# Patient Record
Sex: FEMALE | Race: WHITE | NOT HISPANIC OR LATINO | Employment: OTHER | URBAN - METROPOLITAN AREA
[De-identification: names, ages, dates, MRNs, and addresses within clinical notes are randomized per-mention and may not be internally consistent; named-entity substitution may affect disease eponyms.]

---

## 2017-02-21 ENCOUNTER — RECORDS - HEALTHEAST (OUTPATIENT)
Dept: LAB | Facility: CLINIC | Age: 77
End: 2017-02-21

## 2017-02-21 LAB
CHOLEST SERPL-MCNC: 195 MG/DL
FASTING STATUS PATIENT QL REPORTED: ABNORMAL
HDLC SERPL-MCNC: 37 MG/DL
LDLC SERPL CALC-MCNC: 91 MG/DL
TRIGL SERPL-MCNC: 335 MG/DL

## 2018-05-11 ENCOUNTER — RECORDS - HEALTHEAST (OUTPATIENT)
Dept: LAB | Facility: CLINIC | Age: 78
End: 2018-05-11

## 2018-05-11 LAB
ALBUMIN SERPL-MCNC: 3.6 G/DL (ref 3.5–5)
ALP SERPL-CCNC: 111 U/L (ref 45–120)
ALT SERPL W P-5'-P-CCNC: 22 U/L (ref 0–45)
ANION GAP SERPL CALCULATED.3IONS-SCNC: 15 MMOL/L (ref 5–18)
AST SERPL W P-5'-P-CCNC: 18 U/L (ref 0–40)
BILIRUB SERPL-MCNC: 0.3 MG/DL (ref 0–1)
BUN SERPL-MCNC: 34 MG/DL (ref 8–28)
CALCIUM SERPL-MCNC: 9.4 MG/DL (ref 8.5–10.5)
CHLORIDE BLD-SCNC: 103 MMOL/L (ref 98–107)
CHOLEST SERPL-MCNC: 222 MG/DL
CO2 SERPL-SCNC: 18 MMOL/L (ref 22–31)
CREAT SERPL-MCNC: 1.69 MG/DL (ref 0.6–1.1)
FASTING STATUS PATIENT QL REPORTED: ABNORMAL
GFR SERPL CREATININE-BSD FRML MDRD: 30 ML/MIN/1.73M2
GLUCOSE BLD-MCNC: 332 MG/DL (ref 70–125)
HDLC SERPL-MCNC: 35 MG/DL
LDLC SERPL CALC-MCNC: 114 MG/DL
POTASSIUM BLD-SCNC: 4.7 MMOL/L (ref 3.5–5)
PROT SERPL-MCNC: 7.2 G/DL (ref 6–8)
SODIUM SERPL-SCNC: 136 MMOL/L (ref 136–145)
TRIGL SERPL-MCNC: 366 MG/DL
URATE SERPL-MCNC: 10.9 MG/DL (ref 2–7.5)
VIT B12 SERPL-MCNC: 296 PG/ML (ref 213–816)

## 2018-05-14 LAB — 25(OH)D3 SERPL-MCNC: 9.7 NG/ML (ref 30–80)

## 2018-08-17 ENCOUNTER — RECORDS - HEALTHEAST (OUTPATIENT)
Dept: ADMINISTRATIVE | Facility: OTHER | Age: 78
End: 2018-08-17

## 2018-08-28 ENCOUNTER — RECORDS - HEALTHEAST (OUTPATIENT)
Dept: ADMINISTRATIVE | Facility: OTHER | Age: 78
End: 2018-08-28

## 2018-09-04 ENCOUNTER — RECORDS - HEALTHEAST (OUTPATIENT)
Dept: ADMINISTRATIVE | Facility: OTHER | Age: 78
End: 2018-09-04

## 2018-09-07 ENCOUNTER — RECORDS - HEALTHEAST (OUTPATIENT)
Dept: ADMINISTRATIVE | Facility: OTHER | Age: 78
End: 2018-09-07

## 2018-09-07 ENCOUNTER — AMBULATORY - HEALTHEAST (OUTPATIENT)
Dept: SURGERY | Facility: CLINIC | Age: 78
End: 2018-09-07

## 2018-09-07 DIAGNOSIS — C50.912 DUCTAL CARCINOMA OF LEFT BREAST (H): ICD-10-CM

## 2018-09-10 ENCOUNTER — RECORDS - HEALTHEAST (OUTPATIENT)
Dept: RADIOLOGY | Facility: CLINIC | Age: 78
End: 2018-09-10

## 2018-09-11 ENCOUNTER — HOSPITAL ENCOUNTER (OUTPATIENT)
Dept: SURGERY | Facility: CLINIC | Age: 78
Discharge: HOME | End: 2018-09-11
Attending: SPECIALIST

## 2018-09-11 DIAGNOSIS — Z17.0 MALIGNANT NEOPLASM OF CENTRAL PORTION OF LEFT BREAST IN FEMALE, ESTROGEN RECEPTOR POSITIVE (H): ICD-10-CM

## 2018-09-11 DIAGNOSIS — C50.112 MALIGNANT NEOPLASM OF CENTRAL PORTION OF LEFT BREAST IN FEMALE, ESTROGEN RECEPTOR POSITIVE (H): ICD-10-CM

## 2018-09-14 ENCOUNTER — RECORDS - HEALTHEAST (OUTPATIENT)
Dept: LAB | Facility: CLINIC | Age: 78
End: 2018-09-14

## 2018-09-14 LAB
ALBUMIN SERPL-MCNC: 3.6 G/DL (ref 3.5–5)
ALP SERPL-CCNC: 104 U/L (ref 45–120)
ALT SERPL W P-5'-P-CCNC: 12 U/L (ref 0–45)
ANION GAP SERPL CALCULATED.3IONS-SCNC: 13 MMOL/L (ref 5–18)
AST SERPL W P-5'-P-CCNC: 21 U/L (ref 0–40)
BILIRUB SERPL-MCNC: 0.3 MG/DL (ref 0–1)
BUN SERPL-MCNC: 23 MG/DL (ref 8–28)
CALCIUM SERPL-MCNC: 10.1 MG/DL (ref 8.5–10.5)
CHLORIDE BLD-SCNC: 103 MMOL/L (ref 98–107)
CO2 SERPL-SCNC: 20 MMOL/L (ref 22–31)
CREAT SERPL-MCNC: 1.59 MG/DL (ref 0.6–1.1)
GFR SERPL CREATININE-BSD FRML MDRD: 32 ML/MIN/1.73M2
GLUCOSE BLD-MCNC: 433 MG/DL (ref 70–125)
POTASSIUM BLD-SCNC: 4.5 MMOL/L (ref 3.5–5)
PROT SERPL-MCNC: 6.9 G/DL (ref 6–8)
SODIUM SERPL-SCNC: 136 MMOL/L (ref 136–145)

## 2018-09-18 ENCOUNTER — ANESTHESIA - HEALTHEAST (OUTPATIENT)
Dept: SURGERY | Facility: AMBULATORY SURGERY CENTER | Age: 78
End: 2018-09-18

## 2018-09-19 ENCOUNTER — SURGERY - HEALTHEAST (OUTPATIENT)
Dept: SURGERY | Facility: AMBULATORY SURGERY CENTER | Age: 78
End: 2018-09-19

## 2018-09-19 ENCOUNTER — HOSPITAL ENCOUNTER (OUTPATIENT)
Dept: NUCLEAR MEDICINE | Facility: HOSPITAL | Age: 78
Discharge: HOME | End: 2018-09-19
Attending: SPECIALIST | Admitting: RADIOLOGY

## 2018-09-19 ENCOUNTER — HOSPITAL ENCOUNTER (OUTPATIENT)
Dept: MAMMOGRAPHY | Facility: CLINIC | Age: 78
Discharge: HOME | End: 2018-09-19
Attending: SPECIALIST

## 2018-09-19 ENCOUNTER — HOSPITAL ENCOUNTER (OUTPATIENT)
Dept: SURGERY | Facility: AMBULATORY SURGERY CENTER | Age: 78
Discharge: HOME | End: 2018-09-19
Attending: SPECIALIST | Admitting: SPECIALIST
Payer: MEDICARE

## 2018-09-19 DIAGNOSIS — C50.112 MALIGNANT NEOPLASM OF CENTRAL PORTION OF LEFT BREAST IN FEMALE, ESTROGEN RECEPTOR POSITIVE (H): ICD-10-CM

## 2018-09-19 DIAGNOSIS — Z17.0 MALIGNANT NEOPLASM OF CENTRAL PORTION OF LEFT BREAST IN FEMALE, ESTROGEN RECEPTOR POSITIVE (H): ICD-10-CM

## 2018-09-19 LAB
GLUCOSE BLDC GLUCOMTR-MCNC: 195 MG/DL (ref 70–125)
GLUCOSE BLDC GLUCOMTR-MCNC: 236 MG/DL (ref 70–125)

## 2018-09-19 ASSESSMENT — MIFFLIN-ST. JEOR
SCORE: 1258.79
SCORE: 1258.79

## 2018-10-02 ENCOUNTER — HOSPITAL ENCOUNTER (OUTPATIENT)
Dept: SURGERY | Facility: CLINIC | Age: 78
Discharge: HOME | End: 2018-10-02
Attending: SPECIALIST

## 2018-10-02 DIAGNOSIS — Z17.0 MALIGNANT NEOPLASM OF CENTRAL PORTION OF LEFT BREAST IN FEMALE, ESTROGEN RECEPTOR POSITIVE (H): ICD-10-CM

## 2018-10-02 DIAGNOSIS — C50.112 MALIGNANT NEOPLASM OF CENTRAL PORTION OF LEFT BREAST IN FEMALE, ESTROGEN RECEPTOR POSITIVE (H): ICD-10-CM

## 2018-10-03 ENCOUNTER — COMMUNICATION - HEALTHEAST (OUTPATIENT)
Dept: ONCOLOGY | Facility: HOSPITAL | Age: 78
End: 2018-10-03

## 2018-10-11 ENCOUNTER — COMMUNICATION - HEALTHEAST (OUTPATIENT)
Dept: ADMINISTRATIVE | Facility: HOSPITAL | Age: 78
End: 2018-10-11

## 2018-10-12 ENCOUNTER — COMMUNICATION - HEALTHEAST (OUTPATIENT)
Dept: ONCOLOGY | Facility: HOSPITAL | Age: 78
End: 2018-10-12

## 2018-10-15 ENCOUNTER — COMMUNICATION - HEALTHEAST (OUTPATIENT)
Dept: ONCOLOGY | Facility: HOSPITAL | Age: 78
End: 2018-10-15

## 2018-10-26 ENCOUNTER — OFFICE VISIT - HEALTHEAST (OUTPATIENT)
Dept: ONCOLOGY | Facility: HOSPITAL | Age: 78
End: 2018-10-26

## 2018-10-26 ENCOUNTER — OFFICE VISIT - HEALTHEAST (OUTPATIENT)
Dept: RADIATION ONCOLOGY | Facility: HOSPITAL | Age: 78
End: 2018-10-26

## 2018-10-26 DIAGNOSIS — C50.112 MALIGNANT NEOPLASM OF CENTRAL PORTION OF LEFT BREAST IN FEMALE, ESTROGEN RECEPTOR POSITIVE (H): ICD-10-CM

## 2018-10-26 DIAGNOSIS — M81.0 AGE-RELATED OSTEOPOROSIS WITHOUT CURRENT PATHOLOGICAL FRACTURE: ICD-10-CM

## 2018-10-26 DIAGNOSIS — Z17.0 MALIGNANT NEOPLASM OF CENTRAL PORTION OF LEFT BREAST IN FEMALE, ESTROGEN RECEPTOR POSITIVE (H): ICD-10-CM

## 2018-10-26 RX ORDER — ALLOPURINOL 300 MG/1
1 TABLET ORAL DAILY
Status: SHIPPED | COMMUNITY
Start: 2016-09-07

## 2018-10-26 RX ORDER — NYSTATIN 100000 [USP'U]/G
1 POWDER TOPICAL DAILY PRN
Refills: 5 | Status: SHIPPED | COMMUNITY
Start: 2018-10-10

## 2018-10-26 RX ORDER — ASPIRIN 325 MG
1 TABLET ORAL DAILY
Status: ON HOLD | COMMUNITY
Start: 2007-09-24 | End: 2021-01-01

## 2018-11-15 ENCOUNTER — AMBULATORY - HEALTHEAST (OUTPATIENT)
Dept: RADIATION ONCOLOGY | Facility: HOSPITAL | Age: 78
End: 2018-11-15

## 2018-11-15 DIAGNOSIS — Z17.0 MALIGNANT NEOPLASM OF CENTRAL PORTION OF LEFT BREAST IN FEMALE, ESTROGEN RECEPTOR POSITIVE (H): ICD-10-CM

## 2018-11-15 DIAGNOSIS — C50.112 MALIGNANT NEOPLASM OF CENTRAL PORTION OF LEFT BREAST IN FEMALE, ESTROGEN RECEPTOR POSITIVE (H): ICD-10-CM

## 2018-11-26 ENCOUNTER — AMBULATORY - HEALTHEAST (OUTPATIENT)
Dept: RADIATION ONCOLOGY | Facility: HOSPITAL | Age: 78
End: 2018-11-26

## 2018-11-28 ENCOUNTER — AMBULATORY - HEALTHEAST (OUTPATIENT)
Dept: RADIATION ONCOLOGY | Facility: HOSPITAL | Age: 78
End: 2018-11-28

## 2018-11-28 ENCOUNTER — OFFICE VISIT - HEALTHEAST (OUTPATIENT)
Dept: RADIATION ONCOLOGY | Facility: HOSPITAL | Age: 78
End: 2018-11-28

## 2018-11-28 DIAGNOSIS — Z17.0 MALIGNANT NEOPLASM OF CENTRAL PORTION OF LEFT BREAST IN FEMALE, ESTROGEN RECEPTOR POSITIVE (H): ICD-10-CM

## 2018-11-28 DIAGNOSIS — C50.112 MALIGNANT NEOPLASM OF CENTRAL PORTION OF LEFT BREAST IN FEMALE, ESTROGEN RECEPTOR POSITIVE (H): ICD-10-CM

## 2018-11-29 ENCOUNTER — HOSPITAL ENCOUNTER (OUTPATIENT)
Dept: CT IMAGING | Facility: HOSPITAL | Age: 78
Setting detail: RADIATION/ONCOLOGY SERIES
Discharge: STILL ADMITTED OR ADMINISTRATIVE | End: 2018-11-29
Attending: RADIOLOGY

## 2018-11-29 ENCOUNTER — AMBULATORY - HEALTHEAST (OUTPATIENT)
Dept: RADIATION ONCOLOGY | Facility: HOSPITAL | Age: 78
End: 2018-11-29

## 2018-11-29 DIAGNOSIS — R91.8 MASS OF UPPER LOBE OF RIGHT LUNG: ICD-10-CM

## 2018-11-30 ENCOUNTER — AMBULATORY - HEALTHEAST (OUTPATIENT)
Dept: RADIATION ONCOLOGY | Facility: HOSPITAL | Age: 78
End: 2018-11-30

## 2018-11-30 DIAGNOSIS — R91.8 MASS OF UPPER LOBE OF RIGHT LUNG: ICD-10-CM

## 2018-12-03 ENCOUNTER — OFFICE VISIT - HEALTHEAST (OUTPATIENT)
Dept: RADIATION ONCOLOGY | Facility: HOSPITAL | Age: 78
End: 2018-12-03

## 2018-12-03 DIAGNOSIS — R91.8 MASS OF UPPER LOBE OF RIGHT LUNG: ICD-10-CM

## 2018-12-03 RX ORDER — CITALOPRAM HYDROBROMIDE 40 MG/1
40 TABLET ORAL DAILY
Refills: 3 | Status: SHIPPED | COMMUNITY
Start: 2018-10-10 | End: 2021-01-01

## 2018-12-11 ENCOUNTER — OFFICE VISIT - HEALTHEAST (OUTPATIENT)
Dept: ONCOLOGY | Facility: HOSPITAL | Age: 78
End: 2018-12-11

## 2018-12-11 DIAGNOSIS — Z17.0 MALIGNANT NEOPLASM OF CENTRAL PORTION OF LEFT BREAST IN FEMALE, ESTROGEN RECEPTOR POSITIVE (H): ICD-10-CM

## 2018-12-11 DIAGNOSIS — C50.112 MALIGNANT NEOPLASM OF CENTRAL PORTION OF LEFT BREAST IN FEMALE, ESTROGEN RECEPTOR POSITIVE (H): ICD-10-CM

## 2018-12-12 ENCOUNTER — HOSPITAL ENCOUNTER (OUTPATIENT)
Dept: PET IMAGING | Facility: HOSPITAL | Age: 78
Setting detail: RADIATION/ONCOLOGY SERIES
Discharge: STILL ADMITTED OR ADMINISTRATIVE | End: 2018-12-12
Attending: RADIOLOGY

## 2018-12-12 DIAGNOSIS — R91.8 MASS OF UPPER LOBE OF RIGHT LUNG: ICD-10-CM

## 2018-12-12 LAB — GLUCOSE BLDC GLUCOMTR-MCNC: 263 MG/DL

## 2018-12-13 ENCOUNTER — HOSPITAL ENCOUNTER (OUTPATIENT)
Dept: CT IMAGING | Facility: HOSPITAL | Age: 78
Setting detail: RADIATION/ONCOLOGY SERIES
Discharge: STILL ADMITTED OR ADMINISTRATIVE | End: 2018-12-13
Attending: RADIOLOGY

## 2018-12-13 ENCOUNTER — HOSPITAL ENCOUNTER (OUTPATIENT)
Dept: RADIOLOGY | Facility: HOSPITAL | Age: 78
Discharge: HOME | End: 2018-12-13
Attending: RADIOLOGY

## 2018-12-13 ENCOUNTER — HOSPITAL ENCOUNTER (OUTPATIENT)
Dept: PET IMAGING | Facility: HOSPITAL | Age: 78
Setting detail: RADIATION/ONCOLOGY SERIES
Discharge: STILL ADMITTED OR ADMINISTRATIVE | End: 2018-12-13
Attending: RADIOLOGY

## 2018-12-13 DIAGNOSIS — R91.8 MASS OF UPPER LOBE OF RIGHT LUNG: ICD-10-CM

## 2018-12-13 LAB
GLUCOSE BLDC GLUCOMTR-MCNC: 287 MG/DL
GLUCOSE BLDC GLUCOMTR-MCNC: 294 MG/DL
HGB BLD-MCNC: 7.6 G/DL (ref 12–16)
INR PPP: 0.93 (ref 0.9–1.1)
PLATELET # BLD AUTO: 271 THOU/UL (ref 140–440)

## 2018-12-13 ASSESSMENT — MIFFLIN-ST. JEOR: SCORE: 1272.4

## 2018-12-14 ENCOUNTER — COMMUNICATION - HEALTHEAST (OUTPATIENT)
Dept: RADIATION ONCOLOGY | Facility: HOSPITAL | Age: 78
End: 2018-12-14

## 2018-12-19 ENCOUNTER — HOSPITAL ENCOUNTER (OUTPATIENT)
Dept: PET IMAGING | Facility: HOSPITAL | Age: 78
Setting detail: RADIATION/ONCOLOGY SERIES
Discharge: STILL ADMITTED OR ADMINISTRATIVE | End: 2018-12-19
Attending: RADIOLOGY

## 2018-12-19 LAB
GLUCOSE BLDC GLUCOMTR-MCNC: 157 MG/DL
LAB AP CHARGES (HE HISTORICAL CONVERSION): ABNORMAL
LAB AP INITIAL CYTO EVAL (HE HISTORICAL CONVERSION): ABNORMAL
LAB MED GENERAL PATH INTERP (HE HISTORICAL CONVERSION): ABNORMAL
PATH REPORT.ADDENDUM SPEC: ABNORMAL
PATH REPORT.COMMENTS IMP SPEC: ABNORMAL
PATH REPORT.COMMENTS IMP SPEC: ABNORMAL
PATH REPORT.FINAL DX SPEC: ABNORMAL
PATH REPORT.MICROSCOPIC SPEC OTHER STN: ABNORMAL
PATH REPORT.RELEVANT HX SPEC: ABNORMAL
SPECIMEN DESCRIPTION: ABNORMAL

## 2018-12-21 ENCOUNTER — OFFICE VISIT - HEALTHEAST (OUTPATIENT)
Dept: RADIATION ONCOLOGY | Facility: HOSPITAL | Age: 78
End: 2018-12-21

## 2018-12-21 DIAGNOSIS — C50.112 MALIGNANT NEOPLASM OF CENTRAL PORTION OF LEFT BREAST IN FEMALE, ESTROGEN RECEPTOR POSITIVE (H): ICD-10-CM

## 2018-12-21 DIAGNOSIS — Z17.0 MALIGNANT NEOPLASM OF CENTRAL PORTION OF LEFT BREAST IN FEMALE, ESTROGEN RECEPTOR POSITIVE (H): ICD-10-CM

## 2018-12-21 DIAGNOSIS — C34.11 MALIGNANT NEOPLASM OF UPPER LOBE OF RIGHT LUNG (H): ICD-10-CM

## 2018-12-27 ENCOUNTER — AMBULATORY - HEALTHEAST (OUTPATIENT)
Dept: RADIATION ONCOLOGY | Facility: HOSPITAL | Age: 78
End: 2018-12-27

## 2018-12-27 ENCOUNTER — HOSPITAL ENCOUNTER (OUTPATIENT)
Dept: RESPIRATORY THERAPY | Facility: HOSPITAL | Age: 78
Discharge: HOME | End: 2018-12-27

## 2018-12-27 DIAGNOSIS — Z17.0 MALIGNANT NEOPLASM OF CENTRAL PORTION OF LEFT BREAST IN FEMALE, ESTROGEN RECEPTOR POSITIVE (H): ICD-10-CM

## 2018-12-27 DIAGNOSIS — C34.11 MALIGNANT NEOPLASM OF UPPER LOBE OF RIGHT LUNG (H): ICD-10-CM

## 2018-12-27 DIAGNOSIS — C50.112 MALIGNANT NEOPLASM OF CENTRAL PORTION OF LEFT BREAST IN FEMALE, ESTROGEN RECEPTOR POSITIVE (H): ICD-10-CM

## 2018-12-31 ENCOUNTER — OFFICE VISIT - HEALTHEAST (OUTPATIENT)
Dept: RADIATION ONCOLOGY | Facility: HOSPITAL | Age: 78
End: 2018-12-31

## 2018-12-31 ENCOUNTER — AMBULATORY - HEALTHEAST (OUTPATIENT)
Dept: RADIATION ONCOLOGY | Facility: HOSPITAL | Age: 78
End: 2018-12-31

## 2018-12-31 DIAGNOSIS — Z17.0 MALIGNANT NEOPLASM OF CENTRAL PORTION OF LEFT BREAST IN FEMALE, ESTROGEN RECEPTOR POSITIVE (H): ICD-10-CM

## 2018-12-31 DIAGNOSIS — C50.112 MALIGNANT NEOPLASM OF CENTRAL PORTION OF LEFT BREAST IN FEMALE, ESTROGEN RECEPTOR POSITIVE (H): ICD-10-CM

## 2019-01-02 ENCOUNTER — AMBULATORY - HEALTHEAST (OUTPATIENT)
Dept: RADIATION ONCOLOGY | Facility: HOSPITAL | Age: 79
End: 2019-01-02

## 2019-01-03 ENCOUNTER — OFFICE VISIT - HEALTHEAST (OUTPATIENT)
Dept: PULMONOLOGY | Facility: OTHER | Age: 79
End: 2019-01-03

## 2019-01-03 ENCOUNTER — AMBULATORY - HEALTHEAST (OUTPATIENT)
Dept: RADIATION ONCOLOGY | Facility: HOSPITAL | Age: 79
End: 2019-01-03

## 2019-01-03 DIAGNOSIS — C34.11 MALIGNANT NEOPLASM OF UPPER LOBE OF RIGHT LUNG (H): ICD-10-CM

## 2019-01-03 ASSESSMENT — MIFFLIN-ST. JEOR: SCORE: 1217.97

## 2019-01-04 ENCOUNTER — COMMUNICATION - HEALTHEAST (OUTPATIENT)
Dept: PULMONOLOGY | Facility: OTHER | Age: 79
End: 2019-01-04

## 2019-01-04 ENCOUNTER — AMBULATORY - HEALTHEAST (OUTPATIENT)
Dept: RADIATION ONCOLOGY | Facility: HOSPITAL | Age: 79
End: 2019-01-04

## 2019-01-07 ENCOUNTER — OFFICE VISIT - HEALTHEAST (OUTPATIENT)
Dept: RADIATION ONCOLOGY | Facility: HOSPITAL | Age: 79
End: 2019-01-07

## 2019-01-07 ENCOUNTER — AMBULATORY - HEALTHEAST (OUTPATIENT)
Dept: RADIATION ONCOLOGY | Facility: HOSPITAL | Age: 79
End: 2019-01-07

## 2019-01-07 DIAGNOSIS — Z17.0 MALIGNANT NEOPLASM OF CENTRAL PORTION OF LEFT BREAST IN FEMALE, ESTROGEN RECEPTOR POSITIVE (H): ICD-10-CM

## 2019-01-07 DIAGNOSIS — C50.112 MALIGNANT NEOPLASM OF CENTRAL PORTION OF LEFT BREAST IN FEMALE, ESTROGEN RECEPTOR POSITIVE (H): ICD-10-CM

## 2019-01-08 ENCOUNTER — AMBULATORY - HEALTHEAST (OUTPATIENT)
Dept: RADIATION ONCOLOGY | Facility: HOSPITAL | Age: 79
End: 2019-01-08

## 2019-01-09 ENCOUNTER — COMMUNICATION - HEALTHEAST (OUTPATIENT)
Dept: PULMONOLOGY | Facility: OTHER | Age: 79
End: 2019-01-09

## 2019-01-09 ENCOUNTER — AMBULATORY - HEALTHEAST (OUTPATIENT)
Dept: ONCOLOGY | Facility: CLINIC | Age: 79
End: 2019-01-09

## 2019-01-09 ENCOUNTER — AMBULATORY - HEALTHEAST (OUTPATIENT)
Dept: RADIATION ONCOLOGY | Facility: HOSPITAL | Age: 79
End: 2019-01-09

## 2019-01-10 ENCOUNTER — AMBULATORY - HEALTHEAST (OUTPATIENT)
Dept: RADIATION ONCOLOGY | Facility: HOSPITAL | Age: 79
End: 2019-01-10

## 2019-01-10 ENCOUNTER — COMMUNICATION - HEALTHEAST (OUTPATIENT)
Dept: ONCOLOGY | Facility: HOSPITAL | Age: 79
End: 2019-01-10

## 2019-01-10 ENCOUNTER — COMMUNICATION - HEALTHEAST (OUTPATIENT)
Dept: PULMONOLOGY | Facility: OTHER | Age: 79
End: 2019-01-10

## 2019-01-10 ENCOUNTER — OFFICE VISIT - HEALTHEAST (OUTPATIENT)
Dept: ONCOLOGY | Facility: HOSPITAL | Age: 79
End: 2019-01-10

## 2019-01-10 DIAGNOSIS — Z17.0 MALIGNANT NEOPLASM OF CENTRAL PORTION OF LEFT BREAST IN FEMALE, ESTROGEN RECEPTOR POSITIVE (H): ICD-10-CM

## 2019-01-10 DIAGNOSIS — C50.112 MALIGNANT NEOPLASM OF CENTRAL PORTION OF LEFT BREAST IN FEMALE, ESTROGEN RECEPTOR POSITIVE (H): ICD-10-CM

## 2019-01-10 DIAGNOSIS — R91.8 MASS OF UPPER LOBE OF RIGHT LUNG: ICD-10-CM

## 2019-01-14 ENCOUNTER — RECORDS - HEALTHEAST (OUTPATIENT)
Dept: ADMINISTRATIVE | Facility: OTHER | Age: 79
End: 2019-01-14

## 2019-01-14 ENCOUNTER — AMBULATORY - HEALTHEAST (OUTPATIENT)
Dept: RADIATION ONCOLOGY | Facility: HOSPITAL | Age: 79
End: 2019-01-14

## 2019-01-14 ENCOUNTER — OFFICE VISIT - HEALTHEAST (OUTPATIENT)
Dept: RADIATION ONCOLOGY | Facility: HOSPITAL | Age: 79
End: 2019-01-14

## 2019-01-14 DIAGNOSIS — Z17.0 MALIGNANT NEOPLASM OF CENTRAL PORTION OF LEFT BREAST IN FEMALE, ESTROGEN RECEPTOR POSITIVE (H): ICD-10-CM

## 2019-01-14 DIAGNOSIS — C50.112 MALIGNANT NEOPLASM OF CENTRAL PORTION OF LEFT BREAST IN FEMALE, ESTROGEN RECEPTOR POSITIVE (H): ICD-10-CM

## 2019-01-15 ENCOUNTER — AMBULATORY - HEALTHEAST (OUTPATIENT)
Dept: RADIATION ONCOLOGY | Facility: HOSPITAL | Age: 79
End: 2019-01-15

## 2019-01-16 ENCOUNTER — ANESTHESIA - HEALTHEAST (OUTPATIENT)
Dept: SURGERY | Facility: CLINIC | Age: 79
End: 2019-01-16

## 2019-01-16 ENCOUNTER — COMMUNICATION - HEALTHEAST (OUTPATIENT)
Dept: ONCOLOGY | Facility: HOSPITAL | Age: 79
End: 2019-01-16

## 2019-01-16 ENCOUNTER — RECORDS - HEALTHEAST (OUTPATIENT)
Dept: ADMINISTRATIVE | Facility: OTHER | Age: 79
End: 2019-01-16

## 2019-01-16 ENCOUNTER — AMBULATORY - HEALTHEAST (OUTPATIENT)
Dept: RADIATION ONCOLOGY | Facility: HOSPITAL | Age: 79
End: 2019-01-16

## 2019-01-17 ENCOUNTER — AMBULATORY - HEALTHEAST (OUTPATIENT)
Dept: RADIATION ONCOLOGY | Facility: HOSPITAL | Age: 79
End: 2019-01-17

## 2019-01-17 ENCOUNTER — AMBULATORY - HEALTHEAST (OUTPATIENT)
Dept: ONCOLOGY | Facility: HOSPITAL | Age: 79
End: 2019-01-17

## 2019-01-17 DIAGNOSIS — Z17.0 MALIGNANT NEOPLASM OF CENTRAL PORTION OF LEFT BREAST IN FEMALE, ESTROGEN RECEPTOR POSITIVE (H): ICD-10-CM

## 2019-01-17 DIAGNOSIS — C50.112 MALIGNANT NEOPLASM OF CENTRAL PORTION OF LEFT BREAST IN FEMALE, ESTROGEN RECEPTOR POSITIVE (H): ICD-10-CM

## 2019-01-17 DIAGNOSIS — D64.9 ANEMIA, UNSPECIFIED TYPE: ICD-10-CM

## 2019-01-18 ENCOUNTER — COMMUNICATION - HEALTHEAST (OUTPATIENT)
Dept: ONCOLOGY | Facility: HOSPITAL | Age: 79
End: 2019-01-18

## 2019-01-18 ENCOUNTER — AMBULATORY - HEALTHEAST (OUTPATIENT)
Dept: RADIATION ONCOLOGY | Facility: HOSPITAL | Age: 79
End: 2019-01-18

## 2019-01-18 ENCOUNTER — AMBULATORY - HEALTHEAST (OUTPATIENT)
Dept: INFUSION THERAPY | Facility: HOSPITAL | Age: 79
End: 2019-01-18

## 2019-01-18 DIAGNOSIS — D64.9 ANEMIA, UNSPECIFIED TYPE: ICD-10-CM

## 2019-01-18 LAB
ALBUMIN SERPL-MCNC: 3.5 G/DL (ref 3.5–5)
ALP SERPL-CCNC: 82 U/L (ref 45–120)
ALT SERPL W P-5'-P-CCNC: <9 U/L (ref 0–45)
ANION GAP SERPL CALCULATED.3IONS-SCNC: 8 MMOL/L (ref 5–18)
AST SERPL W P-5'-P-CCNC: 9 U/L (ref 0–40)
BASOPHILS # BLD AUTO: 0 THOU/UL (ref 0–0.2)
BASOPHILS NFR BLD AUTO: 0 % (ref 0–2)
BILIRUB SERPL-MCNC: 0.2 MG/DL (ref 0–1)
BUN SERPL-MCNC: 24 MG/DL (ref 8–28)
CALCIUM SERPL-MCNC: 8.8 MG/DL (ref 8.5–10.5)
CHLORIDE BLD-SCNC: 107 MMOL/L (ref 98–107)
CO2 SERPL-SCNC: 21 MMOL/L (ref 22–31)
CREAT SERPL-MCNC: 1.46 MG/DL (ref 0.6–1.1)
EOSINOPHIL COUNT (ABSOLUTE): 0.2 THOU/UL (ref 0–0.4)
EOSINOPHIL NFR BLD AUTO: 3 % (ref 0–6)
ERYTHROCYTE [DISTWIDTH] IN BLOOD BY AUTOMATED COUNT: 15.7 % (ref 11–14.5)
ERYTHROCYTE [DISTWIDTH] IN BLOOD BY AUTOMATED COUNT: 15.7 % (ref 11–14.5)
FERRITIN SERPL-MCNC: 7 NG/ML (ref 10–130)
FOLATE SERPL-MCNC: 11.5 NG/ML
GFR SERPL CREATININE-BSD FRML MDRD: 35 ML/MIN/1.73M2
GLUCOSE BLD-MCNC: 339 MG/DL (ref 70–125)
HAPTOGLOB SERPL-MCNC: 147 MG/DL (ref 33–171)
HCT VFR BLD AUTO: 21.9 % (ref 35–47)
HCT VFR BLD AUTO: 21.9 % (ref 35–47)
HGB BLD-MCNC: 6.2 G/DL (ref 12–16)
HGB BLD-MCNC: 6.2 G/DL (ref 12–16)
IRON SATN MFR SERPL: 3 % (ref 20–50)
IRON SERPL-MCNC: 13 UG/DL (ref 42–175)
LAB AP CHARGES (HE HISTORICAL CONVERSION): NORMAL
LDH SERPL L TO P-CCNC: 167 U/L (ref 125–220)
LYMPHOCYTES # BLD AUTO: 0.9 THOU/UL (ref 0.8–4.4)
LYMPHOCYTES NFR BLD AUTO: 13 % (ref 20–40)
MCH RBC QN AUTO: 24.2 PG (ref 27–34)
MCH RBC QN AUTO: 24.2 PG (ref 27–34)
MCHC RBC AUTO-ENTMCNC: 28.3 G/DL (ref 32–36)
MCHC RBC AUTO-ENTMCNC: 28.3 G/DL (ref 32–36)
MCV RBC AUTO: 86 FL (ref 80–100)
MCV RBC AUTO: 86 FL (ref 80–100)
MONOCYTES # BLD AUTO: 0.3 THOU/UL (ref 0–0.9)
MONOCYTES NFR BLD AUTO: 5 % (ref 2–10)
OVALOCYTES: ABNORMAL
PATH REPORT.COMMENTS IMP SPEC: NORMAL
PATH REPORT.COMMENTS IMP SPEC: NORMAL
PATH REPORT.FINAL DX SPEC: NORMAL
PATH REPORT.MICROSCOPIC SPEC OTHER STN: ABNORMAL
PATH REPORT.RELEVANT HX SPEC: NORMAL
PLAT MORPH BLD: NORMAL
PLATELET # BLD AUTO: 240 THOU/UL (ref 140–440)
PLATELET # BLD AUTO: 240 THOU/UL (ref 140–440)
PMV BLD AUTO: 10.5 FL (ref 8.5–12.5)
PMV BLD AUTO: 10.5 FL (ref 8.5–12.5)
POLYCHROMASIA BLD QL SMEAR: ABNORMAL
POTASSIUM BLD-SCNC: 5.1 MMOL/L (ref 3.5–5)
PROT SERPL-MCNC: 6.4 G/DL (ref 6–8)
RBC # BLD AUTO: 2.56 MILL/UL (ref 3.8–5.4)
RBC # BLD AUTO: 2.56 MILL/UL (ref 3.8–5.4)
RETICS # AUTO: 0.05 MILL/UL (ref 0.01–0.11)
SCHISTOCYTES: ABNORMAL
SODIUM SERPL-SCNC: 136 MMOL/L (ref 136–145)
TEAR DROP: ABNORMAL
TIBC SERPL-MCNC: 378 UG/DL (ref 313–563)
TOTAL NEUTROPHILS-ABS(DIFF): 5.4 THOU/UL (ref 2–7.7)
TOTAL NEUTROPHILS-REL(DIFF): 79 % (ref 50–70)
TRANSFERRIN SERPL-MCNC: 302 MG/DL (ref 212–360)
VIT B12 SERPL-MCNC: 206 PG/ML (ref 213–816)
WBC: 6.8 THOU/UL (ref 4–11)
WBC: 6.8 THOU/UL (ref 4–11)

## 2019-01-21 ENCOUNTER — AMBULATORY - HEALTHEAST (OUTPATIENT)
Dept: RADIATION ONCOLOGY | Facility: HOSPITAL | Age: 79
End: 2019-01-21

## 2019-01-21 ENCOUNTER — OFFICE VISIT - HEALTHEAST (OUTPATIENT)
Dept: RADIATION ONCOLOGY | Facility: HOSPITAL | Age: 79
End: 2019-01-21

## 2019-01-21 DIAGNOSIS — C50.112 MALIGNANT NEOPLASM OF CENTRAL PORTION OF LEFT BREAST IN FEMALE, ESTROGEN RECEPTOR POSITIVE (H): ICD-10-CM

## 2019-01-21 DIAGNOSIS — Z17.0 MALIGNANT NEOPLASM OF CENTRAL PORTION OF LEFT BREAST IN FEMALE, ESTROGEN RECEPTOR POSITIVE (H): ICD-10-CM

## 2019-01-22 ENCOUNTER — AMBULATORY - HEALTHEAST (OUTPATIENT)
Dept: RADIATION ONCOLOGY | Facility: HOSPITAL | Age: 79
End: 2019-01-22

## 2019-01-22 ENCOUNTER — OFFICE VISIT - HEALTHEAST (OUTPATIENT)
Dept: ONCOLOGY | Facility: HOSPITAL | Age: 79
End: 2019-01-22

## 2019-01-22 ENCOUNTER — AMBULATORY - HEALTHEAST (OUTPATIENT)
Dept: INFUSION THERAPY | Facility: HOSPITAL | Age: 79
End: 2019-01-22

## 2019-01-22 ENCOUNTER — INFUSION - HEALTHEAST (OUTPATIENT)
Dept: INFUSION THERAPY | Facility: HOSPITAL | Age: 79
End: 2019-01-22

## 2019-01-22 DIAGNOSIS — E53.8 VITAMIN B12 DEFICIENCY (NON ANEMIC): ICD-10-CM

## 2019-01-22 DIAGNOSIS — R91.8 MASS OF UPPER LOBE OF RIGHT LUNG: ICD-10-CM

## 2019-01-22 DIAGNOSIS — C50.112 MALIGNANT NEOPLASM OF CENTRAL PORTION OF LEFT BREAST IN FEMALE, ESTROGEN RECEPTOR POSITIVE (H): ICD-10-CM

## 2019-01-22 DIAGNOSIS — D50.9 IRON DEFICIENCY ANEMIA, UNSPECIFIED IRON DEFICIENCY ANEMIA TYPE: ICD-10-CM

## 2019-01-22 DIAGNOSIS — Z17.0 MALIGNANT NEOPLASM OF CENTRAL PORTION OF LEFT BREAST IN FEMALE, ESTROGEN RECEPTOR POSITIVE (H): ICD-10-CM

## 2019-01-22 LAB
ABO/RH(D): NORMAL
ANTIBODY SCREEN: NEGATIVE

## 2019-01-23 ENCOUNTER — AMBULATORY - HEALTHEAST (OUTPATIENT)
Dept: RADIATION ONCOLOGY | Facility: HOSPITAL | Age: 79
End: 2019-01-23

## 2019-01-24 ENCOUNTER — INFUSION - HEALTHEAST (OUTPATIENT)
Dept: INFUSION THERAPY | Facility: HOSPITAL | Age: 79
End: 2019-01-24

## 2019-01-24 ENCOUNTER — AMBULATORY - HEALTHEAST (OUTPATIENT)
Dept: RADIATION ONCOLOGY | Facility: HOSPITAL | Age: 79
End: 2019-01-24

## 2019-01-24 DIAGNOSIS — D50.9 IRON DEFICIENCY ANEMIA, UNSPECIFIED IRON DEFICIENCY ANEMIA TYPE: ICD-10-CM

## 2019-01-25 ENCOUNTER — AMBULATORY - HEALTHEAST (OUTPATIENT)
Dept: RADIATION ONCOLOGY | Facility: HOSPITAL | Age: 79
End: 2019-01-25

## 2019-01-25 ENCOUNTER — AMBULATORY - HEALTHEAST (OUTPATIENT)
Dept: PULMONOLOGY | Facility: OTHER | Age: 79
End: 2019-01-25

## 2019-01-25 LAB
BLD PROD TYP BPU: NORMAL
BLD PROD TYP BPU: NORMAL
BLOOD EXPIRATION DATE: NORMAL
BLOOD EXPIRATION DATE: NORMAL
BLOOD TYPE: 1700
BLOOD TYPE: 7300
CODING SYSTEM: NORMAL
CODING SYSTEM: NORMAL
COMPONENT (HISTORICAL CONVERSION): NORMAL
COMPONENT (HISTORICAL CONVERSION): NORMAL
CROSSMATCH: NORMAL
CROSSMATCH: NORMAL
ISSUE DATE AND TIME: NORMAL
ISSUE DATE AND TIME: NORMAL
STATUS (HISTORICAL CONVERSION): NORMAL
STATUS (HISTORICAL CONVERSION): NORMAL
UNIT ABO/RH (HISTORICAL CONVERSION): NORMAL
UNIT ABO/RH (HISTORICAL CONVERSION): NORMAL
UNIT NUMBER: NORMAL
UNIT NUMBER: NORMAL

## 2019-01-29 ENCOUNTER — INFUSION - HEALTHEAST (OUTPATIENT)
Dept: INFUSION THERAPY | Facility: HOSPITAL | Age: 79
End: 2019-01-29

## 2019-01-29 ENCOUNTER — AMBULATORY - HEALTHEAST (OUTPATIENT)
Dept: RADIATION ONCOLOGY | Facility: HOSPITAL | Age: 79
End: 2019-01-29

## 2019-01-29 DIAGNOSIS — E53.8 VITAMIN B12 DEFICIENCY (NON ANEMIC): ICD-10-CM

## 2019-01-31 ENCOUNTER — AMBULATORY - HEALTHEAST (OUTPATIENT)
Dept: PULMONOLOGY | Facility: OTHER | Age: 79
End: 2019-01-31

## 2019-01-31 ENCOUNTER — AMBULATORY - HEALTHEAST (OUTPATIENT)
Dept: ONCOLOGY | Facility: CLINIC | Age: 79
End: 2019-01-31

## 2019-02-01 ENCOUNTER — AMBULATORY - HEALTHEAST (OUTPATIENT)
Dept: RADIATION ONCOLOGY | Facility: HOSPITAL | Age: 79
End: 2019-02-01

## 2019-02-04 ENCOUNTER — AMBULATORY - HEALTHEAST (OUTPATIENT)
Dept: RADIATION ONCOLOGY | Facility: HOSPITAL | Age: 79
End: 2019-02-04

## 2019-02-04 ENCOUNTER — OFFICE VISIT - HEALTHEAST (OUTPATIENT)
Dept: RADIATION ONCOLOGY | Facility: HOSPITAL | Age: 79
End: 2019-02-04

## 2019-02-04 DIAGNOSIS — Z17.0 MALIGNANT NEOPLASM OF CENTRAL PORTION OF LEFT BREAST IN FEMALE, ESTROGEN RECEPTOR POSITIVE (H): ICD-10-CM

## 2019-02-04 DIAGNOSIS — C34.11 MALIGNANT NEOPLASM OF UPPER LOBE OF RIGHT LUNG (H): ICD-10-CM

## 2019-02-04 DIAGNOSIS — C50.112 MALIGNANT NEOPLASM OF CENTRAL PORTION OF LEFT BREAST IN FEMALE, ESTROGEN RECEPTOR POSITIVE (H): ICD-10-CM

## 2019-02-05 ENCOUNTER — COMMUNICATION - HEALTHEAST (OUTPATIENT)
Dept: ADMINISTRATIVE | Facility: HOSPITAL | Age: 79
End: 2019-02-05

## 2019-02-07 ENCOUNTER — OFFICE VISIT - HEALTHEAST (OUTPATIENT)
Dept: ONCOLOGY | Facility: HOSPITAL | Age: 79
End: 2019-02-07

## 2019-02-07 ENCOUNTER — AMBULATORY - HEALTHEAST (OUTPATIENT)
Dept: INFUSION THERAPY | Facility: HOSPITAL | Age: 79
End: 2019-02-07

## 2019-02-07 ENCOUNTER — INFUSION - HEALTHEAST (OUTPATIENT)
Dept: INFUSION THERAPY | Facility: HOSPITAL | Age: 79
End: 2019-02-07

## 2019-02-07 ENCOUNTER — AMBULATORY - HEALTHEAST (OUTPATIENT)
Dept: RADIATION ONCOLOGY | Facility: HOSPITAL | Age: 79
End: 2019-02-07

## 2019-02-07 DIAGNOSIS — E53.8 VITAMIN B12 DEFICIENCY (NON ANEMIC): ICD-10-CM

## 2019-02-07 DIAGNOSIS — C34.11 MALIGNANT NEOPLASM OF UPPER LOBE OF RIGHT LUNG (H): ICD-10-CM

## 2019-02-07 DIAGNOSIS — D50.8 OTHER IRON DEFICIENCY ANEMIA: ICD-10-CM

## 2019-02-07 DIAGNOSIS — C50.112 MALIGNANT NEOPLASM OF CENTRAL PORTION OF LEFT BREAST IN FEMALE, ESTROGEN RECEPTOR POSITIVE (H): ICD-10-CM

## 2019-02-07 DIAGNOSIS — Z17.0 MALIGNANT NEOPLASM OF CENTRAL PORTION OF LEFT BREAST IN FEMALE, ESTROGEN RECEPTOR POSITIVE (H): ICD-10-CM

## 2019-02-07 DIAGNOSIS — R91.8 MASS OF UPPER LOBE OF RIGHT LUNG: ICD-10-CM

## 2019-02-07 DIAGNOSIS — L03.313 CELLULITIS OF CHEST WALL: ICD-10-CM

## 2019-02-07 LAB
BASOPHILS # BLD AUTO: 0 THOU/UL (ref 0–0.2)
BASOPHILS NFR BLD AUTO: 0 % (ref 0–2)
EOSINOPHIL # BLD AUTO: 0.1 THOU/UL (ref 0–0.4)
EOSINOPHIL NFR BLD AUTO: 1 % (ref 0–6)
ERYTHROCYTE [DISTWIDTH] IN BLOOD BY AUTOMATED COUNT: 18.5 % (ref 11–14.5)
HCT VFR BLD AUTO: 32.4 % (ref 35–47)
HGB BLD-MCNC: 9.7 G/DL (ref 12–16)
LYMPHOCYTES # BLD AUTO: 0.9 THOU/UL (ref 0.8–4.4)
LYMPHOCYTES NFR BLD AUTO: 10 % (ref 20–40)
MCH RBC QN AUTO: 26.1 PG (ref 27–34)
MCHC RBC AUTO-ENTMCNC: 29.9 G/DL (ref 32–36)
MCV RBC AUTO: 87 FL (ref 80–100)
MONOCYTES # BLD AUTO: 0.5 THOU/UL (ref 0–0.9)
MONOCYTES NFR BLD AUTO: 6 % (ref 2–10)
NEUTROPHILS # BLD AUTO: 7 THOU/UL (ref 2–7.7)
NEUTROPHILS NFR BLD AUTO: 82 % (ref 50–70)
PLATELET # BLD AUTO: 205 THOU/UL (ref 140–440)
PMV BLD AUTO: 10.5 FL (ref 8.5–12.5)
RBC # BLD AUTO: 3.71 MILL/UL (ref 3.8–5.4)
WBC: 8.6 THOU/UL (ref 4–11)

## 2019-02-08 ENCOUNTER — AMBULATORY - HEALTHEAST (OUTPATIENT)
Dept: RADIATION ONCOLOGY | Facility: HOSPITAL | Age: 79
End: 2019-02-08

## 2019-02-10 ENCOUNTER — AMBULATORY - HEALTHEAST (OUTPATIENT)
Dept: ONCOLOGY | Facility: HOSPITAL | Age: 79
End: 2019-02-10

## 2019-02-11 ENCOUNTER — COMMUNICATION - HEALTHEAST (OUTPATIENT)
Dept: RADIATION ONCOLOGY | Facility: HOSPITAL | Age: 79
End: 2019-02-11

## 2019-02-12 ENCOUNTER — AMBULATORY - HEALTHEAST (OUTPATIENT)
Dept: RADIATION ONCOLOGY | Facility: HOSPITAL | Age: 79
End: 2019-02-12

## 2019-02-13 ENCOUNTER — COMMUNICATION - HEALTHEAST (OUTPATIENT)
Dept: RADIATION ONCOLOGY | Facility: CLINIC | Age: 79
End: 2019-02-13

## 2019-02-14 ENCOUNTER — AMBULATORY - HEALTHEAST (OUTPATIENT)
Dept: RADIATION ONCOLOGY | Facility: HOSPITAL | Age: 79
End: 2019-02-14

## 2019-02-14 ENCOUNTER — INFUSION - HEALTHEAST (OUTPATIENT)
Dept: INFUSION THERAPY | Facility: HOSPITAL | Age: 79
End: 2019-02-14

## 2019-02-14 DIAGNOSIS — C50.112 MALIGNANT NEOPLASM OF CENTRAL PORTION OF LEFT BREAST IN FEMALE, ESTROGEN RECEPTOR POSITIVE (H): ICD-10-CM

## 2019-02-14 DIAGNOSIS — L08.9 LOCAL INFECTION OF SKIN AND SUBCUTANEOUS TISSUE: ICD-10-CM

## 2019-02-14 DIAGNOSIS — Z17.0 MALIGNANT NEOPLASM OF CENTRAL PORTION OF LEFT BREAST IN FEMALE, ESTROGEN RECEPTOR POSITIVE (H): ICD-10-CM

## 2019-02-14 DIAGNOSIS — E53.8 VITAMIN B12 DEFICIENCY (NON ANEMIC): ICD-10-CM

## 2019-02-15 ENCOUNTER — HOSPITAL ENCOUNTER (OUTPATIENT)
Dept: SURGERY | Facility: CLINIC | Age: 79
Discharge: HOME | End: 2019-02-15
Attending: SPECIALIST | Admitting: SPECIALIST

## 2019-02-15 DIAGNOSIS — L72.3 INFECTED SEBACEOUS CYST: ICD-10-CM

## 2019-02-15 DIAGNOSIS — L08.9 INFECTED SEBACEOUS CYST: ICD-10-CM

## 2019-02-15 ASSESSMENT — MIFFLIN-ST. JEOR: SCORE: 1254.26

## 2019-02-17 ENCOUNTER — HOME CARE/HOSPICE - HEALTHEAST (OUTPATIENT)
Dept: HOME HEALTH SERVICES | Facility: HOME HEALTH | Age: 79
End: 2019-02-17

## 2019-02-21 ENCOUNTER — COMMUNICATION - HEALTHEAST (OUTPATIENT)
Dept: RADIATION ONCOLOGY | Facility: HOSPITAL | Age: 79
End: 2019-02-21

## 2019-02-21 ENCOUNTER — HOME CARE/HOSPICE - HEALTHEAST (OUTPATIENT)
Dept: HOME HEALTH SERVICES | Facility: HOME HEALTH | Age: 79
End: 2019-02-21

## 2019-02-23 ENCOUNTER — HOME CARE/HOSPICE - HEALTHEAST (OUTPATIENT)
Dept: HOME HEALTH SERVICES | Facility: HOME HEALTH | Age: 79
End: 2019-02-23

## 2019-02-24 ENCOUNTER — HOME CARE/HOSPICE - HEALTHEAST (OUTPATIENT)
Dept: HOME HEALTH SERVICES | Facility: HOME HEALTH | Age: 79
End: 2019-02-24

## 2019-02-25 ENCOUNTER — HOME CARE/HOSPICE - HEALTHEAST (OUTPATIENT)
Dept: HOME HEALTH SERVICES | Facility: HOME HEALTH | Age: 79
End: 2019-02-25

## 2019-02-26 ENCOUNTER — HOME CARE/HOSPICE - HEALTHEAST (OUTPATIENT)
Dept: HOME HEALTH SERVICES | Facility: HOME HEALTH | Age: 79
End: 2019-02-26

## 2019-02-27 ENCOUNTER — HOME CARE/HOSPICE - HEALTHEAST (OUTPATIENT)
Dept: HOME HEALTH SERVICES | Facility: HOME HEALTH | Age: 79
End: 2019-02-27

## 2019-02-28 ENCOUNTER — HOME CARE/HOSPICE - HEALTHEAST (OUTPATIENT)
Dept: HOME HEALTH SERVICES | Facility: HOME HEALTH | Age: 79
End: 2019-02-28

## 2019-03-01 ENCOUNTER — HOME CARE/HOSPICE - HEALTHEAST (OUTPATIENT)
Dept: HOME HEALTH SERVICES | Facility: HOME HEALTH | Age: 79
End: 2019-03-01

## 2019-03-02 ENCOUNTER — HOME CARE/HOSPICE - HEALTHEAST (OUTPATIENT)
Dept: HOME HEALTH SERVICES | Facility: HOME HEALTH | Age: 79
End: 2019-03-02

## 2019-03-03 ENCOUNTER — HOME CARE/HOSPICE - HEALTHEAST (OUTPATIENT)
Dept: HOME HEALTH SERVICES | Facility: HOME HEALTH | Age: 79
End: 2019-03-03

## 2019-03-04 ENCOUNTER — HOME CARE/HOSPICE - HEALTHEAST (OUTPATIENT)
Dept: HOME HEALTH SERVICES | Facility: HOME HEALTH | Age: 79
End: 2019-03-04

## 2019-03-05 ENCOUNTER — HOME CARE/HOSPICE - HEALTHEAST (OUTPATIENT)
Dept: HOME HEALTH SERVICES | Facility: HOME HEALTH | Age: 79
End: 2019-03-05

## 2019-03-06 ENCOUNTER — HOME CARE/HOSPICE - HEALTHEAST (OUTPATIENT)
Dept: HOME HEALTH SERVICES | Facility: HOME HEALTH | Age: 79
End: 2019-03-06

## 2019-03-07 ENCOUNTER — HOME CARE/HOSPICE - HEALTHEAST (OUTPATIENT)
Dept: HOME HEALTH SERVICES | Facility: HOME HEALTH | Age: 79
End: 2019-03-07

## 2019-03-08 ENCOUNTER — HOME CARE/HOSPICE - HEALTHEAST (OUTPATIENT)
Dept: HOME HEALTH SERVICES | Facility: HOME HEALTH | Age: 79
End: 2019-03-08

## 2019-03-09 ENCOUNTER — HOME CARE/HOSPICE - HEALTHEAST (OUTPATIENT)
Dept: HOME HEALTH SERVICES | Facility: HOME HEALTH | Age: 79
End: 2019-03-09

## 2019-03-10 ENCOUNTER — HOME CARE/HOSPICE - HEALTHEAST (OUTPATIENT)
Dept: HOME HEALTH SERVICES | Facility: HOME HEALTH | Age: 79
End: 2019-03-10

## 2019-03-11 ENCOUNTER — HOME CARE/HOSPICE - HEALTHEAST (OUTPATIENT)
Dept: HOME HEALTH SERVICES | Facility: HOME HEALTH | Age: 79
End: 2019-03-11

## 2019-03-12 ENCOUNTER — HOME CARE/HOSPICE - HEALTHEAST (OUTPATIENT)
Dept: HOME HEALTH SERVICES | Facility: HOME HEALTH | Age: 79
End: 2019-03-12

## 2019-03-13 ENCOUNTER — HOME CARE/HOSPICE - HEALTHEAST (OUTPATIENT)
Dept: HOME HEALTH SERVICES | Facility: HOME HEALTH | Age: 79
End: 2019-03-13

## 2019-03-14 ENCOUNTER — HOME CARE/HOSPICE - HEALTHEAST (OUTPATIENT)
Dept: HOME HEALTH SERVICES | Facility: HOME HEALTH | Age: 79
End: 2019-03-14

## 2019-03-14 ENCOUNTER — COMMUNICATION - HEALTHEAST (OUTPATIENT)
Dept: RADIATION ONCOLOGY | Facility: HOSPITAL | Age: 79
End: 2019-03-14

## 2019-03-15 ENCOUNTER — HOME CARE/HOSPICE - HEALTHEAST (OUTPATIENT)
Dept: HOME HEALTH SERVICES | Facility: HOME HEALTH | Age: 79
End: 2019-03-15

## 2019-03-16 ENCOUNTER — HOME CARE/HOSPICE - HEALTHEAST (OUTPATIENT)
Dept: HOME HEALTH SERVICES | Facility: HOME HEALTH | Age: 79
End: 2019-03-16

## 2019-03-17 ENCOUNTER — HOME CARE/HOSPICE - HEALTHEAST (OUTPATIENT)
Dept: HOME HEALTH SERVICES | Facility: HOME HEALTH | Age: 79
End: 2019-03-17

## 2019-03-18 ENCOUNTER — HOME CARE/HOSPICE - HEALTHEAST (OUTPATIENT)
Dept: HOME HEALTH SERVICES | Facility: HOME HEALTH | Age: 79
End: 2019-03-18

## 2019-03-18 ENCOUNTER — COMMUNICATION - HEALTHEAST (OUTPATIENT)
Dept: RADIATION ONCOLOGY | Facility: HOSPITAL | Age: 79
End: 2019-03-18

## 2019-03-19 ENCOUNTER — COMMUNICATION - HEALTHEAST (OUTPATIENT)
Dept: RADIATION ONCOLOGY | Facility: HOSPITAL | Age: 79
End: 2019-03-19

## 2019-03-19 ENCOUNTER — HOME CARE/HOSPICE - HEALTHEAST (OUTPATIENT)
Dept: HOME HEALTH SERVICES | Facility: HOME HEALTH | Age: 79
End: 2019-03-19

## 2019-03-19 ENCOUNTER — RECORDS - HEALTHEAST (OUTPATIENT)
Dept: ADMINISTRATIVE | Facility: OTHER | Age: 79
End: 2019-03-19

## 2019-03-20 ENCOUNTER — COMMUNICATION - HEALTHEAST (OUTPATIENT)
Dept: RADIATION ONCOLOGY | Facility: CLINIC | Age: 79
End: 2019-03-20

## 2019-03-20 ENCOUNTER — HOSPITAL ENCOUNTER (OUTPATIENT)
Dept: ULTRASOUND IMAGING | Facility: HOSPITAL | Age: 79
Discharge: HOME | End: 2019-03-20
Attending: FAMILY MEDICINE

## 2019-03-20 ENCOUNTER — AMBULATORY - HEALTHEAST (OUTPATIENT)
Dept: RADIATION ONCOLOGY | Facility: HOSPITAL | Age: 79
End: 2019-03-20

## 2019-03-20 ENCOUNTER — HOME CARE/HOSPICE - HEALTHEAST (OUTPATIENT)
Dept: HOME HEALTH SERVICES | Facility: HOME HEALTH | Age: 79
End: 2019-03-20

## 2019-03-20 DIAGNOSIS — R60.9 SWELLING: ICD-10-CM

## 2019-03-20 DIAGNOSIS — R52 PAIN: ICD-10-CM

## 2019-03-21 ENCOUNTER — HOME CARE/HOSPICE - HEALTHEAST (OUTPATIENT)
Dept: HOME HEALTH SERVICES | Facility: HOME HEALTH | Age: 79
End: 2019-03-21

## 2019-03-22 ENCOUNTER — AMBULATORY - HEALTHEAST (OUTPATIENT)
Dept: RADIATION ONCOLOGY | Facility: HOSPITAL | Age: 79
End: 2019-03-22

## 2019-03-22 ENCOUNTER — HOME CARE/HOSPICE - HEALTHEAST (OUTPATIENT)
Dept: HOME HEALTH SERVICES | Facility: HOME HEALTH | Age: 79
End: 2019-03-22

## 2019-03-23 ENCOUNTER — HOME CARE/HOSPICE - HEALTHEAST (OUTPATIENT)
Dept: HOME HEALTH SERVICES | Facility: HOME HEALTH | Age: 79
End: 2019-03-23

## 2019-03-24 ENCOUNTER — HOME CARE/HOSPICE - HEALTHEAST (OUTPATIENT)
Dept: HOME HEALTH SERVICES | Facility: HOME HEALTH | Age: 79
End: 2019-03-24

## 2019-03-25 ENCOUNTER — HOME CARE/HOSPICE - HEALTHEAST (OUTPATIENT)
Dept: HOME HEALTH SERVICES | Facility: HOME HEALTH | Age: 79
End: 2019-03-25

## 2019-03-25 ENCOUNTER — AMBULATORY - HEALTHEAST (OUTPATIENT)
Dept: RADIATION ONCOLOGY | Facility: HOSPITAL | Age: 79
End: 2019-03-25

## 2019-03-26 ENCOUNTER — HOME CARE/HOSPICE - HEALTHEAST (OUTPATIENT)
Dept: HOME HEALTH SERVICES | Facility: HOME HEALTH | Age: 79
End: 2019-03-26

## 2019-03-26 ENCOUNTER — COMMUNICATION - HEALTHEAST (OUTPATIENT)
Dept: RADIATION ONCOLOGY | Facility: HOSPITAL | Age: 79
End: 2019-03-26

## 2019-03-27 ENCOUNTER — COMMUNICATION - HEALTHEAST (OUTPATIENT)
Dept: RADIATION ONCOLOGY | Facility: HOSPITAL | Age: 79
End: 2019-03-27

## 2019-03-27 ENCOUNTER — HOME CARE/HOSPICE - HEALTHEAST (OUTPATIENT)
Dept: HOME HEALTH SERVICES | Facility: HOME HEALTH | Age: 79
End: 2019-03-27

## 2019-03-28 ENCOUNTER — HOME CARE/HOSPICE - HEALTHEAST (OUTPATIENT)
Dept: HOME HEALTH SERVICES | Facility: HOME HEALTH | Age: 79
End: 2019-03-28

## 2019-03-29 ENCOUNTER — AMBULATORY - HEALTHEAST (OUTPATIENT)
Dept: RADIATION ONCOLOGY | Facility: HOSPITAL | Age: 79
End: 2019-03-29

## 2019-03-29 ENCOUNTER — HOME CARE/HOSPICE - HEALTHEAST (OUTPATIENT)
Dept: HOME HEALTH SERVICES | Facility: HOME HEALTH | Age: 79
End: 2019-03-29

## 2019-03-30 ENCOUNTER — HOME CARE/HOSPICE - HEALTHEAST (OUTPATIENT)
Dept: HOME HEALTH SERVICES | Facility: HOME HEALTH | Age: 79
End: 2019-03-30

## 2019-03-31 ENCOUNTER — HOME CARE/HOSPICE - HEALTHEAST (OUTPATIENT)
Dept: HOME HEALTH SERVICES | Facility: HOME HEALTH | Age: 79
End: 2019-03-31

## 2019-04-01 ENCOUNTER — HOME CARE/HOSPICE - HEALTHEAST (OUTPATIENT)
Dept: HOME HEALTH SERVICES | Facility: HOME HEALTH | Age: 79
End: 2019-04-01

## 2019-04-01 ENCOUNTER — AMBULATORY - HEALTHEAST (OUTPATIENT)
Dept: RADIATION ONCOLOGY | Facility: HOSPITAL | Age: 79
End: 2019-04-01

## 2019-04-01 ENCOUNTER — OFFICE VISIT - HEALTHEAST (OUTPATIENT)
Dept: RADIATION ONCOLOGY | Facility: HOSPITAL | Age: 79
End: 2019-04-01

## 2019-04-01 DIAGNOSIS — C34.11 MALIGNANT NEOPLASM OF UPPER LOBE OF RIGHT LUNG (H): ICD-10-CM

## 2019-04-02 ENCOUNTER — HOME CARE/HOSPICE - HEALTHEAST (OUTPATIENT)
Dept: HOME HEALTH SERVICES | Facility: HOME HEALTH | Age: 79
End: 2019-04-02

## 2019-04-03 ENCOUNTER — HOME CARE/HOSPICE - HEALTHEAST (OUTPATIENT)
Dept: HOME HEALTH SERVICES | Facility: HOME HEALTH | Age: 79
End: 2019-04-03

## 2019-04-04 ENCOUNTER — HOME CARE/HOSPICE - HEALTHEAST (OUTPATIENT)
Dept: HOME HEALTH SERVICES | Facility: HOME HEALTH | Age: 79
End: 2019-04-04

## 2019-04-05 ENCOUNTER — HOME CARE/HOSPICE - HEALTHEAST (OUTPATIENT)
Dept: HOME HEALTH SERVICES | Facility: HOME HEALTH | Age: 79
End: 2019-04-05

## 2019-04-05 ENCOUNTER — AMBULATORY - HEALTHEAST (OUTPATIENT)
Dept: RADIATION ONCOLOGY | Facility: HOSPITAL | Age: 79
End: 2019-04-05

## 2019-04-06 ENCOUNTER — HOME CARE/HOSPICE - HEALTHEAST (OUTPATIENT)
Dept: HOME HEALTH SERVICES | Facility: HOME HEALTH | Age: 79
End: 2019-04-06

## 2019-04-07 ENCOUNTER — HOME CARE/HOSPICE - HEALTHEAST (OUTPATIENT)
Dept: HOME HEALTH SERVICES | Facility: HOME HEALTH | Age: 79
End: 2019-04-07

## 2019-04-08 ENCOUNTER — HOME CARE/HOSPICE - HEALTHEAST (OUTPATIENT)
Dept: HOME HEALTH SERVICES | Facility: HOME HEALTH | Age: 79
End: 2019-04-08

## 2019-04-09 ENCOUNTER — HOME CARE/HOSPICE - HEALTHEAST (OUTPATIENT)
Dept: HOME HEALTH SERVICES | Facility: HOME HEALTH | Age: 79
End: 2019-04-09

## 2019-04-10 ENCOUNTER — COMMUNICATION - HEALTHEAST (OUTPATIENT)
Dept: SURGERY | Facility: CLINIC | Age: 79
End: 2019-04-10

## 2019-04-10 ENCOUNTER — HOME CARE/HOSPICE - HEALTHEAST (OUTPATIENT)
Dept: HOME HEALTH SERVICES | Facility: HOME HEALTH | Age: 79
End: 2019-04-10

## 2019-04-11 ENCOUNTER — HOME CARE/HOSPICE - HEALTHEAST (OUTPATIENT)
Dept: HOME HEALTH SERVICES | Facility: HOME HEALTH | Age: 79
End: 2019-04-11

## 2019-04-12 ENCOUNTER — HOME CARE/HOSPICE - HEALTHEAST (OUTPATIENT)
Dept: HOME HEALTH SERVICES | Facility: HOME HEALTH | Age: 79
End: 2019-04-12

## 2019-04-13 ENCOUNTER — HOME CARE/HOSPICE - HEALTHEAST (OUTPATIENT)
Dept: HOME HEALTH SERVICES | Facility: HOME HEALTH | Age: 79
End: 2019-04-13

## 2019-04-14 ENCOUNTER — HOME CARE/HOSPICE - HEALTHEAST (OUTPATIENT)
Dept: HOME HEALTH SERVICES | Facility: HOME HEALTH | Age: 79
End: 2019-04-14

## 2019-04-15 ENCOUNTER — HOME CARE/HOSPICE - HEALTHEAST (OUTPATIENT)
Dept: HOME HEALTH SERVICES | Facility: HOME HEALTH | Age: 79
End: 2019-04-15

## 2019-04-16 ENCOUNTER — OFFICE VISIT - HEALTHEAST (OUTPATIENT)
Dept: SURGERY | Facility: CLINIC | Age: 79
End: 2019-04-16

## 2019-04-16 ENCOUNTER — AMBULATORY - HEALTHEAST (OUTPATIENT)
Dept: SURGERY | Facility: CLINIC | Age: 79
End: 2019-04-16

## 2019-04-16 DIAGNOSIS — L72.3 INFECTED SEBACEOUS CYST: ICD-10-CM

## 2019-04-16 DIAGNOSIS — L08.9 INFECTED SEBACEOUS CYST: ICD-10-CM

## 2019-04-16 RX ORDER — OXYCODONE HYDROCHLORIDE 10 MG/1
TABLET ORAL
Refills: 0 | Status: SHIPPED | COMMUNITY
Start: 2019-04-13 | End: 2021-01-01

## 2019-04-16 ASSESSMENT — MIFFLIN-ST. JEOR: SCORE: 1190.76

## 2019-04-17 ENCOUNTER — HOME CARE/HOSPICE - HEALTHEAST (OUTPATIENT)
Dept: HOME HEALTH SERVICES | Facility: HOME HEALTH | Age: 79
End: 2019-04-17

## 2019-04-19 ENCOUNTER — HOME CARE/HOSPICE - HEALTHEAST (OUTPATIENT)
Dept: HOME HEALTH SERVICES | Facility: HOME HEALTH | Age: 79
End: 2019-04-19

## 2019-04-22 ENCOUNTER — HOME CARE/HOSPICE - HEALTHEAST (OUTPATIENT)
Dept: HOME HEALTH SERVICES | Facility: HOME HEALTH | Age: 79
End: 2019-04-22

## 2019-04-23 ENCOUNTER — HOME CARE/HOSPICE - HEALTHEAST (OUTPATIENT)
Dept: HOME HEALTH SERVICES | Facility: HOME HEALTH | Age: 79
End: 2019-04-23

## 2019-04-24 ENCOUNTER — HOME CARE/HOSPICE - HEALTHEAST (OUTPATIENT)
Dept: HOME HEALTH SERVICES | Facility: HOME HEALTH | Age: 79
End: 2019-04-24

## 2019-04-26 ENCOUNTER — HOME CARE/HOSPICE - HEALTHEAST (OUTPATIENT)
Dept: HOME HEALTH SERVICES | Facility: HOME HEALTH | Age: 79
End: 2019-04-26

## 2019-04-29 ENCOUNTER — HOME CARE/HOSPICE - HEALTHEAST (OUTPATIENT)
Dept: HOME HEALTH SERVICES | Facility: HOME HEALTH | Age: 79
End: 2019-04-29

## 2019-04-30 ENCOUNTER — OFFICE VISIT - HEALTHEAST (OUTPATIENT)
Dept: SURGERY | Facility: CLINIC | Age: 79
End: 2019-04-30

## 2019-04-30 DIAGNOSIS — L72.3 INFECTED SEBACEOUS CYST: ICD-10-CM

## 2019-04-30 DIAGNOSIS — L08.9 INFECTED SEBACEOUS CYST: ICD-10-CM

## 2019-04-30 ASSESSMENT — MIFFLIN-ST. JEOR: SCORE: 1190.76

## 2019-05-02 ENCOUNTER — HOME CARE/HOSPICE - HEALTHEAST (OUTPATIENT)
Dept: HOME HEALTH SERVICES | Facility: HOME HEALTH | Age: 79
End: 2019-05-02

## 2019-05-04 ENCOUNTER — HOME CARE/HOSPICE - HEALTHEAST (OUTPATIENT)
Dept: HOME HEALTH SERVICES | Facility: HOME HEALTH | Age: 79
End: 2019-05-04

## 2019-05-06 ENCOUNTER — HOME CARE/HOSPICE - HEALTHEAST (OUTPATIENT)
Dept: HOME HEALTH SERVICES | Facility: HOME HEALTH | Age: 79
End: 2019-05-06

## 2019-05-08 ENCOUNTER — HOME CARE/HOSPICE - HEALTHEAST (OUTPATIENT)
Dept: HOME HEALTH SERVICES | Facility: HOME HEALTH | Age: 79
End: 2019-05-08

## 2019-05-09 ENCOUNTER — COMMUNICATION - HEALTHEAST (OUTPATIENT)
Dept: ONCOLOGY | Facility: HOSPITAL | Age: 79
End: 2019-05-09

## 2019-05-10 ENCOUNTER — HOME CARE/HOSPICE - HEALTHEAST (OUTPATIENT)
Dept: HOME HEALTH SERVICES | Facility: HOME HEALTH | Age: 79
End: 2019-05-10

## 2019-05-13 ENCOUNTER — HOME CARE/HOSPICE - HEALTHEAST (OUTPATIENT)
Dept: HOME HEALTH SERVICES | Facility: HOME HEALTH | Age: 79
End: 2019-05-13

## 2019-05-15 ENCOUNTER — HOME CARE/HOSPICE - HEALTHEAST (OUTPATIENT)
Dept: HOME HEALTH SERVICES | Facility: HOME HEALTH | Age: 79
End: 2019-05-15

## 2019-05-17 ENCOUNTER — HOME CARE/HOSPICE - HEALTHEAST (OUTPATIENT)
Dept: HOME HEALTH SERVICES | Facility: HOME HEALTH | Age: 79
End: 2019-05-17

## 2019-05-20 ENCOUNTER — HOME CARE/HOSPICE - HEALTHEAST (OUTPATIENT)
Dept: HOME HEALTH SERVICES | Facility: HOME HEALTH | Age: 79
End: 2019-05-20

## 2019-05-22 ENCOUNTER — HOME CARE/HOSPICE - HEALTHEAST (OUTPATIENT)
Dept: HOME HEALTH SERVICES | Facility: HOME HEALTH | Age: 79
End: 2019-05-22

## 2019-05-24 ENCOUNTER — HOME CARE/HOSPICE - HEALTHEAST (OUTPATIENT)
Dept: HOME HEALTH SERVICES | Facility: HOME HEALTH | Age: 79
End: 2019-05-24

## 2019-05-27 ENCOUNTER — HOME CARE/HOSPICE - HEALTHEAST (OUTPATIENT)
Dept: HOME HEALTH SERVICES | Facility: HOME HEALTH | Age: 79
End: 2019-05-27

## 2019-05-29 ENCOUNTER — HOME CARE/HOSPICE - HEALTHEAST (OUTPATIENT)
Dept: HOME HEALTH SERVICES | Facility: HOME HEALTH | Age: 79
End: 2019-05-29

## 2019-05-31 ENCOUNTER — HOME CARE/HOSPICE - HEALTHEAST (OUTPATIENT)
Dept: HOME HEALTH SERVICES | Facility: HOME HEALTH | Age: 79
End: 2019-05-31

## 2019-07-08 ENCOUNTER — RECORDS - HEALTHEAST (OUTPATIENT)
Dept: LAB | Facility: CLINIC | Age: 79
End: 2019-07-08

## 2019-07-08 LAB
ALBUMIN SERPL-MCNC: 3.4 G/DL (ref 3.5–5)
ALP SERPL-CCNC: 108 U/L (ref 45–120)
ALT SERPL W P-5'-P-CCNC: <9 U/L (ref 0–45)
ANION GAP SERPL CALCULATED.3IONS-SCNC: 13 MMOL/L (ref 5–18)
AST SERPL W P-5'-P-CCNC: 12 U/L (ref 0–40)
BILIRUB SERPL-MCNC: 0.3 MG/DL (ref 0–1)
BUN SERPL-MCNC: 34 MG/DL (ref 8–28)
CALCIUM SERPL-MCNC: 9.7 MG/DL (ref 8.5–10.5)
CHLORIDE BLD-SCNC: 102 MMOL/L (ref 98–107)
CO2 SERPL-SCNC: 20 MMOL/L (ref 22–31)
CREAT SERPL-MCNC: 1.84 MG/DL (ref 0.6–1.1)
GFR SERPL CREATININE-BSD FRML MDRD: 27 ML/MIN/1.73M2
GLUCOSE BLD-MCNC: 151 MG/DL (ref 70–125)
POTASSIUM BLD-SCNC: 3.7 MMOL/L (ref 3.5–5)
PROT SERPL-MCNC: 6.5 G/DL (ref 6–8)
SODIUM SERPL-SCNC: 135 MMOL/L (ref 136–145)

## 2019-07-11 ENCOUNTER — COMMUNICATION - HEALTHEAST (OUTPATIENT)
Dept: ADMINISTRATIVE | Facility: HOSPITAL | Age: 79
End: 2019-07-11

## 2019-07-16 ENCOUNTER — HOSPITAL ENCOUNTER (OUTPATIENT)
Dept: CT IMAGING | Facility: HOSPITAL | Age: 79
Setting detail: RADIATION/ONCOLOGY SERIES
Discharge: STILL ADMITTED OR ADMINISTRATIVE | End: 2019-07-16
Attending: RADIOLOGY

## 2019-07-16 DIAGNOSIS — C34.11 MALIGNANT NEOPLASM OF UPPER LOBE OF RIGHT LUNG (H): ICD-10-CM

## 2019-07-19 ENCOUNTER — OFFICE VISIT - HEALTHEAST (OUTPATIENT)
Dept: RADIATION ONCOLOGY | Facility: HOSPITAL | Age: 79
End: 2019-07-19

## 2019-07-19 DIAGNOSIS — C34.11 MALIGNANT NEOPLASM OF UPPER LOBE OF RIGHT LUNG (H): ICD-10-CM

## 2019-07-19 DIAGNOSIS — C50.112 MALIGNANT NEOPLASM OF CENTRAL PORTION OF LEFT BREAST IN FEMALE, ESTROGEN RECEPTOR POSITIVE (H): ICD-10-CM

## 2019-07-19 DIAGNOSIS — Z17.0 MALIGNANT NEOPLASM OF CENTRAL PORTION OF LEFT BREAST IN FEMALE, ESTROGEN RECEPTOR POSITIVE (H): ICD-10-CM

## 2019-07-30 ENCOUNTER — OFFICE VISIT - HEALTHEAST (OUTPATIENT)
Dept: ONCOLOGY | Facility: HOSPITAL | Age: 79
End: 2019-07-30

## 2019-07-30 DIAGNOSIS — E53.8 VITAMIN B12 DEFICIENCY (NON ANEMIC): ICD-10-CM

## 2019-07-30 DIAGNOSIS — D50.8 OTHER IRON DEFICIENCY ANEMIA: ICD-10-CM

## 2019-07-30 DIAGNOSIS — C34.11 MALIGNANT NEOPLASM OF UPPER LOBE OF RIGHT LUNG (H): ICD-10-CM

## 2019-07-30 LAB
BASOPHILS # BLD AUTO: 0 THOU/UL (ref 0–0.2)
BASOPHILS NFR BLD AUTO: 0 % (ref 0–2)
EOSINOPHIL # BLD AUTO: 0.1 THOU/UL (ref 0–0.4)
EOSINOPHIL NFR BLD AUTO: 1 % (ref 0–6)
ERYTHROCYTE [DISTWIDTH] IN BLOOD BY AUTOMATED COUNT: 14.6 % (ref 11–14.5)
FERRITIN SERPL-MCNC: 52 NG/ML (ref 10–130)
HCT VFR BLD AUTO: 30.1 % (ref 35–47)
HGB BLD-MCNC: 9.3 G/DL (ref 12–16)
LYMPHOCYTES # BLD AUTO: 1 THOU/UL (ref 0.8–4.4)
LYMPHOCYTES NFR BLD AUTO: 16 % (ref 20–40)
MCH RBC QN AUTO: 29.2 PG (ref 27–34)
MCHC RBC AUTO-ENTMCNC: 30.9 G/DL (ref 32–36)
MCV RBC AUTO: 95 FL (ref 80–100)
MONOCYTES # BLD AUTO: 0.5 THOU/UL (ref 0–0.9)
MONOCYTES NFR BLD AUTO: 9 % (ref 2–10)
NEUTROPHILS # BLD AUTO: 4.5 THOU/UL (ref 2–7.7)
NEUTROPHILS NFR BLD AUTO: 74 % (ref 50–70)
PLATELET # BLD AUTO: 212 THOU/UL (ref 140–440)
PMV BLD AUTO: 10.9 FL (ref 8.5–12.5)
RBC # BLD AUTO: 3.18 MILL/UL (ref 3.8–5.4)
VIT B12 SERPL-MCNC: 295 PG/ML (ref 213–816)
WBC: 6.2 THOU/UL (ref 4–11)

## 2019-08-05 ENCOUNTER — AMBULATORY - HEALTHEAST (OUTPATIENT)
Dept: ONCOLOGY | Facility: HOSPITAL | Age: 79
End: 2019-08-05

## 2019-10-09 ENCOUNTER — RECORDS - HEALTHEAST (OUTPATIENT)
Dept: ADMINISTRATIVE | Facility: OTHER | Age: 79
End: 2019-10-09

## 2019-10-22 ENCOUNTER — COMMUNICATION - HEALTHEAST (OUTPATIENT)
Dept: RADIATION ONCOLOGY | Facility: HOSPITAL | Age: 79
End: 2019-10-22

## 2019-11-14 ENCOUNTER — RECORDS - HEALTHEAST (OUTPATIENT)
Dept: ADMINISTRATIVE | Facility: OTHER | Age: 79
End: 2019-11-14

## 2019-11-20 ENCOUNTER — HOSPITAL ENCOUNTER (OUTPATIENT)
Dept: CT IMAGING | Facility: HOSPITAL | Age: 79
Discharge: HOME | End: 2019-11-20
Attending: RADIOLOGY

## 2019-11-20 DIAGNOSIS — Z17.0 MALIGNANT NEOPLASM OF CENTRAL PORTION OF LEFT BREAST IN FEMALE, ESTROGEN RECEPTOR POSITIVE (H): ICD-10-CM

## 2019-11-20 DIAGNOSIS — C50.112 MALIGNANT NEOPLASM OF CENTRAL PORTION OF LEFT BREAST IN FEMALE, ESTROGEN RECEPTOR POSITIVE (H): ICD-10-CM

## 2019-11-20 DIAGNOSIS — C34.11 MALIGNANT NEOPLASM OF UPPER LOBE OF RIGHT LUNG (H): ICD-10-CM

## 2019-11-22 ENCOUNTER — COMMUNICATION - HEALTHEAST (OUTPATIENT)
Dept: RADIATION ONCOLOGY | Facility: HOSPITAL | Age: 79
End: 2019-11-22

## 2019-12-11 ENCOUNTER — COMMUNICATION - HEALTHEAST (OUTPATIENT)
Dept: RADIATION ONCOLOGY | Facility: CLINIC | Age: 79
End: 2019-12-11

## 2019-12-11 ENCOUNTER — RECORDS - HEALTHEAST (OUTPATIENT)
Dept: ADMINISTRATIVE | Facility: OTHER | Age: 79
End: 2019-12-11

## 2019-12-11 ENCOUNTER — RECORDS - HEALTHEAST (OUTPATIENT)
Dept: LAB | Facility: CLINIC | Age: 79
End: 2019-12-11

## 2019-12-11 LAB
ALBUMIN SERPL-MCNC: 3.5 G/DL (ref 3.5–5)
ALP SERPL-CCNC: 116 U/L (ref 45–120)
ALT SERPL W P-5'-P-CCNC: <9 U/L (ref 0–45)
ANION GAP SERPL CALCULATED.3IONS-SCNC: 10 MMOL/L (ref 5–18)
AST SERPL W P-5'-P-CCNC: 7 U/L (ref 0–40)
BILIRUB SERPL-MCNC: 0.2 MG/DL (ref 0–1)
BUN SERPL-MCNC: 28 MG/DL (ref 8–28)
CALCIUM SERPL-MCNC: 9 MG/DL (ref 8.5–10.5)
CHLORIDE BLD-SCNC: 102 MMOL/L (ref 98–107)
CO2 SERPL-SCNC: 22 MMOL/L (ref 22–31)
CREAT SERPL-MCNC: 1.81 MG/DL (ref 0.6–1.1)
GFR SERPL CREATININE-BSD FRML MDRD: 27 ML/MIN/1.73M2
GLUCOSE BLD-MCNC: 427 MG/DL (ref 70–125)
POTASSIUM BLD-SCNC: 5 MMOL/L (ref 3.5–5)
PROT SERPL-MCNC: 6.5 G/DL (ref 6–8)
SODIUM SERPL-SCNC: 134 MMOL/L (ref 136–145)

## 2020-01-01 ENCOUNTER — OFFICE VISIT - HEALTHEAST (OUTPATIENT)
Dept: ONCOLOGY | Facility: HOSPITAL | Age: 80
End: 2020-01-01

## 2020-01-01 ENCOUNTER — RECORDS - HEALTHEAST (OUTPATIENT)
Dept: ADMINISTRATIVE | Facility: OTHER | Age: 80
End: 2020-01-01

## 2020-01-01 DIAGNOSIS — D50.8 OTHER IRON DEFICIENCY ANEMIA: ICD-10-CM

## 2020-01-01 DIAGNOSIS — Z17.0 MALIGNANT NEOPLASM OF CENTRAL PORTION OF LEFT BREAST IN FEMALE, ESTROGEN RECEPTOR POSITIVE (H): ICD-10-CM

## 2020-01-01 DIAGNOSIS — C50.112 MALIGNANT NEOPLASM OF CENTRAL PORTION OF LEFT BREAST IN FEMALE, ESTROGEN RECEPTOR POSITIVE (H): ICD-10-CM

## 2020-01-01 DIAGNOSIS — Z12.31 ENCOUNTER FOR SCREENING MAMMOGRAM FOR MALIGNANT NEOPLASM OF BREAST: ICD-10-CM

## 2020-01-23 ENCOUNTER — OFFICE VISIT - HEALTHEAST (OUTPATIENT)
Dept: RADIATION ONCOLOGY | Facility: HOSPITAL | Age: 80
End: 2020-01-23

## 2020-01-23 DIAGNOSIS — C34.11 MALIGNANT NEOPLASM OF UPPER LOBE OF RIGHT LUNG (H): ICD-10-CM

## 2020-01-23 DIAGNOSIS — Z17.0 MALIGNANT NEOPLASM OF CENTRAL PORTION OF LEFT BREAST IN FEMALE, ESTROGEN RECEPTOR POSITIVE (H): ICD-10-CM

## 2020-01-23 DIAGNOSIS — C50.112 MALIGNANT NEOPLASM OF CENTRAL PORTION OF LEFT BREAST IN FEMALE, ESTROGEN RECEPTOR POSITIVE (H): ICD-10-CM

## 2020-01-23 RX ORDER — KETOCONAZOLE 20 MG/G
CREAM TOPICAL PRN
Refills: 3 | Status: SHIPPED | COMMUNITY
Start: 2019-08-22

## 2020-01-29 ENCOUNTER — RECORDS - HEALTHEAST (OUTPATIENT)
Dept: ADMINISTRATIVE | Facility: OTHER | Age: 80
End: 2020-01-29

## 2020-01-30 ENCOUNTER — RECORDS - HEALTHEAST (OUTPATIENT)
Dept: ADMINISTRATIVE | Facility: OTHER | Age: 80
End: 2020-01-30

## 2020-02-25 ENCOUNTER — OFFICE VISIT - HEALTHEAST (OUTPATIENT)
Dept: ONCOLOGY | Facility: HOSPITAL | Age: 80
End: 2020-02-25

## 2020-02-25 ENCOUNTER — AMBULATORY - HEALTHEAST (OUTPATIENT)
Dept: INFUSION THERAPY | Facility: HOSPITAL | Age: 80
End: 2020-02-25

## 2020-02-25 DIAGNOSIS — D50.8 OTHER IRON DEFICIENCY ANEMIA: ICD-10-CM

## 2020-02-25 LAB
BASOPHILS # BLD AUTO: 0 THOU/UL (ref 0–0.2)
BASOPHILS NFR BLD AUTO: 0 % (ref 0–2)
EOSINOPHIL # BLD AUTO: 0.1 THOU/UL (ref 0–0.4)
EOSINOPHIL NFR BLD AUTO: 2 % (ref 0–6)
ERYTHROCYTE [DISTWIDTH] IN BLOOD BY AUTOMATED COUNT: 14.6 % (ref 11–14.5)
FERRITIN SERPL-MCNC: 18 NG/ML (ref 10–130)
HCT VFR BLD AUTO: 26.7 % (ref 35–47)
HGB BLD-MCNC: 8.2 G/DL (ref 12–16)
LYMPHOCYTES # BLD AUTO: 1.1 THOU/UL (ref 0.8–4.4)
LYMPHOCYTES NFR BLD AUTO: 15 % (ref 20–40)
MCH RBC QN AUTO: 28.6 PG (ref 27–34)
MCHC RBC AUTO-ENTMCNC: 30.7 G/DL (ref 32–36)
MCV RBC AUTO: 93 FL (ref 80–100)
MONOCYTES # BLD AUTO: 0.6 THOU/UL (ref 0–0.9)
MONOCYTES NFR BLD AUTO: 9 % (ref 2–10)
NEUTROPHILS # BLD AUTO: 5.4 THOU/UL (ref 2–7.7)
NEUTROPHILS NFR BLD AUTO: 74 % (ref 50–70)
PLATELET # BLD AUTO: 193 THOU/UL (ref 140–440)
PMV BLD AUTO: 10.7 FL (ref 8.5–12.5)
RBC # BLD AUTO: 2.87 MILL/UL (ref 3.8–5.4)
WBC: 7.3 THOU/UL (ref 4–11)

## 2020-03-04 ENCOUNTER — RECORDS - HEALTHEAST (OUTPATIENT)
Dept: ADMINISTRATIVE | Facility: OTHER | Age: 80
End: 2020-03-04

## 2020-03-04 ENCOUNTER — RECORDS - HEALTHEAST (OUTPATIENT)
Dept: LAB | Facility: CLINIC | Age: 80
End: 2020-03-04

## 2020-03-05 LAB
ALBUMIN SERPL-MCNC: 3.6 G/DL (ref 3.5–5)
ALP SERPL-CCNC: 106 U/L (ref 45–120)
ALT SERPL W P-5'-P-CCNC: <9 U/L (ref 0–45)
ANION GAP SERPL CALCULATED.3IONS-SCNC: 9 MMOL/L (ref 5–18)
AST SERPL W P-5'-P-CCNC: 11 U/L (ref 0–40)
BILIRUB SERPL-MCNC: 0.2 MG/DL (ref 0–1)
BUN SERPL-MCNC: 33 MG/DL (ref 8–28)
C REACTIVE PROTEIN LHE: 0.1 MG/DL (ref 0–0.8)
CALCIUM SERPL-MCNC: 9.7 MG/DL (ref 8.5–10.5)
CHLORIDE BLD-SCNC: 108 MMOL/L (ref 98–107)
CO2 SERPL-SCNC: 22 MMOL/L (ref 22–31)
CREAT SERPL-MCNC: 1.66 MG/DL (ref 0.6–1.1)
GFR SERPL CREATININE-BSD FRML MDRD: 30 ML/MIN/1.73M2
GLUCOSE BLD-MCNC: 77 MG/DL (ref 70–125)
POTASSIUM BLD-SCNC: 5.3 MMOL/L (ref 3.5–5)
PROT SERPL-MCNC: 6.8 G/DL (ref 6–8)
SODIUM SERPL-SCNC: 139 MMOL/L (ref 136–145)

## 2020-03-18 ENCOUNTER — RECORDS - HEALTHEAST (OUTPATIENT)
Dept: ADMINISTRATIVE | Facility: OTHER | Age: 80
End: 2020-03-18

## 2020-03-18 ENCOUNTER — RECORDS - HEALTHEAST (OUTPATIENT)
Dept: LAB | Facility: CLINIC | Age: 80
End: 2020-03-18

## 2020-03-18 LAB
ANION GAP SERPL CALCULATED.3IONS-SCNC: 10 MMOL/L (ref 5–18)
BUN SERPL-MCNC: 45 MG/DL (ref 8–28)
CALCIUM SERPL-MCNC: 9.3 MG/DL (ref 8.5–10.5)
CHLORIDE BLD-SCNC: 108 MMOL/L (ref 98–107)
CO2 SERPL-SCNC: 21 MMOL/L (ref 22–31)
CREAT SERPL-MCNC: 1.88 MG/DL (ref 0.6–1.1)
GFR SERPL CREATININE-BSD FRML MDRD: 26 ML/MIN/1.73M2
GLUCOSE BLD-MCNC: 152 MG/DL (ref 70–125)
POTASSIUM BLD-SCNC: 5.5 MMOL/L (ref 3.5–5)
SODIUM SERPL-SCNC: 139 MMOL/L (ref 136–145)

## 2020-03-30 ENCOUNTER — RECORDS - HEALTHEAST (OUTPATIENT)
Dept: ADMINISTRATIVE | Facility: OTHER | Age: 80
End: 2020-03-30

## 2020-04-27 ENCOUNTER — RECORDS - HEALTHEAST (OUTPATIENT)
Dept: ADMINISTRATIVE | Facility: OTHER | Age: 80
End: 2020-04-27

## 2020-05-06 ENCOUNTER — HOSPITAL ENCOUNTER (OUTPATIENT)
Dept: CT IMAGING | Facility: HOSPITAL | Age: 80
Setting detail: RADIATION/ONCOLOGY SERIES
Discharge: STILL ADMITTED OR ADMINISTRATIVE | End: 2020-05-06
Attending: RADIOLOGY

## 2020-05-06 ENCOUNTER — AMBULATORY - HEALTHEAST (OUTPATIENT)
Dept: INFUSION THERAPY | Facility: HOSPITAL | Age: 80
End: 2020-05-06

## 2020-05-06 DIAGNOSIS — C50.112 MALIGNANT NEOPLASM OF CENTRAL PORTION OF LEFT BREAST IN FEMALE, ESTROGEN RECEPTOR POSITIVE (H): ICD-10-CM

## 2020-05-06 DIAGNOSIS — C34.11 MALIGNANT NEOPLASM OF UPPER LOBE OF RIGHT LUNG (H): ICD-10-CM

## 2020-05-06 DIAGNOSIS — Z17.0 MALIGNANT NEOPLASM OF CENTRAL PORTION OF LEFT BREAST IN FEMALE, ESTROGEN RECEPTOR POSITIVE (H): ICD-10-CM

## 2020-05-06 DIAGNOSIS — D50.8 OTHER IRON DEFICIENCY ANEMIA: ICD-10-CM

## 2020-05-06 LAB
BASOPHILS # BLD AUTO: 0 THOU/UL (ref 0–0.2)
BASOPHILS NFR BLD AUTO: 0 % (ref 0–2)
EOSINOPHIL # BLD AUTO: 0.1 THOU/UL (ref 0–0.4)
EOSINOPHIL NFR BLD AUTO: 1 % (ref 0–6)
ERYTHROCYTE [DISTWIDTH] IN BLOOD BY AUTOMATED COUNT: 13.7 % (ref 11–14.5)
FERRITIN SERPL-MCNC: 27 NG/ML (ref 10–130)
HCT VFR BLD AUTO: 28.9 % (ref 35–47)
HGB BLD-MCNC: 9.2 G/DL (ref 12–16)
LYMPHOCYTES # BLD AUTO: 1.4 THOU/UL (ref 0.8–4.4)
LYMPHOCYTES NFR BLD AUTO: 18 % (ref 20–40)
MCH RBC QN AUTO: 29.2 PG (ref 27–34)
MCHC RBC AUTO-ENTMCNC: 31.8 G/DL (ref 32–36)
MCV RBC AUTO: 92 FL (ref 80–100)
MONOCYTES # BLD AUTO: 0.6 THOU/UL (ref 0–0.9)
MONOCYTES NFR BLD AUTO: 7 % (ref 2–10)
NEUTROPHILS # BLD AUTO: 5.5 THOU/UL (ref 2–7.7)
NEUTROPHILS NFR BLD AUTO: 72 % (ref 50–70)
PLATELET # BLD AUTO: 190 THOU/UL (ref 140–440)
PMV BLD AUTO: 9.5 FL (ref 8.5–12.5)
RBC # BLD AUTO: 3.15 MILL/UL (ref 3.8–5.4)
WBC: 7.6 THOU/UL (ref 4–11)

## 2020-05-06 ASSESSMENT — MIFFLIN-ST. JEOR: SCORE: 1131.34

## 2020-05-07 ENCOUNTER — OFFICE VISIT - HEALTHEAST (OUTPATIENT)
Dept: ONCOLOGY | Facility: HOSPITAL | Age: 80
End: 2020-05-07

## 2020-05-07 DIAGNOSIS — Z17.0 MALIGNANT NEOPLASM OF CENTRAL PORTION OF LEFT BREAST IN FEMALE, ESTROGEN RECEPTOR POSITIVE (H): ICD-10-CM

## 2020-05-07 DIAGNOSIS — E53.8 VITAMIN B12 DEFICIENCY (NON ANEMIC): ICD-10-CM

## 2020-05-07 DIAGNOSIS — D50.8 OTHER IRON DEFICIENCY ANEMIA: ICD-10-CM

## 2020-05-07 DIAGNOSIS — C50.112 MALIGNANT NEOPLASM OF CENTRAL PORTION OF LEFT BREAST IN FEMALE, ESTROGEN RECEPTOR POSITIVE (H): ICD-10-CM

## 2020-05-07 DIAGNOSIS — C34.11 MALIGNANT NEOPLASM OF UPPER LOBE OF RIGHT LUNG (H): ICD-10-CM

## 2020-05-08 ENCOUNTER — OFFICE VISIT - HEALTHEAST (OUTPATIENT)
Dept: RADIATION ONCOLOGY | Facility: HOSPITAL | Age: 80
End: 2020-05-08

## 2020-05-08 DIAGNOSIS — C34.11 MALIGNANT NEOPLASM OF UPPER LOBE OF RIGHT LUNG (H): ICD-10-CM

## 2020-06-04 ENCOUNTER — RECORDS - HEALTHEAST (OUTPATIENT)
Dept: ADMINISTRATIVE | Facility: OTHER | Age: 80
End: 2020-06-04

## 2020-09-08 ENCOUNTER — RECORDS - HEALTHEAST (OUTPATIENT)
Dept: ADMINISTRATIVE | Facility: OTHER | Age: 80
End: 2020-09-08

## 2020-10-02 ENCOUNTER — AMBULATORY - HEALTHEAST (OUTPATIENT)
Dept: INFUSION THERAPY | Facility: HOSPITAL | Age: 80
End: 2020-10-02

## 2020-10-02 DIAGNOSIS — C34.11 MALIGNANT NEOPLASM OF UPPER LOBE OF RIGHT LUNG (H): ICD-10-CM

## 2020-10-02 DIAGNOSIS — D50.8 OTHER IRON DEFICIENCY ANEMIA: ICD-10-CM

## 2020-10-02 DIAGNOSIS — C50.112 MALIGNANT NEOPLASM OF CENTRAL PORTION OF LEFT BREAST IN FEMALE, ESTROGEN RECEPTOR POSITIVE (H): ICD-10-CM

## 2020-10-02 DIAGNOSIS — E53.8 VITAMIN B12 DEFICIENCY (NON ANEMIC): ICD-10-CM

## 2020-10-02 DIAGNOSIS — Z17.0 MALIGNANT NEOPLASM OF CENTRAL PORTION OF LEFT BREAST IN FEMALE, ESTROGEN RECEPTOR POSITIVE (H): ICD-10-CM

## 2020-10-02 LAB
BASOPHILS # BLD AUTO: 0 THOU/UL (ref 0–0.2)
BASOPHILS NFR BLD AUTO: 0 % (ref 0–2)
EOSINOPHIL # BLD AUTO: 0.1 THOU/UL (ref 0–0.4)
EOSINOPHIL NFR BLD AUTO: 1 % (ref 0–6)
ERYTHROCYTE [DISTWIDTH] IN BLOOD BY AUTOMATED COUNT: 13.9 % (ref 11–14.5)
FERRITIN SERPL-MCNC: 47 NG/ML (ref 10–130)
HCT VFR BLD AUTO: 32.6 % (ref 35–47)
HGB BLD-MCNC: 10 G/DL (ref 12–16)
IMM GRANULOCYTES # BLD: 0.1 THOU/UL
IMM GRANULOCYTES NFR BLD: 1 %
LYMPHOCYTES # BLD AUTO: 1.3 THOU/UL (ref 0.8–4.4)
LYMPHOCYTES NFR BLD AUTO: 17 % (ref 20–40)
MCH RBC QN AUTO: 30.1 PG (ref 27–34)
MCHC RBC AUTO-ENTMCNC: 30.7 G/DL (ref 32–36)
MCV RBC AUTO: 98 FL (ref 80–100)
MONOCYTES # BLD AUTO: 0.5 THOU/UL (ref 0–0.9)
MONOCYTES NFR BLD AUTO: 7 % (ref 2–10)
NEUTROPHILS # BLD AUTO: 5.4 THOU/UL (ref 2–7.7)
NEUTROPHILS NFR BLD AUTO: 73 % (ref 50–70)
PLATELET # BLD AUTO: 192 THOU/UL (ref 140–440)
PMV BLD AUTO: 10.4 FL (ref 8.5–12.5)
RBC # BLD AUTO: 3.32 MILL/UL (ref 3.8–5.4)
VIT B12 SERPL-MCNC: 286 PG/ML (ref 213–816)
WBC: 7.4 THOU/UL (ref 4–11)

## 2021-01-01 ENCOUNTER — LAB REQUISITION (OUTPATIENT)
Dept: LAB | Facility: CLINIC | Age: 81
End: 2021-01-01
Payer: MEDICARE

## 2021-01-01 ENCOUNTER — TELEPHONE (OUTPATIENT)
Dept: NEUROSURGERY | Facility: CLINIC | Age: 81
End: 2021-01-01

## 2021-01-01 ENCOUNTER — APPOINTMENT (OUTPATIENT)
Dept: NUCLEAR MEDICINE | Facility: HOSPITAL | Age: 81
DRG: 981 | End: 2021-01-01
Attending: INTERNAL MEDICINE
Payer: MEDICARE

## 2021-01-01 ENCOUNTER — TELEPHONE (OUTPATIENT)
Dept: RADIATION ONCOLOGY | Facility: HOSPITAL | Age: 81
End: 2021-01-01

## 2021-01-01 ENCOUNTER — APPOINTMENT (OUTPATIENT)
Dept: CT IMAGING | Facility: HOSPITAL | Age: 81
DRG: 981 | End: 2021-01-01
Attending: EMERGENCY MEDICINE
Payer: MEDICARE

## 2021-01-01 ENCOUNTER — RECORDS - HEALTHEAST (OUTPATIENT)
Dept: ADMINISTRATIVE | Facility: CLINIC | Age: 81
End: 2021-01-01

## 2021-01-01 ENCOUNTER — APPOINTMENT (OUTPATIENT)
Dept: OCCUPATIONAL THERAPY | Facility: HOSPITAL | Age: 81
DRG: 981 | End: 2021-01-01
Attending: PSYCHIATRY & NEUROLOGY
Payer: MEDICARE

## 2021-01-01 ENCOUNTER — APPOINTMENT (OUTPATIENT)
Dept: MRI IMAGING | Facility: HOSPITAL | Age: 81
DRG: 981 | End: 2021-01-01
Attending: NURSE PRACTITIONER
Payer: MEDICARE

## 2021-01-01 ENCOUNTER — HOSPITAL ENCOUNTER (INPATIENT)
Facility: HOSPITAL | Age: 81
LOS: 7 days | Discharge: HOME HEALTH | DRG: 981 | End: 2021-08-12
Attending: EMERGENCY MEDICINE | Admitting: HOSPITALIST
Payer: MEDICARE

## 2021-01-01 ENCOUNTER — COMMUNICATION - HEALTHEAST (OUTPATIENT)
Dept: ADMINISTRATIVE | Facility: HOSPITAL | Age: 81
End: 2021-01-01

## 2021-01-01 ENCOUNTER — TELEPHONE (OUTPATIENT)
Dept: CARDIOLOGY | Facility: CLINIC | Age: 81
End: 2021-01-01

## 2021-01-01 ENCOUNTER — HOSPITAL ENCOUNTER (INPATIENT)
Facility: CLINIC | Age: 81
LOS: 4 days | DRG: 871 | End: 2021-10-05
Attending: EMERGENCY MEDICINE | Admitting: STUDENT IN AN ORGANIZED HEALTH CARE EDUCATION/TRAINING PROGRAM
Payer: MEDICARE

## 2021-01-01 ENCOUNTER — TELEPHONE (OUTPATIENT)
Dept: ONCOLOGY | Facility: HOSPITAL | Age: 81
End: 2021-01-01

## 2021-01-01 ENCOUNTER — ANCILLARY PROCEDURE (OUTPATIENT)
Dept: ULTRASOUND IMAGING | Facility: CLINIC | Age: 81
End: 2021-01-01
Attending: EMERGENCY MEDICINE
Payer: MEDICARE

## 2021-01-01 ENCOUNTER — HOSPITAL ENCOUNTER (INPATIENT)
Dept: CARDIOLOGY | Facility: HOSPITAL | Age: 81
DRG: 981 | End: 2021-08-09
Attending: INTERNAL MEDICINE
Payer: MEDICARE

## 2021-01-01 ENCOUNTER — APPOINTMENT (OUTPATIENT)
Dept: RADIOLOGY | Facility: HOSPITAL | Age: 81
DRG: 981 | End: 2021-01-01
Attending: INTERNAL MEDICINE
Payer: MEDICARE

## 2021-01-01 ENCOUNTER — APPOINTMENT (OUTPATIENT)
Dept: PHYSICAL THERAPY | Facility: HOSPITAL | Age: 81
DRG: 981 | End: 2021-01-01
Attending: PSYCHIATRY & NEUROLOGY
Payer: MEDICARE

## 2021-01-01 ENCOUNTER — APPOINTMENT (OUTPATIENT)
Dept: RADIOLOGY | Facility: HOSPITAL | Age: 81
DRG: 981 | End: 2021-01-01
Attending: EMERGENCY MEDICINE
Payer: MEDICARE

## 2021-01-01 ENCOUNTER — OFFICE VISIT (OUTPATIENT)
Dept: CARDIOLOGY | Facility: CLINIC | Age: 81
End: 2021-01-01
Payer: MEDICARE

## 2021-01-01 ENCOUNTER — PATIENT OUTREACH (OUTPATIENT)
Dept: CARE COORDINATION | Facility: CLINIC | Age: 81
End: 2021-01-01

## 2021-01-01 ENCOUNTER — APPOINTMENT (OUTPATIENT)
Dept: CT IMAGING | Facility: CLINIC | Age: 81
DRG: 871 | End: 2021-01-01
Attending: EMERGENCY MEDICINE
Payer: MEDICARE

## 2021-01-01 ENCOUNTER — APPOINTMENT (OUTPATIENT)
Dept: ULTRASOUND IMAGING | Facility: HOSPITAL | Age: 81
DRG: 981 | End: 2021-01-01
Attending: INTERNAL MEDICINE
Payer: MEDICARE

## 2021-01-01 ENCOUNTER — APPOINTMENT (OUTPATIENT)
Dept: SPEECH THERAPY | Facility: HOSPITAL | Age: 81
DRG: 981 | End: 2021-01-01
Attending: PSYCHIATRY & NEUROLOGY
Payer: MEDICARE

## 2021-01-01 ENCOUNTER — APPOINTMENT (OUTPATIENT)
Dept: CARDIOLOGY | Facility: HOSPITAL | Age: 81
DRG: 981 | End: 2021-01-01
Attending: PSYCHIATRY & NEUROLOGY
Payer: MEDICARE

## 2021-01-01 ENCOUNTER — APPOINTMENT (OUTPATIENT)
Dept: OCCUPATIONAL THERAPY | Facility: HOSPITAL | Age: 81
DRG: 981 | End: 2021-01-01
Payer: MEDICARE

## 2021-01-01 ENCOUNTER — HOSPITAL ENCOUNTER (OUTPATIENT)
Dept: CT IMAGING | Facility: HOSPITAL | Age: 81
Setting detail: RADIATION/ONCOLOGY SERIES
Discharge: STILL ADMITTED OR ADMINISTRATIVE | End: 2021-06-19
Attending: RADIOLOGY
Payer: MEDICARE

## 2021-01-01 ENCOUNTER — AMBULATORY - HEALTHEAST (OUTPATIENT)
Dept: PHARMACY | Facility: HOSPITAL | Age: 81
End: 2021-01-01

## 2021-01-01 ENCOUNTER — AMBULATORY - HEALTHEAST (OUTPATIENT)
Dept: RADIATION ONCOLOGY | Facility: HOSPITAL | Age: 81
End: 2021-01-01

## 2021-01-01 ENCOUNTER — APPOINTMENT (OUTPATIENT)
Dept: OCCUPATIONAL THERAPY | Facility: HOSPITAL | Age: 81
DRG: 981 | End: 2021-01-01
Attending: INTERNAL MEDICINE
Payer: MEDICARE

## 2021-01-01 VITALS — WEIGHT: 174.44 LBS | BODY MASS INDEX: 29.94 KG/M2

## 2021-01-01 VITALS — WEIGHT: 178 LBS | HEIGHT: 64 IN | BODY MASS INDEX: 30.39 KG/M2

## 2021-01-01 VITALS — HEART RATE: 93 BPM | OXYGEN SATURATION: 98 % | DIASTOLIC BLOOD PRESSURE: 63 MMHG | SYSTOLIC BLOOD PRESSURE: 154 MMHG

## 2021-01-01 VITALS — BODY MASS INDEX: 27.31 KG/M2 | HEIGHT: 64 IN | WEIGHT: 160 LBS

## 2021-01-01 VITALS — BODY MASS INDEX: 30.36 KG/M2 | WEIGHT: 176.9 LBS

## 2021-01-01 VITALS — BODY MASS INDEX: 30.76 KG/M2 | WEIGHT: 179.2 LBS

## 2021-01-01 VITALS
RESPIRATION RATE: 16 BRPM | HEIGHT: 63 IN | BODY MASS INDEX: 28.35 KG/M2 | WEIGHT: 160 LBS | SYSTOLIC BLOOD PRESSURE: 108 MMHG | DIASTOLIC BLOOD PRESSURE: 62 MMHG | HEART RATE: 100 BPM

## 2021-01-01 VITALS
DIASTOLIC BLOOD PRESSURE: 72 MMHG | RESPIRATION RATE: 16 BRPM | TEMPERATURE: 98 F | SYSTOLIC BLOOD PRESSURE: 164 MMHG | HEART RATE: 76 BPM | BODY MASS INDEX: 30.05 KG/M2 | WEIGHT: 169.6 LBS | OXYGEN SATURATION: 96 % | HEIGHT: 63 IN

## 2021-01-01 VITALS
BODY MASS INDEX: 28.36 KG/M2 | OXYGEN SATURATION: 96 % | TEMPERATURE: 97.4 F | RESPIRATION RATE: 24 BRPM | DIASTOLIC BLOOD PRESSURE: 45 MMHG | HEIGHT: 63 IN | SYSTOLIC BLOOD PRESSURE: 108 MMHG | WEIGHT: 160.05 LBS | HEART RATE: 86 BPM

## 2021-01-01 VITALS — HEIGHT: 64 IN | BODY MASS INDEX: 29.71 KG/M2 | WEIGHT: 174 LBS

## 2021-01-01 VITALS
HEIGHT: 64 IN | HEIGHT: 64 IN | WEIGHT: 175 LBS | BODY MASS INDEX: 29.88 KG/M2 | WEIGHT: 175 LBS | BODY MASS INDEX: 29.88 KG/M2

## 2021-01-01 VITALS — WEIGHT: 175 LBS | BODY MASS INDEX: 30.04 KG/M2

## 2021-01-01 VITALS — WEIGHT: 175.8 LBS | BODY MASS INDEX: 30.18 KG/M2

## 2021-01-01 VITALS — HEIGHT: 64 IN | BODY MASS INDEX: 28.34 KG/M2 | WEIGHT: 166 LBS

## 2021-01-01 VITALS — WEIGHT: 177.4 LBS | BODY MASS INDEX: 30.45 KG/M2

## 2021-01-01 VITALS — BODY MASS INDEX: 31.59 KG/M2 | HEIGHT: 60 IN | WEIGHT: 160.9 LBS

## 2021-01-01 VITALS
OXYGEN SATURATION: 97 % | DIASTOLIC BLOOD PRESSURE: 69 MMHG | TEMPERATURE: 98.7 F | HEART RATE: 94 BPM | SYSTOLIC BLOOD PRESSURE: 158 MMHG

## 2021-01-01 VITALS — WEIGHT: 178.6 LBS | BODY MASS INDEX: 30.66 KG/M2

## 2021-01-01 VITALS — BODY MASS INDEX: 30.41 KG/M2 | WEIGHT: 177.19 LBS

## 2021-01-01 DIAGNOSIS — R65.20 SEVERE SEPSIS WITHOUT SEPTIC SHOCK (CODE) (H): ICD-10-CM

## 2021-01-01 DIAGNOSIS — E78.2 MIXED HYPERLIPIDEMIA: ICD-10-CM

## 2021-01-01 DIAGNOSIS — R73.9 HYPERGLYCEMIA: ICD-10-CM

## 2021-01-01 DIAGNOSIS — R94.39 ABNORMAL STRESS TEST: ICD-10-CM

## 2021-01-01 DIAGNOSIS — N18.4 CHRONIC KIDNEY DISEASE, STAGE 4 (SEVERE) (H): ICD-10-CM

## 2021-01-01 DIAGNOSIS — C34.11 MALIGNANT NEOPLASM OF UPPER LOBE OF RIGHT LUNG (H): ICD-10-CM

## 2021-01-01 DIAGNOSIS — A41.9 SEVERE SEPSIS (H): ICD-10-CM

## 2021-01-01 DIAGNOSIS — E87.20 LACTIC ACIDOSIS: ICD-10-CM

## 2021-01-01 DIAGNOSIS — E11.65 UNCONTROLLED TYPE 2 DIABETES MELLITUS WITH HYPERGLYCEMIA (H): ICD-10-CM

## 2021-01-01 DIAGNOSIS — M25.511 PAIN IN RIGHT SHOULDER: ICD-10-CM

## 2021-01-01 DIAGNOSIS — R60.0 LOCALIZED EDEMA: ICD-10-CM

## 2021-01-01 DIAGNOSIS — R79.89 ELEVATED TROPONIN: ICD-10-CM

## 2021-01-01 DIAGNOSIS — R10.84 ABDOMINAL PAIN, GENERALIZED: ICD-10-CM

## 2021-01-01 DIAGNOSIS — I10 ESSENTIAL (PRIMARY) HYPERTENSION: ICD-10-CM

## 2021-01-01 DIAGNOSIS — R79.89 ELEVATED TROPONIN: Primary | ICD-10-CM

## 2021-01-01 DIAGNOSIS — R41.82 ALTERED MENTAL STATUS, UNSPECIFIED ALTERED MENTAL STATUS TYPE: ICD-10-CM

## 2021-01-01 DIAGNOSIS — N18.4 CKD (CHRONIC KIDNEY DISEASE) STAGE 4, GFR 15-29 ML/MIN (H): ICD-10-CM

## 2021-01-01 DIAGNOSIS — C34.11 MALIGNANT NEOPLASM OF UPPER LOBE OF RIGHT LUNG (H): Primary | ICD-10-CM

## 2021-01-01 DIAGNOSIS — A41.9 UNSPECIFIED SEPTICEMIA(038.9) (H): ICD-10-CM

## 2021-01-01 DIAGNOSIS — I25.119 CORONARY ARTERY DISEASE INVOLVING NATIVE CORONARY ARTERY OF NATIVE HEART WITH ANGINA PECTORIS (H): Primary | ICD-10-CM

## 2021-01-01 DIAGNOSIS — D64.9 ANEMIA, UNSPECIFIED: ICD-10-CM

## 2021-01-01 DIAGNOSIS — Z71.89 OTHER SPECIFIED COUNSELING: ICD-10-CM

## 2021-01-01 DIAGNOSIS — E87.5 HYPERKALEMIA: ICD-10-CM

## 2021-01-01 DIAGNOSIS — Z11.52 ENCOUNTER FOR SCREENING LABORATORY TESTING FOR SEVERE ACUTE RESPIRATORY SYNDROME CORONAVIRUS 2 (SARS-COV-2): ICD-10-CM

## 2021-01-01 DIAGNOSIS — R65.20 SEVERE SEPSIS (H): ICD-10-CM

## 2021-01-01 DIAGNOSIS — I63.9 CEREBROVASCULAR ACCIDENT (H): Primary | ICD-10-CM

## 2021-01-01 DIAGNOSIS — E83.42 HYPOMAGNESEMIA: ICD-10-CM

## 2021-01-01 DIAGNOSIS — R50.9 FEVER, UNSPECIFIED: ICD-10-CM

## 2021-01-01 LAB
ABO/RH(D): NORMAL
ACT BLD: 215 SECONDS (ref 74–150)
ACT BLD: 280 SECONDS (ref 74–150)
ALBUMIN SERPL-MCNC: 2.5 G/DL (ref 3.4–5)
ALBUMIN SERPL-MCNC: 3.2 G/DL (ref 3.4–5)
ALBUMIN SERPL-MCNC: 3.5 G/DL (ref 3.5–5)
ALBUMIN SERPL-MCNC: 3.7 G/DL (ref 3.5–5)
ALBUMIN SERPL-MCNC: 3.8 G/DL (ref 3.5–5)
ALBUMIN UR-MCNC: 300 MG/DL
ALP SERPL-CCNC: 107 U/L (ref 40–150)
ALP SERPL-CCNC: 115 U/L (ref 45–120)
ALP SERPL-CCNC: 87 U/L (ref 40–150)
ALT SERPL W P-5'-P-CCNC: 15 U/L (ref 0–45)
ALT SERPL W P-5'-P-CCNC: 17 U/L (ref 0–50)
ALT SERPL W P-5'-P-CCNC: 18 U/L (ref 0–50)
ANION GAP SERPL CALCULATED.3IONS-SCNC: 10 MMOL/L (ref 5–18)
ANION GAP SERPL CALCULATED.3IONS-SCNC: 11 MMOL/L (ref 3–14)
ANION GAP SERPL CALCULATED.3IONS-SCNC: 11 MMOL/L (ref 5–18)
ANION GAP SERPL CALCULATED.3IONS-SCNC: 11 MMOL/L (ref 5–18)
ANION GAP SERPL CALCULATED.3IONS-SCNC: 13 MMOL/L (ref 5–18)
ANION GAP SERPL CALCULATED.3IONS-SCNC: 14 MMOL/L (ref 5–18)
ANION GAP SERPL CALCULATED.3IONS-SCNC: 15 MMOL/L (ref 3–14)
ANION GAP SERPL CALCULATED.3IONS-SCNC: 6 MMOL/L (ref 5–18)
ANION GAP SERPL CALCULATED.3IONS-SCNC: 7 MMOL/L (ref 5–18)
ANION GAP SERPL CALCULATED.3IONS-SCNC: 8 MMOL/L (ref 5–18)
ANION GAP SERPL CALCULATED.3IONS-SCNC: 9 MMOL/L (ref 3–14)
ANION GAP SERPL CALCULATED.3IONS-SCNC: 9 MMOL/L (ref 5–18)
ANION GAP SERPL CALCULATED.3IONS-SCNC: 9 MMOL/L (ref 5–18)
ANTIBODY SCREEN: NEGATIVE
APPEARANCE UR: CLEAR
APTT PPP: 29 SECONDS (ref 22–38)
APTT PPP: 40 SECONDS (ref 22–38)
APTT PPP: 79 SECONDS (ref 22–38)
AST SERPL W P-5'-P-CCNC: 17 U/L (ref 0–40)
AST SERPL W P-5'-P-CCNC: 19 U/L (ref 0–45)
AST SERPL W P-5'-P-CCNC: 23 U/L (ref 0–45)
ATRIAL RATE - MUSE: 117 BPM
ATRIAL RATE - MUSE: 76 BPM
ATRIAL RATE - MUSE: 81 BPM
ATRIAL RATE - MUSE: 93 BPM
BACTERIA BLD CULT: NO GROWTH
BACTERIA UR CULT: NO GROWTH
BASE EXCESS BLDV CALC-SCNC: -16.3 MMOL/L (ref -7.7–1.9)
BASE EXCESS BLDV CALC-SCNC: -2.1 MMOL/L
BASOPHILS # BLD AUTO: 0 10E3/UL (ref 0–0.2)
BASOPHILS NFR BLD AUTO: 0 %
BILIRUB SERPL-MCNC: 0.6 MG/DL (ref 0.2–1.3)
BILIRUB SERPL-MCNC: 0.6 MG/DL (ref 0.2–1.3)
BILIRUB SERPL-MCNC: 0.6 MG/DL (ref 0–1)
BILIRUB UR QL STRIP: NEGATIVE
BNP SERPL-MCNC: 237 PG/ML (ref 0–147)
BUN SERPL-MCNC: 29 MG/DL (ref 8–28)
BUN SERPL-MCNC: 30 MG/DL (ref 8–28)
BUN SERPL-MCNC: 31 MG/DL (ref 8–28)
BUN SERPL-MCNC: 36 MG/DL (ref 8–28)
BUN SERPL-MCNC: 42 MG/DL (ref 8–28)
BUN SERPL-MCNC: 45 MG/DL (ref 8–28)
BUN SERPL-MCNC: 50 MG/DL (ref 8–28)
BUN SERPL-MCNC: 52 MG/DL (ref 8–28)
BUN SERPL-MCNC: 54 MG/DL (ref 7–30)
BUN SERPL-MCNC: 55 MG/DL (ref 7–30)
BUN SERPL-MCNC: 56 MG/DL (ref 7–30)
BUN SERPL-MCNC: 59 MG/DL (ref 8–28)
BUN SERPL-MCNC: 64 MG/DL (ref 8–28)
CALCIUM SERPL-MCNC: 7.5 MG/DL (ref 8.5–10.5)
CALCIUM SERPL-MCNC: 7.8 MG/DL (ref 8.5–10.5)
CALCIUM SERPL-MCNC: 8.1 MG/DL (ref 8.5–10.5)
CALCIUM SERPL-MCNC: 8.2 MG/DL (ref 8.5–10.1)
CALCIUM SERPL-MCNC: 8.2 MG/DL (ref 8.5–10.5)
CALCIUM SERPL-MCNC: 8.5 MG/DL (ref 8.5–10.5)
CALCIUM SERPL-MCNC: 8.5 MG/DL (ref 8.5–10.5)
CALCIUM SERPL-MCNC: 8.6 MG/DL (ref 8.5–10.1)
CALCIUM SERPL-MCNC: 8.7 MG/DL (ref 8.5–10.5)
CALCIUM SERPL-MCNC: 9.3 MG/DL (ref 8.5–10.5)
CALCIUM SERPL-MCNC: 9.5 MG/DL (ref 8.5–10.5)
CALCIUM SERPL-MCNC: 9.6 MG/DL (ref 8.5–10.5)
CALCIUM SERPL-MCNC: 9.6 MG/DL (ref 8.5–10.5)
CALCIUM SERPL-MCNC: 9.7 MG/DL (ref 8.5–10.5)
CALCIUM SERPL-MCNC: 9.9 MG/DL (ref 8.5–10.1)
CHLORIDE BLD-SCNC: 103 MMOL/L (ref 98–107)
CHLORIDE BLD-SCNC: 104 MMOL/L (ref 98–107)
CHLORIDE BLD-SCNC: 106 MMOL/L (ref 98–107)
CHLORIDE BLD-SCNC: 106 MMOL/L (ref 98–107)
CHLORIDE BLD-SCNC: 108 MMOL/L (ref 94–109)
CHLORIDE BLD-SCNC: 109 MMOL/L (ref 98–107)
CHLORIDE BLD-SCNC: 109 MMOL/L (ref 98–107)
CHLORIDE BLD-SCNC: 111 MMOL/L (ref 98–107)
CHLORIDE BLD-SCNC: 112 MMOL/L (ref 98–107)
CHLORIDE BLD-SCNC: 116 MMOL/L (ref 94–109)
CHLORIDE BLD-SCNC: 116 MMOL/L (ref 94–109)
CHOLEST SERPL-MCNC: 190 MG/DL
CK SERPL-CCNC: 405 U/L (ref 30–190)
CO2 SERPL-SCNC: 11 MMOL/L (ref 20–32)
CO2 SERPL-SCNC: 12 MMOL/L (ref 20–32)
CO2 SERPL-SCNC: 13 MMOL/L (ref 20–32)
CO2 SERPL-SCNC: 15 MMOL/L (ref 22–31)
CO2 SERPL-SCNC: 16 MMOL/L (ref 22–31)
CO2 SERPL-SCNC: 17 MMOL/L (ref 22–31)
CO2 SERPL-SCNC: 18 MMOL/L (ref 22–31)
CO2 SERPL-SCNC: 20 MMOL/L (ref 22–31)
CO2 SERPL-SCNC: 21 MMOL/L (ref 22–31)
CO2 SERPL-SCNC: 21 MMOL/L (ref 22–31)
CO2 SERPL-SCNC: 23 MMOL/L (ref 22–31)
COLOR UR AUTO: ABNORMAL
CREAT SERPL-MCNC: 1.42 MG/DL (ref 0.6–1.1)
CREAT SERPL-MCNC: 1.5 MG/DL (ref 0.6–1.1)
CREAT SERPL-MCNC: 1.53 MG/DL (ref 0.6–1.1)
CREAT SERPL-MCNC: 1.63 MG/DL (ref 0.6–1.1)
CREAT SERPL-MCNC: 1.69 MG/DL (ref 0.6–1.1)
CREAT SERPL-MCNC: 1.73 MG/DL (ref 0.6–1.1)
CREAT SERPL-MCNC: 1.75 MG/DL (ref 0.6–1.1)
CREAT SERPL-MCNC: 1.9 MG/DL (ref 0.52–1.04)
CREAT SERPL-MCNC: 1.93 MG/DL (ref 0.52–1.04)
CREAT SERPL-MCNC: 1.98 MG/DL (ref 0.6–1.1)
CREAT SERPL-MCNC: 2.09 MG/DL (ref 0.52–1.04)
CREAT SERPL-MCNC: 2.16 MG/DL (ref 0.6–1.1)
CREAT SERPL-MCNC: 2.4 MG/DL (ref 0.6–1.1)
CREAT SERPL-MCNC: 2.67 MG/DL (ref 0.6–1.1)
CREAT SERPL-MCNC: 2.73 MG/DL (ref 0.6–1.1)
DIASTOLIC BLOOD PRESSURE - MUSE: NORMAL MMHG
EOSINOPHIL # BLD AUTO: 0 10E3/UL (ref 0–0.7)
EOSINOPHIL NFR BLD AUTO: 0 %
ERYTHROCYTE [DISTWIDTH] IN BLOOD BY AUTOMATED COUNT: 13.4 % (ref 10–15)
ERYTHROCYTE [DISTWIDTH] IN BLOOD BY AUTOMATED COUNT: 13.7 % (ref 10–15)
ERYTHROCYTE [DISTWIDTH] IN BLOOD BY AUTOMATED COUNT: 14 % (ref 10–15)
ERYTHROCYTE [SEDIMENTATION RATE] IN BLOOD BY WESTERGREN METHOD: 62 MM/HR (ref 0–20)
FASTING STATUS PATIENT QL REPORTED: ABNORMAL
FIBRINOGEN PPP-MCNC: 346 MG/DL (ref 170–490)
GFR SERPL CREATININE-BSD FRML MDRD: 16 ML/MIN/1.73M2
GFR SERPL CREATININE-BSD FRML MDRD: 16 ML/MIN/1.73M2
GFR SERPL CREATININE-BSD FRML MDRD: 19 ML/MIN/1.73M2
GFR SERPL CREATININE-BSD FRML MDRD: 21 ML/MIN/1.73M2
GFR SERPL CREATININE-BSD FRML MDRD: 22 ML/MIN/1.73M2
GFR SERPL CREATININE-BSD FRML MDRD: 24 ML/MIN/1.73M2
GFR SERPL CREATININE-BSD FRML MDRD: 24 ML/MIN/1.73M2
GFR SERPL CREATININE-BSD FRML MDRD: 25 ML/MIN/1.73M2
GFR SERPL CREATININE-BSD FRML MDRD: 27 ML/MIN/1.73M2
GFR SERPL CREATININE-BSD FRML MDRD: 28 ML/MIN/1.73M2
GFR SERPL CREATININE-BSD FRML MDRD: 29 ML/MIN/1.73M2
GFR SERPL CREATININE-BSD FRML MDRD: 30 ML/MIN/1.73M2
GFR SERPL CREATININE-BSD FRML MDRD: 32 ML/MIN/1.73M2
GFR SERPL CREATININE-BSD FRML MDRD: 33 ML/MIN/1.73M2
GFR SERPL CREATININE-BSD FRML MDRD: 35 ML/MIN/1.73M2
GLUCOSE BLD-MCNC: 136 MG/DL (ref 70–125)
GLUCOSE BLD-MCNC: 138 MG/DL (ref 70–125)
GLUCOSE BLD-MCNC: 156 MG/DL (ref 70–125)
GLUCOSE BLD-MCNC: 164 MG/DL (ref 70–125)
GLUCOSE BLD-MCNC: 171 MG/DL (ref 70–125)
GLUCOSE BLD-MCNC: 202 MG/DL (ref 70–125)
GLUCOSE BLD-MCNC: 239 MG/DL (ref 70–99)
GLUCOSE BLD-MCNC: 249 MG/DL (ref 70–125)
GLUCOSE BLD-MCNC: 258 MG/DL (ref 70–99)
GLUCOSE BLD-MCNC: 273 MG/DL (ref 70–125)
GLUCOSE BLD-MCNC: 285 MG/DL (ref 70–125)
GLUCOSE BLD-MCNC: 298 MG/DL (ref 70–99)
GLUCOSE BLD-MCNC: 379 MG/DL (ref 70–125)
GLUCOSE BLD-MCNC: 388 MG/DL (ref 70–125)
GLUCOSE BLD-MCNC: 493 MG/DL (ref 70–125)
GLUCOSE BLDC GLUCOMTR-MCNC: 118 MG/DL (ref 70–125)
GLUCOSE BLDC GLUCOMTR-MCNC: 122 MG/DL (ref 70–125)
GLUCOSE BLDC GLUCOMTR-MCNC: 144 MG/DL (ref 70–125)
GLUCOSE BLDC GLUCOMTR-MCNC: 151 MG/DL (ref 70–125)
GLUCOSE BLDC GLUCOMTR-MCNC: 160 MG/DL (ref 70–125)
GLUCOSE BLDC GLUCOMTR-MCNC: 179 MG/DL (ref 70–125)
GLUCOSE BLDC GLUCOMTR-MCNC: 180 MG/DL (ref 70–125)
GLUCOSE BLDC GLUCOMTR-MCNC: 181 MG/DL (ref 70–125)
GLUCOSE BLDC GLUCOMTR-MCNC: 182 MG/DL (ref 70–125)
GLUCOSE BLDC GLUCOMTR-MCNC: 183 MG/DL (ref 70–125)
GLUCOSE BLDC GLUCOMTR-MCNC: 192 MG/DL (ref 70–125)
GLUCOSE BLDC GLUCOMTR-MCNC: 196 MG/DL (ref 70–125)
GLUCOSE BLDC GLUCOMTR-MCNC: 200 MG/DL (ref 70–125)
GLUCOSE BLDC GLUCOMTR-MCNC: 201 MG/DL (ref 70–125)
GLUCOSE BLDC GLUCOMTR-MCNC: 203 MG/DL (ref 70–125)
GLUCOSE BLDC GLUCOMTR-MCNC: 207 MG/DL (ref 70–125)
GLUCOSE BLDC GLUCOMTR-MCNC: 208 MG/DL (ref 70–125)
GLUCOSE BLDC GLUCOMTR-MCNC: 223 MG/DL (ref 70–125)
GLUCOSE BLDC GLUCOMTR-MCNC: 223 MG/DL (ref 70–125)
GLUCOSE BLDC GLUCOMTR-MCNC: 227 MG/DL (ref 70–125)
GLUCOSE BLDC GLUCOMTR-MCNC: 239 MG/DL (ref 70–125)
GLUCOSE BLDC GLUCOMTR-MCNC: 244 MG/DL (ref 70–125)
GLUCOSE BLDC GLUCOMTR-MCNC: 247 MG/DL (ref 70–125)
GLUCOSE BLDC GLUCOMTR-MCNC: 247 MG/DL (ref 70–125)
GLUCOSE BLDC GLUCOMTR-MCNC: 260 MG/DL (ref 70–125)
GLUCOSE BLDC GLUCOMTR-MCNC: 270 MG/DL (ref 70–125)
GLUCOSE BLDC GLUCOMTR-MCNC: 272 MG/DL (ref 70–125)
GLUCOSE BLDC GLUCOMTR-MCNC: 277 MG/DL (ref 70–99)
GLUCOSE BLDC GLUCOMTR-MCNC: 280 MG/DL (ref 70–125)
GLUCOSE BLDC GLUCOMTR-MCNC: 288 MG/DL (ref 70–125)
GLUCOSE BLDC GLUCOMTR-MCNC: 289 MG/DL (ref 70–99)
GLUCOSE BLDC GLUCOMTR-MCNC: 289 MG/DL (ref 70–99)
GLUCOSE BLDC GLUCOMTR-MCNC: 302 MG/DL (ref 70–99)
GLUCOSE BLDC GLUCOMTR-MCNC: 306 MG/DL (ref 70–125)
GLUCOSE BLDC GLUCOMTR-MCNC: 307 MG/DL (ref 70–99)
GLUCOSE BLDC GLUCOMTR-MCNC: 308 MG/DL (ref 70–125)
GLUCOSE BLDC GLUCOMTR-MCNC: 332 MG/DL (ref 70–99)
GLUCOSE BLDC GLUCOMTR-MCNC: 333 MG/DL (ref 70–125)
GLUCOSE BLDC GLUCOMTR-MCNC: 350 MG/DL (ref 70–99)
GLUCOSE BLDC GLUCOMTR-MCNC: 351 MG/DL (ref 70–125)
GLUCOSE BLDC GLUCOMTR-MCNC: 383 MG/DL (ref 70–125)
GLUCOSE UR STRIP-MCNC: >1000 MG/DL
HBA1C MFR BLD: 9.3 %
HCO3 BLDV-SCNC: 12 MMOL/L (ref 21–28)
HCO3 BLDV-SCNC: 22 MMOL/L (ref 24–30)
HCT VFR BLD AUTO: 26.7 % (ref 35–47)
HCT VFR BLD AUTO: 27.3 % (ref 35–47)
HCT VFR BLD AUTO: 31.7 % (ref 35–47)
HCT VFR BLD AUTO: 33.2 % (ref 35–47)
HCT VFR BLD AUTO: 35.3 % (ref 35–47)
HDLC SERPL-MCNC: 27 MG/DL
HGB BLD-MCNC: 10.7 G/DL (ref 11.7–15.7)
HGB BLD-MCNC: 10.9 G/DL (ref 11.7–15.7)
HGB BLD-MCNC: 8.2 G/DL (ref 11.7–15.7)
HGB BLD-MCNC: 8.3 G/DL (ref 11.7–15.7)
HGB BLD-MCNC: 8.4 G/DL (ref 11.7–15.7)
HGB BLD-MCNC: 9.6 G/DL (ref 11.7–15.7)
HGB UR QL STRIP: ABNORMAL
HOLD SPECIMEN: NORMAL
IMM GRANULOCYTES # BLD: 0.1 10E3/UL
IMM GRANULOCYTES # BLD: 0.1 10E3/UL
IMM GRANULOCYTES # BLD: 0.3 10E3/UL
IMM GRANULOCYTES NFR BLD: 1 %
INR PPP: 1.26 (ref 0.85–1.15)
INR PPP: 1.54 (ref 0.85–1.15)
INTERPRETATION ECG - MUSE: NORMAL
IRON SERPL-MCNC: 49 UG/DL (ref 42–175)
KETONES UR STRIP-MCNC: ABNORMAL MG/DL
LACTATE SERPL-SCNC: 1.9 MMOL/L (ref 0.7–2)
LACTATE SERPL-SCNC: 2 MMOL/L (ref 0.7–2)
LACTATE SERPL-SCNC: 2.2 MMOL/L (ref 0.7–2)
LACTATE SERPL-SCNC: 3.1 MMOL/L (ref 0.7–2)
LACTATE SERPL-SCNC: 3.5 MMOL/L (ref 0.7–2)
LACTATE SERPL-SCNC: 3.8 MMOL/L (ref 0.7–2)
LACTATE SERPL-SCNC: 4.4 MMOL/L (ref 0.7–2)
LACTATE SERPL-SCNC: 7.2 MMOL/L (ref 0.7–2)
LDLC SERPL CALC-MCNC: 123 MG/DL
LEUKOCYTE ESTERASE UR QL STRIP: NEGATIVE
LVEF ECHO: NORMAL
LYMPHOCYTES # BLD AUTO: 0.5 10E3/UL (ref 0.8–5.3)
LYMPHOCYTES # BLD AUTO: 0.7 10E3/UL (ref 0.8–5.3)
LYMPHOCYTES # BLD AUTO: 0.9 10E3/UL (ref 0.8–5.3)
LYMPHOCYTES NFR BLD AUTO: 3 %
LYMPHOCYTES NFR BLD AUTO: 4 %
LYMPHOCYTES NFR BLD AUTO: 7 %
MAGNESIUM SERPL-MCNC: 1.1 MG/DL (ref 1.8–2.6)
MAGNESIUM SERPL-MCNC: 2.1 MG/DL (ref 1.8–2.6)
MCH RBC QN AUTO: 29.4 PG (ref 26.5–33)
MCH RBC QN AUTO: 29.8 PG (ref 26.5–33)
MCH RBC QN AUTO: 29.8 PG (ref 26.5–33)
MCH RBC QN AUTO: 30.3 PG (ref 26.5–33)
MCH RBC QN AUTO: 30.5 PG (ref 26.5–33)
MCHC RBC AUTO-ENTMCNC: 30.3 G/DL (ref 31.5–36.5)
MCHC RBC AUTO-ENTMCNC: 30.8 G/DL (ref 31.5–36.5)
MCHC RBC AUTO-ENTMCNC: 30.9 G/DL (ref 31.5–36.5)
MCHC RBC AUTO-ENTMCNC: 31.1 G/DL (ref 31.5–36.5)
MCHC RBC AUTO-ENTMCNC: 32.2 G/DL (ref 31.5–36.5)
MCV RBC AUTO: 94 FL (ref 78–100)
MCV RBC AUTO: 96 FL (ref 78–100)
MCV RBC AUTO: 97 FL (ref 78–100)
MCV RBC AUTO: 97 FL (ref 78–100)
MCV RBC AUTO: 98 FL (ref 78–100)
MONOCYTES # BLD AUTO: 0.5 10E3/UL (ref 0–1.3)
MONOCYTES # BLD AUTO: 0.8 10E3/UL (ref 0–1.3)
MONOCYTES # BLD AUTO: 0.9 10E3/UL (ref 0–1.3)
MONOCYTES NFR BLD AUTO: 4 %
MONOCYTES NFR BLD AUTO: 4 %
MONOCYTES NFR BLD AUTO: 5 %
NEUTROPHILS # BLD AUTO: 17.1 10E3/UL (ref 1.6–8.3)
NEUTROPHILS # BLD AUTO: 22.4 10E3/UL (ref 1.6–8.3)
NEUTROPHILS # BLD AUTO: 9 10E3/UL (ref 1.6–8.3)
NEUTROPHILS NFR BLD AUTO: 87 %
NEUTROPHILS NFR BLD AUTO: 91 %
NEUTROPHILS NFR BLD AUTO: 92 %
NITRATE UR QL: NEGATIVE
NRBC # BLD AUTO: 0 10E3/UL
NRBC BLD AUTO-RTO: 0 /100
O2/TOTAL GAS SETTING VFR VENT: 21 %
OXYHGB MFR BLDV: 46.6 % (ref 70–75)
P AXIS - MUSE: 40 DEGREES
P AXIS - MUSE: 42 DEGREES
P AXIS - MUSE: 49 DEGREES
P AXIS - MUSE: 64 DEGREES
PCO2 BLDV: 35 MM HG (ref 40–50)
PCO2 BLDV: 46 MM HG (ref 35–50)
PH BLDV: 7.13 [PH] (ref 7.32–7.43)
PH BLDV: 7.33 [PH] (ref 7.35–7.45)
PH UR STRIP: 6 [PH] (ref 5–7)
PHOSPHATE SERPL-MCNC: 3 MG/DL (ref 2.5–4.5)
PHOSPHATE SERPL-MCNC: 4.1 MG/DL (ref 2.5–4.5)
PLATELET # BLD AUTO: 171 10E3/UL (ref 150–450)
PLATELET # BLD AUTO: 180 10E3/UL (ref 150–450)
PLATELET # BLD AUTO: 217 10E3/UL (ref 150–450)
PLATELET # BLD AUTO: 224 10E3/UL (ref 150–450)
PLATELET # BLD AUTO: 236 10E3/UL (ref 150–450)
PLATELET # BLD AUTO: 280 10E3/UL (ref 150–450)
PO2 BLDV: 24 MM HG (ref 25–47)
PO2 BLDV: 26 MM HG (ref 25–47)
POTASSIUM BLD-SCNC: 3.8 MMOL/L (ref 3.5–5)
POTASSIUM BLD-SCNC: 3.9 MMOL/L (ref 3.5–5)
POTASSIUM BLD-SCNC: 4 MMOL/L (ref 3.5–5)
POTASSIUM BLD-SCNC: 4.1 MMOL/L (ref 3.5–5)
POTASSIUM BLD-SCNC: 4.2 MMOL/L (ref 3.5–5)
POTASSIUM BLD-SCNC: 4.4 MMOL/L (ref 3.5–5)
POTASSIUM BLD-SCNC: 4.6 MMOL/L (ref 3.5–5)
POTASSIUM BLD-SCNC: 4.7 MMOL/L (ref 3.5–5)
POTASSIUM BLD-SCNC: 4.9 MMOL/L (ref 3.4–5.3)
POTASSIUM BLD-SCNC: 5 MMOL/L (ref 3.5–5)
POTASSIUM BLD-SCNC: 5.3 MMOL/L (ref 3.5–5)
POTASSIUM BLD-SCNC: 5.3 MMOL/L (ref 3.5–5)
POTASSIUM BLD-SCNC: 5.4 MMOL/L (ref 3.5–5)
POTASSIUM BLD-SCNC: 5.6 MMOL/L (ref 3.4–5.3)
POTASSIUM BLD-SCNC: 5.7 MMOL/L (ref 3.4–5.3)
POTASSIUM BLD-SCNC: 6.5 MMOL/L (ref 3.4–5.3)
POTASSIUM BLD-SCNC: 6.6 MMOL/L (ref 3.4–5.3)
PR INTERVAL - MUSE: 148 MS
PR INTERVAL - MUSE: 152 MS
PR INTERVAL - MUSE: 154 MS
PR INTERVAL - MUSE: 156 MS
PROT SERPL-MCNC: 5.9 G/DL (ref 6.8–8.8)
PROT SERPL-MCNC: 7.4 G/DL (ref 6.8–8.8)
PROT SERPL-MCNC: 7.6 G/DL (ref 6–8)
QRS DURATION - MUSE: 72 MS
QRS DURATION - MUSE: 72 MS
QRS DURATION - MUSE: 76 MS
QRS DURATION - MUSE: 80 MS
QT - MUSE: 332 MS
QT - MUSE: 356 MS
QT - MUSE: 416 MS
QT - MUSE: 420 MS
QTC - MUSE: 442 MS
QTC - MUSE: 463 MS
QTC - MUSE: 468 MS
QTC - MUSE: 487 MS
R AXIS - MUSE: -15 DEGREES
R AXIS - MUSE: -16 DEGREES
R AXIS - MUSE: -22 DEGREES
R AXIS - MUSE: 29 DEGREES
RBC # BLD AUTO: 2.72 10E6/UL (ref 3.8–5.2)
RBC # BLD AUTO: 2.82 10E6/UL (ref 3.8–5.2)
RBC # BLD AUTO: 3.27 10E6/UL (ref 3.8–5.2)
RBC # BLD AUTO: 3.53 10E6/UL (ref 3.8–5.2)
RBC # BLD AUTO: 3.66 10E6/UL (ref 3.8–5.2)
RBC URINE: 1 /HPF
SAO2 % BLDV: 47.5 % (ref 70–75)
SARS-COV-2 RNA RESP QL NAA+PROBE: NEGATIVE
SODIUM SERPL-SCNC: 132 MMOL/L (ref 136–145)
SODIUM SERPL-SCNC: 133 MMOL/L (ref 136–145)
SODIUM SERPL-SCNC: 134 MMOL/L (ref 136–145)
SODIUM SERPL-SCNC: 135 MMOL/L (ref 136–145)
SODIUM SERPL-SCNC: 135 MMOL/L (ref 136–145)
SODIUM SERPL-SCNC: 136 MMOL/L (ref 133–144)
SODIUM SERPL-SCNC: 137 MMOL/L (ref 133–144)
SODIUM SERPL-SCNC: 137 MMOL/L (ref 136–145)
SODIUM SERPL-SCNC: 138 MMOL/L (ref 133–144)
SODIUM SERPL-SCNC: 138 MMOL/L (ref 136–145)
SODIUM SERPL-SCNC: 139 MMOL/L (ref 136–145)
SP GR UR STRIP: 1.02 (ref 1–1.03)
SPECIMEN EXPIRATION DATE: NORMAL
SYSTOLIC BLOOD PRESSURE - MUSE: NORMAL MMHG
T AXIS - MUSE: 53 DEGREES
T AXIS - MUSE: 66 DEGREES
T AXIS - MUSE: 67 DEGREES
T AXIS - MUSE: 77 DEGREES
TRIGL SERPL-MCNC: 198 MG/DL
TROPONIN I SERPL-MCNC: 0.16 NG/ML (ref 0–0.29)
TROPONIN I SERPL-MCNC: 1.89 NG/ML (ref 0–0.29)
TROPONIN I SERPL-MCNC: 3.44 NG/ML (ref 0–0.29)
TROPONIN I SERPL-MCNC: 3.97 NG/ML (ref 0–0.29)
TROPONIN I SERPL-MCNC: 7.9 NG/ML (ref 0–0.29)
TROPONIN I SERPL-MCNC: <0.015 UG/L (ref 0–0.04)
UFH PPP CHRO-ACNC: 0.23 IU/ML
UFH PPP CHRO-ACNC: 0.38 IU/ML
UFH PPP CHRO-ACNC: 0.38 IU/ML
UFH PPP CHRO-ACNC: 0.4 IU/ML
UFH PPP CHRO-ACNC: 0.66 IU/ML
UFH PPP CHRO-ACNC: 0.8 IU/ML
UFH PPP CHRO-ACNC: >1.1 IU/ML
UROBILINOGEN UR STRIP-MCNC: <2 MG/DL
VENTRICULAR RATE- MUSE: 117 BPM
VENTRICULAR RATE- MUSE: 76 BPM
VENTRICULAR RATE- MUSE: 81 BPM
VENTRICULAR RATE- MUSE: 93 BPM
VIT B12 SERPL-MCNC: 290 PG/ML (ref 213–816)
WBC # BLD AUTO: 10.2 10E3/UL (ref 4–11)
WBC # BLD AUTO: 12.7 10E3/UL (ref 4–11)
WBC # BLD AUTO: 18.6 10E3/UL (ref 4–11)
WBC # BLD AUTO: 18.7 10E3/UL (ref 4–11)
WBC # BLD AUTO: 24.5 10E3/UL (ref 4–11)
WBC URINE: 1 /HPF

## 2021-01-01 PROCEDURE — 83605 ASSAY OF LACTIC ACID: CPT | Performed by: INTERNAL MEDICINE

## 2021-01-01 PROCEDURE — 250N000013 HC RX MED GY IP 250 OP 250 PS 637: Performed by: INTERNAL MEDICINE

## 2021-01-01 PROCEDURE — 250N000009 HC RX 250: Performed by: STUDENT IN AN ORGANIZED HEALTH CARE EDUCATION/TRAINING PROGRAM

## 2021-01-01 PROCEDURE — G1004 CDSM NDSC: HCPCS | Mod: GC | Performed by: RADIOLOGY

## 2021-01-01 PROCEDURE — 250N000013 HC RX MED GY IP 250 OP 250 PS 637: Performed by: HOSPITALIST

## 2021-01-01 PROCEDURE — 80048 BASIC METABOLIC PNL TOTAL CA: CPT | Performed by: HOSPITALIST

## 2021-01-01 PROCEDURE — 250N000012 HC RX MED GY IP 250 OP 636 PS 637: Performed by: INTERNAL MEDICINE

## 2021-01-01 PROCEDURE — 36415 COLL VENOUS BLD VENIPUNCTURE: CPT | Mod: ORL | Performed by: FAMILY MEDICINE

## 2021-01-01 PROCEDURE — 87635 SARS-COV-2 COVID-19 AMP PRB: CPT | Performed by: HOSPITALIST

## 2021-01-01 PROCEDURE — 92920 PRQ TRLUML C ANGIOP 1ART&/BR: CPT | Performed by: INTERNAL MEDICINE

## 2021-01-01 PROCEDURE — 99233 SBSQ HOSP IP/OBS HIGH 50: CPT | Performed by: GENERAL ACUTE CARE HOSPITAL

## 2021-01-01 PROCEDURE — 99207 PR APP CREDIT; MD BILLING SHARED VISIT: CPT | Performed by: PHYSICIAN ASSISTANT

## 2021-01-01 PROCEDURE — 85610 PROTHROMBIN TIME: CPT | Performed by: STUDENT IN AN ORGANIZED HEALTH CARE EDUCATION/TRAINING PROGRAM

## 2021-01-01 PROCEDURE — 36415 COLL VENOUS BLD VENIPUNCTURE: CPT | Performed by: EMERGENCY MEDICINE

## 2021-01-01 PROCEDURE — 3E03329 INTRODUCTION OF OTHER ANTI-INFECTIVE INTO PERIPHERAL VEIN, PERCUTANEOUS APPROACH: ICD-10-PCS | Performed by: INTERNAL MEDICINE

## 2021-01-01 PROCEDURE — 343N000001 HC RX 343: Performed by: INTERNAL MEDICINE

## 2021-01-01 PROCEDURE — 99233 SBSQ HOSP IP/OBS HIGH 50: CPT | Performed by: INTERNAL MEDICINE

## 2021-01-01 PROCEDURE — 82805 BLOOD GASES W/O2 SATURATION: CPT | Performed by: EMERGENCY MEDICINE

## 2021-01-01 PROCEDURE — 85025 COMPLETE CBC W/AUTO DIFF WBC: CPT | Performed by: EMERGENCY MEDICINE

## 2021-01-01 PROCEDURE — A9500 TC99M SESTAMIBI: HCPCS | Performed by: INTERNAL MEDICINE

## 2021-01-01 PROCEDURE — 85730 THROMBOPLASTIN TIME PARTIAL: CPT | Performed by: EMERGENCY MEDICINE

## 2021-01-01 PROCEDURE — 83605 ASSAY OF LACTIC ACID: CPT | Performed by: EMERGENCY MEDICINE

## 2021-01-01 PROCEDURE — 36415 COLL VENOUS BLD VENIPUNCTURE: CPT | Performed by: INTERNAL MEDICINE

## 2021-01-01 PROCEDURE — 250N000012 HC RX MED GY IP 250 OP 636 PS 637: Performed by: HOSPITALIST

## 2021-01-01 PROCEDURE — 02703ZZ DILATION OF CORONARY ARTERY, ONE ARTERY, PERCUTANEOUS APPROACH: ICD-10-PCS | Performed by: INTERNAL MEDICINE

## 2021-01-01 PROCEDURE — 84484 ASSAY OF TROPONIN QUANT: CPT | Performed by: INTERNAL MEDICINE

## 2021-01-01 PROCEDURE — 85520 HEPARIN ASSAY: CPT | Performed by: INTERNAL MEDICINE

## 2021-01-01 PROCEDURE — 83036 HEMOGLOBIN GLYCOSYLATED A1C: CPT | Performed by: HOSPITALIST

## 2021-01-01 PROCEDURE — 86900 BLOOD TYPING SEROLOGIC ABO: CPT | Performed by: EMERGENCY MEDICINE

## 2021-01-01 PROCEDURE — 97535 SELF CARE MNGMENT TRAINING: CPT | Mod: GO

## 2021-01-01 PROCEDURE — 84132 ASSAY OF SERUM POTASSIUM: CPT | Performed by: STUDENT IN AN ORGANIZED HEALTH CARE EDUCATION/TRAINING PROGRAM

## 2021-01-01 PROCEDURE — 250N000011 HC RX IP 250 OP 636: Performed by: INTERNAL MEDICINE

## 2021-01-01 PROCEDURE — C1725 CATH, TRANSLUMIN NON-LASER: HCPCS | Performed by: INTERNAL MEDICINE

## 2021-01-01 PROCEDURE — 250N000011 HC RX IP 250 OP 636: Performed by: PHYSICIAN ASSISTANT

## 2021-01-01 PROCEDURE — 99232 SBSQ HOSP IP/OBS MODERATE 35: CPT | Performed by: NURSE PRACTITIONER

## 2021-01-01 PROCEDURE — 96366 THER/PROPH/DIAG IV INF ADDON: CPT | Performed by: EMERGENCY MEDICINE

## 2021-01-01 PROCEDURE — 250N000013 HC RX MED GY IP 250 OP 250 PS 637: Performed by: CLINICAL NURSE SPECIALIST

## 2021-01-01 PROCEDURE — 93005 ELECTROCARDIOGRAM TRACING: CPT

## 2021-01-01 PROCEDURE — 36415 COLL VENOUS BLD VENIPUNCTURE: CPT | Performed by: PSYCHIATRY & NEUROLOGY

## 2021-01-01 PROCEDURE — 80048 BASIC METABOLIC PNL TOTAL CA: CPT | Performed by: INTERNAL MEDICINE

## 2021-01-01 PROCEDURE — 82607 VITAMIN B-12: CPT | Mod: ORL | Performed by: NURSE PRACTITIONER

## 2021-01-01 PROCEDURE — C9803 HOPD COVID-19 SPEC COLLECT: HCPCS

## 2021-01-01 PROCEDURE — 80053 COMPREHEN METABOLIC PANEL: CPT | Performed by: STUDENT IN AN ORGANIZED HEALTH CARE EDUCATION/TRAINING PROGRAM

## 2021-01-01 PROCEDURE — 250N000009 HC RX 250: Performed by: INTERNAL MEDICINE

## 2021-01-01 PROCEDURE — 36415 COLL VENOUS BLD VENIPUNCTURE: CPT | Performed by: HOSPITALIST

## 2021-01-01 PROCEDURE — 99223 1ST HOSP IP/OBS HIGH 75: CPT | Performed by: INTERNAL MEDICINE

## 2021-01-01 PROCEDURE — 250N000012 HC RX MED GY IP 250 OP 636 PS 637: Performed by: GENERAL ACUTE CARE HOSPITAL

## 2021-01-01 PROCEDURE — 85652 RBC SED RATE AUTOMATED: CPT | Mod: ORL | Performed by: NURSE PRACTITIONER

## 2021-01-01 PROCEDURE — 258N000003 HC RX IP 258 OP 636: Performed by: EMERGENCY MEDICINE

## 2021-01-01 PROCEDURE — 85730 THROMBOPLASTIN TIME PARTIAL: CPT | Performed by: INTERNAL MEDICINE

## 2021-01-01 PROCEDURE — 96361 HYDRATE IV INFUSION ADD-ON: CPT

## 2021-01-01 PROCEDURE — 96372 THER/PROPH/DIAG INJ SC/IM: CPT | Performed by: EMERGENCY MEDICINE

## 2021-01-01 PROCEDURE — 93306 TTE W/DOPPLER COMPLETE: CPT | Mod: 26 | Performed by: INTERNAL MEDICINE

## 2021-01-01 PROCEDURE — G0378 HOSPITAL OBSERVATION PER HR: HCPCS

## 2021-01-01 PROCEDURE — 99215 OFFICE O/P EST HI 40 MIN: CPT | Performed by: INTERNAL MEDICINE

## 2021-01-01 PROCEDURE — 120N000001 HC R&B MED SURG/OB

## 2021-01-01 PROCEDURE — 99232 SBSQ HOSP IP/OBS MODERATE 35: CPT | Performed by: HOSPITALIST

## 2021-01-01 PROCEDURE — 250N000011 HC RX IP 250 OP 636

## 2021-01-01 PROCEDURE — 99285 EMERGENCY DEPT VISIT HI MDM: CPT | Mod: 25

## 2021-01-01 PROCEDURE — 93880 EXTRACRANIAL BILAT STUDY: CPT

## 2021-01-01 PROCEDURE — 96376 TX/PRO/DX INJ SAME DRUG ADON: CPT | Performed by: EMERGENCY MEDICINE

## 2021-01-01 PROCEDURE — 96374 THER/PROPH/DIAG INJ IV PUSH: CPT

## 2021-01-01 PROCEDURE — C9113 INJ PANTOPRAZOLE SODIUM, VIA: HCPCS | Performed by: HOSPITALIST

## 2021-01-01 PROCEDURE — 999N000127 HC STATISTIC PERIPHERAL IV START W US GUIDANCE

## 2021-01-01 PROCEDURE — 250N000011 HC RX IP 250 OP 636: Performed by: STUDENT IN AN ORGANIZED HEALTH CARE EDUCATION/TRAINING PROGRAM

## 2021-01-01 PROCEDURE — 83540 ASSAY OF IRON: CPT | Mod: ORL | Performed by: NURSE PRACTITIONER

## 2021-01-01 PROCEDURE — C8929 TTE W OR WO FOL WCON,DOPPLER: HCPCS

## 2021-01-01 PROCEDURE — 93005 ELECTROCARDIOGRAM TRACING: CPT | Performed by: EMERGENCY MEDICINE

## 2021-01-01 PROCEDURE — 85004 AUTOMATED DIFF WBC COUNT: CPT | Performed by: EMERGENCY MEDICINE

## 2021-01-01 PROCEDURE — 99232 SBSQ HOSP IP/OBS MODERATE 35: CPT | Performed by: INTERNAL MEDICINE

## 2021-01-01 PROCEDURE — 93308 TTE F-UP OR LMTD: CPT | Mod: 26 | Performed by: EMERGENCY MEDICINE

## 2021-01-01 PROCEDURE — 255N000002 HC RX 255 OP 636: Performed by: INTERNAL MEDICINE

## 2021-01-01 PROCEDURE — 99291 CRITICAL CARE FIRST HOUR: CPT | Mod: 25 | Performed by: EMERGENCY MEDICINE

## 2021-01-01 PROCEDURE — 97162 PT EVAL MOD COMPLEX 30 MIN: CPT | Mod: GP

## 2021-01-01 PROCEDURE — C1887 CATHETER, GUIDING: HCPCS | Performed by: INTERNAL MEDICINE

## 2021-01-01 PROCEDURE — 250N000011 HC RX IP 250 OP 636: Performed by: EMERGENCY MEDICINE

## 2021-01-01 PROCEDURE — 250N000013 HC RX MED GY IP 250 OP 250 PS 637: Performed by: EMERGENCY MEDICINE

## 2021-01-01 PROCEDURE — 87086 URINE CULTURE/COLONY COUNT: CPT | Mod: ORL | Performed by: FAMILY MEDICINE

## 2021-01-01 PROCEDURE — 93010 ELECTROCARDIOGRAM REPORT: CPT | Mod: HIP | Performed by: INTERNAL MEDICINE

## 2021-01-01 PROCEDURE — 92610 EVALUATE SWALLOWING FUNCTION: CPT | Mod: GN

## 2021-01-01 PROCEDURE — 93005 ELECTROCARDIOGRAM TRACING: CPT | Performed by: INTERNAL MEDICINE

## 2021-01-01 PROCEDURE — 999N000208 ECHOCARDIOGRAM COMPLETE

## 2021-01-01 PROCEDURE — 99223 1ST HOSP IP/OBS HIGH 75: CPT | Performed by: PSYCHIATRY & NEUROLOGY

## 2021-01-01 PROCEDURE — 85018 HEMOGLOBIN: CPT | Performed by: HOSPITALIST

## 2021-01-01 PROCEDURE — 87040 BLOOD CULTURE FOR BACTERIA: CPT | Performed by: EMERGENCY MEDICINE

## 2021-01-01 PROCEDURE — 74176 CT ABD & PELVIS W/O CONTRAST: CPT | Mod: 26 | Performed by: RADIOLOGY

## 2021-01-01 PROCEDURE — 70553 MRI BRAIN STEM W/O & W/DYE: CPT

## 2021-01-01 PROCEDURE — 81001 URINALYSIS AUTO W/SCOPE: CPT | Performed by: EMERGENCY MEDICINE

## 2021-01-01 PROCEDURE — 99233 SBSQ HOSP IP/OBS HIGH 50: CPT | Performed by: PSYCHIATRY & NEUROLOGY

## 2021-01-01 PROCEDURE — 82550 ASSAY OF CK (CPK): CPT | Performed by: INTERNAL MEDICINE

## 2021-01-01 PROCEDURE — 258N000003 HC RX IP 258 OP 636: Performed by: INTERNAL MEDICINE

## 2021-01-01 PROCEDURE — 93458 L HRT ARTERY/VENTRICLE ANGIO: CPT | Performed by: INTERNAL MEDICINE

## 2021-01-01 PROCEDURE — 74174 CTA ABD&PLVS W/CONTRAST: CPT | Mod: 26 | Performed by: RADIOLOGY

## 2021-01-01 PROCEDURE — 97110 THERAPEUTIC EXERCISES: CPT | Mod: GP

## 2021-01-01 PROCEDURE — C9803 HOPD COVID-19 SPEC COLLECT: HCPCS | Performed by: EMERGENCY MEDICINE

## 2021-01-01 PROCEDURE — 85049 AUTOMATED PLATELET COUNT: CPT | Performed by: INTERNAL MEDICINE

## 2021-01-01 PROCEDURE — 85520 HEPARIN ASSAY: CPT | Performed by: STUDENT IN AN ORGANIZED HEALTH CARE EDUCATION/TRAINING PROGRAM

## 2021-01-01 PROCEDURE — 250N000011 HC RX IP 250 OP 636: Performed by: NURSE PRACTITIONER

## 2021-01-01 PROCEDURE — 76706 US ABDL AORTA SCREEN AAA: CPT | Mod: 26 | Performed by: EMERGENCY MEDICINE

## 2021-01-01 PROCEDURE — 120N000002 HC R&B MED SURG/OB UMMC

## 2021-01-01 PROCEDURE — 80053 COMPREHEN METABOLIC PANEL: CPT | Performed by: EMERGENCY MEDICINE

## 2021-01-01 PROCEDURE — 73502 X-RAY EXAM HIP UNI 2-3 VIEWS: CPT

## 2021-01-01 PROCEDURE — 82465 ASSAY BLD/SERUM CHOLESTEROL: CPT | Performed by: PSYCHIATRY & NEUROLOGY

## 2021-01-01 PROCEDURE — 250N000013 HC RX MED GY IP 250 OP 250 PS 637: Performed by: PSYCHIATRY & NEUROLOGY

## 2021-01-01 PROCEDURE — 96375 TX/PRO/DX INJ NEW DRUG ADDON: CPT | Performed by: EMERGENCY MEDICINE

## 2021-01-01 PROCEDURE — G1004 CDSM NDSC: HCPCS

## 2021-01-01 PROCEDURE — 258N000001 HC RX 258: Performed by: STUDENT IN AN ORGANIZED HEALTH CARE EDUCATION/TRAINING PROGRAM

## 2021-01-01 PROCEDURE — 99153 MOD SED SAME PHYS/QHP EA: CPT | Performed by: INTERNAL MEDICINE

## 2021-01-01 PROCEDURE — 258N000003 HC RX IP 258 OP 636: Performed by: HOSPITALIST

## 2021-01-01 PROCEDURE — 272N000001 HC OR GENERAL SUPPLY STERILE: Performed by: INTERNAL MEDICINE

## 2021-01-01 PROCEDURE — 78452 HT MUSCLE IMAGE SPECT MULT: CPT | Mod: 26 | Performed by: INTERNAL MEDICINE

## 2021-01-01 PROCEDURE — 85018 HEMOGLOBIN: CPT | Performed by: INTERNAL MEDICINE

## 2021-01-01 PROCEDURE — 97116 GAIT TRAINING THERAPY: CPT | Mod: GP

## 2021-01-01 PROCEDURE — 84484 ASSAY OF TROPONIN QUANT: CPT | Performed by: EMERGENCY MEDICINE

## 2021-01-01 PROCEDURE — 99152 MOD SED SAME PHYS/QHP 5/>YRS: CPT | Performed by: INTERNAL MEDICINE

## 2021-01-01 PROCEDURE — 99292 CRITICAL CARE ADDL 30 MIN: CPT | Performed by: EMERGENCY MEDICINE

## 2021-01-01 PROCEDURE — 76706 US ABDL AORTA SCREEN AAA: CPT | Performed by: EMERGENCY MEDICINE

## 2021-01-01 PROCEDURE — 82803 BLOOD GASES ANY COMBINATION: CPT | Performed by: STUDENT IN AN ORGANIZED HEALTH CARE EDUCATION/TRAINING PROGRAM

## 2021-01-01 PROCEDURE — 255N000002 HC RX 255 OP 636: Performed by: HOSPITALIST

## 2021-01-01 PROCEDURE — 85027 COMPLETE CBC AUTOMATED: CPT | Performed by: STUDENT IN AN ORGANIZED HEALTH CARE EDUCATION/TRAINING PROGRAM

## 2021-01-01 PROCEDURE — 82374 ASSAY BLOOD CARBON DIOXIDE: CPT | Performed by: INTERNAL MEDICINE

## 2021-01-01 PROCEDURE — U0005 INFEC AGEN DETEC AMPLI PROBE: HCPCS | Mod: ORL | Performed by: FAMILY MEDICINE

## 2021-01-01 PROCEDURE — 250N000011 HC RX IP 250 OP 636: Performed by: HOSPITALIST

## 2021-01-01 PROCEDURE — 83880 ASSAY OF NATRIURETIC PEPTIDE: CPT | Mod: ORL | Performed by: NURSE PRACTITIONER

## 2021-01-01 PROCEDURE — 85610 PROTHROMBIN TIME: CPT | Performed by: EMERGENCY MEDICINE

## 2021-01-01 PROCEDURE — 96367 TX/PROPH/DG ADDL SEQ IV INF: CPT | Performed by: EMERGENCY MEDICINE

## 2021-01-01 PROCEDURE — 99223 1ST HOSP IP/OBS HIGH 75: CPT | Mod: AI | Performed by: HOSPITALIST

## 2021-01-01 PROCEDURE — 250N000012 HC RX MED GY IP 250 OP 636 PS 637: Performed by: EMERGENCY MEDICINE

## 2021-01-01 PROCEDURE — C1769 GUIDE WIRE: HCPCS | Performed by: INTERNAL MEDICINE

## 2021-01-01 PROCEDURE — 87635 SARS-COV-2 COVID-19 AMP PRB: CPT | Performed by: EMERGENCY MEDICINE

## 2021-01-01 PROCEDURE — 258N000003 HC RX IP 258 OP 636: Performed by: GENERAL ACUTE CARE HOSPITAL

## 2021-01-01 PROCEDURE — 72125 CT NECK SPINE W/O DYE: CPT

## 2021-01-01 PROCEDURE — 4A023N7 MEASUREMENT OF CARDIAC SAMPLING AND PRESSURE, LEFT HEART, PERCUTANEOUS APPROACH: ICD-10-PCS | Performed by: INTERNAL MEDICINE

## 2021-01-01 PROCEDURE — 92920 PRQ TRLUML C ANGIOP 1ART&/BR: CPT | Mod: LC | Performed by: INTERNAL MEDICINE

## 2021-01-01 PROCEDURE — 70450 CT HEAD/BRAIN W/O DYE: CPT

## 2021-01-01 PROCEDURE — 210N000001 HC R&B IMCU HEART CARE

## 2021-01-01 PROCEDURE — 93010 ELECTROCARDIOGRAM REPORT: CPT | Mod: 59 | Performed by: EMERGENCY MEDICINE

## 2021-01-01 PROCEDURE — 99222 1ST HOSP IP/OBS MODERATE 55: CPT | Performed by: CLINICAL NURSE SPECIALIST

## 2021-01-01 PROCEDURE — 97110 THERAPEUTIC EXERCISES: CPT | Mod: GO

## 2021-01-01 PROCEDURE — 85384 FIBRINOGEN ACTIVITY: CPT | Performed by: STUDENT IN AN ORGANIZED HEALTH CARE EDUCATION/TRAINING PROGRAM

## 2021-01-01 PROCEDURE — 250N000013 HC RX MED GY IP 250 OP 250 PS 637: Performed by: NURSE PRACTITIONER

## 2021-01-01 PROCEDURE — 99232 SBSQ HOSP IP/OBS MODERATE 35: CPT | Performed by: CLINICAL NURSE SPECIALIST

## 2021-01-01 PROCEDURE — 97168 OT RE-EVAL EST PLAN CARE: CPT | Mod: GO

## 2021-01-01 PROCEDURE — 93018 CV STRESS TEST I&R ONLY: CPT | Performed by: INTERNAL MEDICINE

## 2021-01-01 PROCEDURE — 96361 HYDRATE IV INFUSION ADD-ON: CPT | Performed by: EMERGENCY MEDICINE

## 2021-01-01 PROCEDURE — 36415 COLL VENOUS BLD VENIPUNCTURE: CPT | Performed by: STUDENT IN AN ORGANIZED HEALTH CARE EDUCATION/TRAINING PROGRAM

## 2021-01-01 PROCEDURE — 99232 SBSQ HOSP IP/OBS MODERATE 35: CPT | Performed by: PSYCHIATRY & NEUROLOGY

## 2021-01-01 PROCEDURE — 99223 1ST HOSP IP/OBS HIGH 75: CPT | Mod: AI | Performed by: STUDENT IN AN ORGANIZED HEALTH CARE EDUCATION/TRAINING PROGRAM

## 2021-01-01 PROCEDURE — 93308 TTE F-UP OR LMTD: CPT | Performed by: EMERGENCY MEDICINE

## 2021-01-01 PROCEDURE — U0003 INFECTIOUS AGENT DETECTION BY NUCLEIC ACID (DNA OR RNA); SEVERE ACUTE RESPIRATORY SYNDROME CORONAVIRUS 2 (SARS-COV-2) (CORONAVIRUS DISEASE [COVID-19]), AMPLIFIED PROBE TECHNIQUE, MAKING USE OF HIGH THROUGHPUT TECHNOLOGIES AS DESCRIBED BY CMS-2020-01-R: HCPCS | Performed by: EMERGENCY MEDICINE

## 2021-01-01 PROCEDURE — 99221 1ST HOSP IP/OBS SF/LOW 40: CPT | Performed by: THORACIC SURGERY (CARDIOTHORACIC VASCULAR SURGERY)

## 2021-01-01 PROCEDURE — 83605 ASSAY OF LACTIC ACID: CPT | Performed by: STUDENT IN AN ORGANIZED HEALTH CARE EDUCATION/TRAINING PROGRAM

## 2021-01-01 PROCEDURE — 78452 HT MUSCLE IMAGE SPECT MULT: CPT

## 2021-01-01 PROCEDURE — 85520 HEPARIN ASSAY: CPT | Performed by: EMERGENCY MEDICINE

## 2021-01-01 PROCEDURE — A9585 GADOBUTROL INJECTION: HCPCS | Performed by: HOSPITALIST

## 2021-01-01 PROCEDURE — 80048 BASIC METABOLIC PNL TOTAL CA: CPT | Mod: ORL | Performed by: NURSE PRACTITIONER

## 2021-01-01 PROCEDURE — 93010 ELECTROCARDIOGRAM REPORT: CPT | Mod: HIP | Performed by: GENERAL ACUTE CARE HOSPITAL

## 2021-01-01 PROCEDURE — 97166 OT EVAL MOD COMPLEX 45 MIN: CPT | Mod: GO

## 2021-01-01 PROCEDURE — 99231 SBSQ HOSP IP/OBS SF/LOW 25: CPT | Performed by: INTERNAL MEDICINE

## 2021-01-01 PROCEDURE — 99239 HOSP IP/OBS DSCHRG MGMT >30: CPT | Performed by: INTERNAL MEDICINE

## 2021-01-01 PROCEDURE — 71045 X-RAY EXAM CHEST 1 VIEW: CPT

## 2021-01-01 PROCEDURE — 99223 1ST HOSP IP/OBS HIGH 75: CPT | Performed by: NURSE PRACTITIONER

## 2021-01-01 PROCEDURE — 250N000009 HC RX 250: Performed by: PHYSICIAN ASSISTANT

## 2021-01-01 PROCEDURE — 84484 ASSAY OF TROPONIN QUANT: CPT | Performed by: PSYCHIATRY & NEUROLOGY

## 2021-01-01 PROCEDURE — 93017 CV STRESS TEST TRACING ONLY: CPT

## 2021-01-01 PROCEDURE — 85347 COAGULATION TIME ACTIVATED: CPT

## 2021-01-01 PROCEDURE — 83735 ASSAY OF MAGNESIUM: CPT | Mod: ORL | Performed by: NURSE PRACTITIONER

## 2021-01-01 PROCEDURE — 258N000003 HC RX IP 258 OP 636: Performed by: STUDENT IN AN ORGANIZED HEALTH CARE EDUCATION/TRAINING PROGRAM

## 2021-01-01 PROCEDURE — G1004 CDSM NDSC: HCPCS | Performed by: RADIOLOGY

## 2021-01-01 PROCEDURE — 96372 THER/PROPH/DIAG INJ SC/IM: CPT | Performed by: HOSPITALIST

## 2021-01-01 PROCEDURE — 80069 RENAL FUNCTION PANEL: CPT | Mod: ORL | Performed by: FAMILY MEDICINE

## 2021-01-01 PROCEDURE — 250N000013 HC RX MED GY IP 250 OP 250 PS 637: Performed by: STUDENT IN AN ORGANIZED HEALTH CARE EDUCATION/TRAINING PROGRAM

## 2021-01-01 PROCEDURE — 99232 SBSQ HOSP IP/OBS MODERATE 35: CPT | Performed by: GENERAL ACUTE CARE HOSPITAL

## 2021-01-01 PROCEDURE — 96365 THER/PROPH/DIAG IV INF INIT: CPT | Performed by: EMERGENCY MEDICINE

## 2021-01-01 PROCEDURE — 71250 CT THORAX DX C-: CPT

## 2021-01-01 PROCEDURE — 96365 THER/PROPH/DIAG IV INF INIT: CPT | Mod: 59 | Performed by: EMERGENCY MEDICINE

## 2021-01-01 PROCEDURE — 83735 ASSAY OF MAGNESIUM: CPT | Performed by: INTERNAL MEDICINE

## 2021-01-01 PROCEDURE — B211YZZ FLUOROSCOPY OF MULTIPLE CORONARY ARTERIES USING OTHER CONTRAST: ICD-10-PCS | Performed by: INTERNAL MEDICINE

## 2021-01-01 PROCEDURE — 93458 L HRT ARTERY/VENTRICLE ANGIO: CPT | Mod: 26 | Performed by: INTERNAL MEDICINE

## 2021-01-01 PROCEDURE — C1894 INTRO/SHEATH, NON-LASER: HCPCS | Performed by: INTERNAL MEDICINE

## 2021-01-01 PROCEDURE — 74176 CT ABD & PELVIS W/O CONTRAST: CPT | Mod: MG

## 2021-01-01 RX ORDER — NALOXONE HYDROCHLORIDE 0.4 MG/ML
0.1 INJECTION, SOLUTION INTRAMUSCULAR; INTRAVENOUS; SUBCUTANEOUS
Status: DISCONTINUED | OUTPATIENT
Start: 2021-01-01 | End: 2021-01-01 | Stop reason: HOSPADM

## 2021-01-01 RX ORDER — INSULIN LISPRO 100 [IU]/ML
INJECTION, SOLUTION INTRAVENOUS; SUBCUTANEOUS
COMMUNITY

## 2021-01-01 RX ORDER — NALOXONE HYDROCHLORIDE 0.4 MG/ML
0.4 INJECTION, SOLUTION INTRAMUSCULAR; INTRAVENOUS; SUBCUTANEOUS
Status: DISCONTINUED | OUTPATIENT
Start: 2021-01-01 | End: 2021-01-01

## 2021-01-01 RX ORDER — NALOXONE HYDROCHLORIDE 0.4 MG/ML
0.4 INJECTION, SOLUTION INTRAMUSCULAR; INTRAVENOUS; SUBCUTANEOUS
Status: DISCONTINUED | OUTPATIENT
Start: 2021-01-01 | End: 2021-01-01 | Stop reason: HOSPADM

## 2021-01-01 RX ORDER — SODIUM CHLORIDE 9 MG/ML
INJECTION, SOLUTION INTRAVENOUS CONTINUOUS
Status: ACTIVE | OUTPATIENT
Start: 2021-01-01 | End: 2021-01-01

## 2021-01-01 RX ORDER — ONDANSETRON 4 MG/1
4 TABLET, ORALLY DISINTEGRATING ORAL EVERY 6 HOURS PRN
Status: DISCONTINUED | OUTPATIENT
Start: 2021-01-01 | End: 2021-01-01

## 2021-01-01 RX ORDER — SODIUM CHLORIDE, SODIUM LACTATE, POTASSIUM CHLORIDE, CALCIUM CHLORIDE 600; 310; 30; 20 MG/100ML; MG/100ML; MG/100ML; MG/100ML
INJECTION, SOLUTION INTRAVENOUS CONTINUOUS
Status: DISCONTINUED | OUTPATIENT
Start: 2021-01-01 | End: 2021-01-01

## 2021-01-01 RX ORDER — INSULIN HUMAN 100 [IU]/ML
INJECTION, SUSPENSION SUBCUTANEOUS
COMMUNITY
Start: 2020-01-14

## 2021-01-01 RX ORDER — ONDANSETRON 4 MG/1
4 TABLET, ORALLY DISINTEGRATING ORAL EVERY 6 HOURS PRN
Status: DISCONTINUED | OUTPATIENT
Start: 2021-01-01 | End: 2021-01-01 | Stop reason: HOSPADM

## 2021-01-01 RX ORDER — IOPAMIDOL 755 MG/ML
99 INJECTION, SOLUTION INTRAVASCULAR ONCE
Status: COMPLETED | OUTPATIENT
Start: 2021-01-01 | End: 2021-01-01

## 2021-01-01 RX ORDER — METOCLOPRAMIDE HYDROCHLORIDE 5 MG/ML
5 INJECTION INTRAMUSCULAR; INTRAVENOUS EVERY 6 HOURS PRN
Status: DISCONTINUED | OUTPATIENT
Start: 2021-01-01 | End: 2021-01-01 | Stop reason: HOSPADM

## 2021-01-01 RX ORDER — HYDROMORPHONE HCL IN WATER/PF 6 MG/30 ML
.2-.5 PATIENT CONTROLLED ANALGESIA SYRINGE INTRAVENOUS
Status: DISCONTINUED | OUTPATIENT
Start: 2021-01-01 | End: 2021-01-01

## 2021-01-01 RX ORDER — ATORVASTATIN CALCIUM 10 MG/1
20 TABLET, FILM COATED ORAL EVERY EVENING
Status: DISCONTINUED | OUTPATIENT
Start: 2021-01-01 | End: 2021-01-01 | Stop reason: DRUGHIGH

## 2021-01-01 RX ORDER — AMINOPHYLLINE 25 MG/ML
50 INJECTION, SOLUTION INTRAVENOUS
Status: ACTIVE | OUTPATIENT
Start: 2021-01-01 | End: 2021-01-01

## 2021-01-01 RX ORDER — KETOROLAC TROMETHAMINE 15 MG/ML
15 INJECTION, SOLUTION INTRAMUSCULAR; INTRAVENOUS
Status: COMPLETED | OUTPATIENT
Start: 2021-01-01 | End: 2021-01-01

## 2021-01-01 RX ORDER — AMOXICILLIN 250 MG
2 CAPSULE ORAL 2 TIMES DAILY
Status: DISCONTINUED | OUTPATIENT
Start: 2021-01-01 | End: 2021-01-01

## 2021-01-01 RX ORDER — ACETAMINOPHEN 500 MG
500 TABLET ORAL EVERY 4 HOURS PRN
Status: DISCONTINUED | OUTPATIENT
Start: 2021-01-01 | End: 2021-01-01

## 2021-01-01 RX ORDER — SODIUM CHLORIDE 9 MG/ML
INJECTION, SOLUTION INTRAVENOUS CONTINUOUS
Status: DISCONTINUED | OUTPATIENT
Start: 2021-01-01 | End: 2021-01-01 | Stop reason: HOSPADM

## 2021-01-01 RX ORDER — OXYCODONE AND ACETAMINOPHEN 5; 325 MG/1; MG/1
1 TABLET ORAL EVERY 6 HOURS PRN
COMMUNITY

## 2021-01-01 RX ORDER — DEXTROSE MONOHYDRATE 50 MG/ML
INJECTION, SOLUTION INTRAVENOUS CONTINUOUS
Status: DISCONTINUED | OUTPATIENT
Start: 2021-01-01 | End: 2021-01-01

## 2021-01-01 RX ORDER — ASPIRIN 81 MG/1
81 TABLET, CHEWABLE ORAL ONCE
Status: DISCONTINUED | OUTPATIENT
Start: 2021-01-01 | End: 2021-01-01 | Stop reason: ALTCHOICE

## 2021-01-01 RX ORDER — ACETAMINOPHEN 325 MG/1
650 TABLET ORAL EVERY 4 HOURS PRN
Status: DISCONTINUED | OUTPATIENT
Start: 2021-01-01 | End: 2021-01-01 | Stop reason: HOSPADM

## 2021-01-01 RX ORDER — FENTANYL CITRATE 50 UG/ML
INJECTION, SOLUTION INTRAMUSCULAR; INTRAVENOUS
Status: DISCONTINUED | OUTPATIENT
Start: 2021-01-01 | End: 2021-01-01 | Stop reason: HOSPADM

## 2021-01-01 RX ORDER — METOPROLOL TARTRATE 1 MG/ML
5-10 INJECTION, SOLUTION INTRAVENOUS
Status: DISCONTINUED | OUTPATIENT
Start: 2021-01-01 | End: 2021-01-01

## 2021-01-01 RX ORDER — FENTANYL CITRATE 50 UG/ML
25 INJECTION, SOLUTION INTRAMUSCULAR; INTRAVENOUS
Status: DISCONTINUED | OUTPATIENT
Start: 2021-01-01 | End: 2021-01-01

## 2021-01-01 RX ORDER — FUROSEMIDE 20 MG
20 TABLET ORAL DAILY
Status: DISCONTINUED | OUTPATIENT
Start: 2021-01-01 | End: 2021-01-01

## 2021-01-01 RX ORDER — ASPIRIN 81 MG/1
243 TABLET, CHEWABLE ORAL ONCE
Status: DISCONTINUED | OUTPATIENT
Start: 2021-01-01 | End: 2021-01-01 | Stop reason: HOSPADM

## 2021-01-01 RX ORDER — ASPIRIN 81 MG/1
81 TABLET, CHEWABLE ORAL DAILY
Status: DISCONTINUED | OUTPATIENT
Start: 2021-01-01 | End: 2021-01-01 | Stop reason: HOSPADM

## 2021-01-01 RX ORDER — NALOXONE HYDROCHLORIDE 0.4 MG/ML
0.2 INJECTION, SOLUTION INTRAMUSCULAR; INTRAVENOUS; SUBCUTANEOUS
Status: DISCONTINUED | OUTPATIENT
Start: 2021-01-01 | End: 2021-01-01

## 2021-01-01 RX ORDER — AMOXICILLIN 250 MG
1 CAPSULE ORAL 2 TIMES DAILY PRN
Status: DISCONTINUED | OUTPATIENT
Start: 2021-01-01 | End: 2021-01-01 | Stop reason: HOSPADM

## 2021-01-01 RX ORDER — LORAZEPAM 0.5 MG/1
0.5 TABLET ORAL EVERY 6 HOURS PRN
COMMUNITY

## 2021-01-01 RX ORDER — HEPARIN SODIUM 10000 [USP'U]/100ML
0-5000 INJECTION, SOLUTION INTRAVENOUS CONTINUOUS
Status: DISCONTINUED | OUTPATIENT
Start: 2021-01-01 | End: 2021-01-01

## 2021-01-01 RX ORDER — AMLODIPINE BESYLATE 2.5 MG/1
2.5 TABLET ORAL DAILY
Status: DISCONTINUED | OUTPATIENT
Start: 2021-01-01 | End: 2021-01-01 | Stop reason: HOSPADM

## 2021-01-01 RX ORDER — ACETAMINOPHEN 325 MG/1
975 TABLET ORAL 3 TIMES DAILY
Status: DISCONTINUED | OUTPATIENT
Start: 2021-01-01 | End: 2021-01-01

## 2021-01-01 RX ORDER — ASPIRIN 81 MG/1
81 TABLET, CHEWABLE ORAL DAILY
Status: DISCONTINUED | OUTPATIENT
Start: 2021-01-01 | End: 2021-01-01

## 2021-01-01 RX ORDER — ACETAMINOPHEN 650 MG/1
650 SUPPOSITORY RECTAL 3 TIMES DAILY
Status: DISCONTINUED | OUTPATIENT
Start: 2021-01-01 | End: 2021-01-01

## 2021-01-01 RX ORDER — ACETAMINOPHEN 650 MG/1
650 SUPPOSITORY RECTAL EVERY 6 HOURS PRN
Status: DISCONTINUED | OUTPATIENT
Start: 2021-01-01 | End: 2021-01-01

## 2021-01-01 RX ORDER — METHYLPREDNISOLONE SODIUM SUCCINATE 125 MG/2ML
40 INJECTION, POWDER, LYOPHILIZED, FOR SOLUTION INTRAMUSCULAR; INTRAVENOUS ONCE
Status: COMPLETED | OUTPATIENT
Start: 2021-01-01 | End: 2021-01-01

## 2021-01-01 RX ORDER — METOPROLOL TARTRATE 50 MG
50 TABLET ORAL 2 TIMES DAILY
Qty: 60 TABLET | Refills: 0 | Status: SHIPPED | OUTPATIENT
Start: 2021-01-01

## 2021-01-01 RX ORDER — METOPROLOL TARTRATE 25 MG/1
25 TABLET, FILM COATED ORAL 2 TIMES DAILY
Status: DISCONTINUED | OUTPATIENT
Start: 2021-01-01 | End: 2021-01-01

## 2021-01-01 RX ORDER — HYDROMORPHONE HYDROCHLORIDE 1 MG/ML
0.5 INJECTION, SOLUTION INTRAMUSCULAR; INTRAVENOUS; SUBCUTANEOUS ONCE
Status: COMPLETED | OUTPATIENT
Start: 2021-01-01 | End: 2021-01-01

## 2021-01-01 RX ORDER — HYDROMORPHONE HYDROCHLORIDE 1 MG/ML
0.5 INJECTION, SOLUTION INTRAMUSCULAR; INTRAVENOUS; SUBCUTANEOUS
Status: COMPLETED | OUTPATIENT
Start: 2021-01-01 | End: 2021-01-01

## 2021-01-01 RX ORDER — ASPIRIN 325 MG
325 TABLET ORAL ONCE
Status: DISCONTINUED | OUTPATIENT
Start: 2021-01-01 | End: 2021-01-01 | Stop reason: HOSPADM

## 2021-01-01 RX ORDER — HYDRALAZINE HYDROCHLORIDE 20 MG/ML
10 INJECTION INTRAMUSCULAR; INTRAVENOUS EVERY 4 HOURS PRN
Status: DISCONTINUED | OUTPATIENT
Start: 2021-01-01 | End: 2021-01-01

## 2021-01-01 RX ORDER — CLOPIDOGREL BISULFATE 75 MG/1
75 TABLET ORAL DAILY
Qty: 30 TABLET | Refills: 0 | Status: SHIPPED | OUTPATIENT
Start: 2021-01-01 | End: 2021-01-01

## 2021-01-01 RX ORDER — LIDOCAINE 40 MG/G
CREAM TOPICAL
Status: DISCONTINUED | OUTPATIENT
Start: 2021-01-01 | End: 2021-01-01 | Stop reason: HOSPADM

## 2021-01-01 RX ORDER — CLOPIDOGREL BISULFATE 75 MG/1
75 TABLET ORAL DAILY
Status: DISCONTINUED | OUTPATIENT
Start: 2021-01-01 | End: 2021-01-01 | Stop reason: HOSPADM

## 2021-01-01 RX ORDER — METOPROLOL TARTRATE 25 MG/1
50 TABLET, FILM COATED ORAL ONCE
Status: COMPLETED | OUTPATIENT
Start: 2021-01-01 | End: 2021-01-01

## 2021-01-01 RX ORDER — AMLODIPINE BESYLATE 2.5 MG/1
2.5 TABLET ORAL DAILY
Qty: 30 TABLET | Refills: 0 | Status: SHIPPED | OUTPATIENT
Start: 2021-01-01

## 2021-01-01 RX ORDER — METOPROLOL TARTRATE 25 MG/1
50 TABLET, FILM COATED ORAL 2 TIMES DAILY
Status: DISCONTINUED | OUTPATIENT
Start: 2021-01-01 | End: 2021-01-01 | Stop reason: HOSPADM

## 2021-01-01 RX ORDER — EPINEPHRINE 1 MG/ML
0.3 INJECTION, SOLUTION, CONCENTRATE INTRAVENOUS
Status: DISCONTINUED | OUTPATIENT
Start: 2021-01-01 | End: 2021-01-01

## 2021-01-01 RX ORDER — ONDANSETRON 2 MG/ML
4 INJECTION INTRAMUSCULAR; INTRAVENOUS EVERY 6 HOURS PRN
Status: DISCONTINUED | OUTPATIENT
Start: 2021-01-01 | End: 2021-01-01

## 2021-01-01 RX ORDER — HYDRALAZINE HYDROCHLORIDE 25 MG/1
25 TABLET, FILM COATED ORAL 3 TIMES DAILY
Qty: 90 TABLET | Refills: 0 | Status: SHIPPED | OUTPATIENT
Start: 2021-01-01

## 2021-01-01 RX ORDER — LISINOPRIL AND HYDROCHLOROTHIAZIDE 20; 25 MG/1; MG/1
1 TABLET ORAL ONCE
Status: DISCONTINUED | OUTPATIENT
Start: 2021-01-01 | End: 2021-01-01

## 2021-01-01 RX ORDER — ATORVASTATIN CALCIUM 80 MG/1
80 TABLET, FILM COATED ORAL EVERY EVENING
Qty: 30 TABLET | Refills: 0 | Status: SHIPPED | OUTPATIENT
Start: 2021-01-01 | End: 2021-01-01

## 2021-01-01 RX ORDER — ONDANSETRON 2 MG/ML
4 INJECTION INTRAMUSCULAR; INTRAVENOUS EVERY 6 HOURS PRN
Status: DISCONTINUED | OUTPATIENT
Start: 2021-01-01 | End: 2021-01-01 | Stop reason: HOSPADM

## 2021-01-01 RX ORDER — HYDROMORPHONE HYDROCHLORIDE 1 MG/ML
INJECTION, SOLUTION INTRAMUSCULAR; INTRAVENOUS; SUBCUTANEOUS
Status: COMPLETED
Start: 2021-01-01 | End: 2021-01-01

## 2021-01-01 RX ORDER — CIPROFLOXACIN 2 MG/ML
400 INJECTION, SOLUTION INTRAVENOUS EVERY 24 HOURS
Status: DISCONTINUED | OUTPATIENT
Start: 2021-01-01 | End: 2021-01-01

## 2021-01-01 RX ORDER — MORPHINE SULFATE 2 MG/ML
2 INJECTION, SOLUTION INTRAMUSCULAR; INTRAVENOUS ONCE
Status: COMPLETED | OUTPATIENT
Start: 2021-01-01 | End: 2021-01-01

## 2021-01-01 RX ORDER — SODIUM CHLORIDE 9 MG/ML
INJECTION, SOLUTION INTRAVENOUS CONTINUOUS
Status: DISCONTINUED | OUTPATIENT
Start: 2021-01-01 | End: 2021-01-01

## 2021-01-01 RX ORDER — NICOTINE POLACRILEX 4 MG
15-30 LOZENGE BUCCAL
Status: DISCONTINUED | OUTPATIENT
Start: 2021-01-01 | End: 2021-01-01

## 2021-01-01 RX ORDER — ONDANSETRON 2 MG/ML
8 INJECTION INTRAMUSCULAR; INTRAVENOUS ONCE
Status: DISCONTINUED | OUTPATIENT
Start: 2021-01-01 | End: 2021-01-01

## 2021-01-01 RX ORDER — ATROPINE SULFATE 10 MG/ML
2 SOLUTION/ DROPS OPHTHALMIC EVERY 4 HOURS PRN
Status: DISCONTINUED | OUTPATIENT
Start: 2021-01-01 | End: 2021-01-01 | Stop reason: HOSPADM

## 2021-01-01 RX ORDER — CALCIUM GLUCONATE 20 MG/ML
1 INJECTION, SOLUTION INTRAVENOUS ONCE
Status: COMPLETED | OUTPATIENT
Start: 2021-01-01 | End: 2021-01-01

## 2021-01-01 RX ORDER — AMOXICILLIN 250 MG
2 CAPSULE ORAL 2 TIMES DAILY PRN
Status: DISCONTINUED | OUTPATIENT
Start: 2021-01-01 | End: 2021-01-01 | Stop reason: HOSPADM

## 2021-01-01 RX ORDER — CLOPIDOGREL BISULFATE 75 MG/1
75 TABLET ORAL DAILY
Qty: 90 TABLET | Refills: 3 | Status: SHIPPED | OUTPATIENT
Start: 2021-01-01

## 2021-01-01 RX ORDER — LISINOPRIL AND HYDROCHLOROTHIAZIDE 12.5; 2 MG/1; MG/1
1 TABLET ORAL 2 TIMES DAILY
COMMUNITY

## 2021-01-01 RX ORDER — DEXTROSE MONOHYDRATE 25 G/50ML
25-50 INJECTION, SOLUTION INTRAVENOUS
Status: DISCONTINUED | OUTPATIENT
Start: 2021-01-01 | End: 2021-01-01

## 2021-01-01 RX ORDER — OXYCODONE HYDROCHLORIDE 5 MG/1
10 TABLET ORAL EVERY 4 HOURS PRN
Status: DISCONTINUED | OUTPATIENT
Start: 2021-01-01 | End: 2021-01-01 | Stop reason: HOSPADM

## 2021-01-01 RX ORDER — POLYETHYLENE GLYCOL 3350 17 G/17G
17 POWDER, FOR SOLUTION ORAL DAILY
Status: DISCONTINUED | OUTPATIENT
Start: 2021-01-01 | End: 2021-01-01 | Stop reason: HOSPADM

## 2021-01-01 RX ORDER — ATORVASTATIN CALCIUM 40 MG/1
80 TABLET, FILM COATED ORAL EVERY EVENING
Status: DISCONTINUED | OUTPATIENT
Start: 2021-01-01 | End: 2021-01-01 | Stop reason: HOSPADM

## 2021-01-01 RX ORDER — FLUMAZENIL 0.1 MG/ML
0.2 INJECTION, SOLUTION INTRAVENOUS
Status: ACTIVE | OUTPATIENT
Start: 2021-01-01 | End: 2021-01-01

## 2021-01-01 RX ORDER — NITROGLYCERIN 0.4 MG/1
TABLET SUBLINGUAL
Qty: 25 TABLET | Refills: 3 | Status: ON HOLD | OUTPATIENT
Start: 2021-01-01 | End: 2021-01-01

## 2021-01-01 RX ORDER — REGADENOSON 0.08 MG/ML
0.4 INJECTION, SOLUTION INTRAVENOUS ONCE
Status: COMPLETED | OUTPATIENT
Start: 2021-01-01 | End: 2021-01-01

## 2021-01-01 RX ORDER — DIPHENHYDRAMINE HYDROCHLORIDE 50 MG/ML
50 INJECTION INTRAMUSCULAR; INTRAVENOUS ONCE
Status: COMPLETED | OUTPATIENT
Start: 2021-01-01 | End: 2021-01-01

## 2021-01-01 RX ORDER — DIPHENHYDRAMINE HCL 25 MG
50 TABLET ORAL ONCE
Status: DISCONTINUED | OUTPATIENT
Start: 2021-01-01 | End: 2021-01-01 | Stop reason: HOSPADM

## 2021-01-01 RX ORDER — OXYCODONE HYDROCHLORIDE 5 MG/1
5-10 TABLET ORAL EVERY 4 HOURS PRN
Status: DISCONTINUED | OUTPATIENT
Start: 2021-01-01 | End: 2021-01-01

## 2021-01-01 RX ORDER — HYDRALAZINE HYDROCHLORIDE 25 MG/1
25 TABLET, FILM COATED ORAL 3 TIMES DAILY
Status: DISCONTINUED | OUTPATIENT
Start: 2021-01-01 | End: 2021-01-01 | Stop reason: HOSPADM

## 2021-01-01 RX ORDER — NALOXONE HYDROCHLORIDE 0.4 MG/ML
0.2 INJECTION, SOLUTION INTRAMUSCULAR; INTRAVENOUS; SUBCUTANEOUS
Status: DISCONTINUED | OUTPATIENT
Start: 2021-01-01 | End: 2021-01-01 | Stop reason: HOSPADM

## 2021-01-01 RX ORDER — HYDROCHLOROTHIAZIDE 12.5 MG/1
12.5 TABLET ORAL ONCE
Status: COMPLETED | OUTPATIENT
Start: 2021-01-01 | End: 2021-01-01

## 2021-01-01 RX ORDER — ACETAMINOPHEN 650 MG/1
650 SUPPOSITORY RECTAL 3 TIMES DAILY
Status: DISCONTINUED | OUTPATIENT
Start: 2021-01-01 | End: 2021-01-01 | Stop reason: HOSPADM

## 2021-01-01 RX ORDER — GADOBUTROL 604.72 MG/ML
7 INJECTION INTRAVENOUS ONCE
Status: COMPLETED | OUTPATIENT
Start: 2021-01-01 | End: 2021-01-01

## 2021-01-01 RX ORDER — OXYCODONE HYDROCHLORIDE 5 MG/1
5 TABLET ORAL EVERY 4 HOURS PRN
Status: DISCONTINUED | OUTPATIENT
Start: 2021-01-01 | End: 2021-01-01 | Stop reason: HOSPADM

## 2021-01-01 RX ORDER — LISINOPRIL 20 MG/1
20 TABLET ORAL ONCE
Status: COMPLETED | OUTPATIENT
Start: 2021-01-01 | End: 2021-01-01

## 2021-01-01 RX ORDER — DIAZEPAM 5 MG
5 TABLET ORAL ONCE
Status: COMPLETED | OUTPATIENT
Start: 2021-01-01 | End: 2021-01-01

## 2021-01-01 RX ORDER — ACETAMINOPHEN 325 MG/1
650 TABLET ORAL EVERY 6 HOURS PRN
Status: DISCONTINUED | OUTPATIENT
Start: 2021-01-01 | End: 2021-01-01

## 2021-01-01 RX ORDER — FENTANYL CITRATE 50 UG/ML
25 INJECTION, SOLUTION INTRAMUSCULAR; INTRAVENOUS
Status: DISCONTINUED | OUTPATIENT
Start: 2021-01-01 | End: 2021-01-01 | Stop reason: HOSPADM

## 2021-01-01 RX ORDER — LORAZEPAM 2 MG/ML
1 INJECTION INTRAMUSCULAR
Status: DISCONTINUED | OUTPATIENT
Start: 2021-01-01 | End: 2021-01-01 | Stop reason: HOSPADM

## 2021-01-01 RX ORDER — ATORVASTATIN CALCIUM 40 MG/1
40 TABLET, FILM COATED ORAL EVERY EVENING
Status: DISCONTINUED | OUTPATIENT
Start: 2021-01-01 | End: 2021-01-01

## 2021-01-01 RX ORDER — CITALOPRAM HYDROBROMIDE 40 MG/1
40 TABLET ORAL DAILY
COMMUNITY
Start: 2021-01-01

## 2021-01-01 RX ORDER — CIPROFLOXACIN 2 MG/ML
400 INJECTION, SOLUTION INTRAVENOUS ONCE
Status: COMPLETED | OUTPATIENT
Start: 2021-01-01 | End: 2021-01-01

## 2021-01-01 RX ORDER — ASPIRIN 81 MG/1
81 TABLET ORAL DAILY
Status: DISCONTINUED | OUTPATIENT
Start: 2021-01-01 | End: 2021-01-01 | Stop reason: HOSPADM

## 2021-01-01 RX ORDER — AMINOPHYLLINE 25 MG/ML
INJECTION, SOLUTION INTRAVENOUS
Status: DISCONTINUED | OUTPATIENT
Start: 2021-01-01 | End: 2021-01-01 | Stop reason: HOSPADM

## 2021-01-01 RX ORDER — DULOXETIN HYDROCHLORIDE 30 MG/1
CAPSULE, DELAYED RELEASE ORAL
Status: ON HOLD | COMMUNITY
Start: 2021-01-01 | End: 2021-01-01

## 2021-01-01 RX ORDER — ACETAMINOPHEN 325 MG/1
975 TABLET ORAL 3 TIMES DAILY
Status: DISCONTINUED | OUTPATIENT
Start: 2021-01-01 | End: 2021-01-01 | Stop reason: HOSPADM

## 2021-01-01 RX ORDER — NITROGLYCERIN 0.4 MG/1
0.4 TABLET SUBLINGUAL EVERY 5 MIN PRN
Status: DISCONTINUED | OUTPATIENT
Start: 2021-01-01 | End: 2021-01-01 | Stop reason: HOSPADM

## 2021-01-01 RX ORDER — POLYETHYLENE GLYCOL 3350 17 G/17G
17 POWDER, FOR SOLUTION ORAL DAILY
Status: DISCONTINUED | OUTPATIENT
Start: 2021-01-01 | End: 2021-01-01

## 2021-01-01 RX ORDER — DEXTROSE MONOHYDRATE 100 MG/ML
INJECTION, SOLUTION INTRAVENOUS CONTINUOUS
Status: DISCONTINUED | OUTPATIENT
Start: 2021-01-01 | End: 2021-01-01

## 2021-01-01 RX ORDER — HEPARIN SODIUM 1000 [USP'U]/ML
INJECTION, SOLUTION INTRAVENOUS; SUBCUTANEOUS
Status: DISCONTINUED | OUTPATIENT
Start: 2021-01-01 | End: 2021-01-01 | Stop reason: HOSPADM

## 2021-01-01 RX ORDER — LORAZEPAM 1 MG/1
1 TABLET ORAL
Status: DISCONTINUED | OUTPATIENT
Start: 2021-01-01 | End: 2021-01-01 | Stop reason: HOSPADM

## 2021-01-01 RX ORDER — FUROSEMIDE 20 MG
20 TABLET ORAL DAILY
Status: DISCONTINUED | OUTPATIENT
Start: 2021-01-01 | End: 2021-01-01 | Stop reason: HOSPADM

## 2021-01-01 RX ORDER — DIPHENHYDRAMINE HYDROCHLORIDE 50 MG/ML
25 INJECTION INTRAMUSCULAR; INTRAVENOUS ONCE
Status: COMPLETED | OUTPATIENT
Start: 2021-01-01 | End: 2021-01-01

## 2021-01-01 RX ORDER — SALIVA STIMULANT COMB. NO.3
2 SPRAY, NON-AEROSOL (ML) MUCOUS MEMBRANE
Status: DISCONTINUED | OUTPATIENT
Start: 2021-01-01 | End: 2021-01-01 | Stop reason: HOSPADM

## 2021-01-01 RX ORDER — ALLOPURINOL 300 MG/1
300 TABLET ORAL DAILY
Status: DISCONTINUED | OUTPATIENT
Start: 2021-01-01 | End: 2021-01-01 | Stop reason: HOSPADM

## 2021-01-01 RX ORDER — HYDRALAZINE HYDROCHLORIDE 20 MG/ML
10 INJECTION INTRAMUSCULAR; INTRAVENOUS EVERY 6 HOURS PRN
Status: DISCONTINUED | OUTPATIENT
Start: 2021-01-01 | End: 2021-01-01

## 2021-01-01 RX ORDER — HYDRALAZINE HYDROCHLORIDE 20 MG/ML
10 INJECTION INTRAMUSCULAR; INTRAVENOUS EVERY 6 HOURS PRN
Status: DISCONTINUED | OUTPATIENT
Start: 2021-01-01 | End: 2021-01-01 | Stop reason: HOSPADM

## 2021-01-01 RX ORDER — ONDANSETRON 2 MG/ML
4 INJECTION INTRAMUSCULAR; INTRAVENOUS ONCE
Status: DISCONTINUED | OUTPATIENT
Start: 2021-01-01 | End: 2021-01-01

## 2021-01-01 RX ORDER — ONDANSETRON 2 MG/ML
8 INJECTION INTRAMUSCULAR; INTRAVENOUS ONCE
Status: COMPLETED | OUTPATIENT
Start: 2021-01-01 | End: 2021-01-01

## 2021-01-01 RX ORDER — NITROGLYCERIN 0.4 MG/1
TABLET SUBLINGUAL
Qty: 25 TABLET | Refills: 3 | Status: SHIPPED | OUTPATIENT
Start: 2021-01-01

## 2021-01-01 RX ORDER — AMOXICILLIN 250 MG
1 CAPSULE ORAL 2 TIMES DAILY
Status: DISCONTINUED | OUTPATIENT
Start: 2021-01-01 | End: 2021-01-01

## 2021-01-01 RX ORDER — ASPIRIN 81 MG/1
81 TABLET ORAL DAILY
Status: DISCONTINUED | OUTPATIENT
Start: 2021-01-01 | End: 2021-01-01

## 2021-01-01 RX ORDER — DEXTROSE MONOHYDRATE 25 G/50ML
25-50 INJECTION, SOLUTION INTRAVENOUS
Status: DISCONTINUED | OUTPATIENT
Start: 2021-01-01 | End: 2021-01-01 | Stop reason: HOSPADM

## 2021-01-01 RX ORDER — CLOPIDOGREL BISULFATE 75 MG/1
75 TABLET ORAL DAILY
Status: DISCONTINUED | OUTPATIENT
Start: 2021-01-01 | End: 2021-01-01

## 2021-01-01 RX ORDER — ASPIRIN 325 MG
325 TABLET ORAL DAILY
Status: DISCONTINUED | OUTPATIENT
Start: 2021-01-01 | End: 2021-01-01

## 2021-01-01 RX ORDER — ASPIRIN 325 MG
325 TABLET ORAL ONCE
Status: COMPLETED | OUTPATIENT
Start: 2021-01-01 | End: 2021-01-01

## 2021-01-01 RX ORDER — DEXTROSE MONOHYDRATE 25 G/50ML
25 INJECTION, SOLUTION INTRAVENOUS ONCE
Status: DISCONTINUED | OUTPATIENT
Start: 2021-01-01 | End: 2021-01-01

## 2021-01-01 RX ORDER — ATORVASTATIN CALCIUM 80 MG/1
80 TABLET, FILM COATED ORAL EVERY EVENING
Qty: 30 TABLET | Refills: 11 | Status: SHIPPED | OUTPATIENT
Start: 2021-01-01

## 2021-01-01 RX ORDER — NICOTINE POLACRILEX 4 MG
15-30 LOZENGE BUCCAL
Status: DISCONTINUED | OUTPATIENT
Start: 2021-01-01 | End: 2021-01-01 | Stop reason: HOSPADM

## 2021-01-01 RX ORDER — ATROPINE SULFATE 0.1 MG/ML
0.5 INJECTION INTRAVENOUS
Status: ACTIVE | OUTPATIENT
Start: 2021-01-01 | End: 2021-01-01

## 2021-01-01 RX ORDER — SENNOSIDES 8.6 MG
2 TABLET ORAL 2 TIMES DAILY
Status: DISCONTINUED | OUTPATIENT
Start: 2021-01-01 | End: 2021-01-01 | Stop reason: HOSPADM

## 2021-01-01 RX ORDER — FUROSEMIDE 20 MG
40 TABLET ORAL DAILY
Status: DISCONTINUED | OUTPATIENT
Start: 2021-01-01 | End: 2021-01-01

## 2021-01-01 RX ORDER — IODIXANOL 320 MG/ML
INJECTION, SOLUTION INTRAVASCULAR
Status: DISCONTINUED | OUTPATIENT
Start: 2021-01-01 | End: 2021-01-01 | Stop reason: HOSPADM

## 2021-01-01 RX ADMIN — HYDROMORPHONE HYDROCHLORIDE 1 MG: 1 INJECTION, SOLUTION INTRAMUSCULAR; INTRAVENOUS; SUBCUTANEOUS at 01:38

## 2021-01-01 RX ADMIN — STANDARDIZED SENNA CONCENTRATE 2 TABLET: 8.6 TABLET ORAL at 08:33

## 2021-01-01 RX ADMIN — AMINOPHYLLINE 50 MG: 25 INJECTION, SOLUTION INTRAVENOUS at 11:32

## 2021-01-01 RX ADMIN — SODIUM CHLORIDE: 9 INJECTION, SOLUTION INTRAVENOUS at 12:11

## 2021-01-01 RX ADMIN — DIPHENHYDRAMINE HYDROCHLORIDE 25 MG: 50 INJECTION, SOLUTION INTRAMUSCULAR; INTRAVENOUS at 08:17

## 2021-01-01 RX ADMIN — ACETAMINOPHEN 975 MG: 325 TABLET ORAL at 21:32

## 2021-01-01 RX ADMIN — Medication: at 14:03

## 2021-01-01 RX ADMIN — METOPROLOL TARTRATE 25 MG: 25 TABLET, FILM COATED ORAL at 08:33

## 2021-01-01 RX ADMIN — CLOPIDOGREL BISULFATE 75 MG: 75 TABLET ORAL at 09:31

## 2021-01-01 RX ADMIN — OMEPRAZOLE 20 MG: 20 CAPSULE, DELAYED RELEASE ORAL at 09:38

## 2021-01-01 RX ADMIN — OXYCODONE HYDROCHLORIDE 10 MG: 5 TABLET ORAL at 21:04

## 2021-01-01 RX ADMIN — ACETAMINOPHEN 975 MG: 325 TABLET ORAL at 20:58

## 2021-01-01 RX ADMIN — INSULIN ASPART 3 UNITS: 100 INJECTION, SOLUTION INTRAVENOUS; SUBCUTANEOUS at 10:17

## 2021-01-01 RX ADMIN — HYDROMORPHONE HYDROCHLORIDE 1 MG: 1 INJECTION, SOLUTION INTRAMUSCULAR; INTRAVENOUS; SUBCUTANEOUS at 18:27

## 2021-01-01 RX ADMIN — PREDNISONE 50 MG: 5 TABLET ORAL at 21:27

## 2021-01-01 RX ADMIN — Medication: at 00:13

## 2021-01-01 RX ADMIN — METRONIDAZOLE 500 MG: 500 INJECTION, SOLUTION INTRAVENOUS at 06:27

## 2021-01-01 RX ADMIN — HYDRALAZINE HYDROCHLORIDE 25 MG: 25 TABLET ORAL at 09:15

## 2021-01-01 RX ADMIN — OXYCODONE HYDROCHLORIDE 10 MG: 5 TABLET ORAL at 14:08

## 2021-01-01 RX ADMIN — STANDARDIZED SENNA CONCENTRATE 2 TABLET: 8.6 TABLET ORAL at 09:32

## 2021-01-01 RX ADMIN — HYDRALAZINE HYDROCHLORIDE 25 MG: 25 TABLET ORAL at 20:54

## 2021-01-01 RX ADMIN — HYDROCHLOROTHIAZIDE 12.5 MG: 12.5 TABLET ORAL at 00:21

## 2021-01-01 RX ADMIN — MICONAZOLE NITRATE: 20 POWDER TOPICAL at 09:14

## 2021-01-01 RX ADMIN — OXYCODONE HYDROCHLORIDE 10 MG: 5 TABLET ORAL at 07:27

## 2021-01-01 RX ADMIN — INSULIN ASPART 9 UNITS: 100 INJECTION, SOLUTION INTRAVENOUS; SUBCUTANEOUS at 18:55

## 2021-01-01 RX ADMIN — HYDRALAZINE HYDROCHLORIDE 25 MG: 25 TABLET ORAL at 14:33

## 2021-01-01 RX ADMIN — INSULIN ASPART 3 UNITS: 100 INJECTION, SOLUTION INTRAVENOUS; SUBCUTANEOUS at 19:01

## 2021-01-01 RX ADMIN — HEPARIN SODIUM 550 UNITS/HR: 10000 INJECTION, SOLUTION INTRAVENOUS at 11:30

## 2021-01-01 RX ADMIN — SODIUM CHLORIDE: 9 INJECTION, SOLUTION INTRAVENOUS at 03:45

## 2021-01-01 RX ADMIN — ASPIRIN 81 MG: 81 TABLET, COATED ORAL at 09:14

## 2021-01-01 RX ADMIN — ACETAMINOPHEN 975 MG: 325 TABLET ORAL at 14:33

## 2021-01-01 RX ADMIN — KETOROLAC TROMETHAMINE 15 MG: 15 INJECTION, SOLUTION INTRAMUSCULAR; INTRAVENOUS at 08:46

## 2021-01-01 RX ADMIN — HYDROMORPHONE HYDROCHLORIDE 1 MG: 1 INJECTION, SOLUTION INTRAMUSCULAR; INTRAVENOUS; SUBCUTANEOUS at 10:49

## 2021-01-01 RX ADMIN — HYDROMORPHONE HYDROCHLORIDE 0.5 MG: 1 INJECTION, SOLUTION INTRAMUSCULAR; INTRAVENOUS; SUBCUTANEOUS at 00:15

## 2021-01-01 RX ADMIN — ACETAMINOPHEN 975 MG: 325 TABLET ORAL at 20:51

## 2021-01-01 RX ADMIN — ATORVASTATIN CALCIUM 80 MG: 40 TABLET, FILM COATED ORAL at 20:47

## 2021-01-01 RX ADMIN — ACETAMINOPHEN 975 MG: 325 TABLET ORAL at 20:47

## 2021-01-01 RX ADMIN — AMLODIPINE BESYLATE 2.5 MG: 2.5 TABLET ORAL at 08:40

## 2021-01-01 RX ADMIN — STANDARDIZED SENNA CONCENTRATE 2 TABLET: 8.6 TABLET ORAL at 20:53

## 2021-01-01 RX ADMIN — STANDARDIZED SENNA CONCENTRATE 2 TABLET: 8.6 TABLET ORAL at 21:01

## 2021-01-01 RX ADMIN — CLOPIDOGREL BISULFATE 75 MG: 75 TABLET ORAL at 08:33

## 2021-01-01 RX ADMIN — MICONAZOLE NITRATE 1 APPLICATOR: 20 POWDER TOPICAL at 20:57

## 2021-01-01 RX ADMIN — CLOPIDOGREL BISULFATE 75 MG: 75 TABLET ORAL at 08:54

## 2021-01-01 RX ADMIN — SODIUM CHLORIDE 1000 ML: 9 INJECTION, SOLUTION INTRAVENOUS at 22:25

## 2021-01-01 RX ADMIN — LISINOPRIL 20 MG: 20 TABLET ORAL at 00:21

## 2021-01-01 RX ADMIN — ONDANSETRON 8 MG: 2 INJECTION INTRAMUSCULAR; INTRAVENOUS at 22:28

## 2021-01-01 RX ADMIN — CLOPIDOGREL BISULFATE 75 MG: 75 TABLET ORAL at 13:44

## 2021-01-01 RX ADMIN — MORPHINE SULFATE 2 MG: 2 INJECTION, SOLUTION INTRAMUSCULAR; INTRAVENOUS at 03:37

## 2021-01-01 RX ADMIN — HYDRALAZINE HYDROCHLORIDE 25 MG: 25 TABLET ORAL at 20:56

## 2021-01-01 RX ADMIN — METOPROLOL TARTRATE 25 MG: 25 TABLET, FILM COATED ORAL at 21:32

## 2021-01-01 RX ADMIN — HYDROMORPHONE HYDROCHLORIDE 1 MG: 1 INJECTION, SOLUTION INTRAMUSCULAR; INTRAVENOUS; SUBCUTANEOUS at 09:01

## 2021-01-01 RX ADMIN — INSULIN ASPART: 100 INJECTION, SOLUTION INTRAVENOUS; SUBCUTANEOUS at 14:34

## 2021-01-01 RX ADMIN — METOCLOPRAMIDE 5 MG: 5 INJECTION, SOLUTION INTRAMUSCULAR; INTRAVENOUS at 02:48

## 2021-01-01 RX ADMIN — ASPIRIN 81 MG: 81 TABLET, COATED ORAL at 08:33

## 2021-01-01 RX ADMIN — SODIUM CHLORIDE: 9 INJECTION, SOLUTION INTRAVENOUS at 16:05

## 2021-01-01 RX ADMIN — REGADENOSON 0.4 MG: 0.08 INJECTION, SOLUTION INTRAVENOUS at 11:27

## 2021-01-01 RX ADMIN — ACETAMINOPHEN 975 MG: 325 TABLET ORAL at 08:51

## 2021-01-01 RX ADMIN — HYDROMORPHONE HYDROCHLORIDE 1 MG: 1 INJECTION, SOLUTION INTRAMUSCULAR; INTRAVENOUS; SUBCUTANEOUS at 10:18

## 2021-01-01 RX ADMIN — PREDNISONE 50 MG: 5 TABLET ORAL at 05:27

## 2021-01-01 RX ADMIN — OXYCODONE HYDROCHLORIDE 10 MG: 5 TABLET ORAL at 21:32

## 2021-01-01 RX ADMIN — CLOPIDOGREL BISULFATE 75 MG: 75 TABLET ORAL at 09:14

## 2021-01-01 RX ADMIN — METOPROLOL TARTRATE 50 MG: 25 TABLET, FILM COATED ORAL at 13:59

## 2021-01-01 RX ADMIN — HEPARIN SODIUM 500 UNITS/HR: 10000 INJECTION, SOLUTION INTRAVENOUS at 09:45

## 2021-01-01 RX ADMIN — ACETAMINOPHEN 975 MG: 325 TABLET ORAL at 21:00

## 2021-01-01 RX ADMIN — ASPIRIN 81 MG: 81 TABLET, COATED ORAL at 08:51

## 2021-01-01 RX ADMIN — STANDARDIZED SENNA CONCENTRATE 2 TABLET: 8.6 TABLET ORAL at 13:44

## 2021-01-01 RX ADMIN — INSULIN GLARGINE 35 UNITS: 100 INJECTION, SOLUTION SUBCUTANEOUS at 08:51

## 2021-01-01 RX ADMIN — ATORVASTATIN CALCIUM 40 MG: 40 TABLET, FILM COATED ORAL at 21:32

## 2021-01-01 RX ADMIN — PANTOPRAZOLE SODIUM 40 MG: 40 INJECTION, POWDER, FOR SOLUTION INTRAVENOUS at 03:38

## 2021-01-01 RX ADMIN — DIAZEPAM 5 MG: 5 TABLET ORAL at 08:15

## 2021-01-01 RX ADMIN — METOPROLOL TARTRATE 25 MG: 25 TABLET, FILM COATED ORAL at 20:54

## 2021-01-01 RX ADMIN — OXYCODONE HYDROCHLORIDE 10 MG: 5 TABLET ORAL at 08:54

## 2021-01-01 RX ADMIN — HYDRALAZINE HYDROCHLORIDE 25 MG: 25 TABLET ORAL at 08:33

## 2021-01-01 RX ADMIN — OXYCODONE HYDROCHLORIDE 10 MG: 5 TABLET ORAL at 14:33

## 2021-01-01 RX ADMIN — HEPARIN SODIUM 700 UNITS/HR: 10000 INJECTION, SOLUTION INTRAVENOUS at 04:40

## 2021-01-01 RX ADMIN — ACETAMINOPHEN 975 MG: 325 TABLET ORAL at 20:54

## 2021-01-01 RX ADMIN — OMEPRAZOLE 20 MG: 20 CAPSULE, DELAYED RELEASE ORAL at 08:40

## 2021-01-01 RX ADMIN — INSULIN ASPART 12 UNITS: 100 INJECTION, SOLUTION INTRAVENOUS; SUBCUTANEOUS at 12:48

## 2021-01-01 RX ADMIN — ATORVASTATIN CALCIUM 20 MG: 10 TABLET, FILM COATED ORAL at 20:51

## 2021-01-01 RX ADMIN — STANDARDIZED SENNA CONCENTRATE 2 TABLET: 8.6 TABLET ORAL at 20:48

## 2021-01-01 RX ADMIN — POLYETHYLENE GLYCOL 3350 17 G: 17 POWDER, FOR SOLUTION ORAL at 08:41

## 2021-01-01 RX ADMIN — INSULIN ASPART: 100 INJECTION, SOLUTION INTRAVENOUS; SUBCUTANEOUS at 18:54

## 2021-01-01 RX ADMIN — ASPIRIN 81 MG: 81 TABLET, CHEWABLE ORAL at 08:40

## 2021-01-01 RX ADMIN — OXYCODONE HYDROCHLORIDE 10 MG: 5 TABLET ORAL at 20:37

## 2021-01-01 RX ADMIN — Medication: at 12:06

## 2021-01-01 RX ADMIN — OXYCODONE HYDROCHLORIDE 10 MG: 5 TABLET ORAL at 23:50

## 2021-01-01 RX ADMIN — KETOROLAC TROMETHAMINE 15 MG: 15 INJECTION, SOLUTION INTRAMUSCULAR; INTRAVENOUS at 03:59

## 2021-01-01 RX ADMIN — INSULIN ASPART 3 UNITS: 100 INJECTION, SOLUTION INTRAVENOUS; SUBCUTANEOUS at 10:07

## 2021-01-01 RX ADMIN — INSULIN GLARGINE 20 UNITS: 100 INJECTION, SOLUTION SUBCUTANEOUS at 08:53

## 2021-01-01 RX ADMIN — STANDARDIZED SENNA CONCENTRATE 2 TABLET: 8.6 TABLET ORAL at 20:57

## 2021-01-01 RX ADMIN — ONDANSETRON 4 MG: 2 INJECTION INTRAMUSCULAR; INTRAVENOUS at 12:04

## 2021-01-01 RX ADMIN — INSULIN ASPART 12 UNITS: 100 INJECTION, SOLUTION INTRAVENOUS; SUBCUTANEOUS at 17:55

## 2021-01-01 RX ADMIN — INSULIN ASPART 2 UNITS: 100 INJECTION, SOLUTION INTRAVENOUS; SUBCUTANEOUS at 09:13

## 2021-01-01 RX ADMIN — HEPARIN SODIUM 850 UNITS/HR: 10000 INJECTION, SOLUTION INTRAVENOUS at 22:30

## 2021-01-01 RX ADMIN — INSULIN ASPART 10 UNITS: 100 INJECTION, SOLUTION INTRAVENOUS; SUBCUTANEOUS at 16:40

## 2021-01-01 RX ADMIN — DIPHENHYDRAMINE HYDROCHLORIDE 50 MG: 50 INJECTION INTRAMUSCULAR; INTRAVENOUS at 06:24

## 2021-01-01 RX ADMIN — OMEPRAZOLE 20 MG: 20 CAPSULE, DELAYED RELEASE ORAL at 08:54

## 2021-01-01 RX ADMIN — Medication: at 10:06

## 2021-01-01 RX ADMIN — ACETAMINOPHEN 975 MG: 325 TABLET ORAL at 14:17

## 2021-01-01 RX ADMIN — MICONAZOLE NITRATE: 20 POWDER TOPICAL at 20:58

## 2021-01-01 RX ADMIN — ATORVASTATIN CALCIUM 80 MG: 40 TABLET, FILM COATED ORAL at 20:56

## 2021-01-01 RX ADMIN — OXYCODONE HYDROCHLORIDE 10 MG: 5 TABLET ORAL at 01:03

## 2021-01-01 RX ADMIN — HYDRALAZINE HYDROCHLORIDE 25 MG: 25 TABLET ORAL at 14:43

## 2021-01-01 RX ADMIN — HYDROMORPHONE HYDROCHLORIDE 1 MG: 1 INJECTION, SOLUTION INTRAMUSCULAR; INTRAVENOUS; SUBCUTANEOUS at 08:26

## 2021-01-01 RX ADMIN — METOPROLOL TARTRATE 50 MG: 25 TABLET, FILM COATED ORAL at 20:47

## 2021-01-01 RX ADMIN — HYDROMORPHONE HYDROCHLORIDE 1 MG: 1 INJECTION, SOLUTION INTRAMUSCULAR; INTRAVENOUS; SUBCUTANEOUS at 06:53

## 2021-01-01 RX ADMIN — ATROPINE SULFATE 2 DROP: 10 SOLUTION/ DROPS OPHTHALMIC at 11:03

## 2021-01-01 RX ADMIN — CIPROFLOXACIN 400 MG: 2 INJECTION, SOLUTION INTRAVENOUS at 00:12

## 2021-01-01 RX ADMIN — ALLOPURINOL 300 MG: 300 TABLET ORAL at 08:33

## 2021-01-01 RX ADMIN — OXYCODONE HYDROCHLORIDE 10 MG: 5 TABLET ORAL at 21:56

## 2021-01-01 RX ADMIN — HYDRALAZINE HYDROCHLORIDE 25 MG: 25 TABLET ORAL at 20:47

## 2021-01-01 RX ADMIN — METOPROLOL TARTRATE 50 MG: 25 TABLET, FILM COATED ORAL at 20:56

## 2021-01-01 RX ADMIN — OXYCODONE HYDROCHLORIDE 10 MG: 5 TABLET ORAL at 09:15

## 2021-01-01 RX ADMIN — HYDRALAZINE HYDROCHLORIDE 25 MG: 25 TABLET ORAL at 08:41

## 2021-01-01 RX ADMIN — HYDROMORPHONE HYDROCHLORIDE 1 MG: 1 INJECTION, SOLUTION INTRAMUSCULAR; INTRAVENOUS; SUBCUTANEOUS at 01:24

## 2021-01-01 RX ADMIN — INSULIN ASPART 3 UNITS: 100 INJECTION, SOLUTION INTRAVENOUS; SUBCUTANEOUS at 03:58

## 2021-01-01 RX ADMIN — Medication: at 05:06

## 2021-01-01 RX ADMIN — GADOBUTROL 7 ML: 604.72 INJECTION INTRAVENOUS at 14:59

## 2021-01-01 RX ADMIN — OXYCODONE HYDROCHLORIDE 10 MG: 5 TABLET ORAL at 16:59

## 2021-01-01 RX ADMIN — HYDRALAZINE HYDROCHLORIDE 10 MG: 20 INJECTION INTRAMUSCULAR; INTRAVENOUS at 03:41

## 2021-01-01 RX ADMIN — ACETAMINOPHEN 975 MG: 325 TABLET ORAL at 09:14

## 2021-01-01 RX ADMIN — METOPROLOL TARTRATE 25 MG: 25 TABLET, FILM COATED ORAL at 09:31

## 2021-01-01 RX ADMIN — ASPIRIN 81 MG: 81 TABLET, COATED ORAL at 18:22

## 2021-01-01 RX ADMIN — INSULIN ASPART 2 UNITS: 100 INJECTION, SOLUTION INTRAVENOUS; SUBCUTANEOUS at 02:48

## 2021-01-01 RX ADMIN — INSULIN GLARGINE 30 UNITS: 100 INJECTION, SOLUTION SUBCUTANEOUS at 08:21

## 2021-01-01 RX ADMIN — Medication 8.7 MILLICURIE: at 10:35

## 2021-01-01 RX ADMIN — CLOPIDOGREL BISULFATE 75 MG: 75 TABLET ORAL at 08:41

## 2021-01-01 RX ADMIN — HYDROMORPHONE HYDROCHLORIDE: 1 INJECTION, SOLUTION INTRAMUSCULAR; INTRAVENOUS; SUBCUTANEOUS at 21:34

## 2021-01-01 RX ADMIN — INSULIN ASPART 10 UNITS: 100 INJECTION, SOLUTION INTRAVENOUS; SUBCUTANEOUS at 22:28

## 2021-01-01 RX ADMIN — INSULIN GLARGINE 35 UNITS: 100 INJECTION, SOLUTION SUBCUTANEOUS at 13:44

## 2021-01-01 RX ADMIN — Medication: at 18:03

## 2021-01-01 RX ADMIN — HYDROMORPHONE HYDROCHLORIDE 1 MG: 1 INJECTION, SOLUTION INTRAMUSCULAR; INTRAVENOUS; SUBCUTANEOUS at 12:10

## 2021-01-01 RX ADMIN — STANDARDIZED SENNA CONCENTRATE 2 TABLET: 8.6 TABLET ORAL at 21:41

## 2021-01-01 RX ADMIN — HYDROMORPHONE HYDROCHLORIDE 1 MG: 1 INJECTION, SOLUTION INTRAMUSCULAR; INTRAVENOUS; SUBCUTANEOUS at 21:25

## 2021-01-01 RX ADMIN — STANDARDIZED SENNA CONCENTRATE 2 TABLET: 8.6 TABLET ORAL at 08:53

## 2021-01-01 RX ADMIN — SODIUM CHLORIDE: 9 INJECTION, SOLUTION INTRAVENOUS at 08:22

## 2021-01-01 RX ADMIN — ASPIRIN 81 MG: 81 TABLET, COATED ORAL at 08:54

## 2021-01-01 RX ADMIN — HYDROMORPHONE HYDROCHLORIDE 1 MG: 1 INJECTION, SOLUTION INTRAMUSCULAR; INTRAVENOUS; SUBCUTANEOUS at 07:57

## 2021-01-01 RX ADMIN — INSULIN ASPART 2 UNITS: 100 INJECTION, SOLUTION INTRAVENOUS; SUBCUTANEOUS at 10:14

## 2021-01-01 RX ADMIN — HYDROMORPHONE HYDROCHLORIDE 1 MG: 1 INJECTION, SOLUTION INTRAMUSCULAR; INTRAVENOUS; SUBCUTANEOUS at 04:13

## 2021-01-01 RX ADMIN — MICONAZOLE NITRATE: 20 POWDER TOPICAL at 08:57

## 2021-01-01 RX ADMIN — METOPROLOL TARTRATE 50 MG: 25 TABLET, FILM COATED ORAL at 20:43

## 2021-01-01 RX ADMIN — OMEPRAZOLE 20 MG: 20 CAPSULE, DELAYED RELEASE ORAL at 09:16

## 2021-01-01 RX ADMIN — ASPIRIN 325 MG: 325 TABLET, FILM COATED ORAL at 08:15

## 2021-01-01 RX ADMIN — INSULIN GLARGINE 25 UNITS: 100 INJECTION, SOLUTION SUBCUTANEOUS at 08:47

## 2021-01-01 RX ADMIN — OXYCODONE HYDROCHLORIDE 10 MG: 5 TABLET ORAL at 13:16

## 2021-01-01 RX ADMIN — ACETAMINOPHEN 975 MG: 325 TABLET ORAL at 20:37

## 2021-01-01 RX ADMIN — HYDROMORPHONE HYDROCHLORIDE 1 MG: 1 INJECTION, SOLUTION INTRAMUSCULAR; INTRAVENOUS; SUBCUTANEOUS at 00:58

## 2021-01-01 RX ADMIN — INSULIN ASPART 26 UNITS: 100 INJECTION, SOLUTION INTRAVENOUS; SUBCUTANEOUS at 08:50

## 2021-01-01 RX ADMIN — HYDROMORPHONE HYDROCHLORIDE 1 MG: 1 INJECTION, SOLUTION INTRAMUSCULAR; INTRAVENOUS; SUBCUTANEOUS at 09:10

## 2021-01-01 RX ADMIN — METOPROLOL TARTRATE 25 MG: 25 TABLET, FILM COATED ORAL at 08:51

## 2021-01-01 RX ADMIN — ATORVASTATIN CALCIUM 40 MG: 40 TABLET, FILM COATED ORAL at 21:01

## 2021-01-01 RX ADMIN — ATORVASTATIN CALCIUM 40 MG: 40 TABLET, FILM COATED ORAL at 20:54

## 2021-01-01 RX ADMIN — OXYCODONE HYDROCHLORIDE 10 MG: 5 TABLET ORAL at 05:17

## 2021-01-01 RX ADMIN — OXYCODONE HYDROCHLORIDE 10 MG: 5 TABLET ORAL at 09:32

## 2021-01-01 RX ADMIN — METOPROLOL TARTRATE 25 MG: 25 TABLET, FILM COATED ORAL at 20:51

## 2021-01-01 RX ADMIN — ACETAMINOPHEN 975 MG: 325 TABLET ORAL at 08:54

## 2021-01-01 RX ADMIN — AMLODIPINE BESYLATE 2.5 MG: 2.5 TABLET ORAL at 09:15

## 2021-01-01 RX ADMIN — INSULIN GLARGINE 35 UNITS: 100 INJECTION, SOLUTION SUBCUTANEOUS at 08:18

## 2021-01-01 RX ADMIN — FUROSEMIDE 20 MG: 20 TABLET ORAL at 09:31

## 2021-01-01 RX ADMIN — HYDROMORPHONE HYDROCHLORIDE 1 MG: 1 INJECTION, SOLUTION INTRAMUSCULAR; INTRAVENOUS; SUBCUTANEOUS at 12:50

## 2021-01-01 RX ADMIN — OXYCODONE HYDROCHLORIDE 10 MG: 5 TABLET ORAL at 04:39

## 2021-01-01 RX ADMIN — PERFLUTREN 2 ML: 6.52 INJECTION, SUSPENSION INTRAVENOUS at 15:18

## 2021-01-01 RX ADMIN — SODIUM CHLORIDE: 9 INJECTION, SOLUTION INTRAVENOUS at 08:31

## 2021-01-01 RX ADMIN — INSULIN ASPART 4 UNITS: 100 INJECTION, SOLUTION INTRAVENOUS; SUBCUTANEOUS at 19:27

## 2021-01-01 RX ADMIN — HYDRALAZINE HYDROCHLORIDE 12.5 MG: 25 TABLET, FILM COATED ORAL at 12:42

## 2021-01-01 RX ADMIN — ACETAMINOPHEN 975 MG: 325 TABLET ORAL at 14:08

## 2021-01-01 RX ADMIN — FUROSEMIDE 20 MG: 20 TABLET ORAL at 13:44

## 2021-01-01 RX ADMIN — HYDROMORPHONE HYDROCHLORIDE 1 MG: 1 INJECTION, SOLUTION INTRAMUSCULAR; INTRAVENOUS; SUBCUTANEOUS at 04:56

## 2021-01-01 RX ADMIN — HYDROMORPHONE HYDROCHLORIDE 0.5 MG: 1 INJECTION, SOLUTION INTRAMUSCULAR; INTRAVENOUS; SUBCUTANEOUS at 22:53

## 2021-01-01 RX ADMIN — POLYETHYLENE GLYCOL 3350 17 G: 17 POWDER, FOR SOLUTION ORAL at 09:14

## 2021-01-01 RX ADMIN — FUROSEMIDE 40 MG: 20 TABLET ORAL at 08:33

## 2021-01-01 RX ADMIN — OXYCODONE HYDROCHLORIDE 10 MG: 5 TABLET ORAL at 14:42

## 2021-01-01 RX ADMIN — SODIUM CHLORIDE: 9 INJECTION, SOLUTION INTRAVENOUS at 20:59

## 2021-01-01 RX ADMIN — SODIUM BICARBONATE 50 MEQ: 84 INJECTION INTRAVENOUS at 08:14

## 2021-01-01 RX ADMIN — OXYCODONE HYDROCHLORIDE 10 MG: 5 TABLET ORAL at 19:24

## 2021-01-01 RX ADMIN — HYDRALAZINE HYDROCHLORIDE 25 MG: 25 TABLET ORAL at 14:08

## 2021-01-01 RX ADMIN — ACETAMINOPHEN 650 MG: 325 TABLET ORAL at 09:15

## 2021-01-01 RX ADMIN — Medication: at 09:21

## 2021-01-01 RX ADMIN — OXYCODONE HYDROCHLORIDE 10 MG: 5 TABLET ORAL at 06:31

## 2021-01-01 RX ADMIN — HYDROMORPHONE HYDROCHLORIDE 0.5 MG: 1 INJECTION, SOLUTION INTRAMUSCULAR; INTRAVENOUS; SUBCUTANEOUS at 21:50

## 2021-01-01 RX ADMIN — MICONAZOLE NITRATE: 20 POWDER TOPICAL at 08:40

## 2021-01-01 RX ADMIN — METHYLPREDNISOLONE SODIUM SUCCINATE 37.5 MG: 125 INJECTION, POWDER, FOR SOLUTION INTRAMUSCULAR; INTRAVENOUS at 00:57

## 2021-01-01 RX ADMIN — SODIUM CHLORIDE: 9 INJECTION, SOLUTION INTRAVENOUS at 19:24

## 2021-01-01 RX ADMIN — SODIUM BICARBONATE: 84 INJECTION, SOLUTION INTRAVENOUS at 06:37

## 2021-01-01 RX ADMIN — METRONIDAZOLE 500 MG: 500 INJECTION, SOLUTION INTRAVENOUS at 22:15

## 2021-01-01 RX ADMIN — HYDROMORPHONE HYDROCHLORIDE 1 MG: 1 INJECTION, SOLUTION INTRAMUSCULAR; INTRAVENOUS; SUBCUTANEOUS at 05:12

## 2021-01-01 RX ADMIN — Medication: at 05:27

## 2021-01-01 RX ADMIN — HYDRALAZINE HYDROCHLORIDE 25 MG: 25 TABLET ORAL at 20:45

## 2021-01-01 RX ADMIN — METOPROLOL TARTRATE 25 MG: 25 TABLET, FILM COATED ORAL at 21:01

## 2021-01-01 RX ADMIN — METOPROLOL TARTRATE 50 MG: 25 TABLET, FILM COATED ORAL at 09:15

## 2021-01-01 RX ADMIN — IOPAMIDOL 99 ML: 755 INJECTION, SOLUTION INTRAVENOUS at 07:39

## 2021-01-01 RX ADMIN — MICONAZOLE NITRATE: 20 POWDER TOPICAL at 21:39

## 2021-01-01 RX ADMIN — ACETAMINOPHEN 975 MG: 325 TABLET ORAL at 14:43

## 2021-01-01 RX ADMIN — METOPROLOL TARTRATE 50 MG: 25 TABLET, FILM COATED ORAL at 08:40

## 2021-01-01 RX ADMIN — INSULIN ASPART 4 UNITS: 100 INJECTION, SOLUTION INTRAVENOUS; SUBCUTANEOUS at 14:01

## 2021-01-01 RX ADMIN — OXYCODONE HYDROCHLORIDE 10 MG: 5 TABLET ORAL at 03:00

## 2021-01-01 RX ADMIN — ALLOPURINOL 300 MG: 300 TABLET ORAL at 09:16

## 2021-01-01 RX ADMIN — OXYCODONE HYDROCHLORIDE 10 MG: 5 TABLET ORAL at 03:41

## 2021-01-01 RX ADMIN — STANDARDIZED SENNA CONCENTRATE 2 TABLET: 8.6 TABLET ORAL at 20:38

## 2021-01-01 RX ADMIN — HEPARIN SODIUM 1300 UNITS/HR: 10000 INJECTION, SOLUTION INTRAVENOUS at 01:49

## 2021-01-01 RX ADMIN — OXYCODONE HYDROCHLORIDE 10 MG: 5 TABLET ORAL at 03:07

## 2021-01-01 RX ADMIN — SODIUM CHLORIDE 1000 ML: 900 INJECTION, SOLUTION INTRAVENOUS at 23:11

## 2021-01-01 RX ADMIN — HYDRALAZINE HYDROCHLORIDE 10 MG: 20 INJECTION INTRAMUSCULAR; INTRAVENOUS at 01:22

## 2021-01-01 RX ADMIN — SODIUM CHLORIDE: 9 INJECTION, SOLUTION INTRAVENOUS at 04:00

## 2021-01-01 RX ADMIN — HYDROMORPHONE HYDROCHLORIDE 1 MG: 1 INJECTION, SOLUTION INTRAMUSCULAR; INTRAVENOUS; SUBCUTANEOUS at 01:03

## 2021-01-01 RX ADMIN — HUMAN INSULIN 7.3 UNITS: 100 INJECTION, SOLUTION SUBCUTANEOUS at 08:09

## 2021-01-01 RX ADMIN — HYDRALAZINE HYDROCHLORIDE 12.5 MG: 25 TABLET, FILM COATED ORAL at 21:01

## 2021-01-01 RX ADMIN — POLYETHYLENE GLYCOL 3350 17 G: 17 POWDER, FOR SOLUTION ORAL at 09:30

## 2021-01-01 RX ADMIN — HYDRALAZINE HYDROCHLORIDE 25 MG: 25 TABLET ORAL at 08:55

## 2021-01-01 RX ADMIN — VANCOMYCIN HYDROCHLORIDE 1750 MG: 1 INJECTION, POWDER, LYOPHILIZED, FOR SOLUTION INTRAVENOUS at 00:18

## 2021-01-01 RX ADMIN — ACETAMINOPHEN 975 MG: 325 TABLET ORAL at 09:31

## 2021-01-01 RX ADMIN — OXYCODONE HYDROCHLORIDE 10 MG: 5 TABLET ORAL at 18:06

## 2021-01-01 RX ADMIN — ALLOPURINOL 300 MG: 300 TABLET ORAL at 08:54

## 2021-01-01 RX ADMIN — METOPROLOL TARTRATE 25 MG: 25 TABLET, FILM COATED ORAL at 08:54

## 2021-01-01 RX ADMIN — AMLODIPINE BESYLATE 2.5 MG: 2.5 TABLET ORAL at 19:25

## 2021-01-01 RX ADMIN — INSULIN GLARGINE 30 UNITS: 100 INJECTION, SOLUTION SUBCUTANEOUS at 08:34

## 2021-01-01 RX ADMIN — ALLOPURINOL 300 MG: 300 TABLET ORAL at 19:25

## 2021-01-01 RX ADMIN — OXYCODONE HYDROCHLORIDE 10 MG: 5 TABLET ORAL at 02:22

## 2021-01-01 RX ADMIN — OXYCODONE HYDROCHLORIDE 5 MG: 5 TABLET ORAL at 14:17

## 2021-01-01 RX ADMIN — CALCIUM GLUCONATE 1 G: 20 INJECTION, SOLUTION INTRAVENOUS at 08:08

## 2021-01-01 RX ADMIN — MICONAZOLE NITRATE: 20 POWDER TOPICAL at 20:45

## 2021-01-01 RX ADMIN — ACETAMINOPHEN 975 MG: 325 TABLET ORAL at 08:41

## 2021-01-01 RX ADMIN — HYDROMORPHONE HYDROCHLORIDE 1 MG: 1 INJECTION, SOLUTION INTRAMUSCULAR; INTRAVENOUS; SUBCUTANEOUS at 05:42

## 2021-01-01 RX ADMIN — OXYCODONE HYDROCHLORIDE 10 MG: 5 TABLET ORAL at 05:27

## 2021-01-01 RX ADMIN — INSULIN ASPART 10 UNITS: 100 INJECTION, SOLUTION INTRAVENOUS; SUBCUTANEOUS at 18:01

## 2021-01-01 RX ADMIN — OXYCODONE HYDROCHLORIDE 10 MG: 5 TABLET ORAL at 09:05

## 2021-01-01 RX ADMIN — MICONAZOLE NITRATE: 20 POWDER TOPICAL at 08:33

## 2021-01-01 RX ADMIN — HYDRALAZINE HYDROCHLORIDE 12.5 MG: 25 TABLET, FILM COATED ORAL at 12:17

## 2021-01-01 RX ADMIN — HYDRALAZINE HYDROCHLORIDE 12.5 MG: 25 TABLET, FILM COATED ORAL at 09:38

## 2021-01-01 RX ADMIN — ATORVASTATIN CALCIUM 40 MG: 40 TABLET, FILM COATED ORAL at 20:44

## 2021-01-01 RX ADMIN — ASPIRIN 81 MG: 81 TABLET, COATED ORAL at 09:31

## 2021-01-01 RX ADMIN — FUROSEMIDE 40 MG: 20 TABLET ORAL at 09:47

## 2021-01-01 RX ADMIN — ACETAMINOPHEN 975 MG: 325 TABLET ORAL at 08:33

## 2021-01-01 RX ADMIN — CLOPIDOGREL BISULFATE 75 MG: 75 TABLET ORAL at 08:15

## 2021-01-01 RX ADMIN — HYDROMORPHONE HYDROCHLORIDE 1 MG: 1 INJECTION, SOLUTION INTRAMUSCULAR; INTRAVENOUS; SUBCUTANEOUS at 11:49

## 2021-01-01 RX ADMIN — STANDARDIZED SENNA CONCENTRATE 2 TABLET: 8.6 TABLET ORAL at 08:41

## 2021-01-01 RX ADMIN — ALLOPURINOL 300 MG: 300 TABLET ORAL at 08:41

## 2021-01-01 RX ADMIN — OMEPRAZOLE 20 MG: 20 CAPSULE, DELAYED RELEASE ORAL at 08:33

## 2021-01-01 RX ADMIN — HYDROMORPHONE HYDROCHLORIDE 1 MG: 1 INJECTION, SOLUTION INTRAMUSCULAR; INTRAVENOUS; SUBCUTANEOUS at 22:19

## 2021-01-01 RX ADMIN — Medication: at 04:13

## 2021-01-01 RX ADMIN — POLYETHYLENE GLYCOL 3350 17 G: 17 POWDER, FOR SOLUTION ORAL at 08:33

## 2021-01-01 RX ADMIN — Medication 30.4 MILLICURIE: at 11:30

## 2021-01-01 RX ADMIN — HYDRALAZINE HYDROCHLORIDE 25 MG: 25 TABLET ORAL at 21:32

## 2021-01-01 RX ADMIN — INSULIN GLARGINE 20 UNITS: 100 INJECTION, SOLUTION SUBCUTANEOUS at 10:58

## 2021-01-01 RX ADMIN — SODIUM CHLORIDE 1000 ML: 900 INJECTION, SOLUTION INTRAVENOUS at 21:34

## 2021-01-01 ASSESSMENT — ACTIVITIES OF DAILY LIVING (ADL)
ADLS_ACUITY_SCORE: 25
WHICH_OF_THE_ABOVE_FUNCTIONAL_RISKS_HAD_A_RECENT_ONSET_OR_CHANGE?: FALL HISTORY
ADLS_ACUITY_SCORE: 25
VISION_MANAGEMENT: GLASSES
DOING_ERRANDS_INDEPENDENTLY_DIFFICULTY: OTHER (SEE COMMENTS)
ADLS_ACUITY_SCORE: 25
ADLS_ACUITY_SCORE: 17
ADLS_ACUITY_SCORE: 21
WALKING_OR_CLIMBING_STAIRS_DIFFICULTY: OTHER (SEE COMMENTS)
ADLS_ACUITY_SCORE: 25
ADLS_ACUITY_SCORE: 21
ADLS_ACUITY_SCORE: 25
ADLS_ACUITY_SCORE: 17
ADLS_ACUITY_SCORE: 17
ADLS_ACUITY_SCORE: 21
WEAR_GLASSES_OR_BLIND: YES
EQUIPMENT_CURRENTLY_USED_AT_HOME: CANE, STRAIGHT;WALKER, STANDARD
FALL_HISTORY_WITHIN_LAST_SIX_MONTHS: YES
ADLS_ACUITY_SCORE: 21
ADLS_ACUITY_SCORE: 21
DRESSING/BATHING_DIFFICULTY: OTHER (SEE COMMENTS)
ADLS_ACUITY_SCORE: 21
CONCENTRATING,_REMEMBERING_OR_MAKING_DECISIONS_DIFFICULTY: OTHER (SEE COMMENTS)
ADLS_ACUITY_SCORE: 25
WERE_AUXILIARY_AIDS_OFFERED?: NO
DIFFICULTY_EATING/SWALLOWING: OTHER (SEE COMMENTS)
HEARING_DIFFICULTY_OR_DEAF: NO
ADLS_ACUITY_SCORE: 21
ADLS_ACUITY_SCORE: 21
TOILETING_ISSUES: OTHER (SEE COMMENTS)
ADLS_ACUITY_SCORE: 21
NUMBER_OF_TIMES_PATIENT_HAS_FALLEN_WITHIN_LAST_SIX_MONTHS: 1
DIFFICULTY_COMMUNICATING: OTHER (SEE COMMENTS)
ADLS_ACUITY_SCORE: 17
ADLS_ACUITY_SCORE: 25
ADLS_ACUITY_SCORE: 25

## 2021-01-01 ASSESSMENT — MIFFLIN-ST. JEOR
SCORE: 1169.89
SCORE: 1170
SCORE: 1215.26
SCORE: 1174.89
SCORE: 1218.43
SCORE: 1214.8
SCORE: 1204.82

## 2021-05-27 NOTE — TELEPHONE ENCOUNTER
Pt calling and has questions for Dr. Goddard. Called pt back and LM that pt out of town and will not return until Monday. Informed that tomorrow after RT I can write down her questions and give to him upon return. Informed pt to call back if this doesn't work for her.

## 2021-05-27 NOTE — TELEPHONE ENCOUNTER
Called and left message for pt to return call to schedule a follow up with Dr. Rodriguez in the [facility].

## 2021-05-27 NOTE — PROGRESS NOTES
"Lexus comes in for follow-up of an I&D of an infected sebaceous cyst in the inframammary fold of her left breast.  She has been having almost daily visiting nurse visits.  They feel like the wound was healing nicely but now just has stalled and has not shown any in proved signs for the last couple weeks.    Physical exam:  /40 (Patient Site: Left Arm, Patient Position: Sitting, Cuff Size: Adult Large)   Pulse 96   Ht 5' 4\" (1.626 m)   Wt 160 lb (72.6 kg)   SpO2 95%   Breastfeeding? No   BMI 27.46 kg/m    General: Sitting in a wheelchair.  Breast: In the inferior mammary fold is a small open wound that for the most part is granulated.  In the superior aspect however is some residual sebaceous material that I was able to remove with a curette.  This was repacked with a silver cell gauze.    Impression: Healing infected sebaceous cyst.  I am going to switch her to a silver cell dressing.  This type of dressing is better for less frequent dressing changes.    Plan: Follow-up with me in 2 weeks.  "

## 2021-05-27 NOTE — PROGRESS NOTES
RADIATION ONCOLOGY WEEKLY TREATMENT VISIT NOTE      Assessment / Impression       1. Malignant neoplasm of upper lobe of right lung (H)  CT Chest Without Contrast     Tolerating radiation therapy well.  All questions and concerns addressed.    Plan:     Follow-up with radiation oncology in 2 months with CT chest.    Subjective:      HPI: Dacia Miller is a 78 y.o. female with    1. Malignant neoplasm of upper lobe of right lung (H)  CT Chest Without Contrast       The following portions of the patient's history were reviewed and updated as appropriate: allergies, current medications, past family history, past medical history, past social history, past surgical history and problem list.    Assessment                  Body Site: Thoracic Site: R lung  Stereotactic Radiosurgery: Yes  Stereotactic Radiosurgery date: 19(finished SBRT to lung today)  Today's Dose: 4500  Total Dose for Thoracic: 4500  Today's Fraction/Total Fraction Thoracic: 5/5  Voice Chances/Stridor/Larynx: 0: Normal  Pharynx and Esphogaus: 0: No change over baseline  Constipation: 0: None  Diarrhea W/O Colostomy: 0: None  Cough: 0: Absent  Hemoptysis: 0: None  Mucus Quantity: 0: None  Dyspnea: 2: Dyspnea on exertion                                              Sexuality Alteration                 Emotional Alteration Copin: Effective  Comfort Alteration KPS: 70% Cannot do active work, but can care for self  Fatigue (ONS scale) : 6: Moderate Fatigue  Pain Location: lower R leg  Pain Intensity. Rate degree of pain ranging from 0 (no pain) to 10 (severe pain) : 4  Pain Description: Ache - Muscular type ache   Nutrition Alteration Anorexia: 0: None  Nausea: 0: None  Vomitin: None  Dyspepsia and/or Heartburn: 0: None  Pharynx and Esphogaus: 0: No change over baseline  Skin Alteration Skin Sensation: 0: No problem  Skin Reaction: 0: None  AUA Assessment                                  Accompanied by       Objective:     Exam:  Examination reviewed no significant changes.    Vitals:    04/01/19 1542   BP: 167/70   Pulse: (!) 105   SpO2: 99%       Wt Readings from Last 8 Encounters:   02/19/19 160 lb 8 oz (72.8 kg)   02/17/19 175 lb (79.4 kg)   02/15/19 174 lb (78.9 kg)   02/07/19 174 lb 7 oz (79.1 kg)   01/21/19 175 lb 12.8 oz (79.7 kg)   01/14/19 176 lb 14.4 oz (80.2 kg)   01/07/19 177 lb 3 oz (80.4 kg)   01/03/19 166 lb (75.3 kg)       General: Alert and oriented, in no acute distress  Dacia has no Erythema.  Aria chart and setup information reviewed    Kelsi Goddard MD

## 2021-05-27 NOTE — TELEPHONE ENCOUNTER
"Pt left a message at 1315 stating she accidentally deleted our VM from yesterday so wanted to know when she could speak to Dr. Goddard to have her questions answered, and she wanted to cancel her treatment for today as her last treatment was \"absolute torture\". I called her back and let her know Dr. Goddard is out this week, but we could get him a list of her questions on Monday. She did not want to provide me with her questions stating she wants to speak with Dr. Goddard. She also again said she wanted to cancel treatment today as it was too painful last time and too long. I asked if she had taken pain medication and/or Ativan prior to treatment and she said she had taken both. I told her we'd cancel for today but we really have to try to get it done on Friday. Plan for Friday is that she'll take pain medication and Ativan prior to coming, we'll use lavender oil aromatherapy upon her arrival, and we'll play relaxing music loudly during her treatment. She was happy with that plan. Anni JARETT will adjust patient's schedule and now she'll be able to see Dr. Goddard Friday for OTV and final treatment visit.   "

## 2021-05-27 NOTE — PROGRESS NOTES
Radiation Treatment Summary    Patient: Dacia Miller   MRN: [id number]  : 1940  Care Provider: Kelsi Goddard    Date of Service: 2019        Lilliana Rodriguez MD  [facility], [facility] [address] [address]             Dear Dr. Rodriguez:     Your patient Mrs. Dacia Bowman complete her thoracic radiation therapy 2019.  As you know, the patient is 78-year-old female with a diagnosis of left breast cancer, stage T2 N0 M0, ER/VT positive, HER-2 negative, status post lumpectomy and sentinel resection with close margin form DCIS.    She received postop radiation therapy with a total dose of 5240 cGy in 20 treatments targeted to the left breast only given from 2018-1019.  She was found to have early stage lung cancer during the evaluation of her breast radiation therapy.  The patient however declined options of biopsy due to the potential risks involved.  She therefore received stereotactic radiotherapy for her clinically diagnosed lung cancer and had radiation therapy in our clinic over the past 2 weeks which a total dose of 4500 cGy in 5 treatments given from 3/20/2019-2019.  She tolerated radiation therapy reasonably well with no significant acute side effects.  She is scheduled to return to radiation oncology eating 2 months for routine post therapy office follow-up and restaging CT chest.    Again, thank you very much for the referral and allowing me to participate in the care of this patient.  If you have any questions about this patient, please do not has to call.    Sincerely,      Kelsi Goddard MD, PhD  Department of Radiation Oncology   [facility]  Tel: [phone]  Page: [phone]    [facility]  [address] [place] [address]     [facility]  [address]   [place] [address]    CC:  Patient Care Team:  Rajinder Kitchen MD as PCP - General (Family Medicine)  Lilliana Rodriguez MD as Physician (General Surgery)  Kelsi Goddard MD as Physician  (Radiation Oncology)  Tory Mackey MD as Physician (Hematology and Oncology)  Magda Davidson RN as Oncology Nurse Navigator (Hematology and Oncology)  Marcos Bassett MD (Cardiovascular and Thoracic Surgery)

## 2021-05-27 NOTE — PROGRESS NOTES
Called and updated [facility] with new dressing care orders per Dr. Rodriguez. Will switch to Silvercell dressings 3x weekly.

## 2021-05-28 NOTE — PROGRESS NOTES
Comes in to recheck her sebaceous cyst.  Feeling well.    Physical exam:  The wound looks much better today.  Granulating well.  No residual cyst material that I can see.    Impression: Status post I&D of infected sebaceous cyst.  Did warn her that this would be slow to heal because of its location but it is definitely healing.  Continue with dressing changes and follow-up with us can now be as needed.  If the visiting nurse feels that it is not completely healed within the next 3 to 4 weeks and we should see her back.

## 2021-05-30 NOTE — PROGRESS NOTES
[facility] Radiation Oncology Follow Up     Patient: Dacia Miller  MRN: [id number]  Date of Service: 07/19/2019       DISEASE TREATED:   1. Left breast cancer, stage T2 N0 M0, ER/CT positive, HER-2 negative, status post lumpectomy and sentinel resection with close margin form DCIS.  2. Non-small cell lung cancer involving right upper lobe, clinical stage T2 N0 M0.  Patient is not candidate for surgery and declined additional workup including EBUS to exam the mediastinum.        TYPE OF RADIATION THERAPY ADMINISTERED:    1. 5240 cGy in 20 treatments targeted to the left breast only given from 12/27/2018-2/4/1019.  2. 4500 cGy in 5 treatments given from 3/20/2019-4/1/2019.     INTERVAL SINCE COMPLETION OF RADIATION THERAPY: 3 months.      SUBJECTIVE:  Ms. Miller is a 78 y.o. female who presented with palpable left breast lump by self-examination for which she was seeking further evaluation.  Mammogram followed by ultrasound reviewed 22 x 12 x 22 mm irregular mass involving left breast at the 12 o'clock position 6 cm from nipple suspicious for malignancy.  Needle biopsy was then obtained and pathology reviewed invasive ductal carcinoma, ER/CT positive, HER-2 negative.  After discussed with patient about various treatment options, the patient elected proceed with lumpectomy and sentinel resection as her treatment choice and underwent surgery 9/19/2018.  The pathology reviewed 26 x 21 x 19 mm invasive ductal carcinoma, Bourbon grade 2 with negative margin 4 mm from superior form invasive and less than 1 mm from superior, inferior, and posterior from DCIS.  There was also evidence of DIC.  One removed sentinel node showed no evidence of metastatic disease.  Postoperatively she has been recovering well.  Her case has been discussed on the breast conference and the consensus recommendation is to consider postop radiation therapy given the pathological evidence of close margin. She received postop radiation therapy  with a total dose of 5240 cGy in 20 treatments targeted to the left breast only given from 12/27/2018-2/4/1019.  She tolerated radiation therapy very well with expected acute side effects at the end.      Patient was also found to have early stage non-small cell lung cancer. The patient has been canceled several times for her EBUS procedure as part of the staging workup.  Her case was also discussed at the thoracic tumor conference on 1/31/2019.  The consensus recommendation is to complete staging workup before proceed with therapy.  We will consider stereotactic radiotherapy for her lung cancer if patient declined EBUS procedure.  This has been discussed with the patient and his son today.  The patient is adamant against another staging procedure for her lung cancer.  She wished to proceed with stereotactic radiotherapy as her treatment choice.  She is informed there is a risk of cancer metastasis to the mediastinal nodes which will require systemic therapy.  She understands well and wished to proceed with definitive external beam radiation therapy. She therefore received stereotactic radiotherapy for her clinically diagnosed lung cancer and had radiation therapy in our clinic over the past 2 weeks which a total dose of 4500 cGy in 5 treatments given from 3/20/2019-4/1/2019.  She tolerated radiation therapy reasonably well with no significant acute side effects.     The patient has been stable since completion of the radiation therapy.  She denies any chest pain short of breath at the time of evaluation.  She had a restaging CT chest on 7/16/2019 and is here for routine office follow-up.    Medications were reviewed and are up to date on EPIC.    The following portions of the patient's history were reviewed and updated as appropriate: allergies, current medications, past family history, past medical history, past social history, past surgical history and problem list.    Review of Systems:       General  Constitutional (WDL): Exceptions to WDL  Fatigue: Fatigue relieved by rest  Hot flashes/Night Sweats: Mild symptoms, no intervention needed  EENT  Ear Disorder (WDL): All ear disorder elements are within defined limits  Respiratory   Respiratory (WDL): Within Defined Limits  Cardiovascular  Cardiovascular (WDL): All cardiovascular elements are within defined limits  Endocrine     Gastrointestinal  Gastrointestinal (WDL): Exceptions to WDL  Constipation: Occasional or intermittent symptoms, occasional use of stool softeners, laxatives, dietary modification, or enema  Musculoskeletal  Musculoskeletal and Connetive Tissue Disorders (WDL): Exceptions to WDL  Arthralgia: Mild pain  Bone Pain: Mild pain  Muscle Weakness : Symptomatic, perceived by patient but not evident on physical exam  Myalgia: Mild pain  Integumentary                  Neurological  Neurosensory (WDL): Exceptions to WDL  Ataxia: Moderate symptoms, limiting instrumental ADL(in w/c)  Dizziness: Mild unsteadiness or sensation of movement  Psychological/Emotional   Patient Coping: Open/discussion  Hematological/Lymphatic  Lymph (WDL): Assessment not pertinent to visit  Dermatologic     Genitourinary/Reproductive  Genitourinary (WDL): All genitourinary elements are within defined limits  Reproductive     Pain              Currently in Pain: Yes  Pain Score (Initial OR Reassessment): 3  Pain Frequency: Intermittent  Location: under left breast  Pain Intervention(s): Medication (See MAR)  Response to Interventions: helpful  Accompanied by  Accompanied by: Family Member    Objective:     PHYSICAL EXAMINATION:    /66   Pulse (!) 106   Temp 98.5  F (36.9  C) (Oral)   SpO2 98%     Gen: Alert, in NAD  Eyes: PERRL, EOMI, sclera anicteric  HENT     Head: NC/AT     Ears: No external auricular lesions     Nose/sinus: No rhinorrhea or epistaxis     Oropharynx: MMM, no visible oral lesions  Neck: Supple, full ROM, no LAD  Chest: Breast examination  is unremarkable with postsurgical scar.  There is no palpable mass in the left breast, axilla, and supraclavicular region.   Pulm: No wheezing, stridor or respiratory distress  CV: Well-perfused, no cyanosis, no pedal edema  Abdominal: BS+, soft, nontender, nondistended, no hepatomegaly  Back: No step-offs or pain to palpation along the thoracolumbar spine  Rectal: Deferred  : Deferred  Musculoskeletal: Normal muscle bulk and tone  Skin: Normal color and turgor  Neurologic: A/Ox3, CN II-XII intact, normal gait and station  Psychiatric: Appropriate mood and affect     Imaging: Imaging results 30 days: Ct Chest Without Contrast    Result Date: 7/16/2019  EXAM: CT CHEST WO CONTRAST LOCATION: [facility] DATE/TIME: 7/16/2019 1:02 PM INDICATION: Neoplasm: chest, metastatic, rx monitor or f/u lung cancer post SBRT follow up COMPARISON: CT 11/29/2018. PET scan 12/19/2018. CT from biopsy 12/13/2018. TECHNIQUE: Helical images were obtained through the chest. Multiplanar reformats were obtained. Dose reduction techniques were used. CONTRAST: None. FINDINGS: LUNGS AND PLEURA: No significant change in the infiltrative masslike opacity right upper lobe measures 4.6 x 2.2 cm on image 28. Medially adjacent 5 mm nodule minimally smaller. No associated new infiltrates/radiation change. Stable 8 mm solid nodule right middle lobe image 62. No new lung lesions. MEDIASTINUM: No mediastinal adenopathy. Moderate size sliding esophageal hiatal hernia unchanged. LIMITED UPPER ABDOMEN: Postop cholecystectomy. Extrahepatic bile duct prominence likely reservoir effect unchanged. MUSCULOSKELETAL: No concerning bone lesions.     CONCLUSION: 1.  Stable appearing infiltrative masslike opacity right upper lobe with adjacent tiny nodule minimally smaller. 2.  No appreciable radiation change. 3.  Stable 8 mm solid nodule right middle lobe. 4.  No significant new findings.        Impression     The patient is a 78-year-old female with a  diagnosis of left breast cancer and right lung cancer status post radiation therapy completed 3 months ago.  Patient has been doing well and the restaging CT chest showed no evidence of cancer recurrence.    Assessment & Plan:     1.  I have reviewed her CT scan with the patient today.  There is no radiographic evidence of cancer recurrence.  The patient is scheduled to return to radiation oncology in 4 months for office follow-up and restaging CT chest.  2.  Continue follow-up with Dr. Rodriguez, breast surgeon and Dr. Mackey, medical oncology for her breast cancer as planned.    Face to face time  25 minutes with > 75% spent on consultation, education and coordination of care.    Kelsi Goddard MD, PhD  Department of Radiation Oncology   [facility]  Tel: [phone]  Page: [phone]    [facility]  [address] [place] [address]     [address] [place] [address]    CC:  Patient Care Team:  Rajinder Kitchen MD as PCP - General (Family Medicine)  Lilliana Rodriguez MD as Physician (General Surgery)  Kelsi Goddard MD as Physician (Radiation Oncology)  Tory Mackey MD as Physician (Hematology and Oncology)  Magda Davidson RN as Oncology Nurse Navigator (Hematology and Oncology)  Marcos Bassett MD (Cardiovascular and Thoracic Surgery)

## 2021-05-30 NOTE — PROGRESS NOTES
[facility] Hematology and Oncology Progress Note    Patient: Dacia Miller  MRN: [id number]  Date of Service: 07/30/2019        Reason for Visit    Chief Complaint   Patient presents with     HE Cancer     Malignant neoplasm of central portion of left breast       Assessment and Plan  Cancer Staging  Malignant neoplasm of central portion of left breast in female, estrogen receptor positive (H)  Staging form: Breast, AJCC 8th Edition  - Pathologic stage from 9/19/2018: Stage IA (pT2, pN0, cM0, G2, ER+, NV+, HER2-) - Signed by Adelina Romero CNP on 7/30/2019    Malignant neoplasm of upper lobe of right lung (H)  Staging form: Lung, AJCC 8th Edition  - Clinical: Stage IIA (cT2b, cN0, cM0) - Signed by Adelina Romero CNP on 7/30/2019    1.  Breast cancer: Had a lumpectomy and then had radiation.  She started on tamoxifen but only took that briefly and is no longer taking that.  She states that she originally got that from Dr. Rodriguez and it was not clear if she needed to continue on that.  She states that this time she does not want to continue.  She says she was tolerating it but she just does not want to be on more medications and states that the lung cancer is probably more to worry about than the breast cancer so she just does not want to take it at this time.  Talked her about the fact that the tamoxifen definitely decreases her risk of breast cancer recurrence but she does not want to continue.  I encouraged her to get her mammograms every year.  Her last bilateral mammogram was August 28 of 2018 so I told her she is due for that anytime at the end of August into September.  Not want to schedule that today and said she will call to schedule it.  She also states she does not want to follow-up with our office I did tell her that she will need to continue to see her primary care doctor and Dr. Goddard    2.  Lung cancer: 4.6 cm lesion in the right upper lobe and satellite 8 mm nodule in the right  middle lobe.  she was not a surgical candidate and declined additional work-up including Ebus to examine the mediastinum.  She then went on to get radiation to the lung in 5 treatments given from March to April 2019.  She just had a CT scan 2 weeks ago that showed stable disease.  She is due to see Dr. melchor with another CT scan in 4 months.    3.  B12 deficiency with anemia: She was given some B12 injections but has not received that since February.  We will recheck her B12 levels today.  I did tell her that when she is deficient in B12 she probably will need to be on injections every month going forward.  She prefers to do that her primary care doctor's office if possible because she does not want to come back to the clinic here so we will evaluate her level and then refer back to the primary care doctor    4.  Anemia with iron deficiency: She has been on oral iron.  Originally she was doing that 3 times a day but states now she is only taking it once a day.  We will check her ferritin levels today.  If her ferritin levels have dropped I would want her to go back up to taking it 3 times a day.  Patient would like to follow-up with her primary care doctor going forward for the anemia as well.    ECOG Performance   ECOG Performance Status: 2     Distress Assessment  Distress Assessment Score: No distress    Pain  Pain Score (Initial OR Reassessment): 5  Location: Back.      Problem List    1. Malignant neoplasm of upper lobe of right lung (H)  Ferritin    Ferritin   2. Vitamin B12 deficiency (non anemic)  Vitamin B12    Ferritin    Vitamin B12    Ferritin   3. Other iron deficiency anemia  HM1(CBC and Differential)    Ferritin    HM1(CBC and Differential)    Ferritin    HM1 (CBC with Diff)        ______________________________________________________________________________    History of Present Illness    Measurable disease: CT of the chest     Current therapy: Start tamoxifen 20 mg p.o. Daily, December 11, 2018.   She only took this until sometime in the spring 2019.  She cannot remember when she stopped  Start vitamin B12 injections thousand micrograms IM January 22, 2019.  Last dose was given in February 2019.  iron sulfate 3 times daily January 22, 2019.  currently states she is only taking it once a day     Treatment history:  Adjuvant breast radiation 50-40 cGy in 20 fractions completed February 4, 2019  Radiation to Lung in 5 treatments, 4500 cGy.  Given from March 20, 2019 through April 1, 2019    Interim history: Patient is here today for a follow-up visit.  She is quite upset that she actually has to keep coming to this clinic and does not know why.  She states that she is feeling fine and has no complaints.  She just does not want to keep coming here.      Review of Systems    Oncology Nurse Assessment/CMA Intake: Constitutional  Constitutional (WDL): Exceptions to WDL  Fatigue: Fatigue relieved by rest  Neurosensory  Ataxia: Moderate symptoms, limiting instrumental ADL(Can take a few steps. In a WC for today's appt. )  Eye   Eye Disorder (WDL): All eye disorder elements are within defined limits(Wears eye glasses. )  Ear  Ear Disorder (WDL): All ear disorder elements are within defined limits  Cardiovascular  Cardiovascular (WDL): All cardiovascular elements are within defined limits  Pulmonary  Respiratory (WDL): Within Defined Limits  Gastrointestinal  Gastrointestinal (WDL): Exceptions to WDL  Anorexia: Loss of appetite without alteration in eating habits(Food is not appealing. )  Constipation: Occasional or intermittent symptoms, occasional use of stool softeners, laxatives, dietary modification, or enema  Genitourinary  Genitourinary (WDL): All genitourinary elements are within defined limits  Lymphatic  Lymph (WDL): All lymph disorder elements are within defined limits  Musculoskeletal and Connective Tissue  Musculoskeletal and Connetive Tissue Disorders (WDL): Exceptions to WDL  Arthralgia: Mild pain  Bone  Pain: Mild pain  Muscle Weakness : Symptomatic, perceived by patient but not evident on physical exam  Myalgia: Mild pain  Integumentary  Integumentary (WDL): All integumentary elements are within defined limits  Patient Coping  Patient Coping: Accepting  Distress Assessment  Distress Assessment Score: No distress  Accompanied by  Accompanied by: Family Member  Oral Chemo Adherence         Past History  Past Medical History:   Diagnosis Date     Anemia, iron deficiency 1/22/2019     Arthritis      Breast cancer (H)      Cancer (H)      Diabetes mellitus (H)      DM (diabetes mellitus screen)      History of anesthesia complications      HLD (hyperlipidemia)      HTN (hypertension)      Kidney stones      Lung cancer (H)      PONV (postoperative nausea and vomiting)      Sciatica      Scoliosis        PHYSICAL EXAM:  /66   Pulse 92   Temp 98.8  F (37.1  C) (Oral)   SpO2 97%   GENERAL: no acute distress. Cooperative in conversation. Here with family. In wheelchair  No exam done  BREAST: declined        Lab Results    Recent Results (from the past 168 hour(s))   HM1 (CBC with Diff)   Result Value Ref Range    WBC 6.2 4.0 - 11.0 thou/uL    RBC 3.18 (L) 3.80 - 5.40 mill/uL    Hemoglobin 9.3 (L) 12.0 - 16.0 g/dL    Hematocrit 30.1 (L) 35.0 - 47.0 %    MCV 95 80 - 100 fL    MCH 29.2 27.0 - 34.0 pg    MCHC 30.9 (L) 32.0 - 36.0 g/dL    RDW 14.6 (H) 11.0 - 14.5 %    Platelets 212 140 - 440 thou/uL    MPV 10.9 8.5 - 12.5 fL    Neutrophils % 74 (H) 50 - 70 %    Lymphocytes % 16 (L) 20 - 40 %    Monocytes % 9 2 - 10 %    Eosinophils % 1 0 - 6 %    Basophils % 0 0 - 2 %    Neutrophils Absolute 4.5 2.0 - 7.7 thou/uL    Lymphocytes Absolute 1.0 0.8 - 4.4 thou/uL    Monocytes Absolute 0.5 0.0 - 0.9 thou/uL    Eosinophils Absolute 0.1 0.0 - 0.4 thou/uL    Basophils Absolute 0.0 0.0 - 0.2 thou/uL     Vitamin B12 level pending.  Ferritin level pending.    Lab Results   Component Value Date    OIIFQEUM11 206 (L) 01/18/2019     TGNSEQAP19 296 05/11/2018    JNVKBSNJ36 690 12/23/2016     Lab Results   Component Value Date    FERRITIN 7 (L) 01/18/2019   ]  Imaging    Ct Chest Without Contrast    Result Date: 7/16/2019  EXAM: CT CHEST WO CONTRAST LOCATION: [facility] DATE/TIME: 7/16/2019 1:02 PM INDICATION: Neoplasm: chest, metastatic, rx monitor or f/u lung cancer post SBRT follow up COMPARISON: CT 11/29/2018. PET scan 12/19/2018. CT from biopsy 12/13/2018. TECHNIQUE: Helical images were obtained through the chest. Multiplanar reformats were obtained. Dose reduction techniques were used. CONTRAST: None. FINDINGS: LUNGS AND PLEURA: No significant change in the infiltrative masslike opacity right upper lobe measures 4.6 x 2.2 cm on image 28. Medially adjacent 5 mm nodule minimally smaller. No associated new infiltrates/radiation change. Stable 8 mm solid nodule right middle lobe image 62. No new lung lesions. MEDIASTINUM: No mediastinal adenopathy. Moderate size sliding esophageal hiatal hernia unchanged. LIMITED UPPER ABDOMEN: Postop cholecystectomy. Extrahepatic bile duct prominence likely reservoir effect unchanged. MUSCULOSKELETAL: No concerning bone lesions.     CONCLUSION: 1.  Stable appearing infiltrative masslike opacity right upper lobe with adjacent tiny nodule minimally smaller. 2.  No appreciable radiation change. 3.  Stable 8 mm solid nodule right middle lobe. 4.  No significant new findings.       Total time spent with patient was 25 minutes.  Greater than 50% of that was in counseling and care coordination.    Signed by: Adelina Romero, CNP

## 2021-05-30 NOTE — PROGRESS NOTES
Pt in w/c with family to radiation clinic for follow up. CT done on 7/16. Further recommendations and orders per provider.

## 2021-05-31 NOTE — PROGRESS NOTES
Patient will no longer be following us in Medical Oncology. She is to get her B12 at her primary care clinic, [facility]. Orders were faxed to them at [phone]. Lexus has been made aware of this as well.

## 2021-06-02 NOTE — TELEPHONE ENCOUNTER
Lexus reports that her oral surgeon  would like to speak with  about her upcoming oral surgery. Contacting  to figure out best method of contact; will contact  with information.

## 2021-06-03 NOTE — TELEPHONE ENCOUNTER
I called the patient today and informed her most recent CT scan result.  I have also informed patient it is important for her to have regular follow-up and to check her cancer status.  Patient understands well and agreeed to call cancer center in the near future to set up time for follow-up.

## 2021-06-04 NOTE — TELEPHONE ENCOUNTER
Faxed dental clearance back to [facility], copy made for chart and pt original mailed. No answer when called, PATY re: above.

## 2021-06-05 NOTE — PROGRESS NOTES
[facility] Radiation Oncology Follow Up     Patient: Dacia Miller  MRN: [id number]  Date of Service: 01/23/2020       DISEASE TREATED:   1. Left breast cancer, stage T2 N0 M0, ER/IN positive, HER-2 negative, status post lumpectomy and sentinel resection with close margin form DCIS.  2. Non-small cell lung cancer involving right upper lobe, clinical stage T2 N0 M0.  Patient is not candidate for surgery and declined additional workup including EBUS to exam the mediastinum.        TYPE OF RADIATION THERAPY ADMINISTERED:    1. 5240 cGy in 20 treatments targeted to the left breast only given from 12/27/2018-2/4/1019.  2. 4500 cGy in 5 treatments given from 3/20/2019-4/1/2019.     INTERVAL SINCE COMPLETION OF RADIATION THERAPY: 9 months.      SUBJECTIVE:  Ms. Miller is a 79 y.o. female who presented with palpable left breast lump by self-examination for which she was seeking further evaluation.  Mammogram followed by ultrasound reviewed 22 x 12 x 22 mm irregular mass involving left breast at the 12 o'clock position 6 cm from nipple suspicious for malignancy.  Needle biopsy was then obtained and pathology reviewed invasive ductal carcinoma, ER/IN positive, HER-2 negative.  After discussed with patient about various treatment options, the patient elected proceed with lumpectomy and sentinel resection as her treatment choice and underwent surgery 9/19/2018.  The pathology reviewed 26 x 21 x 19 mm invasive ductal carcinoma, [place] grade 2 with negative margin 4 mm from superior form invasive and less than 1 mm from superior, inferior, and posterior from DCIS.  There was also evidence of DIC.  One removed sentinel node showed no evidence of metastatic disease.  Postoperatively she has been recovering well.  Her case has been discussed on the breast conference and the consensus recommendation is to consider postop radiation therapy given the pathological evidence of close margin. She received postop radiation therapy  with a total dose of 5240 cGy in 20 treatments targeted to the left breast only given from 12/27/2018-2/4/1019.  She tolerated radiation therapy very well with expected acute side effects at the end.       Patient was also found to have early stage non-small cell lung cancer. The patient has been canceled several times for her EBUS procedure as part of the staging workup.  Her case was also discussed at the thoracic tumor conference on 1/31/2019.  The consensus recommendation is to complete staging workup before proceed with therapy.  We will consider stereotactic radiotherapy for her lung cancer if patient declined EBUS procedure.  This has been discussed with the patient and his son today.  The patient is adamant against another staging procedure for her lung cancer.  She wished to proceed with stereotactic radiotherapy as her treatment choice.  She is informed there is a risk of cancer metastasis to the mediastinal nodes which will require systemic therapy.  She understands well and wished to proceed with definitive external beam radiation therapy. She therefore received stereotactic radiotherapy for her clinically diagnosed lung cancer and had radiation therapy in our clinic over the past 2 weeks which a total dose of 4500 cGy in 5 treatments given from 3/20/2019-4/1/2019.  She tolerated radiation therapy reasonably well with no significant acute side effects.      The patient has been stable since completion of the radiation therapy.  She delay the follow-up from last visit. The patient otherwise has been doing well with no new symptoms.  She had a restaging CT chest in November 2019 which showed no evidence of recurrence.  The patient is here for routine office follow-up for her lung cancer.    Medications were reviewed and are up to date on EPIC.    The following portions of the patient's history were reviewed and updated as appropriate: allergies, current medications, past family history, past medical  history, past social history, past surgical history and problem list.    Review of Systems:      General  Constitutional (WDL): Exceptions to WDL  Fatigue: Fatigue not relieved by rest - Limiting instrumental ADL  Hot flashes/Night Sweats: Mild symptoms, no intervention needed  EENT  Eye Disorder (WDL): All eye disorder elements are within defined limits  Ear Disorder (WDL): All ear disorder elements are within defined limits  Respiratory   Respiratory (WDL): Exceptions to WDL  Dyspnea: Shortness of breath with moderate exertion  Cardiovascular  Cardiovascular (WDL): All cardiovascular elements are within defined limits  Endocrine     Gastrointestinal  Gastrointestinal (WDL): All gastrointestinal elements are within defined limits  Musculoskeletal  Musculoskeletal and Connetive Tissue Disorders (WDL): Exceptions to WDL  Arthralgia: Moderate pain, limiting instrumental ADL  Bone Pain: Mild pain  Muscle Weakness : Symptomatic, evident on physical exam, limiting instrumental ADL  Myalgia: Mild pain  Integumentary               Integumentary (WDL): All integumentary elements are within defined limits  Neurological  Neurosensory (WDL): Exceptions to WDL  Peripheral Motor Neuropathy: Asymptomatic, clinical or diagnostic observations only, intervention not indicated  Ataxia: Asymptomatic, clinical or diagnostic observations only, intervention not indicated(uses wheeled walker, c/o weakness)  Psychological/Emotional   Patient Coping: Open/discussion  Hematological/Lymphatic  Lymph (WDL): All lymph disorder elements are within defined limits  Dermatologic     Genitourinary/Reproductive  Genitourinary (WDL): All genitourinary elements are within defined limits  Reproductive     Pain              Currently in Pain: Yes  Pain Score (Initial OR Reassessment): 3  Pain Frequency: Constant/continuous  Location: arthritis  Pain Intervention(s): Home medication  Response to Interventions: tolerable  Accompanied by  Accompanied by:  Family Member    Objective:     PHYSICAL EXAMINATION:    /63   Pulse 93   SpO2 98%     Gen: Alert, in NAD  Eyes: PERRL, EOMI, sclera anicteric  HENT     Head: NC/AT     Ears: No external auricular lesions     Nose/sinus: No rhinorrhea or epistaxis     Oropharynx: MMM, no visible oral lesions  Neck: Supple, full ROM, no LAD  Pulm: No wheezing, stridor or respiratory distress  CV: Well-perfused, no cyanosis, no pedal edema  Abdominal: BS+, soft, nontender, nondistended, no hepatomegaly  Back: No step-offs or pain to palpation along the thoracolumbar spine  Rectal: Deferred  : Deferred  Musculoskeletal: Normal muscle bulk and tone  Skin: Normal color and turgor  Neurologic: A/Ox3, CN II-XII intact, normal gait and station  Psychiatric: Appropriate mood and affect     Imaging:   EXAM: CT CHEST WO CONTRAST  LOCATION: [facility]  DATE/TIME: 11/20/2019 2:26 PM    IMPRESSION:      1.  Unchanged focal opacity in the posterior segment right upper lobe abutting the superior major fissure consistent with treated neoplasm. No findings to suggest local recurrence or distant disease.  2.  Large hiatal hernia and patulous esophagus.     Impression     The patient is a 79-year-old female with a diagnosis of left breast cancer and right lung cancer status post radiation therapy completed 9 months ago.  Patient has been doing well and the restaging CT chest showed no evidence of cancer recurrence.    Assessment & Plan:     1.  I have reviewed her most recent CT chest scan today.  There is no radiographic evidence of lung cancer recurrence.  She is scheduled to return to radiation oncology in 4 months (6 months from last CT chest)  for another office follow-up and CT chest.  I have again explained to the patient about the importance of continue office follow-up for her cancer.  2.  Patient is scheduled to have mammogram next week and have patient to set up time to see Dr. Mackey, medical oncology 2 weeks later.   Patient will continue her long-term follow-up and ongoing care for her breast cancer with Dr. Rodriguez, breast surgeon and Dr. Mackey, medical oncology for her breast cancer as planned.      Face to face time  25 minutes with > 75% spent on consultation, education and coordination of care.    Kelsi Goddard MD, PhD  Department of Radiation Oncology   [facility]  Tel: [phone]  Page: [phone]    [facility]  [address] [place] [address]     [address]   [place] [address]    CC:  Patient Care Team:  Rajinder Kitchen MD as PCP - General (Family Medicine)  Lilliana Rodriguez MD as Physician (General Surgery)  Kelsi Goddard MD as Physician (Radiation Oncology)  Tory Mackey MD as Physician (Hematology and Oncology)  Magda Davidson RN as Oncology Nurse Navigator (Hematology and Oncology)  Marcos Bassett MD (Cardiovascular and Thoracic Surgery)

## 2021-06-05 NOTE — PROGRESS NOTES
Pt here for routine f/u with Dr. Goddard. Had refused to be followed by [facility] in the past. No scans associated with appointment today.

## 2021-06-06 NOTE — PROGRESS NOTES
[facility] Hematology and Oncology Progress Note    Patient: Dacia Miller  MRN: [id number]  Date of Service: 02/25/2020        Reason for Visit    Chief Complaint   Patient presents with     HE Cancer     Malignant neoplasm of central portion of left breast       Assessment and Plan    T2 N0 M0, stage II left breast cancer status post lumpectomy September 19, 2018  Tumor is ER AK positive and HER-2/jerri negative  Right upper lobe lung mass, biopsy-proven adenocarcinoma, with satellite nodule and also 8 mm right middle lobe nodule  Normocytic anemia with iron deficiency and B12 deficiency  Breast cellulitis, resolved    Patient doing well with no evidence of recurrence of breast cancer.  Recent mammogram is benign.  She only took tamoxifen for about 3 months.  Discussed the benefits and indications and recommend she restart and a new prescription sent to her pharmacy.    She did undergo stereotactic radiation therapy for the lung cancer and most recent CT scan shows this to be stable.  She will have CT scan and follow-up with radiation oncology in about 3 months also.    She remains anemic and this is probably related to iron deficiency but she has not been taking oral iron.  She was unable to undergo dental surgery because of the anemia.  Will check CBC and ferritin today.  Recommend that she restart ferrous sulfate twice daily.  We will recheck labs again in 6 weeks and with follow-up in 3 months.  Typically would like hemoglobin greater than 10 for surgical procedure.  She continues B12 monthly through her primary physician.    Plan: Recommend to resume tamoxifen 20 mg p.o. daily  Check labs today and restart iron twice daily  We will recheck labs again in 6 weeks and with return in 3 months  Follow-up in 3-month      Measurable disease: CT of the chest    Current therapy: Resume tamoxifen 20 mg p.o. daily  Resume iron twice daily     Start tamoxifen 20 mg p.o. Daily, December 11, 2018, patient took this for  only about 3 months  Start vitamin B12 injections thousand micrograms IM January 22, 2019  Start first sulfate 3 times daily January 22, 2019      Treatment history:  4500 cGy in 5 treatments between March 20, 2019 and April 1, 2019 to the right lung cancer  Adjuvant breast radiation 50-40 cGy in 20 fractions completed February 4, 2019    ECOG Performance   ECOG Performance Status: 2    Distress Assessment  Distress Assessment Score: No distress    Pain           Problem List    1. Other iron deficiency anemia  HM1(CBC and Differential)    Ferritin    tamoxifen (NOLVADEX) 20 MG tablet        CC: Rajinder Kitchen MD    ______________________________________________________________________________    History of Present Illness    Ms. Dacia Miller returns for follow-up.  She was last seen here in July 2019 and then did not follow-up.  She took tamoxifen for only about 3 months.  She had recent CT of the chest and follow-up with radiation oncology with noted stable lung mass.    She has had recent mammogram.  No headaches or dizziness.  No shortness of breath or cough.  No new bone or abdominal pain.  ECOG status is 1.    She does continue B12 injections monthly through the primary physician.  She has not taken oral iron for a long while.  She remains anemic and was unable to undergo dental surgery because of the hemoglobin.    ECOG status is 1.                February 2020:  Was seen 3 weeks ago.  She is completed breast radiation but is complaining of pain, warmth and tenderness in the left breast with open area in the inferior portion of the breast with some discharge.    Did have blood transfusion and then started B12 and oral iron but only once daily.  Does report improvement in her strength and activity level.    Has declined bronchoscopy and plans are now to proceed with stereotactic radiation to the 2 lung masses.              January 2019:     She was seen 2 weeks ago.  She was found to have profound  anemia prior to bronchoscopy.  She had anemia workup and is here to review results.  Lab work shows both iron deficiency and B12 deficiency.  Reports previous history of anemia and was on iron but stopped oral iron replacement.  Denies blood in the stool or urine.  Denies dark stools.  Denies any history of bleeding from her bowels.  Had colonoscopy 6 years ago which was normal.  No previous endoscopy.  Was on B12 pills in the past as well.    She continues on tamoxifen and adjuvant breast radiation.    Pain Status  Currently in Pain: No/denies    Review of Systems    Constitutional  Constitutional (WDL): Exceptions to WDL  Fatigue: Fatigue relieved by rest  Neurosensory  Neurosensory (WDL): Exceptions to WDL  Ataxia: Asymptomatic, clinical or diagnostic observations only, intervention not indicated(Uses a walker. )  Cardiovascular  Cardiovascular (WDL): All cardiovascular elements are within defined limits  Pulmonary  Respiratory (WDL): Within Defined Limits  Gastrointestinal  Gastrointestinal (WDL): Exceptions to WDL(C/o colitis. )  Anorexia: Loss of appetite without alteration in eating habits  Constipation: Occasional or intermittent symptoms, occasional use of stool softeners, laxatives, dietary modification, or enema  Genitourinary  Genitourinary (WDL): All genitourinary elements are within defined limits  Integumentary  Integumentary (WDL): All integumentary elements are within defined limits  Patient Coping  Patient Coping: Accepting  Distress Assessment  Distress Assessment Score: No distress  Accompanied by  Accompanied by: Family Member    Past History  Past Medical History:   Diagnosis Date     Anemia, iron deficiency 1/22/2019     Arthritis      Breast cancer (H)      Cancer (H)      Diabetes mellitus (H)      DM (diabetes mellitus screen)      History of anesthesia complications      HLD (hyperlipidemia)      HTN (hypertension)      Kidney stones      Lung cancer (H)      PONV (postoperative nausea and  vomiting)      Sciatica      Scoliosis          Past Surgical History:   Procedure Laterality Date     ABDOMINAL SURGERY        SECTION       CHOLECYSTECTOMY       CT BIOPSY LUNG  2018     LA MASTECTOMY,PARTIAL,  WITH AXILLARY LYMPHADENECTOMY Left 2018    Procedure: Left Lumpectomy; [place] Lymph Node Biopsy;  Surgeon: Lilliana Rodriguez MD;  Location: [facility];  Service: General     SKIN BIOPSY         Physical Exam    Recent Vitals 2020   Weight -   /69   Pulse 94   Temp 98.7   Temp src 1   SpO2 97   Some recent data might be hidden       GENERAL: Alert and oriented. Seated comfortably. In no distress.    HEAD: Atraumatic and normocephalic.  Has a full head of hair.    EYES: ZEESHAN, EOMI.  No pallor.  No icterus.    Oral cavity: no mucosal lesion or tonsillar enlargement.    NECK: supple. JVP normal.  No thyroid enlargement.    LYMPH NODES: No palpable, cervical, axillary or inguinal lymphadenopathy.    CHEST: clear to auscultation bilaterally.  Resonant to percussion throughout bilaterally.  Symmetrical breath movements bilaterally.    CVS: S1 and S2 are heard. Regular rate and rhythm.  No murmur or gallop or rub heard.    : Left breast is diffusely erythematous with warmth to touch and tenderness to palpation.  Discrimination in the inferior portion is noted    ABDOMEN: Soft. Not tender. Not distended.  No palpable hepatomegaly or splenomegaly.  No other mass palpable.  Bowel sounds heard.    EXTREMITIES: Warm.  No peripheral edema.    SKIN: no rash, or bruising or purpura.        Lab Results    No results found for this or any previous visit (from the past 168 hour(s)).    Imaging    No results found.      Signed by: Tory Mackey MD

## 2021-06-08 NOTE — PROGRESS NOTES
"Pt called for phone visit for follow up. Talked with NP yesterday, states \"no changes\". Further recommendations and orders per provider.  "

## 2021-06-08 NOTE — PROGRESS NOTES
[facility] Hematology and Oncology Progress Note    Patient: Dacia Miller  MRN: [id number]  Date of Service: 05/07/2020  Telephone visit done today due to risk of Covid19 infection. Pt consented to doing telephone visit instead of coming to clinic. Pt declined video visit        Reason for Visit    Chief Complaint   Patient presents with     HE Cancer     Malignant neoplasm of central portion of left breast in female, estrogen receptor positive     Benign Hematology     Other iron deficiency anemia      Cancer     Malignant neoplasm of upper lobe of right lung       Assessment and Plan  Cancer Staging  Malignant neoplasm of central portion of left breast in female, estrogen receptor positive (H)  Staging form: Breast, AJCC 8th Edition  - Pathologic stage from 9/19/2018: Stage IA (pT2, pN0, cM0, G2, ER+, KS+, HER2-) - Signed by Adelina Romero CNP on 7/30/2019    Malignant neoplasm of upper lobe of right lung (H)  Staging form: Lung, AJCC 8th Edition  - Clinical: Stage IIA (cT2b, cN0, cM0) - Signed by Adelina Romero CNP on 7/30/2019    1.  Breast cancer: Had a lumpectomy and then had radiation in 2019.  She started on tamoxifen but only took that briefly and is no longer taking that.  She states that the lung cancer is probably more to worry about than the breast cancer so she just does not want to take it at this time.  Talked her about the fact that the tamoxifen definitely decreases her risk of breast cancer recurrence but she does not want to continue.  I encouraged her to get her mammograms every year.  Her last bilateral mammogram was January of 2020 at [facility]. She will see Dr. Mackey in 4-5 months.     2.  Lung cancer: 4.6 cm lesion in the right upper lobe and satellite 8 mm nodule in the right middle lobe.  she was not a surgical candidate and declined additional work-up including Ebus to examine the mediastinum.  She then went on to get radiation to the lung in 5 treatments given from  March to April 2019.  She just had a CT scan yesterday that showed stable disease.  She is due to see Dr. melchor tomorrow to discuss and he will manage how often to get scans.     3.  Normocytic anemia with B12 and iron deficiencies: she restarted on iron and is currently taking two times a day. She will continue that. She has had improvement in her hemoglobin and ferritin over the last 3 months. Monitor labs at next visit. She is also getting B12 with PCP.     ECOG Performance   ECOG Performance Status: 2     Distress Assessment  Distress Assessment Score: 4    Pain  Currently in Pain: Yes  Pain Score (Initial OR Reassessment): 6  Location: joints      Problem List    1. Other iron deficiency anemia     2. Vitamin B12 deficiency (non anemic)     3. Malignant neoplasm of upper lobe of right lung (H)     4. Malignant neoplasm of central portion of left breast in female, estrogen receptor positive (H)          ______________________________________________________________________________    History of Present Illness    Measurable disease: CT of the chest     Current therapy: Start tamoxifen 20 mg p.o. Daily, December 11, 2018.  She only took this until sometime in the spring 2019.  She cannot remember when she stopped  Start vitamin B12 injections thousand micrograms IM January 22, 2019.  Last dose was given in February 2019.  iron sulfate 3 times daily January 22, 2019.  currently states she is only taking it once a day     Treatment history:  Adjuvant breast radiation 50-40 cGy in 20 fractions completed February 4, 2019  Radiation to Lung in 5 treatments, 4500 cGy.  Given from March 20, 2019 through April 1, 2019    Interim history: Patient was due for follow up visit today. This is being conducted via telephone due to Covid. She states she is doing pretty well. Anxious about labs and CT scan. Mild fatigue, which is not new. Mild GI issues. No changes to breast, breathing.       Review of  Systems    Constitutional  Constitutional (WDL): Exceptions to WDL  Fatigue: Concerns  Neurosensory  Neurosensory (WDL): Exceptions to WDL  Ataxia: Concerns(uses cane and walker)  Eye   Eye Disorder (WDL): All eye disorder elements are within defined limits  Ear  Ear Disorder (WDL): All ear disorder elements are within defined limits  Cardiovascular  Cardiovascular (WDL): All cardiovascular elements are within defined limits  Pulmonary  Respiratory (WDL): Within Defined Limits  Gastrointestinal  Gastrointestinal (WDL): Exceptions to WDL  Constipation: Concerns(occ)  Diarrhea: Concerns(occ)  Genitourinary  Genitourinary (WDL): All genitourinary elements are within defined limits  Lymphatic  Lymph (WDL): All lymph disorder elements are within defined limits  Musculoskeletal and Connective Tissue  Musculoskeletal and Connetive Tissue Disorders (WDL): Exceptions to WDL  Arthralgia: Concerns  Muscle Weakness : Concerns  Myalgia: Concerns  Integumentary  Integumentary (WDL): All integumentary elements are within defined limits  Patient Coping  Patient Coping: Accepting;Open/discussion  Accompanied by  Accompanied by: Alone  Oral Chemo Adherence           Past History  Past Medical History:   Diagnosis Date     Anemia, iron deficiency 1/22/2019     Arthritis      Breast cancer (H)      Cancer (H)      Diabetes mellitus (H)      DM (diabetes mellitus screen)      History of anesthesia complications      HLD (hyperlipidemia)      HTN (hypertension)      Kidney stones      Lung cancer (H)      PONV (postoperative nausea and vomiting)      Sciatica      Scoliosis        PHYSICAL EXAM:  Deferred.       Lab Results    Recent Results (from the past 168 hour(s))   Ferritin   Result Value Ref Range    Ferritin 27 10 - 130 ng/mL   HM1 (CBC with Diff)   Result Value Ref Range    WBC 7.6 4.0 - 11.0 thou/uL    RBC 3.15 (L) 3.80 - 5.40 mill/uL    Hemoglobin 9.2 (L) 12.0 - 16.0 g/dL    Hematocrit 28.9 (L) 35.0 - 47.0 %    MCV 92 80 - 100  fL    MCH 29.2 27.0 - 34.0 pg    MCHC 31.8 (L) 32.0 - 36.0 g/dL    RDW 13.7 11.0 - 14.5 %    Platelets 190 140 - 440 thou/uL    MPV 9.5 8.5 - 12.5 fL    Neutrophils % 72 (H) 50 - 70 %    Lymphocytes % 18 (L) 20 - 40 %    Monocytes % 7 2 - 10 %    Eosinophils % 1 0 - 6 %    Basophils % 0 0 - 2 %    Neutrophils Absolute 5.5 2.0 - 7.7 thou/uL    Lymphocytes Absolute 1.4 0.8 - 4.4 thou/uL    Monocytes Absolute 0.6 0.0 - 0.9 thou/uL    Eosinophils Absolute 0.1 0.0 - 0.4 thou/uL    Basophils Absolute 0.0 0.0 - 0.2 thou/uL         Lab Results   Component Value Date    GRWPFTAI10 295 07/30/2019    TKXBDGYR79 206 (L) 01/18/2019    MWKQYNNO54 296 05/11/2018    RFBHSIUQ59 690 12/23/2016     Lab Results   Component Value Date    FERRITIN 27 05/06/2020    FERRITIN 18 02/25/2020    FERRITIN 52 07/30/2019    FERRITIN 7 (L) 01/18/2019   ]  Imaging    Ct Chest Without Contrast    Result Date: 5/6/2020  EXAM: CT CHEST WO CONTRAST LOCATION: [facility] DATE/TIME: 5/6/2020 2:54 PM INDICATION: lung cancer post SBRT in 2/2019 followup COMPARISON: PET/CT dated 12/19/2018. CT chest dated 11/20/2019. TECHNIQUE: CT chest without IV contrast. Multiplanar reformats were obtained. Dose reduction techniques were used. CONTRAST: None. FINDINGS: LUNGS AND PLEURA: Right upper lobe mass in the right posterior apex on image 26 of series 3 measures 3.1 x 1.9 cm. This compares to 3.5 x 1.9 cm on the prior study. This abuts the major fissure. There is a small satellite nodule on image 33 of series 3 measuring 6 mm unchanged. There is mild irregularity along the posterior aspect of the mass extending into the superior segment of the right lower lobe measuring 0.6 x 0.9 cm on image 33 of series 3. Smoothly marginated 7 mm nodule in the right middle lobe on image 56 is unchanged. No new nodules or masses. No pneumothorax or significant pleural effusion there is minimal pleural thickening posterior to the mass which may represent a postradiation  change. MEDIASTINUM/AXILLAE: No lymphadenopathy. No pericardial effusion. Extensive coronary artery calcification. Moderate hiatal hernia. Air-filled esophagus. UPPER ABDOMEN: Cholecystectomy with prominent extrahepatic biliary dilatation consistent with reservoir effect. Small nonobstructing right renal stones. Arterial calcification. Small low dense nodule in the right adrenal gland is unchanged likely representing an adenoma measuring approximately 9 mm. MUSCULOSKELETAL: Unremarkable.     1.  No significant change size of posterior segment right upper lobe mass. Mild irregularity along its posterior margin extending into the superior segment of the right lower lobe may represent postradiation change versus minimal extension. 2.  Stable 7 mm nodule right middle lobe. 3.  No new nodules or masses. 4.  No lymphadenopathy. 5.  Moderate hiatal hernia.       Total time spent with patient was 8 minutes.  All of that was in counseling and care coordination.    Signed by: Adelina Romero CNP

## 2021-06-08 NOTE — PROGRESS NOTES
[facility] Radiation Oncology Follow Up     Patient: Dacia Miller  MRN: [id number]  Date of Service: 05/08/2020       DISEASE TREATED:   1. Left breast cancer, stage T2 N0 M0, ER/NM positive, HER-2 negative, status post lumpectomy and sentinel resection with close margin form DCIS.  2. Non-small cell lung cancer involving right upper lobe, clinical stage T2 N0 M0.  Patient is not candidate for surgery and declined additional workup including EBUS to exam the mediastinum.        TYPE OF RADIATION THERAPY ADMINISTERED:    1. 5240 cGy in 20 treatments targeted to the left breast only given from 12/27/2018-2/4/1019.  2. 4500 cGy in 5 treatments given from 3/20/2019-4/1/2019.     INTERVAL SINCE COMPLETION OF RADIATION THERAPY: 1 year and 1 months.      SUBJECTIVE:  Ms. Miller is a 79 y.o. female who presented with palpable left breast lump by self-examination for which she was seeking further evaluation.  Mammogram followed by ultrasound reviewed 22 x 12 x 22 mm irregular mass involving left breast at the 12 o'clock position 6 cm from nipple suspicious for malignancy.  Needle biopsy was then obtained and pathology reviewed invasive ductal carcinoma, ER/NM positive, HER-2 negative.  After discussed with patient about various treatment options, the patient elected proceed with lumpectomy and sentinel resection as her treatment choice and underwent surgery 9/19/2018.  The pathology reviewed 26 x 21 x 19 mm invasive ductal carcinoma, Lyman grade 2 with negative margin 4 mm from superior form invasive and less than 1 mm from superior, inferior, and posterior from DCIS.  There was also evidence of DIC.  One removed sentinel node showed no evidence of metastatic disease.  Postoperatively she has been recovering well.  Her case has been discussed on the breast conference and the consensus recommendation is to consider postop radiation therapy given the pathological evidence of close margin. She received postop  radiation therapy with a total dose of 5240 cGy in 20 treatments targeted to the left breast only given from 12/27/2018-2/4/1019.  She tolerated radiation therapy very well with expected acute side effects at the end.       Patient was also found to have early stage non-small cell lung cancer. The patient has been canceled several times for her EBUS procedure as part of the staging workup.  Her case was also discussed at the thoracic tumor conference on 1/31/2019.  The consensus recommendation is to complete staging workup before proceed with therapy.  We will consider stereotactic radiotherapy for her lung cancer if patient declined EBUS procedure.  This has been discussed with the patient and his son today.  The patient is adamant against another staging procedure for her lung cancer.  She wished to proceed with stereotactic radiotherapy as her treatment choice.  She is informed there is a risk of cancer metastasis to the mediastinal nodes which will require systemic therapy.  She understands well and wished to proceed with definitive external beam radiation therapy. She therefore received stereotactic radiotherapy for her clinically diagnosed lung cancer and had radiation therapy in our clinic over the past 2 weeks which a total dose of 4500 cGy in 5 treatments given from 3/20/2019-4/1/2019.  She tolerated radiation therapy reasonably well with no significant acute side effects.      The patient has been stable since completion of the radiation therapy.  The patient otherwise has been doing well with no new symptoms.  She had a restaging CT chest on 5/6/2020 which showed no evidence of recurrence.  The patient is here for routine telephone office follow-up for her lung cancer.    Medications were reviewed and are up to date on EPIC.    The following portions of the patient's history were reviewed and updated as appropriate: allergies, current medications, past family history, past medical history, past social  history, past surgical history and problem list.    Review of Systems:      General  Constitutional (WDL): Exceptions to WDL  Fatigue: Fatigue relieved by rest  Hot flashes/Night Sweats: Mild symptoms, no intervention needed  EENT  Eye Disorder (WDL): All eye disorder elements are within defined limits  Ear Disorder (WDL): All ear disorder elements are within defined limits  Respiratory   Respiratory (WDL): Exceptions to WDL  Dyspnea: Shortness of breath with moderate exertion  Cardiovascular  Cardiovascular (WDL): All cardiovascular elements are within defined limits  Endocrine     Gastrointestinal  Gastrointestinal (WDL): Exceptions to WDL  Anorexia: Loss of appetite without alteration in eating habits  Constipation: Occasional or intermittent symptoms, occasional use of stool softeners, laxatives, dietary modification, or enema  Musculoskeletal  Musculoskeletal and Connetive Tissue Disorders (WDL): Exceptions to WDL  Arthralgia: Mild pain  Bone Pain: Mild pain  Muscle Weakness : Symptomatic, perceived by patient but not evident on physical exam  Myalgia: Mild pain  Integumentary               Integumentary (WDL): All integumentary elements are within defined limits  Neurological  Neurosensory (WDL): Exceptions to WDL  Ataxia: Moderate symptoms, limiting instrumental ADL(uses wheeled walker)  Psychological/Emotional   Patient Coping: Open/discussion  Hematological/Lymphatic  Lymph (WDL): Assessment not pertinent to visit  Dermatologic     Genitourinary/Reproductive  Genitourinary (WDL): All genitourinary elements are within defined limits  Reproductive     Pain              Currently in Pain: Yes  Pain Score (Initial OR Reassessment): 5  Pain Frequency: Constant/continuous  Location: arthritis  Pain Intervention(s): Medication (See MAR)  Response to Interventions: a little  Accompanied by  Accompanied by: Alone     Imaging: Imaging results 30 days: Ct Chest Without Contrast    Result Date: 5/6/2020  EXAM: CT CHEST  WO CONTRAST LOCATION: [facility] DATE/TIME: 5/6/2020 2:54 PM INDICATION: lung cancer post SBRT in 2/2019 followup COMPARISON: PET/CT dated 12/19/2018. CT chest dated 11/20/2019. TECHNIQUE: CT chest without IV contrast. Multiplanar reformats were obtained. Dose reduction techniques were used. CONTRAST: None. FINDINGS: LUNGS AND PLEURA: Right upper lobe mass in the right posterior apex on image 26 of series 3 measures 3.1 x 1.9 cm. This compares to 3.5 x 1.9 cm on the prior study. This abuts the major fissure. There is a small satellite nodule on image 33 of series 3 measuring 6 mm unchanged. There is mild irregularity along the posterior aspect of the mass extending into the superior segment of the right lower lobe measuring 0.6 x 0.9 cm on image 33 of series 3. Smoothly marginated 7 mm nodule in the right middle lobe on image 56 is unchanged. No new nodules or masses. No pneumothorax or significant pleural effusion there is minimal pleural thickening posterior to the mass which may represent a postradiation change. MEDIASTINUM/AXILLAE: No lymphadenopathy. No pericardial effusion. Extensive coronary artery calcification. Moderate hiatal hernia. Air-filled esophagus. UPPER ABDOMEN: Cholecystectomy with prominent extrahepatic biliary dilatation consistent with reservoir effect. Small nonobstructing right renal stones. Arterial calcification. Small low dense nodule in the right adrenal gland is unchanged likely representing an adenoma measuring approximately 9 mm. MUSCULOSKELETAL: Unremarkable.     1.  No significant change size of posterior segment right upper lobe mass. Mild irregularity along its posterior margin extending into the superior segment of the right lower lobe may represent postradiation change versus minimal extension. 2.  Stable 7 mm nodule right middle lobe. 3.  No new nodules or masses. 4.  No lymphadenopathy. 5.  Moderate hiatal hernia.        Impression     The patient is a 79-year-old  female with a diagnosis of left breast cancer and right lung cancer status post radiation therapy completed 1 year and 1 month ago.  Patient has been doing well and the restaging CT chest showed no evidence of cancer recurrence.    Assessment & Plan:     1.  I have reviewed her most recent CT chest scan today.  There is no radiographic evidence of lung cancer recurrence.  She is scheduled to return to radiation oncology in 6 months for another office follow-up and CT chest.  I have again explained to the patient about the importance of continue office follow-up for her cancer.  2.  Patient will continue her long-term follow-up and ongoing care for her breast cancer with Dr. Rodriguez, breast surgeon and Dr. Mackey, medical oncology for her breast cancer as planned.          Kelsi Goddard MD, PhD  Department of Radiation Oncology   [facility]  Tel: [phone]  Page: [phone]    [facility]  [address] [place] [address]     [address] [place] [address]    CC:  Patient Care Team:  Rajinder Kitchen MD as PCP - General (Family Medicine)  Lilliana Rodriguez MD as Physician (General Surgery)  Kelsi Goddard MD as Physician (Radiation Oncology)  Tory Mackey MD as Physician (Hematology and Oncology)  Magda Davidson RN as Oncology Nurse Navigator (Hematology and Oncology)  Marcos Bassett MD (Cardiovascular and Thoracic Surgery)

## 2021-06-08 NOTE — PROGRESS NOTES
".Dacia Miller is a 79 y.o. female who is being evaluated via a billable telephone visit regarding Malignant neoplasm of central portion of left breast in female, estrogen receptor positive and anemia.    The patient has been notified of following:     \"This telephone visit will be conducted via a call between you and your physician/provider. We have found that certain health care needs can be provided without the need for a physical exam.  This service lets us provide the care you need with a short phone conversation.  If a prescription is necessary we can send it directly to your pharmacy.  If lab work is needed we can place an order for that and you can then stop by our lab to have the test done at a later time.    Telephone visits are billed at different rates depending on your insurance coverage. During this emergency period, for some insurers they may be billed the same as an in-person visit.  Please reach out to your insurance provider with any questions.    If during the course of the call the physician/provider feels a telephone visit is not appropriate, you will not be charged for this service.\"    Patient has given verbal consent to a Telephone visit? Yes      Phone call duration: 8 minutes    Ellen Kitchen CMA   "

## 2021-06-12 NOTE — PROGRESS NOTES
[facility] Hematology and Oncology Progress Note    Patient: Dacia Miller  MRN: [id number]  Date of Service: 10/05/2020    Telephone visit done today due to risk of Covid19 infection. Pt consented to doing telephone visit instead of coming to clinic.       Reason for Visit    No chief complaint on file.      Assessment and Plan  Cancer Staging  Malignant neoplasm of central portion of left breast in female, estrogen receptor positive (H)  Staging form: Breast, AJCC 8th Edition  - Pathologic stage from 9/19/2018: Stage IA (pT2, pN0, cM0, G2, ER+, WY+, HER2-) - Signed by Adelina Romero CNP on 7/30/2019    Malignant neoplasm of upper lobe of right lung (H)  Staging form: Lung, AJCC 8th Edition  - Clinical: Stage IIA (cT2b, cN0, cM0) - Signed by Adelina Romero CNP on 7/30/2019    1.  Breast cancer: Had a lumpectomy and then had radiation in 2019.  She started on tamoxifen but only took that briefly and is no longer taking that.  She states that the lung cancer is probably more to worry about than the breast cancer so she just does not want to take it at this time.  Talked her about the fact that the tamoxifen definitely decreases her risk of breast cancer recurrence but she does not want to continue.  I encouraged her to get her mammograms every year.  Her last bilateral mammogram was January of 2020 at [facility]. Encourage her to repeat that in January 2021. She will see Dr. Mackey in 6 months.     2.  Lung cancer: 4.6 cm lesion in the right upper lobe and satellite 8 mm nodule in the right middle lobe.  she was not a surgical candidate and declined additional work-up including Ebus to examine the mediastinum.  She then went on to get radiation to the lung in 5 treatments given from March to April 2019.  CT in May was stable. They will repeat that in November.     3.  Normocytic anemia with B12 and iron deficiencies: she restarted on iron and is currently taking that once a day. She will continue  that. She has had improvement in her hemoglobin and ferritin over the last 3 months. Monitor labs at next visit. She is also getting B12 with PCP.     ECOG Performance   ECOG Performance Status: 2     Distress Assessment  Distress Assessment Score: 5    Pain  Currently in Pain: Yes  Pain Score (Initial OR Reassessment): 8      Problem List    1. Malignant neoplasm of central portion of left breast in female, estrogen receptor positive (H)  Mammo Screening Bilateral   2. Other iron deficiency anemia  HM1(CBC and Differential)    Ferritin   3. Encounter for screening mammogram for malignant neoplasm of breast   Mammo Screening Bilateral        ______________________________________________________________________________    History of Present Illness    Measurable disease: CT of the chest     Current therapy: Start tamoxifen 20 mg p.o. Daily, December 11, 2018.  She only took this until sometime in the spring 2019.  She cannot remember when she stopped  Start vitamin B12 injections thousand micrograms IM January 22, 2019.  Last dose was given in February 2019.  iron sulfate 1-3 times a day January 22, 2019.  currently states she is only taking it once a day     Treatment history:  Adjuvant breast radiation 50-40 cGy in 20 fractions completed February 4, 2019  Radiation to Lung in 5 treatments, 4500 cGy.  Given from March 20, 2019 through April 1, 2019    Interim history: Patient was due for follow up visit today. This is being conducted via telephone due to Covid. She states she is doing pretty well. Anxious about labs and CT scan. Mild fatigue, which is not new.  No changes to breast, breathing.       Review of Systems    Constitutional  Constitutional (WDL): Exceptions to WDL  Fatigue: Fatigue relieved by rest  Neurosensory  Neurosensory (WDL): All neurosensory elements are within defined limits  Eye   Eye Disorder (WDL): All eye disorder elements are within defined limits  Ear  Ear Disorder (WDL): All ear disorder  elements are within defined limits  Cardiovascular  Cardiovascular (WDL): All cardiovascular elements are within defined limits  Pulmonary  Respiratory (WDL): Within Defined Limits  Gastrointestinal  Gastrointestinal (WDL): All gastrointestinal elements are within defined limits  Genitourinary  Genitourinary (WDL): All genitourinary elements are within defined limits  Lymphatic  Lymph (WDL): All lymph disorder elements are within defined limits  Musculoskeletal and Connective Tissue  Musculoskeletal and Connetive Tissue Disorders (WDL): Exceptions to WDL  Arthralgia: Mild pain  Integumentary  Integumentary (WDL): All integumentary elements are within defined limits  Patient Coping  Patient Coping: Accepting  Distress Assessment  Distress Assessment Score: 5  Accompanied by     Oral Chemo Adherence           Past History  Past Medical History:   Diagnosis Date     Anemia, iron deficiency 1/22/2019     Arthritis      Breast cancer (H)      Cancer (H)      Diabetes mellitus (H)      DM (diabetes mellitus screen)      History of anesthesia complications      HLD (hyperlipidemia)      HTN (hypertension)      Kidney stones      Lung cancer (H)      PONV (postoperative nausea and vomiting)      Sciatica      Scoliosis        PHYSICAL EXAM:  Deferred.       Lab Results    Recent Results (from the past 168 hour(s))   Ferritin   Result Value Ref Range    Ferritin 47 10 - 130 ng/mL   Vitamin B12   Result Value Ref Range    Vitamin B-12 286 213 - 816 pg/mL   HM1 (CBC with Diff)   Result Value Ref Range    WBC 7.4 4.0 - 11.0 thou/uL    RBC 3.32 (L) 3.80 - 5.40 mill/uL    Hemoglobin 10.0 (L) 12.0 - 16.0 g/dL    Hematocrit 32.6 (L) 35.0 - 47.0 %    MCV 98 80 - 100 fL    MCH 30.1 27.0 - 34.0 pg    MCHC 30.7 (L) 32.0 - 36.0 g/dL    RDW 13.9 11.0 - 14.5 %    Platelets 192 140 - 440 thou/uL    MPV 10.4 8.5 - 12.5 fL    Neutrophils % 73 (H) 50 - 70 %    Lymphocytes % 17 (L) 20 - 40 %    Monocytes % 7 2 - 10 %    Eosinophils % 1 0 - 6 %     Basophils % 0 0 - 2 %    Immature Granulocyte % 1 (H) <=0 %    Neutrophils Absolute 5.4 2.0 - 7.7 thou/uL    Lymphocytes Absolute 1.3 0.8 - 4.4 thou/uL    Monocytes Absolute 0.5 0.0 - 0.9 thou/uL    Eosinophils Absolute 0.1 0.0 - 0.4 thou/uL    Basophils Absolute 0.0 0.0 - 0.2 thou/uL    Immature Granulocyte Absolute 0.1 (H) <=0.0 thou/uL         Lab Results   Component Value Date    TDQJBBBB78 286 10/02/2020    [id number] 295 07/30/2019    DHRBLUME14 206 (L) 01/18/2019    [id number] 296 05/11/2018    VLRSGZWT91 690 12/23/2016     Lab Results   Component Value Date    FERRITIN 47 10/02/2020    FERRITIN 27 05/06/2020    FERRITIN 18 02/25/2020    FERRITIN 52 07/30/2019    FERRITIN 7 (L) 01/18/2019   ]  Imaging    No results found.    Total time spent with patient was 6 minutes.  Greater than 50% of that was in counseling and care coordination.    Signed by: Adelina Romero, CNP

## 2021-06-15 NOTE — TELEPHONE ENCOUNTER
Letter sent to patient to call radiation oncology to schedule CT and F/u.    6 month office follow-up and CT chest with Dr. Goddard is needed.    Pt was due for f/u in 11/2020

## 2021-06-16 PROBLEM — Z17.0 MALIGNANT NEOPLASM OF CENTRAL PORTION OF LEFT BREAST IN FEMALE, ESTROGEN RECEPTOR POSITIVE (H): Status: ACTIVE | Noted: 2018-09-11

## 2021-06-16 PROBLEM — L03.90 CELLULITIS: Status: ACTIVE | Noted: 2019-02-20

## 2021-06-16 PROBLEM — S32.509A: Status: ACTIVE | Noted: 2019-02-19

## 2021-06-16 PROBLEM — E53.8 VITAMIN B12 DEFICIENCY (NON ANEMIC): Status: ACTIVE | Noted: 2019-01-22

## 2021-06-16 PROBLEM — C50.112 MALIGNANT NEOPLASM OF CENTRAL PORTION OF LEFT BREAST IN FEMALE, ESTROGEN RECEPTOR POSITIVE (H): Status: ACTIVE | Noted: 2018-09-11

## 2021-06-16 PROBLEM — D50.9 ANEMIA, IRON DEFICIENCY: Status: ACTIVE | Noted: 2019-01-22

## 2021-06-16 PROBLEM — R91.8 MASS OF UPPER LOBE OF RIGHT LUNG: Status: ACTIVE | Noted: 2018-11-29

## 2021-06-16 PROBLEM — C34.11 MALIGNANT NEOPLASM OF UPPER LOBE OF RIGHT LUNG (H): Status: ACTIVE | Noted: 2019-02-04

## 2021-06-17 NOTE — PATIENT INSTRUCTIONS - HE
Patient Instructions by April Garrett RN at 1/24/2019  9:00 AM     Author: April Garrett RN Service: -- Author Type: Registered Nurse    Filed: 1/24/2019  2:21 PM Encounter Date: 1/24/2019 Status: Signed    : April Garrett RN (Registered Nurse)         [facility] [place] -- [phone]        When You Need a Blood Transfusion (Adult)  A blood transfusion may be done when you have lost blood because of an injury or during surgery. It can also be done because of diseases or conditions that affect the blood. Blood is made up of several different parts (blood products). You may receive some or all of these blood products during a transfusion. Blood for transfusion is usually donated from another person (donor). Strict measures are taken to make sure that donated blood is safe before it's given to you. This sheet helps you understand how a blood transfusion is done. Your healthcare provider will discuss your condition with you and answer your questions.    The parts of blood  Blood can be broken down into different parts that perform special roles in the body. These parts include:    Red blood cells, which carry oxygen throughout the body.    Platelets, which help stop bleeding.    Plasma (the liquid part of blood), which carries red blood cells and platelets throughout the body. Plasma also helps platelets in stopping bleeding.  Where does donated blood come from?    Volunteer donors. These are people who donate their blood to help others in need of blood. Blood donation can take place at several places, including a hospital, blood bank, or during a blood drive.    Directed donation. If you need a blood transfusion during a planned surgery, family and friends can have their blood tested for compatibility and donate blood for you before the surgery. This needs to be done at least 7 day(s) in advance. This is because the blood must be tested for safety.    Autologous donation. This is also called self-donation.  For planned surgery, you can donate your own blood starting up to 6 weeks before surgery.  Are blood transfusions safe?  Donated blood is tested and processed to make sure that the blood is safe:    The health and medical history of each donor is carefully screened. If a person is considered high-risk for infection or problems, he or she isn't accepted as a blood donor.    Donated blood is tested for infections such as hepatitis, syphilis, and HIV (the virus that causes AIDS). If the tested blood is found to be unsafe, it's destroyed.    Blood is divided into four types: A, B, AB, and O. Blood also has Rh types: positive (+) and negative (-). You can only receive blood products that are compatible with (match) your blood type. A sample of your blood is tested for compatibility with donated blood. This is done before blood products are prepared for a transfusion.  How is a blood transfusion done?  A blood transfusion takes place in a blood center, infusion center, hospital room, or operating room. Your healthcare provider will discuss the blood transfusion with you before it's done. You'll need to give permission for the blood transfusion by signing a consent form.    Two healthcare providers confirm your identity. They also confirm that they have the correct blood product(s) for you.    An intravenous (IV) line is placed in a vein if you do not already have an IV.    The blood product comes in a plastic bag that is hung on an IV pole. The blood product flows from the bag into your IV line. The IV line may be connected to a pump, which controls the transfusion rate. You may receive more than one kind of blood product through the IV.    Your vital signs (blood pressure, heart rate, respiratory rate, and temperature) are checked throughout the transfusion. This is to make sure you are not having a reaction to the blood product.    The IV line may be removed once the transfusion is complete.  Possible risks and  complications of blood transfusions  Most transfusions are problem free. In some cases, reactions occur. These can happen within seconds to minutes during the transfusion or a week to a few months after the transfusion. Call your doctor or nurse right away if you have any of the signs or symptoms in the table below during or after a transfusion:  Reaction Timing Symptoms   Allergic reaction (mild)   Within seconds to minutes during the transfusion    Up to 24 hours after the transfusion Hives or red welts on the skin, mild itching, rash, localized swelling, flushing (red face), wheezing, shortness of breath, or stridor (high-pitched noise or sound)   Anaphylactic reaction   Within seconds to minutes during the transfusion    Up to 24 hours after the transfusion Shortness of breath, flushing (red face), wheezing, labored (working hard) breathing, low blood pressure, localized swelling, chest tightness, or cramps   Febrile nonhemolytic reaction   Within minutes to hours during the transfusion    Within a few hours to 24 hours after the transfusion Fever (increase of 1  C or higher), chills, flushing (red face), nausea, headache, minor discomfort, or mild shortness of breath   Acute immune hemolytic reaction   Within minutes during the transfusion    Up to 24 hours after the transfusion Fever, red or brown urine, back pain, fast heart rate (tachycardia), abdominal pain, low blood pressure, feeling anxious, chills, chest pain, nausea, or fainting spells   Transfusion-related acute lung injury (TRALI)   Within 1 to 2 hours during the transfusion    Up to 6 hours after the transfusion Shortness of breath, trouble breathing, low blood pressure, fever, pulmonary edema   Transfusion-associated circulatory overload   Near the end of the transfusion    Within 6 hours after the transfusion Shortness of breath, fast heart rate (tachycardia), problems breathing when lying on back, abnormal blood pressure   Post-transfusion  "purpura (PUP)   Within 1 week    Up to 48 days after the transfusion Purple spots on skin; nose bleed; bleeding from the urinary tract, abdomen, colon, or rectum; fever; or chills         \"Delayed\" transfusion-related acute lung injury (TRALI)   Within 72 hours (3 days) after the transfusion \"Sudden\" onset of respiratory distress or trouble breathing   \"Delayed\" hemolytic reaction   Within 3 to 7 days    Up to weeks after the transfusion Low-grade fever, mild jaundice (yellowing of the skin and whites of the eyes), decrease in hematocrit, chills, chest pain, back pain, nausea   Date Last Reviewed: 12/1/2016 2000-2017 [employer]. [address] [place] [address], [place] [address]. All rights reserved. This information is not intended as a substitute for professional medical care. Always follow your healthcare professional's instructions.                "

## 2021-06-18 NOTE — LETTER
Letter by Marcos Bassett MD at      Author: Marcos Bassett MD Service: -- Author Type: --    Filed:  Encounter Date: 1/3/2019 Status: (Other)       Rajinder Kitchen MD  [facility] [place] [address]                                  January 3, 2019    Patient: Dacia Miller   MR Number: [id number]   YOB: 1940   Date of Visit: 1/3/2019     Dear Dr. Fidelina MD:    Thank you for referring Dacia Miller to me for evaluation. Below are the relevant portions of my assessment and plan of care.    If you have questions, please do not hesitate to call me. I look forward to following Dacia along with you.    Sincerely,        MD JAZMINE Huggins MD Gopakumar Sankaran Nambudiri, MD Andrade, Rafael S, MD  1/3/2019  3:24 PM  Sign at close encounter  THORACIC SURGERY OFFICE NEW PATIENT VISIT      Dear Dr. Kitchen,    I saw Ms. Miller at Dr. Monaco request in consultation for the evaluation and treatment of an adenocarcinoma of the RIGHT upper lobe and a RIGHT middle lobe nodule.     HPI  Mrs. Dacia Miller is a 78 y.o. year-old female who has an incidentally found RUL NSCLC. A lung mass was seen when she had a CXR for rib pain after a fall over Thanksgiving. She has no respiratory symptoms. Her gait is unsteady due to DJD and neuropathy, and she has had several falls.    Tests   PET-CT  (12/19/2018): 4.6 cm RIGHT upper lobe mass, SUV 2.4, indeterminate RML nodule, 8mm, SUV 2.4. No abnormal LN.Post breast lumpectomy changes.    CT chest (11/29/2018): 4.6 cm RIGHT upper lobe mass, indeterminate RML nodule, 8mm; pleural tethering at apex              CT-guided biopsy (12/18/2018): NSCLC, adenocarcinoma, immunochemical staining c/w lung cancer and not metastatic breast cancer.     PFT (12/27/2018): FEV1  86%, DLCO 81% (normal when corrected for Hgb)    PMH    Past Medical History:   Diagnosis Date   ? Arthritis    ? Breast cancer (H)    ? Cancer (H)    ?  Diabetes mellitus (H)    ? DM (diabetes mellitus screen)    ? History of anesthesia complications    ? HLD (hyperlipidemia)    ? HTN (hypertension)    ? Kidney stones    ? Lung cancer (H)    ? PONV (postoperative nausea and vomiting)    ? Sciatica    ? Scoliosis         Past Surgical History:   Procedure Laterality Date   ? ABDOMINAL SURGERY     ?  SECTION     ? CHOLECYSTECTOMY     ? CT BIOPSY LUNG  2018   ? DC MASTECTOMY,PARTIAL,  WITH AXILLARY LYMPHADENECTOMY Left 2018    Procedure: Left Lumpectomy; [place] Lymph Node Biopsy;  Surgeon: Lilliana Rodriguez MD;  Location: [facility] [place];  Service: General   ? SKIN BIOPSY         ETOH negative  TOB 4 pack years, quit 50 years ago    Physical examination  BMI 28  Age appears as stated, she has a flat affect and uses a cane. She needed a wheelchair to come to clinic.    From a personal perspective, she is here with her , Maikel.       IMPRESSION   1. Malignant neoplasm of upper lobe of right lung (H)        78 y.o. year-old female with an adenocarcinoma of the RIGHT upper lobe and a RIGHT middle lobe nodule.   Frequent falls    PLAN  I spent a total of 30 minutes with Ms. Miller and her , Saul, more than 50% of which were spent in counseling, coordination of care, and face-to-face time. I reviewed the plan as follows:    1) Brain MRI    2) EBUS    3) Review her case at tumor board: surgery is not an option for her, it would put her at significant risk of losing her independence.     They had all their questions answered and were in agreement with the plan.  I appreciate the opportunity to participate in the care of your patient and will keep you updated.    Sincerely,

## 2021-06-20 NOTE — INTERVAL H&P NOTE
I have performed an assessment and examined the patient, as necessary, to update the patient's current status that may have changed since the prior History and Physical.  The History & Physical has been reviewed and the patient's status is unchanged.

## 2021-06-20 NOTE — PROGRESS NOTES
Chief Complaint   Patient presents with     Consult     patient has some questions and concerns regarding the left breast

## 2021-06-20 NOTE — ANESTHESIA CARE TRANSFER NOTE
Last vitals:   Vitals:    09/19/18 1224   BP: (P) 150/65   Pulse: (!) (P) 101   Resp: (P) 16   Temp: (P) 37.1  C (98.7  F)   SpO2: (P) 97%     Patient's level of consciousness is drowsy  Spontaneous respirations: yes  Maintains airway independently: yes  Dentition unchanged: yes  Oropharynx: oropharynx clear of all foreign objects    QCDR Measures:  ASA# 20 - Surgical Safety Checklist: WHO surgical safety checklist completed prior to induction  PQRS# 430 - Adult PONV Prevention: 4558F - Pt received => 2 anti-emetic agents (different classes) preop & intraop  ASA# 8 - Peds PONV Prevention: NA - Not pediatric patient, not GA or 2 or more risk factors NOT present  PQRS# 424 - Tavia-op Temp Management: 4559F - At least one body temp DOCUMENTED => 35.5C or 95.9F within required timeframe  PQRS# 426 - PACU Transfer Protocol: - Transfer of care checklist used  ASA# 14 - Acute Post-op Pain: ASA14B - Patient did NOT experience pain >= 7 out of 10

## 2021-06-20 NOTE — PROGRESS NOTES
Chief Complaint   Patient presents with     Post Op Visit     patient states still having pain in the left axilla area states more lymph  node pain than anything

## 2021-06-20 NOTE — PROGRESS NOTES
This is a 78 y.o.  female who I'm asked to see by Rajinder Kitchen MD for evaluation of a left breast cancer.  This was picked up by the patient.  She first noticed it a couple months ago.  She was set up for mammogram and ultrasound.  A needle biopsy was done which shows an invasive ductal carcinoma.  It is estrogen receptor positive, progesterone receptor positive and HER-2 Is indeterminate.  It has been sent for FISH.    She has no family history of breast cancer.      PAST MEDICAL HISTORY:  Past Medical History:   Diagnosis Date     DM (diabetes mellitus screen)      HLD (hyperlipidemia)      HTN (hypertension)      Kidney stones      Sciatica      Scoliosis      Past Surgical History:   Procedure Laterality Date      SECTION         Medications:    Current Outpatient Prescriptions:      insulin NPH isoph U-100 human (HUMULIN N NPH INSULIN KWIKPEN) 100 unit/mL (3 mL) pen, Take 40 units in the am and 26 at bedtime, Disp: , Rfl:      lisinopril-hydrochlorothiazide (PRINZIDE,ZESTORETIC) 20-12.5 mg per tablet, 1 tab(s), Disp: , Rfl:      FREESTYLE 28 gauge lancets, , Disp: , Rfl:      HYDROcodone-acetaminophen 5-325 mg per tablet, TK 1 T PO Q SIX H PRN P, Disp: , Rfl: 0     LORazepam (ATIVAN) 0.5 MG tablet, TK 1 T PO TID PRN, Disp: , Rfl: 0    Allergies:  Allergies   Allergen Reactions     Amoxicillin-Pot Clavulanate Hives     Cephalexin Monohydrate Itching and Rash     Other Allergy (See Comments)      Contrast Media Ready-Box [facility], 2013.; [employer] [facility], 2013.       Penicillins      Prochlorperazine Edisylate      Sulfa (Sulfonamide Antibiotics) Rash       Social History:   reports that she has never smoked. She has never used smokeless tobacco. She reports that she does not drink alcohol.    ROS:  A 12 point comprehensive review of systems was negative except as noted.    Physical Exam  /60 (Patient Site: Left Arm, Patient Position: Sitting, Cuff Size: Adult Large)  Pulse  (!) 104  SpO2 96%  Breastfeeding? No  General:alert, appears older than stated age, cooperative and mildly obese, she moves very slowly and unsteady.  Uses a cane.  Lungs:clear to auscultation bilaterally  CV:regular rate and rhythm, S1, S2 normal, no murmur, click, rub or gallop  Breasts: Palpable mass in the superior aspect of the left breast.  At about 12 o'clock position 5-6 cm superior to the nipple.  Measures at least 2 cm in size.  Lymph Nodes:no axillary nodes palpated  Neuro:Grossly normal   Skeletal skeletal: Full range of motion of the upper extremities.      Reviewed her mammograms and ultrasound and pathology.     Impression: Left Breast Cancer. Clinically T2, N0.  Discussed the surgical options for treatment of breast cancer which generally are a lumpectomy (partial mastectomy) combined with radiation versus a mastectomy.  Explained that the survival benefit is the same for both.  The difference is in local recurrence risk.  The patient is a Good candidate for a lumpectomy.  There is a relatively large mass but she has a large breast so I think a lumpectomy is very reasonable.  Discussed SLN biopsy.  The procedure and rationale were explained.  Discussed that at this point we do not know yet whether or not she will need chemotherapy and we may not know until we get all of the results of surgery back.  If the HER-2 comes back positive then she definitely will be recommended to do chemotherapy.  If it comes back negative then chemotherapy is much less likely.  We may not know the answer before we do surgery but it does not change the type of surgery she chooses.      Plan: We'll schedule for a left  lumpectomy with sentinel lymph node biopsy.  This is typically an outpatient procedure under local MAC anesthetic.  The risks and benefits of surgery were explained.  Also talked about expected recovery time.

## 2021-06-20 NOTE — PROGRESS NOTES
In for follow-up of her left lumpectomy  with sentinel lymph node biopsy.  She is feeling well.  She is having very minimal pain.      Physical exam:  Appears well.  Does not appear in any discomfort.  Breasts: Incisions are healing nicely with no signs of infection.  No swelling.    Pathology: The tumor was 2.7 cm but there was a large component of DCIS extending out at least 4 cm.  The margins are negative for Invasive carcinoma but close for DCIS.  Her sentinel lymph node is negative.    Impression: Postop visit. Doing well.  Had a long talk about her pathology.  The invasive part of the cancer is out.  Her DCIS is close.  If you look at the new recommendations, the close margins for DCIS are supposedly okay when there is an invasive cancer.  However there were several margins that were close and it was a fairly large area of DCIS, so I think a reexcision lumpectomy may be reasonable.  I still think she probably would need radiation afterwards.  I explained all of this to her as best as I could.  I do not think she needs chemotherapy because she is strongly estrogen and progesterone receptor positive and HER-2 was negative.  Performance status is not the best so I am not ordering an Oncotype.  She would like to talk this over with an oncologist and radiation oncologist before she makes a decision.  We will also plan to discuss her at tumor conference to get everybody's recommendation.  I me to go ahead and put the referral through to oncology but will also plan to discuss her at tumor conference next Friday and then will get back to her.    Plan:  We'll refer her onto [facility] oncology.  We will get back to her next Friday after we discussed her tumor conference and then will make a plan on whether or not just to proceed with radiation versus doing a reexcision lumpectomy.

## 2021-06-20 NOTE — ANESTHESIA POSTPROCEDURE EVALUATION
Patient: Dacia Miller  Left Lumpectomy; [place] Lymph Node Biopsy  Anesthesia type: MAC    Patient location: p2  Last vitals:   Vitals:    09/19/18 1230   BP:    Pulse:    Resp: 16   Temp:    SpO2:      Post vital signs: stable  Level of consciousness: awake and responds to simple questions  Post-anesthesia pain: pain controlled  Post-anesthesia nausea and vomiting: no  Pulmonary: unassisted, return to baseline  Cardiovascular: stable and blood pressure at baseline  Hydration: adequate  Anesthetic events: no    QCDR Measures:  ASA# 11 - Tavia-op Cardiac Arrest: ASA11B - Patient did NOT experience unanticipated cardiac arrest  ASA# 12 - Tavia-op Mortality Rate: ASA12B - Patient did NOT die  ASA# 13 - PACU Re-Intubation Rate: NA - No ETT / LMA used for case  ASA# 10 - Composite Anes Safety: ASA10A - No serious adverse event    Additional Notes:

## 2021-06-20 NOTE — H&P (VIEW-ONLY)
This is a 78 y.o.  female who I'm asked to see by Rajinder Kitchen MD for evaluation of a left breast cancer.  This was picked up by the patient.  She first noticed it a couple months ago.  She was set up for mammogram and ultrasound.  A needle biopsy was done which shows an invasive ductal carcinoma.  It is estrogen receptor positive, progesterone receptor positive and HER-2 Is indeterminate.  It has been sent for FISH.    She has no family history of breast cancer.      PAST MEDICAL HISTORY:  Past Medical History:   Diagnosis Date     DM (diabetes mellitus screen)      HLD (hyperlipidemia)      HTN (hypertension)      Kidney stones      Sciatica      Scoliosis      Past Surgical History:   Procedure Laterality Date      SECTION         Medications:    Current Outpatient Prescriptions:      insulin NPH isoph U-100 human (HUMULIN N NPH INSULIN KWIKPEN) 100 unit/mL (3 mL) pen, Take 40 units in the am and 26 at bedtime, Disp: , Rfl:      lisinopril-hydrochlorothiazide (PRINZIDE,ZESTORETIC) 20-12.5 mg per tablet, 1 tab(s), Disp: , Rfl:      FREESTYLE 28 gauge lancets, , Disp: , Rfl:      HYDROcodone-acetaminophen 5-325 mg per tablet, TK 1 T PO Q SIX H PRN P, Disp: , Rfl: 0     LORazepam (ATIVAN) 0.5 MG tablet, TK 1 T PO TID PRN, Disp: , Rfl: 0    Allergies:  Allergies   Allergen Reactions     Amoxicillin-Pot Clavulanate Hives     Cephalexin Monohydrate Itching and Rash     Other Allergy (See Comments)      Contrast Media Ready-Box [facility], 2013.; Cont[employer] [facility], 2013.       Penicillins      Prochlorperazine Edisylate      Sulfa (Sulfonamide Antibiotics) Rash       Social History:   reports that she has never smoked. She has never used smokeless tobacco. She reports that she does not drink alcohol.    ROS:  A 12 point comprehensive review of systems was negative except as noted.    Physical Exam  /60 (Patient Site: Left Arm, Patient Position: Sitting, Cuff Size: Adult Large)  Pulse  (!) 104  SpO2 96%  Breastfeeding? No  General:alert, appears older than stated age, cooperative and mildly obese, she moves very slowly and unsteady.  Uses a cane.  Lungs:clear to auscultation bilaterally  CV:regular rate and rhythm, S1, S2 normal, no murmur, click, rub or gallop  Breasts: Palpable mass in the superior aspect of the left breast.  At about 12 o'clock position 5-6 cm superior to the nipple.  Measures at least 2 cm in size.  Lymph Nodes:no axillary nodes palpated  Neuro:Grossly normal   Skeletal skeletal: Full range of motion of the upper extremities.      Reviewed her mammograms and ultrasound and pathology.     Impression: Left Breast Cancer. Clinically T2, N0.  Discussed the surgical options for treatment of breast cancer which generally are a lumpectomy (partial mastectomy) combined with radiation versus a mastectomy.  Explained that the survival benefit is the same for both.  The difference is in local recurrence risk.  The patient is a Good candidate for a lumpectomy.  There is a relatively large mass but she has a large breast so I think a lumpectomy is very reasonable.  Discussed SLN biopsy.  The procedure and rationale were explained.  Discussed that at this point we do not know yet whether or not she will need chemotherapy and we may not know until we get all of the results of surgery back.  If the HER-2 comes back positive then she definitely will be recommended to do chemotherapy.  If it comes back negative then chemotherapy is much less likely.  We may not know the answer before we do surgery but it does not change the type of surgery she chooses.      Plan: We'll schedule for a left  lumpectomy with sentinel lymph node biopsy.  This is typically an outpatient procedure under local MAC anesthetic.  The risks and benefits of surgery were explained.  Also talked about expected recovery time.

## 2021-06-20 NOTE — ANESTHESIA PREPROCEDURE EVALUATION
Anesthesia Evaluation      Patient summary reviewed   History of anesthetic complications (ponv)     Airway   Mallampati: III  Neck ROM: full   Pulmonary - normal exam    breath sounds clear to auscultation                         Cardiovascular - normal exam  Exercise tolerance: > or = 4 METS  (+) hypertension, , hypercholesterolemia,     ECG reviewed  Rhythm: regular  Rate: normal,         Neuro/Psych    (+) neuromuscular disease (scoliosis),      Endo/Other    (+) diabetes mellitus poorly controlled, arthritis, obesity,      GI/Hepatic/Renal    (+)   chronic renal disease,      Other findings: fsbg < 200 today      Dental    (+) poor dentition and chipped                       Anesthesia Plan  Planned anesthetic: MAC  Risk and benefit of mac vs ga d/w with patient.  Patient acknowledges that this anesthetic isn't a GA and that it is possible and normal to recall intraop events.      Fire precautions  ASA 3     Anesthetic plan and risks discussed with: patient and child/children  Anesthesia plan special considerations: antiemetics,   Post-op plan: routine recovery

## 2021-06-21 NOTE — CONSULTS
Consults   [facility] Radiation Oncology Consult Note     Patient: Dacia Miller  MRN: [id number]  Date of Service: 10/26/2018          Lilliana Rodriguez MD  [facility] [place] [address]       Dear Dr. Rodriguez:    Thank you very much for referring this patient for consideration of radiotherapy. As you know Ms. Miller is a 78 y.o. female with a diagnosis of left breast cancer, stage T2 N0 M0, ER/ME positive, HER-2 negative, status post lumpectomy and sentinel resection with close margin form DCIS.  The patient is referred to radiation oncology for evaluation and discussion of possible postop radiation therapy.    HISTORY OF PRESENT ILLNESS:   Ms. Miller is a 78 y.o. female who presented with palpable left breast lump by self-examination for which she was seeking further evaluation.  Mammogram followed by ultrasound reviewed 22 x 12 x 22 mm irregular mass involving left breast at the 12 o'clock position 6 cm from nipple suspicious for malignancy.  Needle biopsy was then obtained and pathology reviewed invasive ductal carcinoma, ER/ME positive, HER-2 negative.  After discussed with patient about various treatment options, the patient elected proceed with lumpectomy and sentinel resection as her treatment choice and underwent surgery 9/19/2018.  The pathology reviewed 26 x 21 x 19 mm invasive ductal carcinoma, McIntyre grade 2 with negative margin 4 mm from superior form invasive and less than 1 mm from superior, inferior, and posterior from DCIS.  There was also evidence of DIC.  One removed sentinel node showed no evidence of metastatic disease.  Postoperatively she has been recovering well.  Her case has been discussed on the breast conference and the consensus recommendation is to consider postop radiation therapy given the pathological evidence of close margin.  She is referred to radiation oncology for evaluation and discussion of possible radiation therapy.  The patient is also  scheduled to see Dr. Mackey medical oncology later on today for discussion of possible systemic therapy.    CHEMOTHERAPY HISTORY: Concurrent Chemotherapy: No    RADIATION THERAPY HISTORY: Prior Radiation: No    IMPLANTED CARDIAC DEVICE: none     PREGNANCY: The patient is informed not to be pregnant during the radiation therapy.  She is post menopausal.    Current Outpatient Prescriptions   Medication Sig Dispense Refill     allopurinol (ZYLOPRIM) 300 MG tablet Take 1.5 tablets by mouth daily.       aspirin 325 MG tablet Take 1 tablet by mouth daily.       cholecalciferol, vitamin D3, 2,000 unit Tab Take 1 tablet by mouth daily.       citalopram (CELEXA) 20 MG tablet Take 20 mg by mouth daily.       clotrimazole-betamethasone (LOTRISONE) cream APPLY TO AFFECTED AREA  AS NEEDED       HYDROcodone-acetaminophen 5-325 mg per tablet TK 1 T PO Q SIX H PRN P  0     HYDROcodone-acetaminophen 5-325 mg per tablet Take 1 tablet by mouth every 4 (four) hours as needed for pain. 25 tablet 0     insulin NPH isoph U-100 human (HUMULIN N NPH INSULIN KWIKPEN) 100 unit/mL (3 mL) pen Take 40 units in the am and 26 at bedtime       lisinopril-hydrochlorothiazide (PRINZIDE,ZESTORETIC) 20-12.5 mg per tablet 1 tab(s)       LORazepam (ATIVAN) 0.5 MG tablet TK 1 T PO TID PRN  0     NYSTOP powder HOMERO EXT AA  TID  5     No current facility-administered medications for this visit.      Past Medical History:   Diagnosis Date     Arthritis      Breast cancer (H)      Cancer (H)      Diabetes mellitus (H)      DM (diabetes mellitus screen)      History of anesthesia complications      HLD (hyperlipidemia)      HTN (hypertension)      Kidney stones      PONV (postoperative nausea and vomiting)      Sciatica      Scoliosis      Past Surgical History:   Procedure Laterality Date     ABDOMINAL SURGERY        SECTION       CHOLECYSTECTOMY       [place] MASTECTOMY,PARTIAL,  WITH AXILLARY LYMPHADENECTOMY Left 2018    Procedure: Left  Lumpectomy; [place] Lymph Node Biopsy;  Surgeon: Lilliana Rodriguez MD;  [facility]: [facility];  Service: General     SKIN BIOPSY       Amoxicillin-pot clavulanate; Cephalexin monohydrate; Iodinated contrast- oral and iv dye; Other allergy (see comments); Penicillins; Prochlorperazine edisylate; and Sulfa (sulfonamide antibiotics)  Family History   Problem Relation Age of Onset     Breast cancer Mother      Social History     Social History     Marital status:      Spouse name: N/A     Number of children: N/A     Years of education: N/A     Occupational History     Not on file.     Social History Main Topics     Smoking status: Former Smoker     Smokeless tobacco: Never Used     Alcohol use No     Drug use: No     Sexual activity: Not on file     Other Topics Concern     Not on file     Social History Narrative        Review of Systems:      General  General (WDL): Exceptions to WDL  Fatigue: Yes - Chronic (Greater than 3 months)  Generalized Muscle Weakness: Yes - Chronic (Greater than 3 months)  Night Sweats: Yes - Chronic (Less than 3 months)  EENT  ENT (WDL): Exceptions to WDL  Changes in vision: Yes - Chronic (Greater than 3 months)  Glasses or Contacts: Yes - Chronic (Greater than 3 months)  Respiratory   Respiratory (WDL): All respiratory elements are within defined limits  Cardiovascular  Cardiovascular (WDL): All cardiovascular elements are within defined limits  Endocrine  Endocrine (WDL): All endocrine elements are within defined limits  Gastrointestinal  Gastrointestinal (WDL): Exceptions to WDL  Constipation: Yes - Recent (Less than 3 months)  Musculoskeletal  Musculoskeletal (WDL): Exceptions to WDL  Range of Motion Limitation: Yes - Chronic (Greater than 3 months)  Joint pain: Yes - Chronic (Greater than 3 months)  Back Pain: Yes - Chronic (Greater than 3 months)  Muscle pain or stiffness: Yes - Chronic (Greater than 3 months)  Integumentary                  Neurological  Neurological  (WDL): Exceptions to WDL  Difficulty walking: Yes - Chronic (Greater than 3 months)  Dominant Hand: Right  Difficulty with Balance: Yes - Chronic (Greater than 3 months)  Psychological/Emotional   Psychological/Emotional (WDL): Exceptions to WDL  Insomnia: Yes - Recent (Less than 3 months)  Panic attacks: Yes - Chronic (Greater than 3 months)  Anxiety: Yes - Chronic (Greater than 3 months)  Daytime sleepiness: Yes - Chronic (Greater than 3 months)  Hematological/Lymphatic  Hematological/Lymphatic (WDL): All hematological/lymphatic elements are within defined limits  Dermatologic  Dermatologic (WDL): Exceptions to WDL  Healing Incision: Yes - Recent (Less than 3 months)  Genitourinary/Reproductive  Genitourinary/Reproductive (WDL): All genitourinary/reproductive elements are within defined limits  Reproductive     Pain              Currently in Pain: Yes  Pain Score (Initial OR Reassessment): 3  Pain Frequency: Intermittent  Location: stomach  Response to Interventions: helpful  Accompanied by       Imaging: Reviewed    Pathology: Reviewed    ECOG Peformance Status  ECOG Performance Status: 1  Distress Assessment Score: 6    Objective:     PHYSICAL EXAMINATION:    /81  Pulse (!) 108  Temp 98.4  F (36.9  C) (Oral)   Wt 179 lb 3.2 oz (81.3 kg)  SpO2 96%  BMI 30.76 kg/m2    Gen: Alert, in NAD  Eyes: PERRL, EOMI, sclera anicteric  HENT     Head: NC/AT     Ears: No external auricular lesions     Nose/sinus: No rhinorrhea or epistaxis     Oropharynx: MMM, no visible oral lesions  Neck: Supple, full ROM, no LAD  Chest: The breasts and nipples are symmetrical bilaterally.  There is no evidence of nipple discharge.  There is no palpable lump or mass bilaterally in the breast, axillary, supraclavicular region.  There is a well-healed surgical scar in the left breast consistent with recent history of surgery.  Pulm: No wheezing, stridor or respiratory distress  CV: Well-perfused, no cyanosis, no pedal  edema  Abdominal: BS+, soft, nontender, nondistended, no hepatomegaly  Back: No step-offs or pain to palpation along the thoracolumbar spine  Rectal: Deferred  : Deferred  Musculoskeletal: Normal muscle bulk and tone  Skin: Normal color and turgor  Neurologic: A/Ox3, CN II-XII intact, normal gait and station  Psychiatric: Appropriate mood and affect    Intent of Therapy: Curative    We recommend adjuvant radiation as part of her breast conserving therapy to decrease her chance of local recurrence to the single digits. ROM stretches and skin care were discussed with the patient. She understands that these maneuvers need to continue for 6 months following completion of radiation as skin and muscle fibrosis continue to form for weeks to months following completion of therapy.      Side effects that may occur during or within weeks after radiation therapy      Fatigue and general weakness    Darkening, irritation, itchiness, redness, dryness, erythema, peeling, scabbing, ulceration and contraction of the skin of the breast and chest    Swelling of the breast    Loss of armpit hair    Lung irritation    Decrease in appetite    Side effects that may occur months or years after radiation therapy      Development of another tumor or cancer    Thickening, telangiectasias (development of spider like blood vessels in the skin) and ulceration of the skin of the breast and chest    Firming, fibrosis (scar tissue), fat necrosis, and distortion of the breast    Poor healing after a trauma or surgery in the irradiated area    Nerve damage resulting in loss of arm strength and sensation    Coronary artery blockage causing angina pain or a heart attack    Lung inflammation of fibrosis causing cough, fever and shortness of breath    Fracture of the ribs    Swellingof an arm and hand    The risks, benefits and alternatives to radiation therapy were outlined with the patient. All questions were answered and a consent was signed.      Impression     Left breast cancer, stage T2 N0 M0, ER/AK positive, HER-2 negative, status post lumpectomy and sentinel resection with close margin form DCIS.    Assessment & Plan:     I have personally reviewed her upcoming medical record today.  I have also discussed with the patient about all the possible treatment options for breast cancer including surgery, systemic therapy, and radiation therapy.  The possible risks and side effects of radiation therapy has also been explained to the patient in detail and at the great lengths.  Questions are answered to patient's satisfaction.  I agree with you the patient is a reasonable candidate for considering postop radiation therapy given the fact of large tumor, and close margin from DCIS.  I believe the patient can be potentially benefited by radiation therapy for local control and possibly better survival.  The pros and cons of different options has also been discussed with the patient in detail and at great length.  After long discussion, the patient elected to proceed with postop radiation therapy as clear treatment choice for her breast cancer being aware of potential risks and side effects involved.  She will be scheduled to return to radiation oncology next week for simulation.  I plan to give her radiation therapy at 265 cGy each fraction to a total of 4240 cGy in 16 treatments targeted to the left breast followed by additional 1000 cGy in 4 fractions given to the primary tumor bed using electron.  She is also scheduled to see Dr. Mackey, medical oncology later on today and we will await for Dr. Mackey's final recommendation before proceed with radiation therapy.    Again, thank you very much for the referral and allowing me to participate in the care of this patient.  If you have any questions or concerns about this consultation, please do not hesitate to call.  I spent approximately 60 minutes today with the patient and 80% time was used for  counseling.      Sincerely,          Kelsi Goddard MD, PhD  Department of Radiation Oncology   [facility]  Tel: [phone]  Page: [phone]    [facility]  [address] [place] [address]     [address]    [place] [address]    CC:  Patient Care Team:  Rajinder Kitchen MD as PCP - General (Family Medicine)  Lilliana Rodriguez MD as Physician (General Surgery)  Kelsi Goddard MD as Physician (Radiation Oncology)  Tory Mackey MD as Physician (Hematology and Oncology)  Magda Davidson RN as Oncology Nurse Navigator (Hematology and Oncology)

## 2021-06-21 NOTE — LETTER
Letter by Paris Sal at      Author: Paris Sal Service: -- Author Type: --    Filed:  Encounter Date: 3/12/2021 Status: (Other)         03/12/21    Dacia Miller  [address] [place] [id number]    Dear Dacia:  Thank you for allowing us to participate in your care.  Your health and progress is important to us.  Follow-up exams or studies are an integral part of your radiation therapy treatment.  The scheduling department has made attempts to schedule your follow-up exams.  Our attempts to schedule this appointment have been unsuccessful.    Please contact us at [facility] - [phone] to schedule your follow-up exam or to let us know how we may assist you.      Sincerely,    The Radiation Oncology Care Team    CC: [facility], 03/12/21, 11:51 AM

## 2021-06-21 NOTE — CONSULTS
[facility] Hematology and Oncology Consult Note    Patient: Dacia Miller  MRN: [id number]  Date of Service: 10/26/2018        Reason for Visit    I was consulted by Dr. Rodriguez regarding breast cancer    Assessment/Plan    T2 N0 M0, stage II left breast cancer status post lumpectomy September 19, 2018  Tumor is ER NH positive and HER-2/jerri negative    Pathology reviewed and it is consistent with stage IIa breast cancer.  Patient's tumor is strongly ER NH positive and HER-2 negative.  She is 78 and has core morbidities and is not a candidate for adjuvant chemotherapy.  She has met with radiation oncology and is planned for about 4 weeks of radiation therapy.  I discussed and recommended adjuvant hormonal therapy with anastrozole 1 mg p.o. daily for 5 years with the patient.    Discussed rationale for adjuvant hormonal therapy.  Discussed potential side effects including hot flashes, joint symptoms and small risk for osteoporosis and fractures.    Discussed typical follow-up for patients with stage II breast cancer.  Would typically see every 3 months for the first year and then every 6 months after that.  She will continue with annual mammograms.  No additional radiologic or lab testing is recommended.    No significant family history and so no genetic testing is not gated.    Plan: Proceed with adjuvant radiation therapy  We will schedule DEXA scan  Follow-up in 6 weeks to plan initiation of anastrozole      ECOG Performance   ECOG Performance Status: 1  Distress Assessment  Distress Assessment Score: 6        Problem List    1. Malignant neoplasm of central portion of left breast in female, estrogen receptor positive (H)  DXA Bone Density Scan    DXA Bone Density Scan   2. Age-related osteoporosis without current pathological fracture   DXA Bone Density Scan    DXA Bone Density Scan           CC: Rajinder Kitchen MD      ______________________________________________________________________________      Staging  History    No matching staging information was found for the patient.    History    Ms. Dacia Miller is a 78-year-old with past history of gout, depression, diabetes and hypertension who is been diagnosed with left breast cancer.  This was found by the patient.  She then underwent mammogram and ultrasound followed by needle biopsy showing infiltrating ductal cancer.  Tumor is ER AZ positive and HER-2/jerri negative.  She underwent left lumpectomy and sentinel lymph node biopsy.  Pathology shows 2.6 cm tumor, grade 2, lymph node negative with negative margins.  She has met with radiation oncology and recommended 4 weeks of adjuvant radiation therapy.  Is recovering well from the surgery.    Past surgical history includes skin biopsies, cholecystectomy,  and abdominal surgery.    Patient has had skin cancer.  Mother may have had DCIS.  No other family history of cancer.    She does not smoke or drink.    Ambulates with a cane.  Does have some chronic joint/arthritic pain.  No headaches or dizziness.  No fever or mild sores.  No dysphasia or done aphasia.  No shortness of breath or cough.  No PND orthopnea or leg swelling.  No change with bowels or urine.  No bleeding or rash.  ECOG status is 1.        Past History  Past Medical History:   Diagnosis Date     Arthritis      Breast cancer (H)      Cancer (H)      Diabetes mellitus (H)      DM (diabetes mellitus screen)      History of anesthesia complications      HLD (hyperlipidemia)      HTN (hypertension)      Kidney stones      PONV (postoperative nausea and vomiting)      Sciatica      Scoliosis      Past Surgical History:   Procedure Laterality Date     ABDOMINAL SURGERY        SECTION       CHOLECYSTECTOMY       [place] MASTECTOMY,PARTIAL,  WITH AXILLARY LYMPHADENECTOMY Left 2018    Procedure: Left Lumpectomy; [place] Lymph Node Biopsy;  Surgeon: Lilliana Rodriguez MD;  Location: [facility];  Service: General     SKIN BIOPSY       Family  History   Problem Relation Age of Onset     Breast cancer Mother      Social History     Social History     Marital status:      Spouse name: N/A     Number of children: N/A     Years of education: N/A     Social History Main Topics     Smoking status: Former Smoker     Smokeless tobacco: Never Used     Alcohol use No     Drug use: No     Sexual activity: Not Asked     Other Topics Concern     None     Social History Narrative     Allergies    Allergies   Allergen Reactions     Amoxicillin-Pot Clavulanate Hives     Cephalexin Monohydrate Itching and Rash     Iodinated Contrast- Oral And Iv Dye Other (See Comments)     flushed     Other Allergy (See Comments)      Contrast Media Ready-Box [facility] [place], 02/13/2013.; Contrast [employer] Ready[employer] [facility], 02/13/2013.       Penicillins      Prochlorperazine Edisylate      Sulfa (Sulfonamide Antibiotics) Rash       Review of Systems    General  General (WDL): Exceptions to WDL  Fatigue: Yes - Chronic (Greater than 3 months)  Generalized Muscle Weakness: Yes - Chronic (Greater than 3 months)  ENT  ENT (WDL): Exceptions to WDL  Changes in vision: Yes - Chronic (Greater than 3 months)  Glasses or Contacts: Yes - Chronic (Greater than 3 months)  Respiratory  Respiratory (WDL): All respiratory elements are within defined limits  Cardiovascular  Cardiovascular (WDL): All cardiovascular elements are within defined limits  Endocrine  Endocrine (WDL): All endocrine elements are within defined limits  Gastrointestinal  Gastrointestinal (WDL): Exceptions to WDL  Constipation: Yes - Recent (Less than 3 months)  Musculoskeletal  Musculoskeletal (WDL): Exceptions to WDL  Range of Motion Limitation: Yes - Chronic (Greater than 3 months)  Joint pain: Yes - Chronic (Greater than 3 months)  Back Pain: Yes - Chronic (Greater than 3 months)  Muscle pain or stiffness: Yes - Chronic (Greater than 3 months)  Assistive device: Yes - Chronic (Greater than 3 months) (Cane)  Neurological  Neurological  (WDL): Exceptions to WDL  Difficulty walking: Yes - Chronic (Greater than 3 months)  Dominant Hand: Right  Difficulty with Balance: Yes - Chronic (Greater than 3 months)  Numbness and/or tingling: Yes - Chronic (Greater than 3 months) (Legs)  Psychological/Emotional  Psychological/Emotional (WDL): Exceptions to WDL  Insomnia: Yes - Recent (Less than 3 months)  Panic attacks: Yes - Chronic (Greater than 3 months)  Anxiety: Yes - Chronic (Greater than 3 months)  Daytime sleepiness: Yes - Chronic (Greater than 3 months)  Hematological/Lymphatic  Hematological/Lymphatic (WDL): All hematological/lymphatic elements are within defined limits  Dermatological  Dermatologic (WDL): Exceptions to WDL  Healing Incision: Yes - Recent (Less than 3 months)  Genitourinary/Reproductive  Genitourinary/Reproductive (WDL): All genitourinary/reproductive elements are within defined limits  Reproductive (Females only)     Pain  Currently in Pain: Yes  Pain Score (Initial OR Reassessment): 3  Pain Frequency: Intermittent  Location: Stomach  Pain Characteristics : Aching  Pain Intervention(s): Home medication  Response to Interventions: Relief reported     Physical Exam    Recent Vitals 10/26/2018   Height -   Weight 179 lbs 3 oz   BSA (m2) -   /81   Pulse 108   Temp 98.4   Temp src 1   SpO2 96   Some recent data might be hidden       GENERAL: Alert and oriented. Seated comfortably. In no distress.    HEAD: Atraumatic and normocephalic.  Has a full head of hair.    EYES: ZEESHAN, EOMI.  No pallor.  No icterus.    Oral cavity: no mucosal lesion or tonsillar enlargement.    NECK: supple. JVP normal.  No thyroid enlargement.    LYMPH NODES: No palpable, cervical, axillary or inguinal lymphadenopathy.    CHEST: clear to auscultation bilaterally.  Resonant to percussion throughout bilaterally.  Symmetrical breath movements bilaterally.    CVS: S1 and S2 are heard. Regular rate and rhythm.  No murmur or gallop or rub heard.    ABDOMEN: Soft.  Not tender. Not distended.  No palpable hepatomegaly or splenomegaly.  No other mass palpable.  Bowel sounds heard.    EXTREMITIES: Warm.  No peripheral edema.    SKIN: no rash, or bruising or purpura.    CNS: Nonfocal      Lab Results    No results found for this or any previous visit (from the past 168 hour(s)).    Imaging Results    No results found.      Signed by: Tory Mackey MD

## 2021-06-21 NOTE — PROCEDURES
Procedures    SIMULATION NOTE:       DIAGNOSIS: Left breast cancer, stage T2 N0 M0, ER/SC positive, HER-2 negative, status post lumpectomy and sentinel resection with close margin form DCIS.    INDICATION:  Postoperative radiation therapy is recommended for patient as second part of the breast preservation protocol to further reduce the likelihood of cancer recurrence.    CONSENT:  The possible risks and the side effects of radiation therapy have been discussed with patient in detail and at great length.  Questions are answered to patient's satisfaction.  Written consent was obtained.    SIMULATION:  The patient is in a supine position with a wing breast board to help keep the same position during the daily radiation therapy.  Tentative isocenter is set up in the center of the thoracic region.  We will acquire CT information to help us to better locate target and design radiation therapy field.    BLOCKS:  Custom blocks will be drawn to minimize radiation to normal tissues and to protect normal organs including, but not limited to, lungs, heart, liver, bone and soft tissue.    DOSAGE:  I plan to give her radiation therapy at 265 cGy each fraction to a total of 4240 cGy in 16 treatments targeted to the left breast only.  An additional 1000 cGy in 4 fractions will be given to the primary tumor bed using an electron. I will consider to use 3D conformer technique to help us better locate target and to protect normal tissue.      Kelsi Goddard MD, PhD  Department of Radiation Oncology   [facility]  Tel: [phone]  Page: [phone]    [facility]  [address] [place] [address]     [facility] [place] [place]  [address]   [place] [address]

## 2021-06-21 NOTE — PROCEDURES
Clinical Treatment Planning Note    The complex radiotherapy planning will be completed for the patient to plan the treatment for her breast cancer.  The patient had a planning CT earlier today for planning.  The treatment aids were used for planning, including headrest and Wing Board to help keep the same position during the daily radiation therapy.  The therapy planning is necessary to reduce radiotherapy dose to the normal critical organs which are not possible with simple treatment.  In addition, dose to the target and the critical structures requires three-dimensional analysis of the isodose distribution.  The planning will be done to reduce dose to the lungs, spinal cord, liver, and heart.     I will contour the clinical tumor volume  CTV , with expanded volume of planning treatment volume  PTV  on the treatment planning system.  The critical structures will be outlined, including spinal cord, lungs, heart, liver, bone and soft tissue.     Treatment planning will be done on the computer treatment planning system.  The tangential field will be used to achieve optimal coverage of the target volume.  Dose distribution to the above critical structures will be reviewed.  Isodose distribution along with the X, Y, Z plan will also be reviewed.  Custom blocking will be used to shield normal structures.  The beam s eye views will be reviewed and the digital reconstructed image will be reviewed on the planning software.      The patient will receive 4240 cGy in 16 treatments targeted to the left chest/breast region using 6-MV or 15-MV photons.  An additional 1000 cGy in 4 fractions will be planned to give to the primary tumor bed using electron.        Kelsi Goddard MD, PhD  Department of Radiation Oncology   [facility]  Tel: [phone]  Page: [phone]    [facility]  [address] [place] [address]     [place] [address] [facility] [place] [address]

## 2021-06-21 NOTE — PROGRESS NOTES
Pt ambulatory to radiation clinic with cane for initial radiation consult. VSS, has some leg and stomach pain at times. RN spent 20 minutes with pt discussing RT, RT planning, and RT side effects. ACS RT, Krames RT, Breast RT, and breast exercise hand outs given with explanation. Further recommendations and orders per provider.

## 2021-06-22 NOTE — PROGRESS NOTES
[facility] Radiation Oncology Follow Up Note    Patient: Dacia Miller  MRN: [id number]  Date of Service: 12/03/2018      Ms. Miller is a 78 y.o. female who presented with palpable left breast lump by self-examination for which she was seeking further evaluation.  Mammogram followed by ultrasound reviewed 22 x 12 x 22 mm irregular mass involving left breast at the 12 o'clock position 6 cm from nipple suspicious for malignancy.  Needle biopsy was then obtained and pathology reviewed invasive ductal carcinoma, ER/KS positive, HER-2 negative.  After discussed with patient about various treatment options, the patient elected proceed with lumpectomy and sentinel resection as her treatment choice and underwent surgery 9/19/2018.  The pathology reviewed 26 x 21 x 19 mm invasive ductal carcinoma, Roger grade 2 with negative margin 4 mm from superior form invasive and less than 1 mm from superior, inferior, and posterior from DCIS.  There was also evidence of DIC.  One removed sentinel node showed no evidence of metastatic disease.    The patient was seen and evaluated for consideration of postop radiation therapy.  She had injury few days before radiation therapy and the chest x-ray was obtained which showed no evidence of rib fracture.  However there was a possible right upper lobe mass/infiltrate.  I have reviewed her planning CT which showed right upper lobe abnormality.  A diagnostic CT chest was obtained on 11/29/2018 which confirmed a right upper lobe masslike opacity measuring  4.6 cm as well as 8 mm right middle lobe solid pulmonary nodule.  The findings are suspicious for malignancy.  There was no mediastinum lymphadenopathy.  I have reviewed her scan result with the patient.  This is highly suspicious for malignancy possibly a new lung primary.  We will obtain PET scan for staging and diagnostic purposes.  She is also scheduled to have a CT-guided needle biopsy the following week to confirm the  diagnosis.  We will withheld her breast radiation therapy at this time.  We will discuss her case 2 weeks later for treatment recommendation.    I spent 25 minutes today with the patient and 80% time was used for counseling.      Kelsi Goddard MD, PhD  Department of Radiation Oncology   [facility]  Tel: [phone]  Page: [phone]    [facility]  [address] [place] [address]     [facility]  [address]  [place] [address]

## 2021-06-22 NOTE — TELEPHONE ENCOUNTER
Lexus was left the time and date worked out for her EBUS and was instructed to call back to get the prep for the procedure.  A second message was left on 1-8 as well.

## 2021-06-22 NOTE — PROGRESS NOTES
[facility] Hematology and Oncology Progress Note    Patient: Dacia Miller  MRN: [id number]  Date of Service: 12/11/2018        Reason for Visit    Chief Complaint   Patient presents with     HE Cancer     Malignant neoplasm of central portion of left breast       Assessment and Plan    T2 N0 M0, stage II left breast cancer status post lumpectomy September 19, 2018  Tumor is ER OK positive and HER-2/jerri negative  Right upper lobe lung mass with satellite nodule and also 8 mm right middle lobe nodule    Patient with new findings of lung masses.  Unclear if this is a new separate lung primary versus metastatic disease which I think is less likely.  She is scheduled for PET scan and biopsy and then follow-up with radiation oncology to review results.  Based on pathology she may require surgery or possibly radiation therapy.    Discussed management of her breast cancer.  Bone density shows osteoporosis.  Discussed hormonal therapy with either anastrozole or tamoxifen.  Would recommend starting tamoxifen 20 mg p.o. daily now as we await workup of the lung mass and breast radiation therapy has been delayed.  Reviewed potential side effects of tamoxifen including hot flashes, joint symptoms and small risks for blood clots and uterine cancer.  She will start 20 mg p.o. daily for now.  This may need to be stopped when she starts breast radiation or other treatment for the lung mass.  Discussed symptoms of DVT and uterine cancer and asked her to call us if she develops unilateral calf pain and swelling or any vaginal bleeding we will tentatively plan to see her back in 4 weeks.    30 minutes spent majority in counseling and coordination of care.    Plan: Await PET scan and lung biopsy  Start tamoxifen 20 mg p.o. daily  Follow-up with me in 4 weeks    Measurable disease: CT of the chest    Current therapy: Start tamoxifen 20 mg p.o. daily            ECOG Performance   ECOG Performance Status: 2    Distress  Assessment  Distress Assessment Score: 6    Pain  Pain Score (Initial OR Reassessment): 7        Problem List    1. Malignant neoplasm of central portion of left breast in female, estrogen receptor positive (H)  tamoxifen (NOLVADEX) 20 MG tablet        CC: Rajinder Kitchen MD    ______________________________________________________________________________    History of Present Illness    Ms. Dacia Miller returns for follow-up.  She was seen about 3 months ago.  She could not have radiation therapy because she initially had a fall and developed significant rib tenderness.  She subsequently had imaging which shows right lung mass and other nodules.  She is currently awaiting PET scan and biopsy.  Continues to have chronic back pain.  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  She has a 7-pack-year smoking history but this was more than 50 years ago and denies any secondhand smoke exposure.  No other personal history of cancer.    Pain Status  Currently in Pain: Yes    Review of Systems    Constitutional  Constitutional (WDL): Exceptions to WDL  Fatigue: Fatigue relieved by rest  Neurosensory  Neurosensory (WDL): Exceptions to WDL  Peripheral Motor Neuropathy: Asymptomatic, clinical or diagnostic observations only, intervention not indicated  Ataxia: Asymptomatic, clinical or diagnostic observations only, intervention not indicated(Slow gait. Uses cane. )  Cardiovascular  Cardiovascular (WDL): Exceptions to WDL  Palpitations: Definition: A disorder characterized by inflammation of the muscle tissue of the heart.  Pulmonary  Respiratory (WDL): Exceptions to WDL  Dyspnea: Shortness of breath with moderate exertion  Gastrointestinal  Gastrointestinal (WDL): All gastrointestinal elements are within defined limits  Genitourinary  Genitourinary (WDL): All genitourinary elements are within defined limits  Integumentary  Integumentary (WDL): All  integumentary elements are within defined limits  Patient Coping  Patient Coping: Accepting;Anxiety  Distress Assessment  Distress Assessment Score: 6  Accompanied by  Accompanied by: Alone    Past History  Past Medical History:   Diagnosis Date     Arthritis      Breast cancer (H)      Cancer (H)      Diabetes mellitus (H)      DM (diabetes mellitus screen)      History of anesthesia complications      HLD (hyperlipidemia)      HTN (hypertension)      Kidney stones      PONV (postoperative nausea and vomiting)      Sciatica      Scoliosis          Past Surgical History:   Procedure Laterality Date     ABDOMINAL SURGERY        SECTION       CHOLECYSTECTOMY       UT MASTECTOMY,PARTIAL,  WITH AXILLARY LYMPHADENECTOMY Left 2018    Procedure: Left Lumpectomy; [place] Lymph Node Biopsy;  Surgeon: Lilliana [place] MD;  Location: [facility];  Service: General     SKIN BIOPSY         Physical Exam    Recent Vitals 2018   Weight 178 lbs 10 oz   BSA (m2) 1.91 m2   /68   Pulse 102   Temp 98.6   Temp src 1   SpO2 96   Some recent data might be hidden       GENERAL: Alert and oriented. Seated comfortably. In no distress.    HEAD: Atraumatic and normocephalic.  Has a full head of hair.    EYES: ZEESHAN, EOMI.  No pallor.  No icterus.    Oral cavity: no mucosal lesion or tonsillar enlargement.    NECK: supple. JVP normal.  No thyroid enlargement.    LYMPH NODES: No palpable, cervical, axillary or inguinal lymphadenopathy.    CHEST: clear to auscultation bilaterally.  Resonant to percussion throughout bilaterally.  Symmetrical breath movements bilaterally.    CVS: S1 and S2 are heard. Regular rate and rhythm.  No murmur or gallop or rub heard.    ABDOMEN: Soft. Not tender. Not distended.  No palpable hepatomegaly or splenomegaly.  No other mass palpable.  Bowel sounds heard.    EXTREMITIES: Warm.  No peripheral edema.    SKIN: no rash, or bruising or purpura.        Lab Results    No results found for  this or any previous visit (from the past 168 hour(s)).    Imaging    Xr Ribs Right W Pa Chest    Result Date: 11/28/2018  XR RIBS RIGHT W PA CHEST 11/28/2018 4:42 PM INDICATION: Rib pain COMPARISON: None. FINDINGS: There is an ill-defined 3 cm right upper lobe mass. On one of the rib detail films there is a suggestion there may be erosion anteriorly of the first, right rib. This is not the site of pain as noted by the marker. No other fractures noted. No effusion. Suggest chest CT for further evaluation. Minimal scarring in the left base. Heart and pulmonary vascularity are normal. There is a moderate hiatal hernia. Bones are demineralized. NOTE: ABNORMAL REPORT THE DICTATION ABOVE DESCRIBES AN ABNORMALITY FOR WHICH FOLLOW-UP IS NEEDED.     Ct Chest Without Contrast    Result Date: 11/29/2018  CT CHEST WO CONTRAST 11/29/2018 3:12 PM INDICATION: Righ upper lung mass recent fall, right rib pain TECHNIQUE: Routine chest. Dose reduction techniques were used. IV CONTRAST: None COMPARISON: Chest x-ray 11/28/2018 FINDINGS: LUNGS AND PLEURA: The central airways are clear. Left basilar scarring is present. Within the right upper lobe there is an irregular masslike opacity measuring 2.2 x 4.6 cm. Associated architectural distortion and traction bronchiectasis are noted. There  are thin septa extending to the adjacent pleura. There is a 6 mm solid solid nodule image 43. An 8 mm solid nodule is present within the right middle lobe image 76. MEDIASTINUM: Asymmetrically enlarged lower left thyroid lobe nodule measuring 2.3 cm. There is also an asymmetrically enlarged posterior right thyroid lobe with a nodule measuring 1.5 cm. 2.9 cm left breast cystic structure likely representing a seroma. Asymmetric left breast skin thickening as well as an upper left breast asymmetry with a surgical clip. These findings correlate with the patient's history of prior partial mastectomy. Upper normal heart size. No pericardial effusion. Severe  coronary calcifications. Small to moderate-sized hiatal hernia. No thoracic adenopathy by imaging size criteria. LIMITED UPPER ABDOMEN: Moderate extrahepatic biliary ductal dilatation likely in part related to post cholecystectomy status. 1.7 cm fluid density medial left splenic lesion. Some faint peripheral calcifications are noted. 12 mm lipid rich right adrenal adenoma. 3.3 cm exophytic right renal cyst. MUSCULOSKELETAL: Mild age indeterminate but likely remote upper thoracic vertebral body compression deformities. Osteopenia. No definite suspicious osseous lesion.     CONCLUSION: 1.  Right upper lobe masslike opacity measuring 4.6 cm. This is highly suspicious for a primary pulmonary neoplasm. An adjacent satellite nodule is noted. 2.  8 mm right middle lobe solid pulmonary nodule suspicious for a pulmonary metastasis. 3.  No thoracic adenopathy by imaging size criteria. 4.  Left breast post treatment changes as described above. 5.  Enlarged multinodular thyroid for which nonemergent thyroid ultrasound follow-up is recommended. 6.  Additional findings as described above.        Signed by: Tory Mackey MD

## 2021-06-22 NOTE — PROGRESS NOTES
RADIATION ONCOLOGY WEEKLY TREATMENT VISIT NOTE      Assessment / Impression       1. Malignant neoplasm of central portion of left breast in female, estrogen receptor positive (H)       Tolerating radiation therapy well.  All questions and concerns addressed.    Plan:     Continue radiation treatment as prescribed.    Subjective:      HPI: Dacia Miller is a 78 y.o. female with    1. Malignant neoplasm of central portion of left breast in female, estrogen receptor positive (H)         The following portions of the patient's history were reviewed and updated as appropriate: allergies, current medications, past family history, past medical history, past social history, past surgical history and problem list.    Assessment                  Body Site: Breast                           Site: left breast  Stereotactic Radiosurgery: No  Concurrent Therapy: Yes  Today's Dose: 1060  Total Dose for Breast: 5240  Today's Fraction/Total Fraction Breast:   Drainage: 0: Absent                                            Sexuality Alteration                 Emotional Alteration Copin: Effective  Comfort Alteration KPS: 70% Cannot do active work, but can care for self  Fatigue (ONS scale) : 5: Moderate Fatigue  Pain Description: Ache - Muscular type ache  Pain Intervention: 3: Opiods  Effectiveness of pain intervention: 1: Pain relieved 25%  Hot Flashes and/or Flushes: 0: None   Nutrition Alteration Anorexia: 0: None  Skin Alteration Skin Sensation: 0: No problem  AUA Assessment                                  Accompanied by       Objective:     Exam: Examination reviewed no significant changes.    Vitals:    19 1200   BP: 163/62   Pulse: (!) 104   Temp: 98.4  F (36.9  C)   TempSrc: Oral   SpO2: 98%   Weight: 177 lb 3 oz (80.4 kg)       Wt Readings from Last 8 Encounters:   19 177 lb 3 oz (80.4 kg)   19 166 lb (75.3 kg)   18 177 lb 6.4 oz (80.5 kg)   18 178 lb (80.7 kg)    12/11/18 178 lb 9.6 oz (81 kg)   11/28/18 176 lb (79.8 kg)   10/26/18 179 lb 3.2 oz (81.3 kg)   10/26/18 179 lb 3.2 oz (81.3 kg)       General: Alert and oriented, in no acute distress  Dacia has mild Erythema.  Aria chart and setup information reviewed    Kelsi Goddard MD

## 2021-06-22 NOTE — INTERVAL H&P NOTE
I have performed an assessment and examined the patient, as necessary, to update the patient's current status that may have changed since the prior History and Physical.  The History & Physical has been reviewed and changes to the patient's status are as follows Systolic murmur..

## 2021-06-22 NOTE — PROGRESS NOTES
RADIATION ONCOLOGY WEEKLY TREATMENT VISIT NOTE      Assessment / Impression       1. Malignant neoplasm of central portion of left breast in female, estrogen receptor positive (H)       Significant rib pain and hematoma after a fall.     Plan:   Unable to treat with radiation due to right rib pain.  She was in significant pain even with light touch to hematoma on right lower ribs.  Needs to be evaluated in ER.    Radiation start delayed until pain under better control.        Subjective:      HPI: Dacia Miller is a 78 y.o. female with    1. Malignant neoplasm of central portion of left breast in female, estrogen receptor positive (H)         The following portions of the patient's history were reviewed and updated as appropriate: allergies, current medications, past family history, past medical history, past social history, past surgical history and problem list.                        Objective:     Exam:     There were no vitals filed for this visit.    Wt Readings from Last 8 Encounters:   10/26/18 179 lb 3.2 oz (81.3 kg)   10/26/18 179 lb 3.2 oz (81.3 kg)   10/02/18 175 lb (79.4 kg)   09/19/18 175 lb (79.4 kg)       General: Alert and oriented, in but in considerable pain.      Treatment Summary to Date    Aria chart and setup information reviewed    Renate Colunga MD

## 2021-06-22 NOTE — H&P (VIEW-ONLY)
[facility] Hematology and Oncology Progress Note    Patient: Dacia Miller  MRN: [id number]  Date of Service: 12/11/2018        Reason for Visit    Chief Complaint   Patient presents with     HE Cancer     Malignant neoplasm of central portion of left breast       Assessment and Plan    T2 N0 M0, stage II left breast cancer status post lumpectomy September 19, 2018  Tumor is ER IN positive and HER-2/jerri negative  Right upper lobe lung mass with satellite nodule and also 8 mm right middle lobe nodule    Patient with new findings of lung masses.  Unclear if this is a new separate lung primary versus metastatic disease which I think is less likely.  She is scheduled for PET scan and biopsy and then follow-up with radiation oncology to review results.  Based on pathology she may require surgery or possibly radiation therapy.    Discussed management of her breast cancer.  Bone density shows osteoporosis.  Discussed hormonal therapy with either anastrozole or tamoxifen.  Would recommend starting tamoxifen 20 mg p.o. daily now as we await workup of the lung mass and breast radiation therapy has been delayed.  Reviewed potential side effects of tamoxifen including hot flashes, joint symptoms and small risks for blood clots and uterine cancer.  She will start 20 mg p.o. daily for now.  This may need to be stopped when she starts breast radiation or other treatment for the lung mass.  Discussed symptoms of DVT and uterine cancer and asked her to call us if she develops unilateral calf pain and swelling or any vaginal bleeding we will tentatively plan to see her back in 4 weeks.    30 minutes spent majority in counseling and coordination of care.    Plan: Await PET scan and lung biopsy  Start tamoxifen 20 mg p.o. daily  Follow-up with me in 4 weeks    Measurable disease: CT of the chest    Current therapy: Start tamoxifen 20 mg p.o. daily            ECOG Performance   ECOG Performance Status: 2    Distress  Assessment  Distress Assessment Score: 6    Pain  Pain Score (Initial OR Reassessment): 7        Problem List    1. Malignant neoplasm of central portion of left breast in female, estrogen receptor positive (H)  tamoxifen (NOLVADEX) 20 MG tablet        CC: Rajinder Kitchen MD    ______________________________________________________________________________    History of Present Illness    Ms. Dacia Miller returns for follow-up.  She was seen about 3 months ago.  She could not have radiation therapy because she initially had a fall and developed significant rib tenderness.  She subsequently had imaging which shows right lung mass and other nodules.  She is currently awaiting PET scan and biopsy.  Continues to have chronic back pain.  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  She has a 7-pack-year smoking history but this was more than 50 years ago and denies any secondhand smoke exposure.  No other personal history of cancer.    Pain Status  Currently in Pain: Yes    Review of Systems    Constitutional  Constitutional (WDL): Exceptions to WDL  Fatigue: Fatigue relieved by rest  Neurosensory  Neurosensory (WDL): Exceptions to WDL  Peripheral Motor Neuropathy: Asymptomatic, clinical or diagnostic observations only, intervention not indicated  Ataxia: Asymptomatic, clinical or diagnostic observations only, intervention not indicated(Slow gait. Uses cane. )  Cardiovascular  Cardiovascular (WDL): Exceptions to WDL  Palpitations: Definition: A disorder characterized by inflammation of the muscle tissue of the heart.  Pulmonary  Respiratory (WDL): Exceptions to WDL  Dyspnea: Shortness of breath with moderate exertion  Gastrointestinal  Gastrointestinal (WDL): All gastrointestinal elements are within defined limits  Genitourinary  Genitourinary (WDL): All genitourinary elements are within defined limits  Integumentary  Integumentary (WDL): All  integumentary elements are within defined limits  Patient Coping  Patient Coping: Accepting;Anxiety  Distress Assessment  Distress Assessment Score: 6  Accompanied by  Accompanied by: Alone    Past History  Past Medical History:   Diagnosis Date     Arthritis      Breast cancer (H)      Cancer (H)      Diabetes mellitus (H)      DM (diabetes mellitus screen)      History of anesthesia complications      HLD (hyperlipidemia)      HTN (hypertension)      Kidney stones      PONV (postoperative nausea and vomiting)      Sciatica      Scoliosis          Past Surgical History:   Procedure Laterality Date     ABDOMINAL SURGERY        SECTION       CHOLECYSTECTOMY       WY MASTECTOMY,PARTIAL,  WITH AXILLARY LYMPHADENECTOMY Left 2018    Procedure: Left Lumpectomy; [place] Lymph Node Biopsy;  Surgeon: Lilliana Rodriguez MD;  Location: [facility];  Service: General     SKIN BIOPSY         Physical Exam    Recent Vitals 2018   Weight 178 lbs 10 oz   BSA (m2) 1.91 m2   /68   Pulse 102   Temp 98.6   Temp src 1   SpO2 96   Some recent data might be hidden       GENERAL: Alert and oriented. Seated comfortably. In no distress.    HEAD: Atraumatic and normocephalic.  Has a full head of hair.    EYES: ZEESHAN, EOMI.  No pallor.  No icterus.    Oral cavity: no mucosal lesion or tonsillar enlargement.    NECK: supple. JVP normal.  No thyroid enlargement.    LYMPH NODES: No palpable, cervical, axillary or inguinal lymphadenopathy.    CHEST: clear to auscultation bilaterally.  Resonant to percussion throughout bilaterally.  Symmetrical breath movements bilaterally.    CVS: S1 and S2 are heard. Regular rate and rhythm.  No murmur or gallop or rub heard.    ABDOMEN: Soft. Not tender. Not distended.  No palpable hepatomegaly or splenomegaly.  No other mass palpable.  Bowel sounds heard.    EXTREMITIES: Warm.  No peripheral edema.    SKIN: no rash, or bruising or purpura.        Lab Results    No results found for  this or any previous visit (from the past 168 hour(s)).    Imaging    Xr Ribs Right W Pa Chest    Result Date: 11/28/2018  XR RIBS RIGHT W PA CHEST 11/28/2018 4:42 PM INDICATION: Rib pain COMPARISON: None. FINDINGS: There is an ill-defined 3 cm right upper lobe mass. On one of the rib detail films there is a suggestion there may be erosion anteriorly of the first, right rib. This is not the site of pain as noted by the marker. No other fractures noted. No effusion. Suggest chest CT for further evaluation. Minimal scarring in the left base. Heart and pulmonary vascularity are normal. There is a moderate hiatal hernia. Bones are demineralized. NOTE: ABNORMAL REPORT THE DICTATION ABOVE DESCRIBES AN ABNORMALITY FOR WHICH FOLLOW-UP IS NEEDED.     Ct Chest Without Contrast    Result Date: 11/29/2018  CT CHEST WO CONTRAST 11/29/2018 3:12 PM INDICATION: Righ upper lung mass recent fall, right rib pain TECHNIQUE: Routine chest. Dose reduction techniques were used. IV CONTRAST: None COMPARISON: Chest x-ray 11/28/2018 FINDINGS: LUNGS AND PLEURA: The central airways are clear. Left basilar scarring is present. Within the right upper lobe there is an irregular masslike opacity measuring 2.2 x 4.6 cm. Associated architectural distortion and traction bronchiectasis are noted. There  are thin septa extending to the adjacent pleura. There is a 6 mm solid solid nodule image 43. An 8 mm solid nodule is present within the right middle lobe image 76. MEDIASTINUM: Asymmetrically enlarged lower left thyroid lobe nodule measuring 2.3 cm. There is also an asymmetrically enlarged posterior right thyroid lobe with a nodule measuring 1.5 cm. 2.9 cm left breast cystic structure likely representing a seroma. Asymmetric left breast skin thickening as well as an upper left breast asymmetry with a surgical clip. These findings correlate with the patient's history of prior partial mastectomy. Upper normal heart size. No pericardial effusion. Severe  coronary calcifications. Small to moderate-sized hiatal hernia. No thoracic adenopathy by imaging size criteria. LIMITED UPPER ABDOMEN: Moderate extrahepatic biliary ductal dilatation likely in part related to post cholecystectomy status. 1.7 cm fluid density medial left splenic lesion. Some faint peripheral calcifications are noted. 12 mm lipid rich right adrenal adenoma. 3.3 cm exophytic right renal cyst. MUSCULOSKELETAL: Mild age indeterminate but likely remote upper thoracic vertebral body compression deformities. Osteopenia. No definite suspicious osseous lesion.     CONCLUSION: 1.  Right upper lobe masslike opacity measuring 4.6 cm. This is highly suspicious for a primary pulmonary neoplasm. An adjacent satellite nodule is noted. 2.  8 mm right middle lobe solid pulmonary nodule suspicious for a pulmonary metastasis. 3.  No thoracic adenopathy by imaging size criteria. 4.  Left breast post treatment changes as described above. 5.  Enlarged multinodular thyroid for which nonemergent thyroid ultrasound follow-up is recommended. 6.  Additional findings as described above.        Signed by: Tory Mackey MD

## 2021-06-22 NOTE — PROGRESS NOTES
1/9/19: Received a message today from KINSEY Bateman, at [facility] that she is helping to get Lexus rescheduled for EBUS and Lexus has additional questions.  She is tentatively scheduled for EBUS in the OR on 1/17/19 at 0800.  Her case will be reviewed at thoracic conference the same day per Dr. Bassett's request.  She is scheduled to see Dr. Mackey tomorrow.  I will connect with Dr. Mackey when he is in the office tomorrow to relay Lexus would like some additional information about the plan of care/purpose of the EBUS.    I updated Dr. Goddard today.  He previously discussed with Lexus that he would advise SBRT if her staging LN are negative with EBUS.  1/10/19: I was able to connect with KINSEY Griffith, re the above and she will update Dr. Mackey.

## 2021-06-22 NOTE — PROGRESS NOTES
RADIATION ONCOLOGY WEEKLY TREATMENT VISIT NOTE      Assessment / Impression       1. Malignant neoplasm of central portion of left breast in female, estrogen receptor positive (H)       Tolerating radiation therapy well.  All questions and concerns addressed.    Plan:     Continue radiation treatment as prescribed.    Subjective:      HPI: Dacia Miller is a 78 y.o. female with    1. Malignant neoplasm of central portion of left breast in female, estrogen receptor positive (H)         The following portions of the patient's history were reviewed and updated as appropriate: allergies, current medications, past family history, past medical history, past social history, past surgical history and problem list.    Assessment                  Body Site: Breast                           Site: L breast  Stereotactic Radiosurgery: No  Concurrent Therapy: Yes(tamoxifen)  Today's Dose: 530  Total Dose for Breast: 5240  Today's Fraction/Total Fraction Breast:   Drainage: 0: Absent                                            Sexuality Alteration                 Emotional Alteration Copin: Effective  Comfort Alteration KPS: 70% Cannot do active work, but can care for self  Fatigue (ONS scale) : 5: Moderate Fatigue  Pain Location: ribs  Pain Intensity. Rate degree of pain ranging from 0 (no pain) to 10 (severe pain) : 3  Pain Description: Ache - Muscular type ache  Pain Intervention: 3: Opiods  Effectiveness of pain intervention: 3: Pain relieved 75%  Hot Flashes and/or Flushes: 0: None   Nutrition Alteration Anorexia: 0: None  Nausea: 0: None  Vomitin: None  Skin Alteration Skin Sensation: 0: No problem  Skin Reaction: 0: None(radiaplex given with written/verbal instructions)  AUA Assessment                                  Accompanied by       Objective:     Exam: Examination reviewed no significant changes.    Vitals:    18 1341   Pulse: (!) 104   Temp: 99.3  F (37.4  C)   TempSrc: Oral    SpO2: 99%   Weight: 177 lb 6.4 oz (80.5 kg)       Wt Readings from Last 8 Encounters:   12/31/18 177 lb 6.4 oz (80.5 kg)   12/13/18 178 lb (80.7 kg)   12/11/18 178 lb 9.6 oz (81 kg)   11/28/18 176 lb (79.8 kg)   10/26/18 179 lb 3.2 oz (81.3 kg)   10/26/18 179 lb 3.2 oz (81.3 kg)   10/02/18 175 lb (79.4 kg)   09/19/18 175 lb (79.4 kg)       General: Alert and oriented, in no acute distress  Dacia has no Erythema.  Aria chart and setup information reviewed    Kelsi Goddard MD

## 2021-06-22 NOTE — PROGRESS NOTES
Pt here with one family member to discuss results from recent CT. She had a fall on Thanksgiving (11/22), attempted radiation start for breast cancer 11/28 but couldn't tolerate laying in tx position d/t rib pain. Dr. Colunga sent her to the ED that day. XR was done in ED which showed lung mass, Dr. Goddard ordered a CT chest which was done 11/29 and she's here today for the results. She says pain is slowly improving.

## 2021-06-22 NOTE — TELEPHONE ENCOUNTER
Scheduling has not been able to reach patient to lock in time for EBUS in the OR.  We have time reserved for Jan 17 in the OR, but have not been able to contact patient yet.    Was able to reach patient using her cell phone number yesterday.  She does not want to schedule EBUS procedure at this time as she does not understand why another bx is needed.  Sent note to Dr. Bassett and he will try to call her Thursday or Friday.        Patient also has appt with Dr. Mackey tomorrow in clinic.

## 2021-06-22 NOTE — PROCEDURES
POST PROCEDURE NOTE    Post procedure Diagnosis: Right upper lobe lung nodule    Technical Procedure: CT guided lung biopsy.    Findings: Right upper lobe nodule    Physician: Lester J Fahrner IV    EBL:  Minimal    Specimens Removed:  3 20 gauge core biopsies    Complications:  None.

## 2021-06-22 NOTE — PROGRESS NOTES
[facility] Radiation Oncology Follow Up Note    Patient: Dacia Miller  MRN: [id number]  Date of Service: 12/21/2018      Ms. Miller is a 78 y.o. female who presented with palpable left breast lump by self-examination for which she was seeking further evaluation.  Mammogram followed by ultrasound reviewed 22 x 12 x 22 mm irregular mass involving left breast at the 12 o'clock position 6 cm from nipple suspicious for malignancy.  Needle biopsy was then obtained and pathology reviewed invasive ductal carcinoma, ER/VT positive, HER-2 negative.  After discussed with patient about various treatment options, the patient elected proceed with lumpectomy and sentinel resection as her treatment choice and underwent surgery 9/19/2018.  The pathology reviewed 26 x 21 x 19 mm invasive ductal carcinoma, [place] grade 2 with negative margin 4 mm from superior form invasive and less than 1 mm from superior, inferior, and posterior from DCIS.  There was also evidence of DIC.  One removed sentinel node showed no evidence of metastatic disease.     The patient was seen and evaluated for consideration of postop radiation therapy.  She had injury few days before radiation therapy and the chest x-ray was obtained which showed no evidence of rib fracture.  However there was a possible right upper lobe mass/infiltrate.  I have reviewed her planning CT which showed right upper lobe abnormality.  A diagnostic CT chest was obtained on 11/29/2018 which confirmed a right upper lobe masslike opacity measuring  4.6 cm as well as 8 mm right middle lobe solid pulmonary nodule.  The findings are suspicious for malignancy.  There was no mediastinum lymphadenopathy.  CT-guided needle biopsy of lung lesion on 12/13/2018 confirmed diagnosis of adenocarcinoma of lung.  The additional staging workup including PET/CT scan on 12/19/2018 mild FDG avid right upper lobe mass consistent with lung cancer.  There is no radiographic evidence of bri and  systemic metastasis.  There was a right medial lobe 0.8 cm nodule with some mild FDG activity is technical the indeterminate but remains suspicious for malignancy.  Her case has been discussed in December thoracic tumor conference. The consensus recommendation is to consider surgery.    I have reviewed all the test results today with the patient.  I have also discussed about the tumor board recommendation.  Patient will be referred to Dr Bassett for evaluation and consideration of possible surgery.  We will also schedule patient to have pulmonary function test to complete her workup.  We will start her postoperative radiation therapy for her breast cancer next week which will take approximately 3-4 weeks to complete and hopefully will be down before her lung surgery.        I spent approximately 25 minutes today with the patient and 80% time was used for counseling.      Kelsi Goddard MD, PhD  Department of Radiation Oncology   [facility]  Tel: [phone]  Page: [phone]    [facility]  [address] [place] [address]     [address] [place] [address]    CC:  Patient Care Team:  Rajinder Kitchen MD as PCP - General (Family Medicine)  Lilliana Rodriguez MD as Physician (General Surgery)  Kelsi Goddard MD as Physician (Radiation Oncology)  Tory Mackey MD as Physician (Hematology and Oncology)  Magda Davidson RN as Oncology Nurse Navigator (Hematology and Oncology)  Marcos Bassett MD (Cardiovascular and Thoracic Surgery)

## 2021-06-22 NOTE — PROGRESS NOTES
RESPIRATORY CARE NOTE     Patient Name: Dacia Miller  Today's Date: 12/27/2018     Efforts varied with multiple instruction.  Inspiratory volume is < 90% of VC on DLCO.  DLCO and FVC's meets repeatability but not acceptability.  Some testing does not meet ATS criteria. Results scanned into epic. Pt left in no distress.       Billie Felipe

## 2021-06-23 NOTE — PROGRESS NOTES
[facility] Hematology and Oncology Progress Note    Patient: Dacia Miller  MRN: [id number]  Date of Service: 01/10/2019        Reason for Visit    Chief Complaint   Patient presents with     HE Cancer     Malignant neoplasm of central portion of left breast       Assessment and Plan    T2 N0 M0, stage II left breast cancer status post lumpectomy September 19, 2018  Tumor is ER WA positive and HER-2/jerri negative  Right upper lobe lung mass, biopsy-proven adenocarcinoma, with satellite nodule and also 8 mm right middle lobe nodule      Patient had right lung biopsy confirming lung adenocarcinoma.  Met with thoracic surgery and she is not felt to be a surgical candidate.  Has been scheduled for brain MRI and was recommended EBUS procedure.  Explained the rationale to do this procedure as it could change potential management.  She is reluctant to undergo the procedure because of potential risks.  Will refer to pulmonary for evaluation for the procedure.    Patient will continue adjuvant breast radiation and tamoxifen for now.  Case to be discussed again at tumor conference next week.  We will plan to see patient again in 4 weeks.  25 minutes spent majority in counseling and coordination of care.    Plan: Continue adjuvant breast radiation  Continue tamoxifen 20 mg p.o. daily  Schedule open brain MRI  Referred to pulmonary for discussion about EBUS  Case to be discussed again at tumor conference  Follow-up in 4 weeks      Measurable disease: CT of the chest    Current therapy: Start tamoxifen 20 mg p.o. Daily, December 11, 2018            ECOG Performance   ECOG Performance Status: 2    Distress Assessment  Distress Assessment Score: 7    Pain  Pain Score (Initial OR Reassessment): 6        Problem List    1. Malignant neoplasm of central portion of left breast in female, estrogen receptor positive (H)  Ambulatory referral to Pulmonology   2. Mass of upper lobe of right lung          CC: Rajinder Kitchen,  "MD    ______________________________________________________________________________    History of Present Illness    Ms. Dacia Miller returns for follow-up.  She was seen 4 weeks ago.  She had lung biopsy that confirmed adenocarcinoma.  Case was reviewed at tumor conference and she was recommended to proceed with adjuvant breast radiation therapy.  She was referred to thoracic surgery but is not felt to be a surgical candidate.  She has record mended brain MRI and EBUS procedure.    She is tolerating tamoxifen well.  She is tolerating breast radiation fine.  She has significant concerns about the bronchoscopy and not clear why this is needed.      Pain Status  Currently in Pain: Yes    Review of Systems    Constitutional  Constitutional (WDL): Exceptions to WDL  Fatigue: Fatigue not relieved by rest - Limiting instrumental ADL  Neurosensory  Neurosensory (WDL): Exceptions to WDL  Ataxia: Moderate symptoms, limiting instrumental ADL(Uses cane. Can only ambulate very short distances.)  Cardiovascular  Cardiovascular (WDL): Exceptions to WDL  Palpitations: Definition: A disorder characterized by inflammation of the muscle tissue of the heart.  Pulmonary  Respiratory (WDL): Exceptions to WDL  Dyspnea: Shortness of breath with moderate exertion  Gastrointestinal  Gastrointestinal (WDL): Exceptions to WDL  Anorexia: Loss of appetite without alteration in eating habits(\"Not as good as usual.\")  Dry Mouth: Symptomatic (e.g., dry or thick saliva) without significant dietary alteration, unstimulated saliva flow >0.2 ml/min(\"My tongue feels irritated.\")  Genitourinary  Genitourinary (WDL): All genitourinary elements are within defined limits  Integumentary  Integumentary (WDL): Exceptions to WDL(Dry.)  Patient Coping     Distress Assessment  Distress Assessment Score: 7  Accompanied by  Accompanied by: Family Member(Saul, spouse)    Past History  Past Medical History:   Diagnosis Date     Arthritis      Breast cancer " (H)      Cancer (H)      Diabetes mellitus (H)      DM (diabetes mellitus screen)      History of anesthesia complications      HLD (hyperlipidemia)      HTN (hypertension)      Kidney stones      Lung cancer (H)      PONV (postoperative nausea and vomiting)      Sciatica      Scoliosis          Past Surgical History:   Procedure Laterality Date     ABDOMINAL SURGERY        SECTION       CHOLECYSTECTOMY       CT BIOPSY LUNG  2018     DE MASTECTOMY,PARTIAL,  WITH AXILLARY LYMPHADENECTOMY Left 2018    Procedure: Left Lumpectomy; [place] Lymph Node Biopsy;  Surgeon: Lilliana Rodriguez MD;  Location: [facility];  Service: General     SKIN BIOPSY         Physical Exam    Recent Vitals 1/10/2019   Height -   Weight -   BSA (m2) -   /66   Pulse 92   Temp 98.6   Temp src 1   SpO2 99   Some recent data might be hidden       GENERAL: Alert and oriented. Seated comfortably. In no distress.    HEAD: Atraumatic and normocephalic.  Has a full head of hair.    EYES: ZEESHAN, EOMI.  No pallor.  No icterus.    Oral cavity: no mucosal lesion or tonsillar enlargement.    NECK: supple. JVP normal.  No thyroid enlargement.    LYMPH NODES: No palpable, cervical, axillary or inguinal lymphadenopathy.    CHEST: clear to auscultation bilaterally.  Resonant to percussion throughout bilaterally.  Symmetrical breath movements bilaterally.    CVS: S1 and S2 are heard. Regular rate and rhythm.  No murmur or gallop or rub heard.    ABDOMEN: Soft. Not tender. Not distended.  No palpable hepatomegaly or splenomegaly.  No other mass palpable.  Bowel sounds heard.    EXTREMITIES: Warm.  No peripheral edema.    SKIN: no rash, or bruising or purpura.        Lab Results    No results found for this or any previous visit (from the past 168 hour(s)).    Imaging    Xr Chest 1 View Portable    Result Date: 2018  XR CHEST 1 VIEW PORTABLE 2018 11:38 AM INDICATION: Right lung biopsy. COMPARISON: 2018 at 0900 hours  FINDINGS: No pneumothorax. The right upper lobe mass is again seen.     Ct Lung Biopsy    Result Date: 12/13/2018  1. PERCUTANEOUS BIOPSY RIGHT UPPER LOBE MASS 2. CT GUIDANCE 3. CONSCIOUS SEDATION 12/13/2018 9:43 AM INDICATION: Right lung mass TECHNIQUE: Dose reduction techniques were used. PROCEDURE: Informed consent obtained. Time out performed. The site was prepped and draped in sterile fashion. 5  mL of 1% lidocaine was infused into the local soft tissues. Using standard technique and under direct CT guidance, a 20 gauge biopsy device was used to obtain three core biopsies. Tissue was submitted to Pathology and was adequate by preliminary review by a pathologist. The patient tolerated the procedure well. No immediate complications. RADIOLOGIC SUPERVISION AND INTERPRETATION: CT GUIDANCE: Images demonstrate the biopsy needle to be in good position. SEDATION: Versed 1  mg. Fentanyl 75  mcg. The procedure was performed with administration intravenous conscious sedation with appropriate preoperative, intraoperative, and postoperative evaluation. 19  minutes of supervised face to face conscious sedation time was provided by a radiology nurse under my direct supervision.     CONCLUSION: 1.  Successful CT-guided biopsy of the right upper lobe mass Reference CPT Code: 46867, 38819, 12036, 36082    Nm Pet Ct Skull To Mid Thigh    Result Date: 12/19/2018  [facility] RADIOLOGY EXAM: NM PET/CT SKULL TO MID THIGH LOCATION: [facility] DATE/TIME: 12/19/2018 2:32 PM INDICATION: New diagnosis invasive ductal carcinoma 12:00 position upper left breast treated with lumpectomy and sentinel lymph node resection. New diagnosis adenocarcinoma in a right upper lobe 4.6 mm lung mass. There is also an adjacent 0.8 cm right middle lobe pulmonary nodule. Initial treatment strategy. COMPARISON: CT 11/29/2018. TECHNIQUE: Serum glucose level 157 mg/dL. One hour post intravenous administration of 10.3 mCi F-18 FDG, PET imaging was  performed from the skull base to the mid thighs utilizing attenuation correction with concurrent axial CT and PET/CT image fusion. Dose reduction techniques were used. FINDINGS: The recently biopsied right upper lobe lung mass demonstrates mild FDG activity, SUVmax 2.4. The 0.8 cm right middle lobe nodule also demonstrates mild FDG activity, SUVmax 2.4. There are no enlarged or FDG-avid hilar or mediastinal lymph nodes. Minimal FDG activity associated with postoperative changes within the left breast associated with recent lumpectomy. No enlarged or FDG-avid left hilar lymph nodes. Normal FDG activity in the abdomen and pelvis. Atheromatous changes in a normal-caliber aorta. Coronary artery calcification. Moderate sliding esophageal hiatal hernia. Cholecystectomy with prominence of the central bile ducts consistent with the reservoir effect. Right renal cyst. Renal vascular calcification. Scattered colonic diverticulosis without diverticulitis. Tiny midline infraumbilical abdominal wall hernia containing only mesenteric fat. Degenerative disc disease throughout the spine.     CONCLUSION: 1.  Right upper lobe adenocarcinoma demonstrates only mild FDG activity. No enlarged or FDG-avid hilar or mediastinal lymph nodes. 2.  Right middle lobe 0.8 cm nodule demonstrates mild FDG activity and is technically indeterminate but remains suspicious for malignancy. 3.  Postlumpectomy changes left breast. No enlarged or FDG-avid left hilar lymph nodes.        Signed by: Tory Mackey MD

## 2021-06-23 NOTE — PROGRESS NOTES
Patient arrived via wheelchair after MD visit for vitamin B12 injection and tolerated with no complaints. Lab also at chairside to draw blood for T&S for patient appointment on Thursday am for transfusion of 2 units of blood. Patient education provided to leave ID bracelet in place until after appointment on Thursday as this ID bracelet is used for identification for her transfusion and patient agrees. Patient discharged via wheelchair with her  and stopped at registration desk to confirm future appointments.

## 2021-06-23 NOTE — PROCEDURES
Procedures  SIMULATION NOTE:   DIAGNOSIS: Non-small cell lung cancer involving right upper lobe, clinical stage T2 N0 M0.  Patient is not candidate for surgery and declined additional workup including EBUS to exam the mediastinum.      INDICATION: After discussion with patient about various treatment options, the patient elected to proceed with definitive radiation therapy using SBRT technique for her lung cancer. The patient is adamant against another staging procedure for her lung cancer.  She wished to proceed with stereotactic radiotherapy as her treatment choice.  She is informed there is a risk of cancer metastasis to the mediastinal nodes which will require systemic therapy. The patient is here for simulation.     CONSENT: The possible risks and side effects of radiation therapy have been discussed with the patient in detail and at a great length. The patient's questions are answered to patient's satisfaction. Written consent was obtained.     SIMULATION: Patient is in supine position with wing board and VacLok to help keep the same position during the radiation therapy. Tentative isocenter is set in the center of thoracic region. We will acquire 4D scan to help us better locate target and design radiation therapy field. We are also going to use the respiratory gating technique to help us to better locate the movement of the tumor.     BLOCKS: Custom blocks will be drawn to minimize radiation to normal tissues and to protect normal organs including, but not limited to, spinal cord, brachial plexus, lungs, bronchial tree, esophagus, liver, heart/large vessels, spleen, stomach, small bowel, diaphragm, bone and soft tissue.     DOSAGE: I plan to give her radiation therapy at 1000 cGy each fraction to a total of 5000 cGy in 5 fractions over 2 weeks. I will consider using SBRT/IGRT technique to help us to better locate the target and to protect normal tissue.       Kelsi Goddard MD, PhD  Department of Radiation  Oncology   [facility]  Tel: [phone]  Page: [phone]    [facility]  [address]  [place] [address]     [address]  [place] [address]

## 2021-06-23 NOTE — PROGRESS NOTES
Called Lexus to remind her to hold her aspirin starting tomorrow for her re-scheduled EBUS procedure in the OR on Thurs, Jan 31 at 1:30pm.  NPO at least 8 hours prior to her procedure as well.  Again given phone numbers to call if any questions.

## 2021-06-23 NOTE — PROGRESS NOTES
[facility] Hematology and Oncology Progress Note    Patient: Dacia Miller  MRN: [id number]  Date of Service: 01/22/2019        Reason for Visit    Chief Complaint   Patient presents with     HE Cancer     Malignant neoplasm of central portion of left breast in female,       Assessment and Plan    T2 N0 M0, stage II left breast cancer status post lumpectomy September 19, 2018  Tumor is ER DE positive and HER-2/jerri negative  Right upper lobe lung mass, biopsy-proven adenocarcinoma, with satellite nodule and also 8 mm right middle lobe nodule  Normocytic anemia with iron deficiency and B12 deficiency    Patient found to have profound anemia prior to bronchoscopy procedure.  Workup shows low ferritin and low B12 levels.  Patient is a poor historian but reports that she was on iron and B12 in the past and stopped these treatments.  Denies any bleeding, dark stools or blood in the stools or urine.  Has had colonoscopy 6 years ago which was normal.    Recommend blood transfusion to improve her hemoglobin so that we can proceed with bronchoscopy/ultrasound and lymph node staging.  Reviewed potential risks of transfusion reaction and rare risks of infection.  She understands and is willing to proceed with transfusion did sign a consent.  We will have her return Thursday for transfusion of 2 units of packed cells.    Will start vitamin B12 injections today and continue weekly for the next 4 weeks.  Will start slow iron 3 times daily.  We will have her return as previously scheduled in about 2-3 weeks to review pathology from procedure.  Will recheck CBC at that time.  She will probably need to continue on B12 injections and oral iron indefinitely.    She will continue adjuvant radiation therapy to the breast.  She will continue tamoxifen.    Will probably be recommended stereotactic radiation therapy for the lung masses.    Plan: Return Thursday for transfusion of 2 units of packed cells  Start vitamin B12 thousand  micrograms IM weekly  Start oral iron 3 times daily  Follow-up in 2-3 weeks with repeat CBC  Proceed with bronchoscopy and endoscopic ultrasound for lymph node staging  Continue adjuvant breast radiation and tamoxifen      Measurable disease: CT of the chest    Current therapy: Start tamoxifen 20 mg p.o. Daily, December 11, 2018  Start vitamin B12 injections thousand micrograms IM January 22, 2019  Start first sulfate 3 times daily January 22, 2019          ECOG Performance   ECOG Performance Status: 2    Distress Assessment  Distress Assessment Score: 9    Pain  Pain Score (Initial OR Reassessment): 8        Problem List    1. Mass of upper lobe of right lung  ferrous sulfate 140 mg (45 mg iron) TbER    HM1(CBC and Differential)   2. Malignant neoplasm of central portion of left breast in female, estrogen receptor positive (H)     3. Iron deficiency anemia, unspecified iron deficiency anemia type  Type and Screen    sodium chloride 0.9% 250 mL    sodium chloride 0.9% 250 mL    Prepare RBC, 2 Units    Transfuse RBC, 2 Units   4. Vitamin B12 deficiency (non anemic)          CC: Rajinder Kitchen MD    ______________________________________________________________________________    History of Present Illness    Ms. Dacia Miller returns for follow-up.  She was seen 2 weeks ago.  She was found to have profound anemia prior to bronchoscopy.  She had anemia workup and is here to review results.  Lab work shows both iron deficiency and B12 deficiency.  Reports previous history of anemia and was on iron but stopped oral iron replacement.  Denies blood in the stool or urine.  Denies dark stools.  Denies any history of bleeding from her bowels.  Had colonoscopy 6 years ago which was normal.  No previous endoscopy.  Was on B12 pills in the past as well.    She continues on tamoxifen and adjuvant breast radiation.    Pain Status  Currently in Pain: Yes    Review of Systems    Constitutional  Constitutional (WDL):  Exceptions to WDL  Fatigue: Fatigue not relieved by rest - Limiting instrumental ADL  Neurosensory  Neurosensory (WDL): Exceptions to WDL  Ataxia: Moderate symptoms, limiting instrumental ADL(In a wheelchair today. More fatigued/weak. Uses a cane normally.)  Cardiovascular  Cardiovascular (WDL): Exceptions to WDL  Palpitations: Definition: A disorder characterized by inflammation of the muscle tissue of the heart.  Pulmonary  Respiratory (WDL): Exceptions to WDL  Dyspnea: Shortness of breath with moderate exertion  Gastrointestinal  Gastrointestinal (WDL): Exceptions to WDL  Anorexia: Loss of appetite without alteration in eating habits  Dry Mouth: Symptomatic (e.g., dry or thick saliva) without significant dietary alteration, unstimulated saliva flow >0.2 ml/min  Genitourinary  Genitourinary (WDL): All genitourinary elements are within defined limits  Integumentary  Integumentary (WDL): All integumentary elements are within defined limits  Patient Coping  Patient Coping: Accepting  Distress Assessment  Distress Assessment Score: 9  Accompanied by  Accompanied by: Family Member    Past History  Past Medical History:   Diagnosis Date     Anemia, iron deficiency 2019     Arthritis      Breast cancer (H)      Cancer (H)      Diabetes mellitus (H)      DM (diabetes mellitus screen)      History of anesthesia complications      HLD (hyperlipidemia)      HTN (hypertension)      Kidney stones      Lung cancer (H)      PONV (postoperative nausea and vomiting)      Sciatica      Scoliosis          Past Surgical History:   Procedure Laterality Date     ABDOMINAL SURGERY        SECTION       CHOLECYSTECTOMY       CT BIOPSY LUNG  2018     MI MASTECTOMY,PARTIAL,  WITH AXILLARY LYMPHADENECTOMY Left 2018    Procedure: Left Lumpectomy; [place] Lymph Node Biopsy;  Surgeon: Lilliana Rodriguez MD;  Location: [facility];  Service: General     SKIN BIOPSY         Physical Exam    Recent Vitals 2019    Weight (No Data)   BSA (m2) -   /71   Pulse 86   Temp 99   Temp src 1   SpO2 98   Some recent data might be hidden       GENERAL: Alert and oriented. Seated comfortably. In no distress.    HEAD: Atraumatic and normocephalic.  Has a full head of hair.    EYES: ZEESHAN, EOMI.  No pallor.  No icterus.    Oral cavity: no mucosal lesion or tonsillar enlargement.    NECK: supple. JVP normal.  No thyroid enlargement.    LYMPH NODES: No palpable, cervical, axillary or inguinal lymphadenopathy.    CHEST: clear to auscultation bilaterally.  Resonant to percussion throughout bilaterally.  Symmetrical breath movements bilaterally.    CVS: S1 and S2 are heard. Regular rate and rhythm.  No murmur or gallop or rub heard.    ABDOMEN: Soft. Not tender. Not distended.  No palpable hepatomegaly or splenomegaly.  No other mass palpable.  Bowel sounds heard.    EXTREMITIES: Warm.  No peripheral edema.    SKIN: no rash, or bruising or purpura.        Lab Results    Recent Results (from the past 168 hour(s))   Comprehensive Metabolic Panel   Result Value Ref Range    Sodium 136 136 - 145 mmol/L    Potassium 5.1 (H) 3.5 - 5.0 mmol/L    Chloride 107 98 - 107 mmol/L    CO2 21 (L) 22 - 31 mmol/L    Anion Gap, Calculation 8 5 - 18 mmol/L    Glucose 339 (H) 70 - 125 mg/dL    BUN 24 8 - 28 mg/dL    Creatinine 1.46 (H) 0.60 - 1.10 mg/dL    GFR MDRD Af Amer 42 (L) >60 mL/min/1.73m2    GFR MDRD Non Af Amer 35 (L) >60 mL/min/1.73m2    Bilirubin, Total 0.2 0.0 - 1.0 mg/dL    Calcium 8.8 8.5 - 10.5 mg/dL    Protein, Total 6.4 6.0 - 8.0 g/dL    Albumin 3.5 3.5 - 5.0 g/dL    Alkaline Phosphatase 82 45 - 120 U/L    AST 9 0 - 40 U/L    ALT <9 0 - 45 U/L   Morphology, Path Smear Review (MORP)   Result Value Ref Range    Pathology, Smear Review See Separate Pathology Report (!) (none)    WBC 6.8 4.0 - 11.0 thou/uL    RBC 2.56 (L) 3.80 - 5.40 mill/uL    Hemoglobin 6.2 (LL) 12.0 - 16.0 g/dL    Hematocrit 21.9 (L) 35.0 - 47.0 %    MCV 86 80 - 100 fL     MCH 24.2 (L) 27.0 - 34.0 pg    MCHC 28.3 (L) 32.0 - 36.0 g/dL    RDW 15.7 (H) 11.0 - 14.5 %    Platelets 240 140 - 440 thou/uL    MPV 10.5 8.5 - 12.5 fL   Lactate Dehydrogenase (LDH)   Result Value Ref Range    LD (LDH) 167 125 - 220 U/L   Haptoglobin   Result Value Ref Range    Haptoglobin 147 33 - 171 mg/dL   Vitamin B12   Result Value Ref Range    Vitamin B-12 206 (L) 213 - 816 pg/mL   Folate, Serum   Result Value Ref Range    Folate 11.5 >=3.5 ng/mL   Reticulocytes   Result Value Ref Range    Retic Absolute Count 0.052 0.010 - 0.110 mill/uL   Ferritin   Result Value Ref Range    Ferritin 7 (L) 10 - 130 ng/mL   Iron and Transferrin Iron Binding Capacity   Result Value Ref Range    Iron 13 (L) 42 - 175 ug/dL    Transferrin 302 212 - 360 mg/dL    Transferrin Saturation, Calculated 3 (L) 20 - 50 %    Transferrin IBC, Calculated 378 313 - 563 ug/dL   HM1 (CBC with Diff)   Result Value Ref Range    WBC 6.8 4.0 - 11.0 thou/uL    RBC 2.56 (L) 3.80 - 5.40 mill/uL    Hemoglobin 6.2 (LL) 12.0 - 16.0 g/dL    Hematocrit 21.9 (L) 35.0 - 47.0 %    MCV 86 80 - 100 fL    MCH 24.2 (L) 27.0 - 34.0 pg    MCHC 28.3 (L) 32.0 - 36.0 g/dL    RDW 15.7 (H) 11.0 - 14.5 %    Platelets 240 140 - 440 thou/uL    MPV 10.5 8.5 - 12.5 fL   Manual Differential   Result Value Ref Range    Total Neutrophils % 79 (H) 50 - 70 %    Lymphocytes % 13 (L) 20 - 40 %    Monocytes % 5 2 - 10 %    Eosinophils %  3 0 - 6 %    Basophils % 0 0 - 2 %    Total Neutrophils Absolute 5.4 2.0 - 7.7 thou/ul    Lymphocytes Absolute 0.9 0.8 - 4.4 thou/uL    Monocytes Absolute 0.3 0.0 - 0.9 thou/uL    Eosinophils Absolute 0.2 0.0 - 0.4 thou/uL    Basophils Absolute 0.0 0.0 - 0.2 thou/uL    Platelet Estimate Normal Normal    Ovalocytes 2+ (!) Negative    Polychromasia 1+ (!) Negative    Schistocytes 1+ (!) Negative    Tear Drop Cells 1+ (!) Negative   Peripheral Blood Smear, Path Review   Result Value Ref Range    Case Report       Peripheral Blood Morphology                        Case: [id number]                                   Authorizing Provider:  Adelina Romero,   Collected:           01/18/2019 1421                                     CNP                                                                          Ordering Location:     [facility] and Received:            01/18/2019 9184                                     Hematology                                                                   Pathologist:           Ren Wood MD                                                        Specimen:    Peripheral Blood                                                                           Final Diagnosis       PERIPHERAL BLOOD:     -  NORMOCHROMIC-NORMOCYTIC ANEMIA     -  SEVERAL SPHEROCYTES ARE PRESENT     -  RARE SCHISTOCYTES ARE PRESENT     -  UNREMARKABLE PLATELETS     -  NEGATIVE FOR ACUTE LEUKEMIA     -  PLEASE SEE COMMENT    Comment       Normocytic anemia can be caused by multiple etiologies including intrinsic bone marrow disease, renal disease, hepatic disease, endocrine disease, anemia of chronic disease, post-hemorrhagic anemia, and bone marrow infiltration by tumors. Recommend clinical correlation and follow-up. Several spherocytes are present, raising the possibility of extravascular hemolysis (e.g., in the spleen). Rare schistocytes are identified, raising the possibility of a very low-grade intravascular hemolytic anemia. Suggest assessment of serum haptoglobin, LDH and a direct antiglobulin test.    Clinical Information D64.9     Charges CPT:  70265   ICD-10:  D64.9        Imaging    No results found.      Signed by: Tory Mackey MD

## 2021-06-23 NOTE — TELEPHONE ENCOUNTER
Called lung clinic to schedule EBUS consult. Pt is already scheduled for EBUS 1/17/19.  will pass patient request along to nurse and doctor team and see if they can squeeze her in prior to the EBUS. Pt is aware if she needs to see someone her EBUS will be rescheduled further out. The lung clinic will call her. I also encouraged the patient to call them if she had specific questions she wanted answered so that perhaps the nurse or doctor could answer them over the phone.

## 2021-06-23 NOTE — PROGRESS NOTES
Pt arrived ambulatory with use of cane - assisted to chair 12. Patient is accompanied by her son, Shashank. Reviewed plan of care and today's treatment - educated on the process of receiving a transfusion, and potential s/sx of a reaction. Placed IV in the right arm (hx of breast CA on the left) - and used NS as the primary IV solution. The blood consent is signed and on the chart. Transfused with two units of RBCs - VSS throughout. Between the units and at completion the IV line was flushed with NS, using a total volume of 250 mls. Assisted to the bathroom x 3. Ate 100% of lunch. Dc'd the IV and wrapped site with gauze and coban. Post transfusion discharge instructions was given and explained to the patient. Left unit via w/c (and accompanied by her son) at 14:30.    April Garrett RN

## 2021-06-23 NOTE — TELEPHONE ENCOUNTER
After review by Adelina Romero CNP, call placed to Lexus to discuss one of her lab results from earlier today.  Her Hgb is 6.2.  Per Adelina, depending on how she is feeling, she will either need to go to the ER today or over the weekend or come in early next week to our clinic and get a blood transfusion.  We do not have any other labs back at this time but Adelina wants her 2/7 appt with Dr Mackey moved up to next week to discuss the rest of the results and to develop a plan.  Lexus does not want to have a blood transfusion.  She asked why she couldn't just take iron pills to help her Hgb and I explained to her the best I can that iron pills may not work.  There are many reasons why someone's Hgb may be low and until we know further about why this is happening, we don't suggest iron pills.  She verbalized understanding and wants to decline the transfusion for now and will talk further with Dr Mackey.  She is aware of our recommendation and is declining.  She will see Dr Mackey next Tuesday instead of 2/7.    Altagracia Gaytan RN

## 2021-06-23 NOTE — PROGRESS NOTES
[facility] Hematology and Oncology Progress Note    Patient: Dacia Miller  MRN: [id number]  Date of Service: 02/07/2019        Reason for Visit    Chief Complaint   Patient presents with     HE Cancer       Assessment and Plan    T2 N0 M0, stage II left breast cancer status post lumpectomy September 19, 2018  Tumor is ER IA positive and HER-2/jerri negative  Right upper lobe lung mass, biopsy-proven adenocarcinoma, with satellite nodule and also 8 mm right middle lobe nodule  Normocytic anemia with iron deficiency and B12 deficiency  Breast cellulitis      Patient has completed breast radiation.  Having significant tenderness, warmth in the inferior portion of the breast where there is desquamation.  Will treat for cellulitis.  Given allergies to penicillin, cephalosporins and sulfa will start her on doxycycline 100 mg twice daily for 10 days.  Asked her to contact us if there is worsening or if she develops fever.    She will continue tamoxifen 20 mg p.o. daily.    She has declined bronchoscopy.  Plan is to proceed with stereotactic lung radiation.    Hemoglobin has improved with transfusion and B12 injections and oral iron.  She will get another B12 injection today and again next week and then do it every 4 weeks.  Asked her to increase the iron to 3 times daily.  Will recheck CBC when she returns to see me in 3 months.    Plan: As above    Measurable disease: CT of the chest    Current therapy: Start tamoxifen 20 mg p.o. Daily, December 11, 2018  Start vitamin B12 injections thousand micrograms IM January 22, 2019  Start first sulfate 3 times daily January 22, 2019      Treatment history:  Adjuvant breast radiation 50-40 cGy in 20 fractions completed February 4, 2019    ECOG Performance   ECOG Performance Status: 2    Distress Assessment  Distress Assessment Score: 8    Pain  Pain Score (Initial OR Reassessment): 8        Problem List    1. Malignant neoplasm of central portion of left breast in female,  estrogen receptor positive (H)     2. Other iron deficiency anemia  HM1(CBC and Differential)   3. Vitamin B12 deficiency (non anemic)  HM1(CBC and Differential)   4. Malignant neoplasm of upper lobe of right lung (H)     5. Cellulitis of chest wall  doxycycline (VIBRA-TABS) 100 MG tablet        CC: Rajinder Kitchen MD    ______________________________________________________________________________    History of Present Illness    Ms. Dacia Miller returns for follow-up.  Was seen 3 weeks ago.  She is completed breast radiation but is complaining of pain, warmth and tenderness in the left breast with open area in the inferior portion of the breast with some discharge.    Did have blood transfusion and then started B12 and oral iron but only once daily.  Does report improvement in her strength and activity level.    Has declined bronchoscopy and plans are now to proceed with stereotactic radiation to the 2 lung masses.              January 2019:     She was seen 2 weeks ago.  She was found to have profound anemia prior to bronchoscopy.  She had anemia workup and is here to review results.  Lab work shows both iron deficiency and B12 deficiency.  Reports previous history of anemia and was on iron but stopped oral iron replacement.  Denies blood in the stool or urine.  Denies dark stools.  Denies any history of bleeding from her bowels.  Had colonoscopy 6 years ago which was normal.  No previous endoscopy.  Was on B12 pills in the past as well.    She continues on tamoxifen and adjuvant breast radiation.    Pain Status  Currently in Pain: No/denies    Review of Systems    Constitutional  Constitutional (WDL): Exceptions to WDL  Fatigue: Fatigue relieved by rest  Neurosensory  Neurosensory (WDL): Exceptions to WDL  Ataxia: Moderate symptoms, limiting instrumental ADL  Cardiovascular  Cardiovascular (WDL): All cardiovascular elements are within defined limits  Pulmonary  Respiratory (WDL): Within Defined  Limits  Gastrointestinal  Gastrointestinal (WDL): Exceptions to WDL  Anorexia: Loss of appetite without alteration in eating habits  Dry Mouth: Symptomatic (e.g., dry or thick saliva) without significant dietary alteration, unstimulated saliva flow >0.2 ml/min  Genitourinary  Genitourinary (WDL): All genitourinary elements are within defined limits  Integumentary  Integumentary (WDL): Exceptions to WDL(pt has weeping under her left breast. pt ran out of mepilex )  Patient Coping  Patient Coping: Accepting  Distress Assessment  Distress Assessment Score: 8  Accompanied by  Accompanied by: Family Member    Past History  Past Medical History:   Diagnosis Date     Anemia, iron deficiency 2019     Arthritis      Breast cancer (H)      Cancer (H)      Diabetes mellitus (H)      DM (diabetes mellitus screen)      History of anesthesia complications      HLD (hyperlipidemia)      HTN (hypertension)      Kidney stones      Lung cancer (H)      PONV (postoperative nausea and vomiting)      Sciatica      Scoliosis          Past Surgical History:   Procedure Laterality Date     ABDOMINAL SURGERY        SECTION       CHOLECYSTECTOMY       CT BIOPSY LUNG  2018     NH MASTECTOMY,PARTIAL,  WITH AXILLARY LYMPHADENECTOMY Left 2018    Procedure: Left Lumpectomy; Lyle Lymph Node Biopsy;  Surgeon: Lilliana Rodriguez MD;  Location: [facility];  Service: General     SKIN BIOPSY         Physical Exam    Recent Vitals 2019   Weight 174 lbs 7 oz   BSA (m2) 1.89 m2   /66   Pulse 112   Temp 98.7   Temp src -   SpO2 96   Some recent data might be hidden       GENERAL: Alert and oriented. Seated comfortably. In no distress.    HEAD: Atraumatic and normocephalic.  Has a full head of hair.    EYES: ZEESHAN, EOMI.  No pallor.  No icterus.    Oral cavity: no mucosal lesion or tonsillar enlargement.    NECK: supple. JVP normal.  No thyroid enlargement.    LYMPH NODES: No palpable, cervical, axillary or inguinal  lymphadenopathy.    CHEST: clear to auscultation bilaterally.  Resonant to percussion throughout bilaterally.  Symmetrical breath movements bilaterally.    CVS: S1 and S2 are heard. Regular rate and rhythm.  No murmur or gallop or rub heard.    : Left breast is diffusely erythematous with warmth to touch and tenderness to palpation.  Discrimination in the inferior portion is noted    ABDOMEN: Soft. Not tender. Not distended.  No palpable hepatomegaly or splenomegaly.  No other mass palpable.  Bowel sounds heard.    EXTREMITIES: Warm.  No peripheral edema.    SKIN: no rash, or bruising or purpura.        Lab Results    Recent Results (from the past 168 hour(s))   HM1 (CBC with Diff)   Result Value Ref Range    WBC 8.6 4.0 - 11.0 thou/uL    RBC 3.71 (L) 3.80 - 5.40 mill/uL    Hemoglobin 9.7 (L) 12.0 - 16.0 g/dL    Hematocrit 32.4 (L) 35.0 - 47.0 %    MCV 87 80 - 100 fL    MCH 26.1 (L) 27.0 - 34.0 pg    MCHC 29.9 (L) 32.0 - 36.0 g/dL    RDW 18.5 (H) 11.0 - 14.5 %    Platelets 205 140 - 440 thou/uL    MPV 10.5 8.5 - 12.5 fL    Neutrophils % 82 (H) 50 - 70 %    Lymphocytes % 10 (L) 20 - 40 %    Monocytes % 6 2 - 10 %    Eosinophils % 1 0 - 6 %    Basophils % 0 0 - 2 %    Neutrophils Absolute 7.0 2.0 - 7.7 thou/uL    Lymphocytes Absolute 0.9 0.8 - 4.4 thou/uL    Monocytes Absolute 0.5 0.0 - 0.9 thou/uL    Eosinophils Absolute 0.1 0.0 - 0.4 thou/uL    Basophils Absolute 0.0 0.0 - 0.2 thou/uL       Imaging    No results found.      Signed by: Tory Mackey MD

## 2021-06-23 NOTE — PROGRESS NOTES
Lexus called scheduling this am to cancel her EBUS procedure scheduled in the OR for 1:30 today.  She is very anxious, could not sleep last night.  Just wants to cancel.    Dr. Bassett in clinic and was notified.  He will discuss options at tumor board today.

## 2021-06-23 NOTE — PROGRESS NOTES
RADIATION ONCOLOGY WEEKLY TREATMENT VISIT NOTE      Assessment / Impression       1. Malignant neoplasm of central portion of left breast in female, estrogen receptor positive (H)       Tolerating radiation therapy well.  All questions and concerns addressed.    Plan:     Continue radiation treatment as prescribed.  Patient is a scheduled to see Dr. Mackey, med oncology tomorrow for discussion of management options of her lower blood count.  I recommend patient to make sure keep the appointment with Dr. Mackey and also appointment for the EBUS next week.    Subjective:      HPI: Dacia Miller is a 78 y.o. female with    1. Malignant neoplasm of central portion of left breast in female, estrogen receptor positive (H)         The following portions of the patient's history were reviewed and updated as appropriate: allergies, current medications, past family history, past medical history, past social history, past surgical history and problem list.    Assessment                  Body Site: Breast                           Site: Left Breast  Stereotactic Radiosurgery: No  Concurrent Therapy: Yes(tamoxifen)  Today's Dose: 3710  Total Dose for Breast: 5240  Today's Fraction/Total Fraction Breast: 14/20  Drainage: 0: Absent                                            Sexuality Alteration                 Emotional Alteration Copin: Effective  Comfort Alteration KPS: 70% Cannot do active work, but can care for self  Fatigue (ONS scale) : 8: Extreme Fatigue  Pain Location: legs & lower back  Pain Intensity. Rate degree of pain ranging from 0 (no pain) to 10 (severe pain) : 7  Pain Description: Ache - Muscular type ache  Pain Intervention: 3: Opiods  Effectiveness of pain intervention: 2: Pain relieved 50%  Hot Flashes and/or Flushes: 0: None   Nutrition Alteration Anorexia: 0: None  Nausea: 0: None  Vomitin: None  Skin Alteration Skin Sensation: 0: No problem  Skin Reaction: 1: Faint erythema or  dry desquamation  AUA Assessment                                  Accompanied by       Objective:     Exam: Examination reviewed no significant changes.    Vitals:    01/21/19 1316   BP: 151/54   Pulse: 97   Temp: 98.9  F (37.2  C)   TempSrc: Oral   SpO2: 99%   Weight: 175 lb 12.8 oz (79.7 kg)       Wt Readings from Last 8 Encounters:   01/21/19 175 lb 12.8 oz (79.7 kg)   01/14/19 176 lb 14.4 oz (80.2 kg)   01/07/19 177 lb 3 oz (80.4 kg)   01/03/19 166 lb (75.3 kg)   12/31/18 177 lb 6.4 oz (80.5 kg)   12/13/18 178 lb (80.7 kg)   12/11/18 178 lb 9.6 oz (81 kg)   11/28/18 176 lb (79.8 kg)       General: Alert and oriented, in no acute distress  Dacia has mild Erythema.  Aria chart and setup information reviewed    Kelsi Goddard MD

## 2021-06-23 NOTE — PROGRESS NOTES
Lexus came to clinic this afternoon for her next dose of vitamin B12.  VSS.  Pt assessed.  B12 given sq into her right upper arm.  She tolerated the injection well and site was covered with a bandaid.  Lexus d/c from clinic ambulatory using her cane accompanied by a family member.

## 2021-06-23 NOTE — TELEPHONE ENCOUNTER
I called patient today regarding her skin discomfort.  She finished her breast radiation therapy a week ago and patient complained of significant redness and discomfort in the left breast.  I have recommend patient to come to radiation oncology today or tomorrow for office follow-up and address the issues of her skin discomfort.

## 2021-06-23 NOTE — PROGRESS NOTES
Pt is here for visit with dr Mirza.  is with her.  Pt reports increased weeping under left breast. Pt states she ran out of meplilex pads. Talked with the radiation nurse and she suggested to continue the mepilex pads. She is to put aquafor on before she puts a pad on and then keep the pad on as long as it stays intact. I told this to pt. She did not want me to put a pad on for her at this time.

## 2021-06-23 NOTE — PROGRESS NOTES
Radiation Treatment Summary    Patient: Dacia Miller   MRN: [id number]  : 1940  Care Provider: Kelsi Goddard    Date of Service: 2019          Lilliana Rodriguez MD  [facility] [id number]             Dear Dr. Rodriguez:    Your patient Mrs. Dacia Miller complete her postop radiation therapy for her breast cancer on 2019. As you know Ms. Miller is a 78 y.o. female with a diagnosis of left breast cancer, stage T2 N0 M0, ER/NJ positive, HER-2 negative, status post lumpectomy and sentinel resection with close margin form DCIS.    She received postop radiation therapy with a total dose of 5240 cGy in 20 treatments targeted to the left breast only given from 2018-1019.  She tolerated radiation therapy very well with expected acute side effects at the end.      Patient was also found to have early stage non-small cell lung cancer. The patient has been canceled several times for her EBUS procedure as part of the staging workup.  Her case was also discussed at the thoracic tumor conference on 2019.  The consensus recommendation is to complete staging workup before proceed with therapy.  We will consider stereotactic radiotherapy for her lung cancer if patient declined EBUS procedure.  This has been discussed with the patient and his son today.  The patient is adamant against another staging procedure for her lung cancer.  She wished to proceed with stereotactic radiotherapy as her treatment choice.  She is informed there is a risk of cancer metastasis to the mediastinal nodes which will require systemic therapy.  She understands well and wished to proceed with definitive external beam radiation therapy at this time.  She therefore will be scheduled to return to radiation oncology later this week for simulation.  I plan to give her radiation therapy to a total dose of 5000 cGy in 5 treatments using SBRT.    Again, thank you very much for the referral and allowing me  to participate in the care of this patient.  If you have any questions about this patient, please do not hesitate to call.    Sincerely,      Kelsi Goddard MD, PhD  Department of Radiation Oncology   [facility]  Tel: [phone]  Page: [phone]    [facility]  [address] [place] [address]     [facility]  [address] [place] [address]    CC:  Patient Care Team:  Rajinder Kitchen MD as PCP - General (Family Medicine)  Lilliana Rodriguez MD as Physician (General Surgery)  Kelsi Goddard MD as Physician (Radiation Oncology)  Tory Mackey MD as Physician (Hematology and Oncology)  Magda Davidson RN as Oncology Nurse Navigator (Hematology and Oncology)  Marcos Bassett MD (Cardiovascular and Thoracic Surgery)

## 2021-06-23 NOTE — PROCEDURES
SBRT Special Treatment Procedure Note  Date of Service: 2/7/2019    Diagnosis:  Non-small cell lung cancer, stage T2N0M0    Medical Indication:  To escalate the radiotherapy dose to a curative dose (5000 cGy) using stereotactic body radiotherapy technique and to spare surrounding lung and normal tissues from excessive toxicity. SBRT planning was completed today to prepare for lung cancer treatment.  SBRT planning is chosen to avoid the critical structures, which cannot be done adequately with conventional planning due to the dose constraints of the normal surrounding critical organs.  In addition, the treatment will be high dose per fraction with complete course to be done in 5 sessions. The patient previously had CT scan acquisition for planning and special treatment aids used include.  The patient was simulated in a supine position. After the contouring of the GTV, ITV (MIPS 90%) and MIPS Averages, a clinical tumor volume (CTV), expanded volume of planned treatment volume (PTV) was carried out on the treatment planning system.  Critical structures outlined included both right and left lung, brachial plexus, spinal cord, esophagus, and airway. Extra efforts were required to immobilize the patient using the custom designed stereotactic frame as well as planning.  Extra efforts during the planning process included contouring of critical structures, GTV, ITV, CTV, PTV and evaluating the DVH and coverage of the PTV.    The patient is going to have SBRT technique for her lung cancer. I have explained to the patient this is a very complicated process which will require an extensive amount of time for planning, delivering and monitoring the patient. The patient understands well and wished to proceed.     Kelsi Goddard MD, PhD  Department of Radiation Oncology   [facility]  Tel: [phone]  Page: [phone]    [facility]  [address] [place] [address]     [address] [place] [address]

## 2021-06-23 NOTE — TELEPHONE ENCOUNTER
Patient wanting to discuss this procedure with Dr. Bassett before scheduling.  Dr. Bassett spoke with patient.  I called her again and we went over the procedure and what she can expect.  She is now OK with scheduling the procedure.     Patient informed  that her EBUS procedure in the OR is scheduled for Thursday, January 17, 2019 at 8:00am at [facility].  Instructed to arrive at 6:30am.  An IV will be placed and IV sedation will be given.  Biopsies may be done.  Instructed to have nothing to eat or drink for 6 hours before procedure.  Medication instructions: hold all meds am of procedure  Stop Aspirin date:  5 days before procedure. Patient will stop after Friday, jan 11 dose  Stop blood thinner date: N/A  Patient understands that they will need a ride home after the procedure and someone to stay with them at home after the procedure.  Patient had no further questions and is agreeable to procedure.  Phone number given for questions.

## 2021-06-23 NOTE — PROGRESS NOTES
RADIATION ONCOLOGY WEEKLY TREATMENT VISIT NOTE      Assessment / Impression       1. Malignant neoplasm of upper lobe of right lung (H)     2. Malignant neoplasm of central portion of left breast in female, estrogen receptor positive (H)       Tolerating radiation therapy well.  All questions and concerns addressed.    Plan:     The patient completed her breast postop radiation therapy today.  She is so far has been tolerated very well.  I have again discussed with the patient about treatment options for her newly diagnosed lung cancer.  The patient has been canceled several times for her EBUS procedure as part of the staging workup.  Her case was also discussed at the thoracic tumor conference on 1/31/2019.  The consensus recommendation is to complete staging workup before proceed with therapy.  We will consider stereotactic radiotherapy for her lung cancer if patient declined EBUS procedure.  This has been discussed with the patient and his son today.  The patient is adamant against another staging procedure for her lung cancer.  She wished to proceed with stereotactic radiotherapy as her treatment choice.  She is informed there is a risk of cancer metastasis to the mediastinal nodes which will require systemic therapy.  She understands well and wished to proceed with definitive external beam radiation therapy at this time.  She therefore will be scheduled to return to radiation oncology later this week for simulation.  I plan to give her radiation therapy to a total dose of 5000 cGy in 5 treatments using SBRT.  Written consent was also obtained today.    Subjective:      HPI: Dacia Miller is a 78 y.o. female with    1. Malignant neoplasm of upper lobe of right lung (H)     2. Malignant neoplasm of central portion of left breast in female, estrogen receptor positive (H)         The following portions of the patient's history were reviewed and updated as appropriate: allergies, current medications, past  family history, past medical history, past social history, past surgical history and problem list.    Assessment                  Body Site: Breast                           Site: Left breast  Stereotactic Radiosurgery: No  Concurrent Therapy: Yes  Today's Dose: 5240  Total Dose for Breast: 5240  Today's Fraction/Total Fraction Breast:   Drainage: 0: Absent                                            Sexuality Alteration                 Emotional Alteration Copin: Effective  Comfort Alteration KPS: 80% Can perform normal activity with effort, some signs of disease  Fatigue (ONS scale) : 6: Moderate Fatigue  Pain Location: legs and lower back  Pain Description: Ache - Muscular type ache  Pain Intervention: 3: Opiods  Effectiveness of pain intervention: 2: Pain relieved 50%  Hot Flashes and/or Flushes: 0: None   Nutrition Alteration Anorexia: 0: None  Nausea: 0: None  Vomitin: None  Skin Alteration Skin Sensation: 2: Burning  Skin Reaction: 3: Dry desquamation with or without erythema  AUA Assessment                                  Accompanied by  Accompanied by: Family Member    Objective:     Exam: moderate Erythema.    There were no vitals filed for this visit.    Wt Readings from Last 8 Encounters:   19 175 lb 12.8 oz (79.7 kg)   19 176 lb 14.4 oz (80.2 kg)   19 177 lb 3 oz (80.4 kg)   19 166 lb (75.3 kg)   18 177 lb 6.4 oz (80.5 kg)   18 178 lb (80.7 kg)   18 178 lb 9.6 oz (81 kg)   18 176 lb (79.8 kg)       General: Alert and oriented, in no acute distress  Dacia has moderate Erythema.  Aria chart and setup information reviewed    Kelsi Goddard MD

## 2021-06-23 NOTE — PROGRESS NOTES
Lexus was discussed at our interdisciplinary thoracic conference today.  She has been scheduled for EBUS X 3 and has canceled three times.  Consensus was to offer Lexus SBRT for right upper lobe adenocarcinoma and monitor the right middle lobe nodule and lymph nodes on serial imaging.  Dr. Goddard will discuss this with her tomorrow when she returns for radiation tx.

## 2021-06-23 NOTE — PROGRESS NOTES
RADIATION ONCOLOGY WEEKLY TREATMENT VISIT NOTE      Assessment / Impression       1. Malignant neoplasm of central portion of left breast in female, estrogen receptor positive (H)       Tolerating radiation therapy well.  All questions and concerns addressed.    Plan:     Continue radiation treatment as prescribed.    Subjective:      HPI: Dacia Miller is a 78 y.o. female with    1. Malignant neoplasm of central portion of left breast in female, estrogen receptor positive (H)         The following portions of the patient's history were reviewed and updated as appropriate: allergies, current medications, past family history, past medical history, past social history, past surgical history and problem list.    Assessment                  Body Site: Breast                           Site: left breast  Stereotactic Radiosurgery: No  Concurrent Therapy: Yes  Today's Dose: 2650  Total Dose for Breast: 5240  Today's Fraction/Total Fraction Breast: 10/20  Drainage: 0: Absent                                            Sexuality Alteration                 Emotional Alteration Copin: Effective  Comfort Alteration KPS: 70% Cannot do active work, but can care for self  Fatigue (ONS scale) : 7: Extreme Fatigue  Hot Flashes and/or Flushes: 0: None   Nutrition Alteration Anorexia: 0: None  Nausea: 0: None  Vomitin: None  Skin Alteration Skin Sensation: 0: No problem  Skin Reaction: 0: None  AUA Assessment                                  Accompanied by       Objective:     Exam: Examination reviewed no significant changes.    Vitals:    19 1323   BP: 159/57   Pulse: (!) 110   Temp: 98.6  F (37  C)   TempSrc: Oral   SpO2: 96%   Weight: 176 lb 14.4 oz (80.2 kg)       Wt Readings from Last 8 Encounters:   19 176 lb 14.4 oz (80.2 kg)   19 177 lb 3 oz (80.4 kg)   19 166 lb (75.3 kg)   18 177 lb 6.4 oz (80.5 kg)   18 178 lb (80.7 kg)   18 178 lb 9.6 oz (81 kg)   18  176 lb (79.8 kg)   10/26/18 179 lb 3.2 oz (81.3 kg)       General: Alert and oriented, in no acute distress  Dacia has mild Erythema.  Aria chart and setup information reviewed    Kelsi Goddard MD

## 2021-06-23 NOTE — PROCEDURES
Clinical Treatment Planning Note    The complex radiotherapy planning will be completed for her lung cancer. The patient had a planning CT earlier today for planning. The treatment aids were used for planning, including headrest and wing board to help keep the same position during the daily radiation therapy. The therapy planning is necessary to reduce radiotherapy dose to the normal critical organs which are not possible with simple treatment. In addition, dose to the target and the critical structures requires three-dimensional analysis of the isodose distribution. The planning will be done to reduce dose to the spinal cord, lungs, bronchial tree, esophagus, liver, heart/large vessels, spleen, stomach, small bowel, diaphragm, bone and soft tissue.   I will contour the clinical tumor volume  CTV , with expanded volume of planning treatment volume  PTV  on the treatment planning system. The critical structures will be outlined, including spinal cord, brachial plexus, lungs, bronchial tree, esophagus, liver, heart/large vessels, spleen, stomach, small bowel, diaphragm, bone and soft tissue.   Treatment planning will be done on the computer treatment planning system. The multiple fields will be used to achieve optimal coverage of the target volume. Dose distribution to the above critical structures will be reviewed. Isodose distribution along with the X, Y, Z plan will also be reviewed. Custom blocking will be used to shield normal structures. The beam s eye views will be reviewed and the digital reconstructed image will be reviewed on the planning software. The patient will receive 5000 cGy in 5 treatments targeted to the lung tumors using 6 MV photons with SBRT/IMRT/IGRT.      Kelsi Goddard MD, PhD  Department of Radiation Oncology   [facility]  Tel: [phone]  Page: [phone]    [facility]  [address] [place] [address]     [place] [id number]  [place] [address]

## 2021-06-23 NOTE — TELEPHONE ENCOUNTER
"Telephoned and spoke with Lexus to check in and inquire about her needs.  We discussed resources for cancer patients including [employer] grants, and [employer].  She declines both of these resources stating she and her spouse are \"doing fine.\"    Lexus expressed her shock at her lung cancer diagnosis.  She had felt relief after learning the plan for her recent breast cancer diagnosis and felt \"in the clear.\" So the diagnosis of lung adenocarcinoma is understandably stressful.  She is searching for understanding of \"what caused\" this disease.  We discussed her history and exposures.  She cites answers she has obtained from providers in this regard.  Emotional support and encouragement offered.    Lexus has cancelled EBUS that was scheduled for tomorrow.  She states her  has been unexpectedly called out of town, and she wants him there when she has the procedure.  He will be unavailable on 1/24 as well due to his own medical procedure.  She expressed tentative willingness to reschedule on 1/31 if spot is available.      I also met with her in person when she arrived at [facility] radiation oncology for treatment today.  Magda Davidson RN      "

## 2021-06-24 NOTE — TELEPHONE ENCOUNTER
I called Mrs. Miller today about her radiation therapy.  She had unfortunately pelvic fracture last week and patient is not able to come for the radiation therapy at this time.  I will consider to give her a week break.  We will contact her next week to see if the patient is stable enough for her lung radiation therapy.

## 2021-06-24 NOTE — PROGRESS NOTES
History of Present Illness:  This is a 78 y.o. y/o female who I am asked to see by Dr. Goddard for evaluation of a likely infected sebaceous cyst.  This is a patient who is fairly recently status post left lumpectomy followed by radiation.  That was about 5 months ago.  Unfortunately since then she was diagnosed with a lung cancer and has been getting radiation for that.  Recently she is noticed increasing pain, redness and swelling just below the breast on the left.  She was started on antibiotics but they were not helping.    PAST MEDICAL HISTORY:  Past Medical History:   Diagnosis Date     Anemia, iron deficiency 2019     Arthritis      Breast cancer (H)      Cancer (H)      Diabetes mellitus (H)      DM (diabetes mellitus screen)      History of anesthesia complications      HLD (hyperlipidemia)      HTN (hypertension)      Kidney stones      Lung cancer (H)      PONV (postoperative nausea and vomiting)      Sciatica      Scoliosis       has a past surgical history that includes  section; Skin biopsy; Abdominal surgery; Cholecystectomy; CT Lung Biopsy (2018); and Left Lumpectomy; [place] Lymph Node Biopsy (Left, 2018).    Medications:    Current Outpatient Medications:      allopurinol (ZYLOPRIM) 300 MG tablet, Take 1.5 tablets by mouth daily., Disp: , Rfl:      aspirin 325 MG tablet, Take 1 tablet by mouth daily., Disp: , Rfl:      cholecalciferol, vitamin D3, 2,000 unit Tab, Take 1 tablet by mouth daily., Disp: , Rfl:      citalopram (CELEXA) 40 MG tablet, Take 40 mg by mouth daily., Disp: , Rfl: 3     clindamycin (CLEOCIN HCL) 300 MG capsule, Take 1 capsule (300 mg total) by mouth 4 (four) times a day for 7 days., Disp: 28 capsule, Rfl: 0     clotrimazole-betamethasone (LOTRISONE) cream, APPLY TO AFFECTED AREA  AS NEEDED, Disp: , Rfl:      doxycycline (VIBRA-TABS) 100 MG tablet, Take 1 tablet (100 mg total) by mouth 2 (two) times a day for 10 days., Disp: 20 tablet, Rfl: 0     ferrous  sulfate 140 mg (45 mg iron) TbER, 1 tablet tid, Disp: 90 tablet, Rfl: 3     HYDROcodone-acetaminophen 5-325 mg per tablet, Take 1 tablet by mouth every 4 (four) hours as needed for pain., Disp: 25 tablet, Rfl: 0     insulin NPH isoph U-100 human (HUMULIN N NPH INSULIN KWIKPEN) 100 unit/mL (3 mL) pen, Take 40 units in the am and 26 at bedtime, Disp: , Rfl:      lisinopril-hydrochlorothiazide (PRINZIDE,ZESTORETIC) 20-12.5 mg per tablet, 1 tab(s), Disp: , Rfl:      LORazepam (ATIVAN) 0.5 MG tablet, TK 1 T PO TID PRN, Disp: , Rfl: 0     NYSTOP powder, HOMERO EXT AA  TID, Disp: , Rfl: 5     oxyCODONE-acetaminophen (PERCOCET) 7.5-325 mg per tablet, Take 1 tablet by mouth every 4 (four) hours as needed for pain., Disp: 30 tablet, Rfl: 0     tamoxifen (NOLVADEX) 20 MG tablet, Take 1 tablet (20 mg total) by mouth daily. (Patient taking differently: Take 20 mg by mouth daily. Started 12/14/18   ), Disp: 90 tablet, Rfl: 1  No current facility-administered medications for this encounter.     Allergies:  Allergies   Allergen Reactions     Amoxicillin-Pot Clavulanate Hives     Cephalexin Monohydrate Itching and Rash     Iodinated Contrast- Oral And Iv Dye Other (See Comments)     Flushed, rashes, blotches     Other Allergy (See Comments)      Contrast Media Ready-Box [facility] 02/13/2013.; Contrast Media Ready-Box [facility], 02/13/2013.       Penicillins      Prochlorperazine Edisylate      Sulfa (Sulfonamide Antibiotics) Rash       Social History:   reports that she quit smoking about 50 years ago. She has a 3.50 pack-year smoking history. she has never used smokeless tobacco. She reports that she does not drink alcohol or use drugs.    ROS:  A 12 point comprehensive review of systems was negative except as noted.    PHYSICAL EXAM:  Vitals:    02/15/19 1032   BP: 130/60   Pulse: (!) 106   SpO2: 95%     General: alert, appears older than stated age, cooperative and mildly obese  Breast: Moderate radiation changes in the left breast.  In the  inferior mammary fold is an area of erythema, swelling and some purulent drainage from multiple skin pores.    Procedure: The area was prepped with Betadine.  After infiltration with 1% lidocaine with epinephrine for a total of 10 cc, a transverse incision was made over the area.  A pocket was entered a needle large amount of infected sebaceous material was evacuated.  The incision was at least 2.5 cm in length.  The pocket was at least 3-4 cm deep.  Once I was comfortable I had a completely evacuated it was packed with a saline soaked gauze.      Impression: Infected sebaceous cyst of the left breast.  Status post I&D.  This will need to be packed.  She does not have anybody at home to do this we will have her come into the office for it.    Plan: Follow-up on Monday for a wound dressing change.  If her  is back in town and feels comfortable he could be shown how to do the dressing at that point.  Continue her antibiotics that she is been given.    Note: As patient was walking out to the waiting area, she fell landing on her left side.  She never lost consciousness.  She denied hitting her head.  She had some discomfort in her left hip but was able to bear weight once she was able to stand up.  I suggested she be seen for an x-ray and she states that this happens to her fairly frequently and she did not feel the need for that.  No tenderness to palpation on the left hip.  Her left wrist also seems somewhat tender but she had full range of motion.  Her son that if she continued to have any kind of pain in that left hip or left wrist for her to be seen in the emergency room.

## 2021-06-24 NOTE — PROGRESS NOTES
Chief Complaint   Patient presents with     Follow-up     patient in for a cyst on the left breast and Dr. Villegas states he does not do that. patient states got it from radiation

## 2021-06-24 NOTE — PROGRESS NOTES
Dacia Miller, female, 78 y.o., arrived to clinic at 1345 for B-12 injection. Plan of care reviewed with patient and she verbalized understanding. Vital signs stable. Vitamin B-12 administered SQ in right arm. She tolerated injection well and site covered with bandaid.  Dacia Miller discharged to [facility] at 1400 alert, ambulatory with cane and in stable condition.

## 2021-06-24 NOTE — PROGRESS NOTES
[facility] Radiation Oncology Follow Up Note    Patient: Dacia Miller  MRN: [id number]  Date of Service: 02/14/2019          Ms. Miller is a 78 y.o. female with a diagnosis of left breast cancer, stage T2 N0 M0, ER/DE positive, HER-2 negative, status post lumpectomy and sentinel resection with close margin form DCIS.    She received postop radiation therapy with a total dose of 5240 cGy in 20 treatments targeted to the left breast only given from 12/27/2018-2/4/1019.  The patient experienced significant skin erythema after completing of the radiation therapy.  She also noticed localized pain and cellulitis involving the inferior mammary fold.  She was given clindamycin for infection 4 weeks ago.  The patient still have significant pain and discomfort.  She is here today for follow evaluation and management recommendation.    Examination: Her breast showed mild erythema involving the left breast consistent with post radiation therapy changes.  There is a significant redness involving the inferior left breast mammary fold with a focal spot of swelling, redness and tenderness possibly infection.    Plan: I I have discussed her case with Dr. Rodriguez, breast surgeon earlier today.  Dr. Rodriguez will contact the patient tomorrow for evaluation and management recommendation.        Kelsi Goddard MD, PhD  Department of Radiation Oncology   [facility]  Tel: [phone]  Page: [phone]    [facility]  [address] [place] [address]     [place] [id number] [facility] [place] [address]

## 2021-06-25 NOTE — TELEPHONE ENCOUNTER
Pt called to ask about treatment. Pt still unsure due to pain. Informed pt that if she doesn't start this week we will need to restart the process. Pt still unsure, informed her to think about it and I will call tomorrow to check on her decision. Informed that she has a right to refuse. Also informed pt that we need to know because it is affecting our schedule for other patients and staffing. Pt agreeable to giving an answer to us tomorrow.

## 2021-06-25 NOTE — TELEPHONE ENCOUNTER
"Asked by  staff to call patient and follow up, she had called in to cancel her treatment for today.    Called patient and she states that she is not feeling well with continued pain and swelling in her legs.  Patient wanted to know if the swelling in her legs was related to the breast radiation treatment.  Informed patient that her leg swelling was not from breast radiation but could potentially be related to the pelvic fractures she received in a fall. Patient states she continues to have an open wound from the breast radiation but feels it is slowly improving.  Patient asked if we had any other advice for the swelling in her legs other than \"the usual\", elevate legs, compression stockings.  Told patient that she should talk to the doctor following her in regards to her pelvic fractures about the swelling in her legs.    Patient states she would not be in tomorrow as well for her radiation treatment.  Told patient we would keep her appointment on Monday as scheduled and to let us know how she is doing at that time.  Patient states she did not know if she would be able to come next week either and hung up the phone.            "

## 2021-06-25 NOTE — TELEPHONE ENCOUNTER
Pt called to see if coming in tomorrow for tx. Pt said she was going out and hung up on me. Will attempt to call in am.

## 2021-06-25 NOTE — TELEPHONE ENCOUNTER
Called pt to ask if coming in for tx today at 2:30. Pt says she has a scan at noon to r/o a dvt. She will stop in department and we will see if we can get her time changed and earlier if possible. Informed this is a possibility not a promise. Pt hung up.

## 2021-06-27 NOTE — PROGRESS NOTES
Progress Notes by Marcos Bassett MD at 1/3/2019  2:00 PM     Author: Marcos Bassett MD Service: -- Author Type: Physician    Filed: 1/3/2019  3:26 PM Encounter Date: 1/3/2019 Status: Signed    : Marcos Bassett MD (Physician)       THORACIC SURGERY OFFICE NEW PATIENT VISIT      Dear Dr. Kitchen,    I saw Ms. Miller at Dr. Monaco request in consultation for the evaluation and treatment of an adenocarcinoma of the RIGHT upper lobe and a RIGHT middle lobe nodule.     HPI  Mrs. Dacia Miller is a 78 y.o. year-old female who has an incidentally found RUL NSCLC. A lung mass was seen when she had a CXR for rib pain after a fall over Thanksgiving. She has no respiratory symptoms. Her gait is unsteady due to DJD and neuropathy, and she has had several falls.    Tests   PET-CT  (2018): 4.6 cm RIGHT upper lobe mass, SUV 2.4, indeterminate RML nodule, 8mm, SUV 2.4. No abnormal LN.Post breast lumpectomy changes.    CT chest (2018): 4.6 cm RIGHT upper lobe mass, indeterminate RML nodule, 8mm; pleural tethering at apex              CT-guided biopsy (2018): NSCLC, adenocarcinoma, immunochemical staining c/w lung cancer and not metastatic breast cancer.     PFT (2018): FEV1  86%, DLCO 81% (normal when corrected for Hgb)    PMH    Past Medical History:   Diagnosis Date   ? Arthritis    ? Breast cancer (H)    ? Cancer (H)    ? Diabetes mellitus (H)    ? DM (diabetes mellitus screen)    ? History of anesthesia complications    ? HLD (hyperlipidemia)    ? HTN (hypertension)    ? Kidney stones    ? Lung cancer (H)    ? PONV (postoperative nausea and vomiting)    ? Sciatica    ? Scoliosis         Past Surgical History:   Procedure Laterality Date   ? ABDOMINAL SURGERY     ?  SECTION     ? CHOLECYSTECTOMY     ? CT BIOPSY LUNG  2018   ? [place] MASTECTOMY,PARTIAL,  WITH AXILLARY LYMPHADENECTOMY Left 2018    Procedure: Left Lumpectomy; [facility] Lymph Node Biopsy;  Surgeon: Lilliana JIMÉNEZ  MD Jennifer;  Location: [facility];  Service: General   ? SKIN BIOPSY         ETOH negative  TOB 4 pack years, quit 50 years ago    Physical examination  BMI 28  Age appears as stated, she has a flat affect and uses a cane. She needed a wheelchair to come to clinic.    From a personal perspective, she is here with her , Maikel.       IMPRESSION   1. Malignant neoplasm of upper lobe of right lung (H)        78 y.o. year-old female with an adenocarcinoma of the RIGHT upper lobe and a RIGHT middle lobe nodule.   Frequent falls    PLAN  I spent a total of 30 minutes with Ms. Miller and her , Saul, more than 50% of which were spent in counseling, coordination of care, and face-to-face time. I reviewed the plan as follows:    1) Brain MRI    2) EBUS    3) Review her case at tumor board: surgery is not an option for her, it would put her at significant risk of losing her independence.     They had all their questions answered and were in agreement with the plan.  I appreciate the opportunity to participate in the care of your patient and will keep you updated.    Sincerely,

## 2021-07-03 NOTE — ADDENDUM NOTE
Addendum Note by Ellen Kitchen CMA at 5/7/2020 11:15 AM     Author: Ellen Kitchen CMA Service: -- Author Type: Certified Medical Assistant    Filed: 5/7/2020  2:41 PM Encounter Date: 5/7/2020 Status: Signed    : Ellen Kitchen CMA (Certified Medical Assistant)    Addended by: ELLEN KITCHEN on: 5/7/2020 02:41 PM        Modules accepted: Orders

## 2021-07-03 NOTE — ADDENDUM NOTE
Addendum Note by Jolene Krishnamurthy CMA at 2/15/2019 10:20 AM     Author: Jolene Krishnamurthy CMA Service: -- Author Type: Certified Medical Assistant    Filed: 2/15/2019 12:03 PM Date of Service: 2/15/2019 10:20 AM Status: Signed    : Jolene Krishnamurthy CMA (Certified Medical Assistant)    Encounter addended by: Jolene Krishnamurthy CMA on: 2/15/2019 12:03 PM      Actions taken: Sign clinical note, Charge Capture section accepted

## 2021-08-02 NOTE — PROGRESS NOTES
Lexus missed her mammogram appt last week, her lab/ and MD OV appt for today    Called and left msg to c/b to r/s her missed appts

## 2021-08-04 PROBLEM — R73.9 HYPERGLYCEMIA: Status: ACTIVE | Noted: 2021-01-01

## 2021-08-04 PROBLEM — R41.82 ALTERED MENTAL STATUS, UNSPECIFIED ALTERED MENTAL STATUS TYPE: Status: ACTIVE | Noted: 2021-01-01

## 2021-08-05 PROBLEM — K21.9 GASTRO-ESOPHAGEAL REFLUX DISEASE WITHOUT ESOPHAGITIS: Status: ACTIVE | Noted: 2021-01-01

## 2021-08-05 PROBLEM — S32.509A: Status: RESOLVED | Noted: 2019-02-19 | Resolved: 2021-01-01

## 2021-08-05 PROBLEM — N18.30 STAGE 3 CHRONIC KIDNEY DISEASE (H): Status: ACTIVE | Noted: 2021-01-01

## 2021-08-05 PROBLEM — L03.90 CELLULITIS: Status: RESOLVED | Noted: 2019-02-20 | Resolved: 2021-01-01

## 2021-08-05 PROBLEM — C34.92 ADENOCARCINOMA OF LEFT LUNG (H): Status: ACTIVE | Noted: 2020-05-29

## 2021-08-05 PROBLEM — I63.9 SUBCORTICAL INFARCTION (H): Status: ACTIVE | Noted: 2021-01-01

## 2021-08-05 PROBLEM — E55.9 VITAMIN D DEFICIENCY: Status: ACTIVE | Noted: 2021-01-01

## 2021-08-05 PROBLEM — D32.0 BENIGN MENINGIOMA OF BRAIN (H): Status: ACTIVE | Noted: 2021-01-01

## 2021-08-05 PROBLEM — M79.7 FIBROMYALGIA: Status: ACTIVE | Noted: 2017-04-04

## 2021-08-05 PROBLEM — F41.1 GENERALIZED ANXIETY DISORDER: Status: ACTIVE | Noted: 2021-01-01

## 2021-08-05 PROBLEM — D50.9 ANEMIA, IRON DEFICIENCY: Status: RESOLVED | Noted: 2019-01-22 | Resolved: 2021-01-01

## 2021-08-05 PROBLEM — E78.5 HYPERLIPIDEMIA: Status: ACTIVE | Noted: 2020-05-29

## 2021-08-05 NOTE — UTILIZATION REVIEW
Admission Status; Secondary Review Determination   Under the authority of the Utilization Management Committee, the utilization review process indicated a secondary review on Dacia Miller. The review outcome is based on review of the medical records, discussions with staff, and applying clinical experience noted on the date of the review.   (x) Inpatient Status Appropriate - This patient's medical care is consistent with medical management for inpatient care and reasonable inpatient medical practice.     RATIONALE FOR DETERMINATION   80 year old female with oast medical history of breast cancer, lung cancer, hypertension, dyslipidemia, chronic pain, fibromyalgia, PMR, chronic kidney disease stage IV, anemia, dyslipidemia who [resentes to ER 22 hours after she had fallen and spent the rest of the time in bed unable to get out. Admitted with intractable nausea and acute on chronic pain syndrome , still symptomatic, pain management consult , crossing 2nd midnight      At the time of admission with the information available to the attending physician more than 2 nights Hospital complex care was anticipated, based on patient risk of adverse outcome if treated as outpatient and complex care required. Inpatient admission is appropriate based on the Medicare guidelines.   The information on this document is developed by the utilization review team in order for the business office to ensure compliance. This only denotes the appropriateness of proper admission status and does not reflect the quality of care rendered.   The definitions of Inpatient Status and Observation Status used in making the determination above are those provided in the CMS Coverage Manual, Chapter 1 and Chapter 6, section 70.4.   Sincerely,   John Diego MD  Utilization Review  Physician Advisor  [facility]

## 2021-08-05 NOTE — CONSULTS
Care Management Initial Consult    General Information  Assessment completed with: Patient, patient  Type of CM/SW Visit: Initial Assessment    Primary Care Provider verified and updated as needed: Yes   Readmission within the last 30 days: no previous admission in last 30 days      Reason for Consult: discharge planning  Advance Care Planning: Advance Care Planning Reviewed: no concerns identified   (patient declined offered information)       Communication Assessment  Patient's communication style: spoken language (English or Bilingual)    Hearing Difficulty or Deaf: no   Wear Glasses or Blind: yes    Cognitive  Cognitive/Neuro/Behavioral: orientation  Level of Consciousness: alert     Orientation: disoriented to, time  Mood/Behavior: cooperative, calm          Living Environment:   People in home: child(vinny), adult, spouse     Current living Arrangements: house      Able to return to prior arrangements: yes       Family/Social Support:  Care provided by:    Provides care for:    Marital Status:   , Children  Saul       Description of Support System: Supportive, Involved    Support Assessment: Adequate family and caregiver support    Current Resources:   Patient receiving home care services: No     Community Resources: None  Equipment currently used at home: cane, straight, grab bar, tub/shower, walker, rolling  Supplies currently used at home: Diabetic Supplies    Employment/Financial:  Employment Status:          Financial Concerns: No concerns identified           Lifestyle & Psychosocial Needs:  Social Determinants of Health     Tobacco Use: Medium Risk     Smoking Tobacco Use: Former Smoker     Smokeless Tobacco Use: Never Used   Alcohol Use:      Frequency of Alcohol Consumption:      Average Number of Drinks:      Frequency of Binge Drinking:    Financial Resource Strain:      Difficulty of Paying Living Expenses:    Food Insecurity:      Worried About Running Out of Food in the Last Year:       Ran Out of Food in the Last Year:    Transportation Needs:      Lack of Transportation (Medical):      Lack of Transportation (Non-Medical):    Physical Activity:      Days of Exercise per Week:      Minutes of Exercise per Session:    Stress:      Feeling of Stress :    Social Connections:      Frequency of Communication with Friends and Family:      Frequency of Social Gatherings with Friends and Family:      Attends Advent Services:      Active Member of Clubs or Organizations:      Attends Club or Organization Meetings:      Marital Status:    Intimate Partner Violence:      Fear of Current or Ex-Partner:      Emotionally Abused:      Physically Abused:      Sexually Abused:    Depression:      PHQ-2 Score:    Housing Stability:      Unable to Pay for Housing in the Last Year:      Number of Places Lived in the Last Year:      Unstable Housing in the Last Year:        Functional Status:  Prior to admission patient needed assistance:   Dependent ADLs:: Ambulation-walker  Dependent IADLs:: Cleaning, Laundry, Shopping, Meal Preparation, Cooking, Transportation  Assesssment of Functional Status: Not at  functional baseline    Mental Health Status:  Mental Health Status: No Current Concerns       Chemical Dependency Status:                Values/Beliefs:  Spiritual, Cultural Beliefs, Advent Practices, Values that affect care:                 Additional Information:  Chart reviewed.  Care Management initial admission assessment completed.  AIDET done.  Met with patient to introduce self and role of care management.    Patient states she lives in house with her spouse, Saul, and her son, Lenny.  She can live on one level, uses a walker or cane, and is able to do basic ADL's.  She states she manages her own medications, including her insulin.  Son, Lenny, takes care of meal preparation, housekeeping, laundry, shopping and transportation.  CM to follow for any possible discharge needs.  Patient states her  daughter, Krista, will provide transport at discharge.    Lisa Keita RN

## 2021-08-05 NOTE — PROGRESS NOTES
Neurosurgery plan of care      Brain MRI results and pt case discussed with Dr Wong.   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 It has shown some slow growth since 2019.  Abuts but does not narrow the supraclinoid internal carotid artery flow-voids.     Dr Wong would like to see Lexus as an outpatient for further discussion/treatment. The mass is benign, but since it is growing and is abutting the internal carotid, could reasonably consider surgery or cyberknife treatment. Continued surveillance would be a third option if her concurrent medical issues pose a great risk to her lifespan. We will sign off and contact her after hospital discharge to arrange outpatient follow up.    Recommend neurology consult for 9mm left frontoparietal ischemic stroke - acute vs early subacute. Confirmed with RN that neurology has been paged for consult.      Carolann Godoy FNP-C  [facility] Neurosurgery  O. [phone]

## 2021-08-05 NOTE — CONSULTS
NEUROSURGERY CONSULTATION NOTE:    Dacia Miller   [address] [place] [address] W  [facility] [id number]  80 year old female  Admission Date/Time: 8/4/2021  7:59 PM  Primary Care Provider: Rajinder Kitchen [facility] Attending Physician: Negrito Pimentel MD    Neurosurgery was asked to see this patient by Negrito Pimentel MD for evaluation of meningioma     PROBLEM LIST:  Active Problems:    Lower Back Pain    Hyperglycemia    Altered mental status, unspecified altered mental status type       Neurosurgery Attending: The patient's clinical examination, laboratory data, and plan will be  discussed with Dr Wong after MRI results available      CONSULTATION ASSESSMENT AND PLAN:    Lexus is a 79yo F on asa 325mg hx type II DM, chronic pain do with opioid dependence, ckd 3-4 and non small cell lung cancer s/p radiation and brast cancer s/p lumpectomy and radiation, who presented to [facility] ED for evaluation after a mechanical fall and altered mental status on 8/4/2021. Found to be hyperglycemic and hypertensive. Incidental 2.5 x 2.5 x 1.4cm dural-based mass (likely meningioma) along the planum sphenoidale was found on head CT. Has grown since previous head CT 1/14/2019 when it measured 2.2x 1. X 0.8cm. No fractures on cervical CT.    Lexus is neuro intact today other than some mild difficulty following multi-step directions. Meningioma is likely unrelated to current symptoms/fall, but warrants brain MRI scan for further evaluation audrey given cancer history. Mass has only grown by 3mm in 2 years which favors low grade lesion. Will review MRI results once available and determine any follow up needed. No surgery indicated at this time.       HPI:    Lexus is a 79yo F on asa 325mg w hx uncontrolled type II DM, chronic pain do with fibromyalgia with opioid dependence (taking 1 percocet every 1-2hrs), ckd stage 3 and right upper lobe carcinoma who presented to [facility] ED for evaluation after a mechanical fall and altered  mental status on 2021. Found to be hypertensive and hyperglycemic. She denies any neck or back pain, radicular arm or leg pain, numbness, change in bowel or bladder control, or difficulty walking today. She states she has had no headaches, dizziness, change in vision, weakness, numbness, tingling or nausea over the last 2 years. Was not aware she has a meningioma. A Head CT in the ED was negative for hemorrhage but demonstrates an incidental 2.5 x 2.5 x 1.4cm dural-based mass (likely meningioma) along the planum sphenoidale was found on head CT. Has shown growth since previous head CT 2019 when it measured 2.2x 1. X 0.8cm. Cervical spine CT was negative for fracture.CT chest demonstrated potential recurrent neoplasm vs radiation treatment change.       Past Medical History:   Diagnosis Date     Anemia, iron deficiency 2019     Arthritis      Breast cancer (H)      Cancer (H)      Diabetes mellitus (H)      DM (diabetes mellitus screen)      History of anesthesia complications      HLD (hyperlipidemia)      HTN (hypertension)      Kidney stones      Lung cancer (H)      PONV (postoperative nausea and vomiting)      Sciatica      Scoliosis      Past Surgical History:   Procedure Laterality Date     ABDOMEN SURGERY       BIOPSY SKIN (LOCATION)        SECTION       CHOLECYSTECTOMY       CT BIOPSY LUNG  2018     MN MASTECTOMY,PARTIAL,  WITH AXILLARY LYMPHADENECTOMY Left 2018    Procedure: Left Lumpectomy; [place] Lymph Node Biopsy;  Surgeon: Lilliana Rodriguez MD;  Location: [facility];  Service: General       REVIEW OF SYSTEMS:    negative    MEDICATIONS:  No current outpatient medications on file.         ALLERGIES/SENSITIVITIES:     Allergies   Allergen Reactions     Amoxicillin-Pot Clavulanate [Augmentin] Hives     Cephalexin Monohydrate [Cephalexin] Itching and Rash     Contrast Dye Other (See Comments)     Flushed, rashes, blotches     Iodinated Contrast Media [Diagnostic X-Ray  Materials] Other (See Comments)     Flushed, rashes, blotches     Other Allergy (See Comments) [External Allergen Needs Reconciliation - See Comment] Unknown     Contrast Media Ready-Box [facility], 2013.; Contrast Media Ready-Box [facility], 2013.       Penicillins Unknown     Prochlorperazine Edisylate [Prochlorperazine] Unknown     Sulfa Drugs Rash       PERTINENT SOCIAL HISTORY: per below  Social History     Socioeconomic History     Marital status:      Spouse name: None     Number of children: None     Years of education: None     Highest education level: None   Occupational History     None   Tobacco Use     Smoking status: Former Smoker     Packs/day: 0.50     Years: 7.00     Pack years: 3.50     Quit date: 1968     Years since quittin.6     Smokeless tobacco: Never Used   Substance and Sexual Activity     Alcohol use: No     Drug use: No     Sexual activity: Never   Other Topics Concern     None   Social History Narrative     None     Social Determinants of Health     Financial Resource Strain:      Difficulty of Paying Living Expenses:    Food Insecurity:      Worried About Running Out of Food in the Last Year:      Ran Out of Food in the Last Year:    Transportation Needs:      Lack of Transportation (Medical):      Lack of Transportation (Non-Medical):    Physical Activity:      Days of Exercise per Week:      Minutes of Exercise per Session:    Stress:      Feeling of Stress :    Social Connections:      Frequency of Communication with Friends and Family:      Frequency of Social Gatherings with Friends and Family:      Attends Shinto Services:      Active Member of Clubs or Organizations:      Attends Club or Organization Meetings:      Marital Status:    Intimate Partner Violence:      Fear of Current or Ex-Partner:      Emotionally Abused:      Physically Abused:      Sexually Abused:          FAMILY HISTORY:  Family History   Problem Relation Age of Onset     Breast Cancer  "Mother         PHYSICAL EXAM:   Constitutional: /63 (BP Location: Right leg)   Pulse 96   Temp 99.4  F (37.4  C) (Oral)   Resp 18   Ht 5' 3\" (1.6 m)   Wt 160 lb (72.6 kg)   SpO2 98%   BMI 28.34 kg/m       Mental Status: A & Ox3 in no acute distress.  Affect is appropriate.  Speech is fluent.  Recent and remote memory are intact.  Attention span and concentration are normal. Mild cognitive difficulty with multistep exam instructions     Cranial Nerves: CN1: grossly intact per patient recall. CN2: No funduscopic exam performed. CN3,4 & 6: Pupillary light response, lateral and vertical gaze normal.  No nystagmus.  Visual fields are full to confrontation. CN5: Intact to touch CN7: No facial weakness, smile, facial symmetry intact. CN8: Intact to spoken voice. CN9&10: Gag reflex, uvula midline, palate rises with phonation. CN11: Shoulder shrug 5/5 intact bilaterally. CN12: Tongue midline and moves freely from side to side.     Motor: No pronator drift of upper extremity. Normal bulk and tone all muscle groups of upper and lower extremities.    Strength:   Deltoids, triceps, biceps, handgrips are 5/5 bilaterally  Unable to test intrinsics and extensors d/t pt's slight confusion  Hip flexors, quads, hamstrings, dorsiflexion, plantarflexion, EHL are 5/5 bilaterally     Sensory: Sensation intact bilaterally to light touch throughout both upper and lower extremities     Coordination; finger to nose, rapid alternating movements smooth and rhythmic.      Reflexes; supinator, biceps, triceps, knee/ ankle jerk are 1+. No hoffmans/clonus.    Range of motion of neck is good    IMAGING:  I personally reviewed all radiographic images   EXAM: CT HEAD W/O CONTRAST  LOCATION: [facility]  DATE/TIME: 8/4/2021 9:05 PM     INDICATION: Traumatic injury  COMPARISON: Brain MRI dated 01/14/2019  TECHNIQUE: Routine CT Head without IV contrast. Multiplanar reformats. Dose reduction techniques were " used.     FINDINGS:  INTRACRANIAL CONTENTS: No intracranial hemorrhage, extraaxial collection, or mass effect.  No CT evidence of acute infarct. Moderate presumed chronic small vessel ischemic changes. Moderate generalized volume loss. No hydrocephalus. Dural based mass   along the planum sphenoidale has increased in size from the prior exam measuring approximately 2.5 x 2.5 x 1.4 cm in size, previously 2.2 x 1.9 x 0.8 cm.     VISUALIZED ORBITS/SINUSES/MASTOIDS: Prior bilateral cataract surgery. Visualized portions of the orbits are otherwise unremarkable. No paranasal sinus mucosal disease. No middle ear or mastoid effusion.     BONES/SOFT TISSUES: No scalp hematoma. No skull fracture.                                                                      IMPRESSION:  1.  No CT evidence for acute intracranial process.  2.  Brain atrophy and presumed chronic microvascular ischemic changes as above.  3.  Interval increase dural based mass along the planum sphenoidale, likely reflecting a meningioma.      EXAM: CT CERVICAL SPINE W/O CONTRAST  LOCATION: [facility]  DATE/TIME: 8/4/2021 9:05 PM     INDICATION: Traumatic injury.  COMPARISON: None.  TECHNIQUE: Routine CT Cervical Spine without IV contrast. Multiplanar reformats. Dose reduction techniques were used.     FINDINGS:  VERTEBRAE: Reversal of the cervical spine curvature. Diffuse bony demineralization suggestive of osteoporosis. Vertebral body heights are preserved. No fracture or posttraumatic subluxation.      CANAL/FORAMINA: Multilevel degenerative changes throughout the cervical spine with at least moderate canal stenosis at C3-C4 and C5-C6. Multilevel bilateral neural foraminal narrowing is greatest at C2-C3 and C3-C4, where there is mild to moderate   stenosis.     PARASPINAL: No acute extraspinal abnormality. See separately dictated chest CT for intrathoracic findings.                                                                       IMPRESSION:  1.  No evidence of an acute displaced fracture.  2.  Degenerative changes as described.      EXAM: XR PELVIS AND HIP LEFT 2 VIEWS  LOCATION: [facility]  DATE/TIME: 8/4/2021 9:18 PM     INDICATION: left hip pain  COMPARISON: None.                                                                      IMPRESSION: No acute fracture or dislocation. Old healed left parasymphyseal and left inferior pubic ramus fractures. Osteopenia.        (critical care)  Total time in critical care I spent was 1 hour.  examining the pt, reviewing the scans, reviewing notes from chart, discussing treatment options with risks and benefits and coordinating care.        Carolann Godoy FNP-C  [facility] Neurosurgery  O. [phone]

## 2021-08-05 NOTE — ED TRIAGE NOTES
Patient fell yesterday in her bedroom on the carpeted floor.  She is unsure if she hit her head.  She states she lost her balance and fell denies LOC.  Her blood sugar per EMS was 396.  She has not taken any insulin since yesterday.  EMS stated she seems confused and unable to answer questions.

## 2021-08-05 NOTE — H&P
[facility]    History and Physical - Hospitalist Service       Date of Admission:  8/4/2021    Assessment & Plan      Dacia Miller is a 80 year old female admitted on 8/4/2021. She has history of breast cancer, lung cancer, hypertension, dyslipidemia, chronic pain, fibromyalgia, PMR, chronic kidney disease stage IV, anemia, dyslipidemia.  She was brought to the emergency department 22 hours after she had fallen and spent the rest of the time in bed unable to get out.  She takes 1 Percocet every 1-2 hours.  Work-up in the ED was negative for fractures or acute pathology, however she was hyperglycemic, hypertensive, nauseated dry heaving with stomach upset.    1.  Intractable nausea  CT chest, abdomen and pelvis without obstruction, unchanged marked dilatation of the central intrahepatic bile ducts and common bile duct  LFTs within normal range  Lactic acid normal  No full criteria for DKA  Nothing by mouth  IV hydration    2.  Chronic pain, opiate dependence  Reportedly patient takes 1 Percocet every 1-2 hours  Did not take medications yesterday  Concerned she could be going into early withdrawals  Acute pain team consult    3.  Hyponatremia, mild  Likely some volume depletion  IV hydration and monitor    4.  Type 2 diabetes mellitus, uncontrolled with long-term insulin  Monitor with insulin sliding scale    5.  Hyperkalemia  Secondary to renal failure  No concerning EKG changes  Monitor    6.  Chronic kidney disease stage IV  Stable creatinine  Associated non anion gap metabolic acidosis  Avoid nephrotoxins    7.  Meningioma, probable  CT head showed interval increase dural based mass along the planum sphenoidale  Neurosurgery consult    8.  Radiographic findings recommended to follow-up as outpatient:  Wedge shaped airspace consolidation and volume loss, recurrent neoplasm remains a possibility, this could be further assessed with PET/CT  Stable 7 mm nodule right middle lobe        Diet: Combination Diet Consistent Carb 60 Grams CHO per Meal Diet; Low Saturated Fat Na <2400mg Diet    DVT Prophylaxis: Pneumatic Compression Devices  Paez Catheter: Not present  Central Lines: None  Code Status: Full Code      Clinically Significant Risk Factors Present on Admission              # Platelet Defect: home medication list includes an antiplatelet medication      Disposition Plan   Expected discharge: 2 days recommended to transitional care unit once safe disposition plan/ TCU bed available.     The patient's care was discussed with the Patient.    Td Nash MD  [facility]  Securely message with the Vocera Web Console (learn more here)  Text page via [facility] Paging/Directory      ______________________________________________________________________    Chief Complaint   Weakness, fall, nausea    History is obtained from the patient, electronic health record and emergency department physician    History of Present Illness   Dacia Miller is a 80 year old female who was brought to the emergency department by ambulance for evaluation of generalized weakness and body ache.  The patient fell in the night of August 3, 2021 and was assisted by her son.  She complained of pain all over her body and is noted to have a right flank bruise.  She denies head trauma or loss of consciousness.  She was too weak to get out of bed and did not take her medications.  She has had nausea and retching with stomach upset.  Broad work-up with CT chest, abdomen, pelvis was negative for fracture.  She is hyperglycemic without evidence of DKA.  Patient reports taking 1 Percocet every 1-2 hours at home.    Review of Systems    The 10 point Review of Systems is negative other than noted in the HPI or here.     Past Medical History    I have reviewed this patient's medical history and updated it with pertinent information if needed.   Past Medical History:   Diagnosis Date     Anemia, iron  deficiency 2019     Arthritis      Breast cancer (H)      Cancer (H)      Diabetes mellitus (H)      DM (diabetes mellitus screen)      History of anesthesia complications      HLD (hyperlipidemia)      HTN (hypertension)      Kidney stones      Lung cancer (H)      PONV (postoperative nausea and vomiting)      Sciatica      Scoliosis        Past Surgical History   I have reviewed this patient's surgical history and updated it with pertinent information if needed.  Past Surgical History:   Procedure Laterality Date     ABDOMEN SURGERY       BIOPSY SKIN (LOCATION)        SECTION       CHOLECYSTECTOMY       CT BIOPSY LUNG  2018     VA MASTECTOMY,PARTIAL,  WITH AXILLARY LYMPHADENECTOMY Left 2018    Procedure: Left Lumpectomy; [place] Lymph Node Biopsy;  Surgeon: Lilliana Rodriguez MD;  Location: [facility];  Service: General       Social History   I have reviewed this patient's social history and updated it with pertinent information if needed.  Social History     Tobacco Use     Smoking status: Former Smoker     Packs/day: 0.50     Years: 7.00     Pack years: 3.50     Quit date: 1968     Years since quittin.6     Smokeless tobacco: Never Used   Substance Use Topics     Alcohol use: No     Drug use: No       Family History   I have reviewed this patient's family history and updated it with pertinent information if needed.  Family History   Problem Relation Age of Onset     Breast Cancer Mother        Prior to Admission Medications   Prior to Admission Medications   Prescriptions Last Dose Informant Patient Reported? Taking?   LORazepam (ATIVAN) 0.5 MG tablet Past Week at prn  Yes Yes   Sig: [LORAZEPAM (ATIVAN) 0.5 MG TABLET] Take 0.5 mg by mouth every 8 (eight) hours as needed for anxiety.   NYSTOP powder   Yes No   Sig: [NYSTOP POWDER] HOMERO EXT AA  TID   allopurinol (ZYLOPRIM) 300 MG tablet 2021 at Unknown time  Yes Yes   Sig: [ALLOPURINOL (ZYLOPRIM) 300 MG TABLET] Take 1  tablet by mouth daily.         aspirin 325 MG tablet 8/4/2021 at Unknown time  Yes Yes   Sig: [ASPIRIN 325 MG TABLET] Take 1 tablet by mouth daily.   ferrous sulfate 140 mg (45 mg iron) TbER 8/4/2021 at Unknown time  No Yes   Sig: [FERROUS SULFATE 140 MG (45 MG IRON) TBER] 1 tablet tid   insulin NPH isoph U-100 human (HUMULIN N NPH INSULIN KWIKPEN) 100 unit/mL (3 mL) pen 8/3/2021 at Unknown time  Yes Yes   Sig: [INSULIN NPH ISOPH U-100 HUMAN (HUMULIN N NPH INSULIN KWIKPEN) 100 UNIT/ML (3 ML) PEN] 35 units with breakfast   insulin lispro (HUMALOG KWIKPEN) 100 unit/mL pen 8/3/2021 at Unknown time  Yes Yes   Sig: [INSULIN LISPRO (HUMALOG KWIKPEN) 100 UNIT/ML PEN] INJECT 15 UNITS IN THE MORNING AND 26 UNITS AT DINNER ( PLUS SLIDING SCALE UP TO 70 UNITS DAILY )   ketoconazole (NIZORAL) 2 % cream   Yes No   Sig: [KETOCONAZOLE (NIZORAL) 2 % CREAM] Apply topically as needed.   lisinopril-hydrochlorothiazide (PRINZIDE,ZESTORETIC) 20-12.5 mg per tablet 8/3/2021 at Unknown time  Yes Yes   Sig: [LISINOPRIL-HYDROCHLOROTHIAZIDE (PRINZIDE,ZESTORETIC) 20-12.5 MG PER TABLET] Take 1 tablet by mouth daily.   omeprazole (PRILOSEC) 20 MG capsule 8/4/2021 at Unknown time  Yes Yes   Sig: [OMEPRAZOLE (PRILOSEC) 20 MG CAPSULE] Take 20 mg by mouth daily.   oxyCODONE-acetaminophen (PERCOCET) 5-325 MG tablet 8/4/2021 at Unknown time  Yes Yes   Sig: Take 1 tablet by mouth every 6 hours as needed for severe pain      Facility-Administered Medications: None     Allergies   Allergies   Allergen Reactions     Amoxicillin-Pot Clavulanate [Augmentin] Hives     Cephalexin Monohydrate [Cephalexin] Itching and Rash     Contrast Dye Other (See Comments)     Flushed, rashes, blotches     Iodinated Contrast Media [Diagnostic X-Ray Materials] Other (See Comments)     Flushed, rashes, blotches     Other Allergy (See Comments) [External Allergen Needs Reconciliation - See Comment] Unknown     Contrast Media Ready-Box [facility] [place], 02/13/2013.; Contrast Media  Ready-Box [facility], 02/13/2013.       Penicillins Unknown     Prochlorperazine Edisylate [Prochlorperazine] Unknown     Sulfa Drugs Rash       Physical Exam   Vital Signs: Temp: 99.7  F (37.6  C) Temp src: Oral BP: (!) 171/82 Pulse: 101   Resp: 20 SpO2: 96 % O2 Device: None (Room air)    Weight: 160 lbs 0 oz    General: Appears chronically ill  HEENT: Moist mucosa, equal pupils  Neck: No JVD  Heart: Tachycardic  Lungs: Bilateral air entry  Abdomen: Soft, mild tenderness  Extremities: Mild tenderness, no edema  Skin: Warm, dry  Neuro: Awake, moves all 4 extremities  Psychiatric: Flat affect    Data   Data reviewed today: I reviewed all medications, new labs and imaging results over the last 24 hours. I personally reviewed the EKG tracing showing Sinus rhythm at 100 bpm, nonspecific ST waves.    Recent Labs   Lab 08/05/21  0237 08/05/21  0135 08/04/21 2023   WBC  --   --  10.2   HGB  --   --  10.7*   MCV  --   --  94   PLT  --   --  217   NA  --   --  135*   POTASSIUM  --   --  5.3*   CHLORIDE  --   --  106   CO2  --   --  18*   BUN  --   --  31*   CR  --   --  1.73*   ANIONGAP  --   --  11   MOHAN  --   --  9.5   * 223* 388*   ALBUMIN  --   --  3.7   PROTTOTAL  --   --  7.6   BILITOTAL  --   --  0.6   ALKPHOS  --   --  115   ALT  --   --  15   AST  --   --  17     Recent Results (from the past 24 hour(s))   CT Head w/o Contrast    Narrative    EXAM: CT HEAD W/O CONTRAST  LOCATION: [facility]  DATE/TIME: 8/4/2021 9:05 PM    INDICATION: Traumatic injury  COMPARISON: Brain MRI dated 01/14/2019  TECHNIQUE: Routine CT Head without IV contrast. Multiplanar reformats. Dose reduction techniques were used.    FINDINGS:  INTRACRANIAL CONTENTS: No intracranial hemorrhage, extraaxial collection, or mass effect.  No CT evidence of acute infarct. Moderate presumed chronic small vessel ischemic changes. Moderate generalized volume loss. No hydrocephalus. Dural based mass   along the planum sphenoidale has  increased in size from the prior exam measuring approximately 2.5 x 2.5 x 1.4 cm in size, previously 2.2 x 1.9 x 0.8 cm.    VISUALIZED ORBITS/SINUSES/MASTOIDS: Prior bilateral cataract surgery. Visualized portions of the orbits are otherwise unremarkable. No paranasal sinus mucosal disease. No middle ear or mastoid effusion.    BONES/SOFT TISSUES: No scalp hematoma. No skull fracture.      Impression    IMPRESSION:  1.  No CT evidence for acute intracranial process.  2.  Brain atrophy and presumed chronic microvascular ischemic changes as above.  3.  Interval increase dural based mass along the planum sphenoidale, likely reflecting a meningioma.   Cervical spine CT w/o contrast    Narrative    EXAM: CT CERVICAL SPINE W/O CONTRAST  LOCATION: [facility]  DATE/TIME: 8/4/2021 9:05 PM    INDICATION: Traumatic injury.  COMPARISON: None.  TECHNIQUE: Routine CT Cervical Spine without IV contrast. Multiplanar reformats. Dose reduction techniques were used.    FINDINGS:  VERTEBRAE: Reversal of the cervical spine curvature. Diffuse bony demineralization suggestive of osteoporosis. Vertebral body heights are preserved. No fracture or posttraumatic subluxation.     CANAL/FORAMINA: Multilevel degenerative changes throughout the cervical spine with at least moderate canal stenosis at C3-C4 and C5-C6. Multilevel bilateral neural foraminal narrowing is greatest at C2-C3 and C3-C4, where there is mild to moderate   stenosis.    PARASPINAL: No acute extraspinal abnormality. See separately dictated chest CT for intrathoracic findings.      Impression    IMPRESSION:  1.  No evidence of an acute displaced fracture.  2.  Degenerative changes as described.   CT Chest Abdomen Pelvis w/o Contrast    Narrative    EXAM: CT CHEST ABDOMEN PELVIS W/O CONTRAST  LOCATION: [facility]  DATE/TIME: 8/4/2021 9:06 PM    INDICATION: Many bruises after fall. Altered mental status. History of lung cancer and  radiation treatment.  COMPARISON: CT chest 06/19/2021 and CT pelvis 02/19/2019  TECHNIQUE: CT scan of the chest, abdomen, and pelvis was performed without IV contrast. Multiplanar reformats were obtained. Dose reduction techniques were used.   CONTRAST: None.    FINDINGS:   LUNGS AND PLEURA: No evidence for pneumothorax. Wedge shaped area of airspace consolidation and volume loss within the right lung apex with air bronchograms, unchanged. 7 mm pulmonary nodule right middle lobe on image 133 of series 5 is unchanged. A   minimal scarring or atelectasis in the bases. The lungs otherwise are clear. No effusions.     MEDIASTINUM/AXILLAE: No mediastinal hemorrhage. No adenopathy. Moderate size esophageal hiatal hernia.    CORONARY ARTERY CALCIFICATION: Severe.    HEPATOBILIARY: A few calcified granulomas in the liver. Marked dilatation of the central intrahepatic bile ducts and common bile duct measuring up to 2.7 cm in diameter, unchanged. No evidence for distal obstructing stone or mass. Cholecystectomy.    PANCREAS: Normal.    SPLEEN: Normal.    ADRENAL GLANDS: Small fat-containing lesion right adrenal gland compatible with a myelolipoma, unchanged.     KIDNEYS/BLADDER: Small right renal cyst. Calcifications both renal pierce appear to be vascular. No definite stones. No hydronephrosis.    BOWEL: Normal.    LYMPH NODES: Normal.    VASCULATURE: Advanced atherosclerotic disease abdominal aorta and iliac arteries. Extensive plaque celiac trunk and superior mesenteric artery resulting in moderate to severe stenosis.    PELVIC ORGANS: Normal. No hemorrhage, ascites, or free air.    MUSCULOSKELETAL: Old inferior left pubic ramus fracture. No acute fractures. Osteopenia. Degenerative changes lumbar spine. Small fat-containing ventral wall hernia.      Impression    IMPRESSION:  1.  No evidence for acute traumatic injury within the chest, abdomen or pelvis.  2.  Wedge-shaped airspace consolidation and volume loss right lung  apex is unchanged and may represent post radiation treatment changes. Recurrent neoplasm remains a possibility. This could be further assessed with PET/CT.  3.  Stable 7 mm nodule right middle lobe.  4.  Marked dilatation of the central intrahepatic bile ducts and common bile duct, unchanged.  5.  Severe atherosclerotic disease.   XR Pelvis and Hip Left 2 Views    Narrative    EXAM: XR PELVIS AND HIP LEFT 2 VIEWS  LOCATION: [facility]  DATE/TIME: 8/4/2021 9:18 PM    INDICATION: left hip pain  COMPARISON: None.      Impression    IMPRESSION: No acute fracture or dislocation. Old healed left parasymphyseal and left inferior pubic ramus fractures. Osteopenia.

## 2021-08-05 NOTE — CONSULTS
[facility] ACUTE PAIN SERVICE    ([facility], [facility], [facility])   Consult Note    Date of Admission:  8/4/2021  Date of Consult: 08/05/21    Physician requesting consult: Td Nash MD   Reason for consult: chronic pain opioid dependent     Assessment/Plan:     Dacia Miller is a 80 year old female who was admitted on 8/4/2021.   Pain Service is asked to see the patient for chronic pain with opioid dependence. Admitted for evaluaton after a fall.  Patient fell previous night before coming to ED (22 hours) with increased pain and difficulty ambulating. She also had complaint of nausea and dry heaves with abdominal pain.  Patient was unable to get up from her bed and asked daughter to call EMS.  Work-up in the ED was negative for fractures or acute pathology, however she was hyperglycemic,  Neurosurgery Consult pending.    Patient with complex medical history significant for Stage IV kidney disease, anemia of chronic illness, hypertension, hyperlipidemia, Type II diabetes, chronic pain, fibromyalgia, scoliosis, prior falls with traumatic injury, chronic pain disorder with fibromyalgia, Polymyalgia Rheumatica,   From Radiation Oncology follow up notes 6/21/21:  non small cell lung cancer T2, N0 M0, not a surgical canidate, Radiation 3/20-4/1/2019 and breast cancer (left breast stage T2 N0 M0 ER/AR positive, HER-2 negative, status post lumpectomy status post radiation treatment. Targeted to left breast 12/27/18 to 2/4/2019.       Restaging CT chest showed moderate masslike consolidation of the right apex concerning for cancer recurrence.  Patient will have PET CT scan in the future.      From review of MAR:    Morphine 2 mg at 0337    Patient states she has chronic pain.  Pain is generalized to hips and joints due to osteoarthritis.  She is on chronic opioids and has prescription of oxycodone/APAP 5/325 mg tablets and can take up to 6 tablets/day.  She does not believe that she was  "overtaking the amount of pain medication that was prescribed for her although she may have taken more frequent doses due to increased pain after falling.  She typically takes 2 oxycodone/APAP 5/325 mg tablets up to three times/day.    She was having increased pain to left hip after falling.  States that pain is not as intense.      The patient does not smoke and no chemical dependency history, does have opioid dependence due to many years prescribed opioids for chronic pain    PLAN:   1) Pain is consistent with acute pain after falling with history of chronic pain.  Acute pain is under control.   The patient's home MME was up to 45  mg daily.   2)Multimodal Medication Therapy  Topical: lidocaine cream qid   NSAID'S: none stage IV kidney disease  Muscle Relaxants: none currently  Adjuvants: tylenol tid   Antidepressants/anxiolytics: none ordered per  patient is regularly prescribed lorazepam,  need to review frequency of use  Opioids: oxycodone 5-10 mg tid prn   IV Pain medication: none  3)Non-medication interventions  Pharmacy consult- appreciate recommendations   Acupuncture consult- as available Mon and Thursday    Integrative consult - called referral to [phone]   4)Constipation Prophylaxis  Daily stool softener/laxative  5) Follow up   -Opioid prescriber has been Jeet Shell MD  -Discharge Recommendations - We recommend prescribing the following at the time of discharge: TBD  If patient goes to TCU would provide 5 day script of oxycodone/APAP 5/325 mg tablets same as home dose up to 6 tablets/day #30 tablets          History of Present Illness (HPI):       Dacia Miller is a 80 year old old female with acute upon chronic pain.  The pain is reported to be acute pain in left side/hip after falling current pain is at about a \"5\" with all over generalized joint pain, stiffness and muscle aches which is chronic. Patient has history of chronic pain associated with osteoarthritis, fibromyalgia.  MN  " pulled from system on 21. Last refill on 21. This indicated daily opioid use. 2  total number of prescribing providers noted. Most common prescriber is Jeet Shell MD.     Monthly prescriptions for   Lorazepam 0.5 mg tablets 60 for 30 days last filled 2021  Oxycodone/APAP 5/325 mg tablets 180 tablets for 30 days last filled 21  Only prescritpions over the past year.        Medical History  Patient Active Problem List    Diagnosis Date Noted     Hyperglycemia 2021     Priority: Medium     Altered mental status, unspecified altered mental status type 2021     Priority: Medium     Cellulitis 2019     Priority: Medium     Fracture of pubis without disruption of pelvic ring (H) 2019     Priority: Medium     Malignant neoplasm of upper lobe of right lung (H) 2019     Priority: Medium     Anemia, iron deficiency 2019     Priority: Medium     Vitamin B12 deficiency (non anemic) 2019     Priority: Medium     Mass of upper lobe of right lung 2018     Priority: Medium     Malignant neoplasm of central portion of left breast in female, estrogen receptor positive (H) 2018     Priority: Medium     Added automatically from request for surgery [id number]         Osteoarthritis Of The Knee      Priority: Medium     Created by Conversion  Replacement Utility updated for latest IMO load         Arthralgias In Multiple Sites      Priority: Medium     Created by Conversion         Lower Back Pain      Priority: Medium     Created by Conversion         Bursitis Of The Hip      Priority: Medium     Created by Conversion            Surgical History  She  has a past surgical history that includes  Section; Biopsy skin (location); Abdomen surgery; Cholecystectomy; Pr Mastectomy,Partial,  With Axillary Lymphadenectomy (Left, 2018); and Ct Biopsy Lung (2018).     Past Surgical History:   Procedure Laterality Date     ABDOMEN SURGERY       BIOPSY SKIN  (LOCATION)        SECTION       CHOLECYSTECTOMY       CT BIOPSY LUNG  2018     MD MASTECTOMY,PARTIAL,  WITH AXILLARY LYMPHADENECTOMY Left 2018    Procedure: Left Lumpectomy; Phil Campbell Lymph Node Biopsy;  Surgeon: Lilliana Rodriguez MD;  Location: [facility];  Service: General       Allergies  Allergies   Allergen Reactions     Amoxicillin-Pot Clavulanate [Augmentin] Hives     Cephalexin Monohydrate [Cephalexin] Itching and Rash     Contrast Dye Other (See Comments)     Flushed, rashes, blotches     Iodinated Contrast Media [Diagnostic X-Ray Materials] Other (See Comments)     Flushed, rashes, blotches     Other Allergy (See Comments) [External Allergen Needs Reconciliation - See Comment] Unknown     Contrast Media Ready-Box [facility], 2013.; Contrast Media Ready-Box [facility], 2013.       Penicillins Unknown     Prochlorperazine Edisylate [Prochlorperazine] Unknown     Sulfa Drugs Rash       Prior to Admission Medications   Medications Prior to Admission   Medication Sig Dispense Refill Last Dose     allopurinol (ZYLOPRIM) 300 MG tablet [ALLOPURINOL (ZYLOPRIM) 300 MG TABLET] Take 1 tablet by mouth daily.         2021 at Unknown time     aspirin 325 MG tablet [ASPIRIN 325 MG TABLET] Take 1 tablet by mouth daily.   2021 at Unknown time     ferrous sulfate 140 mg (45 mg iron) TbER [FERROUS SULFATE 140 MG (45 MG IRON) TBER] 1 tablet tid 90 tablet 3 2021 at Unknown time     insulin lispro (HUMALOG KWIKPEN) 100 unit/mL pen [INSULIN LISPRO (HUMALOG KWIKPEN) 100 UNIT/ML PEN] INJECT 15 UNITS IN THE MORNING AND 26 UNITS AT DINNER ( PLUS SLIDING SCALE UP TO 70 UNITS DAILY )   8/3/2021 at Unknown time     insulin NPH isoph U-100 human (HUMULIN N NPH INSULIN KWIKPEN) 100 unit/mL (3 mL) pen [INSULIN NPH ISOPH U-100 HUMAN (HUMULIN N NPH INSULIN KWIKPEN) 100 UNIT/ML (3 ML) PEN] 35 units with breakfast   8/3/2021 at Unknown time     lisinopril-hydrochlorothiazide (PRINZIDE,ZESTORETIC) 20-12.5 mg  "per tablet [LISINOPRIL-HYDROCHLOROTHIAZIDE (PRINZIDE,ZESTORETIC) 20-12.5 MG PER TABLET] Take 1 tablet by mouth daily.   8/3/2021 at Unknown time     LORazepam (ATIVAN) 0.5 MG tablet [LORAZEPAM (ATIVAN) 0.5 MG TABLET] Take 0.5 mg by mouth every 8 (eight) hours as needed for anxiety.   Past Week at prn     omeprazole (PRILOSEC) 20 MG capsule [OMEPRAZOLE (PRILOSEC) 20 MG CAPSULE] Take 20 mg by mouth daily.  0 2021 at Unknown time     oxyCODONE-acetaminophen (PERCOCET) 5-325 MG tablet Take 1 tablet by mouth every 6 hours as needed for severe pain   2021 at Unknown time     ketoconazole (NIZORAL) 2 % cream [KETOCONAZOLE (NIZORAL) 2 % CREAM] Apply topically as needed.  3      NYSTOP powder [NYSTOP POWDER] HOMERO EXT AA  TID  5        Social History  Reviewed, and she  reports that she quit smoking about 52 years ago. She has a 3.50 pack-year smoking history. She has never used smokeless tobacco. She reports that she does not drink alcohol and does not use drugs.  Social History     Tobacco Use     Smoking status: Former Smoker     Packs/day: 0.50     Years: 7.00     Pack years: 3.50     Quit date: 1968     Years since quittin.6     Smokeless tobacco: Never Used   Substance Use Topics     Alcohol use: No       Family History  Reviewed, and family history includes Breast Cancer in her mother.    Review of Systems  Complete ROS reviewed, unless noted, all other systems reviewed and found to be negative.        Objective:     Physical Exam:  BP (!) 153/70 (BP Location: Right arm)   Pulse 104   Temp 100.3  F (37.9  C) (Oral)   Resp 20   Ht 1.6 m (5' 3\")   Wt 72.6 kg (160 lb)   SpO2 94%   BMI 28.34 kg/m    Weight:   Weight change:   Body mass index is 28.34 kg/m .      General Appearance:  Alert, cooperative, rests in bed, answers questions, mild difficulty with recall   Head:  Normocephalic, without obvious abnormality, atraumatic   Eyes:  PERRL, conjunctiva/corneas clear, EOM's intact   ENT/Throat: " Lips, mucosa, and tongue normal; teeth and gums normal   Lymph/Neck: Supple, symmetrical, trachea midline, no adenopathy, thyroid: not enlarged, symmetric, no carotid bruit or JVD   Lungs:   respirations unlabored   Chest Wall:  No tenderness or deformity   Cardiovascular/Heart:  Regular rate and rhythm. Edema: none   Abdomen:   Soft, non-tender, non distended   Musculoskeletal: Spine and extremities normal, stiffness, generalized body aches   Skin: Skin color, texture, turgor normal, no rashes or lesions   Neurologic: Reflexes intact, cooridanated movement  Alert and oriented X 3       Psych: Affect is limited range            Imaging Reviewed   XR Pelvis and Hip Left 2 Views    Result Date: 8/4/2021  EXAM: XR PELVIS AND HIP LEFT 2 VIEWS LOCATION: [facility] DATE/TIME: 8/4/2021 9:18 PM INDICATION: left hip pain COMPARISON: None.     IMPRESSION: No acute fracture or dislocation. Old healed left parasymphyseal and left inferior pubic ramus fractures. Osteopenia.    Cervical spine CT w/o contrast    Result Date: 8/4/2021  EXAM: CT CERVICAL SPINE W/O CONTRAST LOCATION: [facility] DATE/TIME: 8/4/2021 9:05 PM INDICATION: Traumatic injury. COMPARISON: None. TECHNIQUE: Routine CT Cervical Spine without IV contrast. Multiplanar reformats. Dose reduction techniques were used. FINDINGS: VERTEBRAE: Reversal of the cervical spine curvature. Diffuse bony demineralization suggestive of osteoporosis. Vertebral body heights are preserved. No fracture or posttraumatic subluxation. CANAL/FORAMINA: Multilevel degenerative changes throughout the cervical spine with at least moderate canal stenosis at C3-C4 and C5-C6. Multilevel bilateral neural foraminal narrowing is greatest at C2-C3 and C3-C4, where there is mild to moderate stenosis. PARASPINAL: No acute extraspinal abnormality. See separately dictated chest CT for intrathoracic findings.     IMPRESSION: 1.  No evidence of an acute  displaced fracture. 2.  Degenerative changes as described.    CT Chest Abdomen Pelvis w/o Contrast    Result Date: 8/4/2021  EXAM: CT CHEST ABDOMEN PELVIS W/O CONTRAST LOCATION: [facility] DATE/TIME: 8/4/2021 9:06 PM INDICATION: Many bruises after fall. Altered mental status. History of lung cancer and radiation treatment. COMPARISON: CT chest 06/19/2021 and CT pelvis 02/19/2019     IMPRESSION: 1.  No evidence for acute traumatic injury within the chest, abdomen or pelvis. 2.  Wedge-shaped airspace consolidation and volume loss right lung apex is unchanged and may represent post radiation treatment changes. Recurrent neoplasm remains a possibility. This could be further assessed with PET/CT. 3.  Stable 7 mm nodule right middle lobe. 4.  Marked dilatation of the central intrahepatic bile ducts and common bile duct, unchanged. 5.  Severe atherosclerotic disease.    CT Head w/o Contrast    Result Date: 8/4/2021  EXAM: CT HEAD W/O CONTRAST LOCATION: [facility] DATE/TIME: 8/4/2021 9:05 PM INDICATION: Traumatic injury COMPARISON: Brain MRI dated 01/14/2019      IMPRESSION: 1.  No CT evidence for acute intracranial process. 2.  Brain atrophy and presumed chronic microvascular ischemic changes as above. 3.  Interval increase dural based mass along the planum sphenoidale, likely reflecting a meningioma.      Labs Reviewed        Total time spent 73 minutes with greater than 50% in consultation, education and coordination of care.     Also discussed with RN    Thank you for this consultation.      Concha HATCH, CNS-BC, DNP  Acute Care Pain Management Program  [facility] ([facility], [place], [place])   With questions call [phone]  Preference if for [facility] Aneudy Rodriguez  Click HERE to page Tracy

## 2021-08-05 NOTE — ED NOTES
"[facility] ED Handoff Report    ED Chief Complaint:  chief complaint    ED Diagnosis:  (R73.9) Hyperglycemia  Comment:   Plan:     (R41.82) Altered mental status, unspecified altered mental status type  Comment:   Plan:        PMH:    Past Medical History:   Diagnosis Date     Anemia, iron deficiency 1/22/2019     Arthritis      Breast cancer (H)      Cancer (H)      Diabetes mellitus (H)      DM (diabetes mellitus screen)      History of anesthesia complications      HLD (hyperlipidemia)      HTN (hypertension)      Kidney stones      Lung cancer (H)      PONV (postoperative nausea and vomiting)      Sciatica      Scoliosis         Code Status:  No Order     Falls Risk: Yes    Current Living Situation/Residence: lives at home with family    Elimination Status:  Incont.    Activity Level:  Up with 1-2 assist    Patients Preferred Language:  English     Needed: No    Infection:  [unfilled]     Vital Signs:  BP (!) 193/85   Pulse 104   Temp 99.9  F (37.7  C) (Oral)   Resp 24   Ht 1.6 m (5' 3\")   Wt 72.6 kg (160 lb)   SpO2 96%   BMI 28.34 kg/m       Cardiac Rhythm: sinus tach    Pain Score:  6/10    Is the Patient Confused:  Yes    Last Food or Drink: Breakfast    Focused Assessment:  See chart    Tests Performed:  See chart    Abnormal Results:    Abnormal Labs Reviewed   COMPREHENSIVE METABOLIC PANEL - Abnormal; Notable for the following components:       Result Value    Sodium 135 (*)     Potassium 5.3 (*)     Carbon Dioxide (CO2) 18 (*)     Urea Nitrogen 31 (*)     Creatinine 1.73 (*)     Glucose 388 (*)     GFR Estimate 28 (*)     All other components within normal limits   ROUTINE UA WITH MICROSCOPIC REFLEX TO CULTURE - Abnormal; Notable for the following components:    Glucose Urine >1000 (*)     Ketones Urine Trace (*)     Blood Urine 0.2 mg/dL (*)     Protein Albumin Urine 300  (*)     All other components within normal limits    Narrative:     Urine Culture not indicated   BLOOD GAS " VENOUS - Abnormal; Notable for the following components:    pH Venous 7.33 (*)     Bicarbonate Venous 22 (*)     Oxyhemoglobin Venous 46.6 (*)     O2 Sat, Venous 47.5 (*)     All other components within normal limits   CBC WITH PLATELETS AND DIFFERENTIAL - Abnormal; Notable for the following components:    RBC Count 3.53 (*)     Hemoglobin 10.7 (*)     Hematocrit 33.2 (*)     Absolute Neutrophils 9.0 (*)     Absolute Lymphocytes 0.7 (*)     Absolute Immature Granulocytes 0.1 (*)     All other components within normal limits       XR Pelvis and Hip Left 2 Views   Final Result   IMPRESSION: No acute fracture or dislocation. Old healed left parasymphyseal and left inferior pubic ramus fractures. Osteopenia.      CT Chest Abdomen Pelvis w/o Contrast   Final Result   IMPRESSION:   1.  No evidence for acute traumatic injury within the chest, abdomen or pelvis.   2.  Wedge-shaped airspace consolidation and volume loss right lung apex is unchanged and may represent post radiation treatment changes. Recurrent neoplasm remains a possibility. This could be further assessed with PET/CT.   3.  Stable 7 mm nodule right middle lobe.   4.  Marked dilatation of the central intrahepatic bile ducts and common bile duct, unchanged.   5.  Severe atherosclerotic disease.      Cervical spine CT w/o contrast   Final Result   IMPRESSION:   1.  No evidence of an acute displaced fracture.   2.  Degenerative changes as described.      CT Head w/o Contrast   Final Result   IMPRESSION:   1.  No CT evidence for acute intracranial process.   2.  Brain atrophy and presumed chronic microvascular ischemic changes as above.   3.  Interval increase dural based mass along the planum sphenoidale, likely reflecting a meningioma.           Treatments Provided:      Family Dynamics/Concerns: no :[id number]}    Family Updated On Visitor Policy: Yes    Plan of Care Communicated to Family: Yes      Alson updated daughter  Belongings Checklist Done and Signed by  Patient: Yes    covid sent    ED Medications:  See chart  Medications   ondansetron (ZOFRAN) injection 4 mg (has no administration in time range)   lisinopril (ZESTRIL) tablet 20 mg (has no administration in time range)   hydrochlorothiazide (HYDRODIURIL) tablet 12.5 mg (has no administration in time range)   0.9% sodium chloride BOLUS (1,000 mLs Intravenous New Bag 8/4/21 2225)   insulin aspart (NovoLOG) injection (RAPID ACTING) (10 Units Subcutaneous Given 8/4/21 2228)   ondansetron (ZOFRAN) injection 8 mg (8 mg Intravenous Given 8/4/21 2228)        Additional Information:     @ME2@ 8/4/2021 11:16 PM

## 2021-08-05 NOTE — ED NOTES
Bed: JNED-14  Expected date: 8/4/21  Expected time: 7:35 PM  Means of arrival: Ambulance  Comments:  allyssa

## 2021-08-05 NOTE — PHARMACY-ADMISSION MEDICATION HISTORY
Pharmacy Note - Admission Medication History    Pertinent Provider Information:     Patient is unable to articulate most of her medications and their strength. She believes she took most medications yesterday.     ______________________________________________________________________    Prior To Admission (PTA) med list completed and updated in EMR.       PTA Med List   Medication Sig Last Dose     allopurinol (ZYLOPRIM) 300 MG tablet [ALLOPURINOL (ZYLOPRIM) 300 MG TABLET] Take 1 tablet by mouth daily.       8/4/2021 at Unknown time     aspirin 325 MG tablet [ASPIRIN 325 MG TABLET] Take 1 tablet by mouth daily. 8/4/2021 at Unknown time     ferrous sulfate 140 mg (45 mg iron) TbER [FERROUS SULFATE 140 MG (45 MG IRON) TBER] 1 tablet tid 8/4/2021 at Unknown time     insulin lispro (HUMALOG KWIKPEN) 100 unit/mL pen [INSULIN LISPRO (HUMALOG KWIKPEN) 100 UNIT/ML PEN] INJECT 15 UNITS IN THE MORNING AND 26 UNITS AT DINNER ( PLUS SLIDING SCALE UP TO 70 UNITS DAILY ) 8/3/2021 at Unknown time     insulin NPH isoph U-100 human (HUMULIN N NPH INSULIN KWIKPEN) 100 unit/mL (3 mL) pen [INSULIN NPH ISOPH U-100 HUMAN (HUMULIN N NPH INSULIN KWIKPEN) 100 UNIT/ML (3 ML) PEN] 35 units with breakfast 8/3/2021 at Unknown time     lisinopril-hydrochlorothiazide (PRINZIDE,ZESTORETIC) 20-12.5 mg per tablet [LISINOPRIL-HYDROCHLOROTHIAZIDE (PRINZIDE,ZESTORETIC) 20-12.5 MG PER TABLET] Take 1 tablet by mouth daily. 8/3/2021 at Unknown time     LORazepam (ATIVAN) 0.5 MG tablet [LORAZEPAM (ATIVAN) 0.5 MG TABLET] Take 0.5 mg by mouth every 8 (eight) hours as needed for anxiety. Past Week at prn     omeprazole (PRILOSEC) 20 MG capsule [OMEPRAZOLE (PRILOSEC) 20 MG CAPSULE] Take 20 mg by mouth daily. 8/4/2021 at Unknown time     oxyCODONE-acetaminophen (PERCOCET) 5-325 MG tablet Take 1 tablet by mouth every 6 hours as needed for severe pain 8/4/2021 at Unknown time       Information source(s): Patient and CareEverywhere/SureScripts  Method of interview  communication: in-person    Summary of Changes to PTA Med List  New: none  Discontinued: none  Changed: none    Patient was asked about OTC/herbal products specifically.  PTA med list reflects this.    In the past week, patient estimated taking medication this percent of the time:  50-90% due to other.    Allergies were reviewed, assessed, and updated with the patient.      The information provided in this note is only as accurate as the sources available at the time of the update(s).    Thank you for the opportunity to participate in the care of this patient.    Yuriy Maciel RPH  8/4/2021 11:00 PM

## 2021-08-05 NOTE — PROGRESS NOTES
80-year-old patient with medical history of breast cancer, hypertension, polymyalgia rheumatica, stage IV kidney disease, was brought to the ER after she fell and spent the rest of the time in the bed unable to get up work-up in the ER was negative for fracture she was found to have hyperglycemia hypertensive nauseated, she takes 1 Percocet every 1-2 hours, was kept n.p.o., by the morning her nausea improved and resumed on a diet, pain management consulted, blood sugar improved this morning, patient was seen and examined at bedside daughter at bedside,

## 2021-08-05 NOTE — PROVIDER NOTIFICATION
PROVIDER NOTIFICATION    Reason for communication: Redness of the bilateral  breast and panus/abdomen folds  Team member name: Margarito  Team member role: Primary Physican  Method of Communication: Text Page  Response:Watiting for reponse.  Response time:

## 2021-08-05 NOTE — CONSULTS
NEUROLOGY INPATIENT CONSULTATION NOTE       [facility]  [address] [place] [address]  Tel: [phone]  Fax: [phone]  [email or web address]     Dacia Miller,  1940, MRN [id number]  PCP: Rajinder Kitchen  Date: 2021     ASSESSMENT & PLAN     Diagnosis code: Subcortical infarction    Left frontoparietal subcortical infarction  80-year-old female with history of DM, HTN, HLD, CKD stage III, generalized anxiety disorder, adenocarcinoma of the lung, history of breast cancer status post lumpectomy admitted after she had a fall and was unable to get up.    Patient was not a candidate for thrombolytics due to time of onset contraindication    IV hydration with normal saline, permissive hypertension.  Keep systolic blood pressure less than 180    DAPT with aspirin and Plavix for 21 days.  Afterwards she can be switched to aspirin monotherapy    Check lipid profile and start Lipitor 20 mg daily most recent lipid profile on file is from 2018 that showed LDL of 114    Echocardiogram    Carotid ultrasound    Physical, occupational, speech and pharmacy consult    Check CK    Patient has a known planum sphenoidale meningioma that is incidental although it does show slight increase in the size compared to MRI done last year.  Neurosurgery is following    Thank you again for this referral, please feel free to contact me if you have any questions.    Musa Zurita MD  [facility]  (Formerly, [facility]     CHIEF COMPLAINT Subcortical infarction (H)     HISTORY OF PRESENT ILLNESS     We have been requested by Dr. Godoy to evaluate Dacia Miller who is a 80 year old  female for CVA    Patient is a 80-year-old female with history of type 2 diabetes, chronic pain, non-small cell lung cancer status post radiation, breast cancer status post lumpectomy and radiation who was admitted on today after she  had fallen and spent the rest of the time in bed unable to get out.  When she presented to the emergency room she had intractable nausea and vomiting and had CT of the chest abdomen pelvis that did not show any obstruction.  She had a CT of the head and cervical spine that showed increase in dural based mass along the planum sphenoidale likely reflecting a meningioma.  Neurosurgery consult was obtained but they did not think any intervention was needed at it has only grown minimally in 2 years.  MRI was ordered that did show increase in the size of meningioma compared to 2019 but additionally patient had 9 mm subacute infarct in the mid left frontoparietal junction.  Patient denies any speech difficulty any numbness tingling or any swallowing difficulty.  Patient does have history of chronic pain and was taking 1 Percocet every 1-2 hours     PROBLEM LIST      Patient Active Problem List   Diagnosis Code     Lower Back Pain M54.5     Malignant neoplasm of central portion of left breast in female, estrogen receptor positive (H) C50.112, Z17.0     Mass of upper lobe of right lung R91.8     Vitamin B12 deficiency (non anemic) E53.8     Malignant neoplasm of upper lobe of right lung (H) C34.11     Hyperglycemia R73.9     Altered mental status, unspecified altered mental status type R41.82     Adenocarcinoma of left lung (H) C34.92     Fibromyalgia M79.7     Diabetes type 2, uncontrolled (H) E11.65     Essential hypertension I10     Gastro-esophageal reflux disease without esophagitis K21.9     Generalized anxiety disorder F41.1     Gout M10.9     Hyperlipidemia E78.5     Stage 3 chronic kidney disease N18.30     Vitamin D deficiency E55.9     Left frontoparietal subcortical infarction I63.9     Planum sphenoidale meningioma D32.0        PAST MEDICAL & SURGICAL HISTORY     Past Medical History: Patient  has a past medical history of Anemia, iron deficiency (1/22/2019), Arthritis, Breast cancer (H), Cancer (H), Diabetes  mellitus (H), DM (diabetes mellitus screen), History of anesthesia complications, HLD (hyperlipidemia), HTN (hypertension), Kidney stones, Lung cancer (H), PONV (postoperative nausea and vomiting), Sciatica, and Scoliosis.    Past Surgical History: She  has a past surgical history that includes  Section; Biopsy skin (location); Abdomen surgery; Cholecystectomy; Pr Mastectomy,Partial,  With Axillary Lymphadenectomy (Left, 2018); and Ct Biopsy Lung (2018).     SOCIAL HISTORY     Reviewed, and she  reports that she quit smoking about 52 years ago. She has a 3.50 pack-year smoking history. She has never used smokeless tobacco. She reports that she does not drink alcohol and does not use drugs.     FAMILY HISTORY     Reviewed, and family history includes Breast Cancer in her mother.     ALLERGIES     Allergies   Allergen Reactions     Amoxicillin-Pot Clavulanate [Augmentin] Hives     Cephalexin Monohydrate [Cephalexin] Itching and Rash     Contrast Dye Other (See Comments)     Flushed, rashes, blotches     Iodinated Contrast Media [Diagnostic X-Ray Materials] Other (See Comments)     Flushed, rashes, blotches     Other Allergy (See Comments) [External Allergen Needs Reconciliation - See Comment] Unknown     Contrast Media Ready-Box [facility], 2013.; Contrast Media Ready-Box [facility], 2013.       Penicillins Unknown     Prochlorperazine Edisylate [Prochlorperazine] Unknown     Sulfa Drugs Rash        REVIEW OF SYSTEMS     Pertinent items are noted in HPI.     HOME & HOSPITAL MEDICATIONS     Prior to Admission Medications  Medications Prior to Admission   Medication Sig Dispense Refill Last Dose     allopurinol (ZYLOPRIM) 300 MG tablet [ALLOPURINOL (ZYLOPRIM) 300 MG TABLET] Take 1 tablet by mouth daily.         2021 at Unknown time     aspirin 325 MG tablet [ASPIRIN 325 MG TABLET] Take 1 tablet by mouth daily.   2021 at Unknown time     ferrous sulfate 140 mg (45 mg iron) TbER [FERROUS SULFATE  140 MG (45 MG IRON) TBER] 1 tablet tid 90 tablet 3 8/4/2021 at Unknown time     insulin lispro (HUMALOG KWIKPEN) 100 unit/mL pen [INSULIN LISPRO (HUMALOG KWIKPEN) 100 UNIT/ML PEN] INJECT 15 UNITS IN THE MORNING AND 26 UNITS AT DINNER ( PLUS SLIDING SCALE UP TO 70 UNITS DAILY )   8/3/2021 at Unknown time     insulin NPH isoph U-100 human (HUMULIN N NPH INSULIN KWIKPEN) 100 unit/mL (3 mL) pen [INSULIN NPH ISOPH U-100 HUMAN (HUMULIN N NPH INSULIN KWIKPEN) 100 UNIT/ML (3 ML) PEN] 35 units with breakfast   8/3/2021 at Unknown time     lisinopril-hydrochlorothiazide (PRINZIDE,ZESTORETIC) 20-12.5 mg per tablet [LISINOPRIL-HYDROCHLOROTHIAZIDE (PRINZIDE,ZESTORETIC) 20-12.5 MG PER TABLET] Take 1 tablet by mouth daily.   8/3/2021 at Unknown time     LORazepam (ATIVAN) 0.5 MG tablet [LORAZEPAM (ATIVAN) 0.5 MG TABLET] Take 0.5 mg by mouth every 8 (eight) hours as needed for anxiety.   Past Week at prn     omeprazole (PRILOSEC) 20 MG capsule [OMEPRAZOLE (PRILOSEC) 20 MG CAPSULE] Take 20 mg by mouth daily.  0 8/4/2021 at Unknown time     oxyCODONE-acetaminophen (PERCOCET) 5-325 MG tablet Take 1 tablet by mouth every 6 hours as needed for severe pain   8/4/2021 at Unknown time     ketoconazole (NIZORAL) 2 % cream [KETOCONAZOLE (NIZORAL) 2 % CREAM] Apply topically as needed.  3      NYSTOP powder [NYSTOP POWDER] HOMERO EXT AA  TID  5        Hospital Medications    acetaminophen  975 mg Oral TID    Or     acetaminophen  650 mg Rectal TID     [Held by provider] allopurinol  300 mg Oral Daily     [Held by provider] aspirin  325 mg Oral Daily     insulin aspart  1-6 Units Subcutaneous TID w/meals     insulin glargine  20 Units Subcutaneous QAM AC     [Held by provider] omeprazole  20 mg Oral Daily        PHYSICAL EXAM     Vital signs  Temp:  [98.8  F (37.1  C)-100.3  F (37.9  C)] 98.8  F (37.1  C)  Pulse:  [] 86  Resp:  [14-28] 18  BP: (137-212)/(63-93) 178/75  SpO2:  [94 %-98 %] 95 %    Weight:   Wt Readings from Last 1 Encounters:    08/04/21 72.6 kg (160 lb)      General Physical Exam: Patient is alert and oriented x 3. Vital signs were reviewed and are documented in EMR. Neck was supple, no carotid bruit, thyromegaly, JVD or lymphadenopathy noted.  Neurological Exam:  Mental status: Patient is alert and oriented x3  Speech: Normal with no dysarthria or aphasia  Cranial nerves: Pupil equal round reactive extraocular movements are intact face symmetrical tongue midline  Motor: Moves all 4 extremity no pronator drift.  Tone is normal.  Strength on the right 5 -/5 on the left 5/5  Reflexes: 1+ with downgoing toes  Sensory: Intact light touch pinprick.  No double simultaneous extinction   Coordination: Intact finger-nose testing and rapid alternating movement  Gait: Deferred for safety     DIAGNOSTIC STUDIES     Pertinent Radiology   Radiology Results: Reviewed impression and images     CT  1.  No CT evidence for acute intracranial process.  2.  Brain atrophy and presumed chronic microvascular ischemic changes as above.  3.  Interval increase dural based mass along the planum sphenoidale, likely reflecting a meningioma.    MRI  1.  9 mm acute or early subacute infarct within the subcortical white matter at the mid left frontoparietal junction. No hemorrhage.   2.  Interval enlargement of a planum sphenoidale meningioma compared to 2019 as detailed above.   3.  Mild to moderate volume loss and presumed chronic small vessel ischemic changes.     Pertinent Labs   Lab Results: Personally Reviewed   Recent Results (from the past 24 hour(s))   Extra Blood Culture Bottle    Collection Time: 08/04/21  8:23 PM   Result Value Ref Range    Hold Specimen [facility]    Extra Blue Top Tube    Collection Time: 08/04/21  8:23 PM   Result Value Ref Range    Hold Specimen [facility]    Extra Red Top Tube    Collection Time: 08/04/21  8:23 PM   Result Value Ref Range    Hold Specimen [facility]    Extra Green Top (Lithium Heparin) Tube    Collection Time: 08/04/21  8:23 PM   Result  Value Ref Range    Hold Specimen [facility]    Extra Purple Top Tube    Collection Time: 08/04/21  8:23 PM   Result Value Ref Range    Hold Specimen C    Extra Green Top (Lithium Heparin) ON ICE    Collection Time: 08/04/21  8:23 PM   Result Value Ref Range    Hold Specimen [facility]    Comprehensive metabolic panel    Collection Time: 08/04/21  8:23 PM   Result Value Ref Range    Sodium 135 (L) 136 - 145 mmol/L    Potassium 5.3 (H) 3.5 - 5.0 mmol/L    Chloride 106 98 - 107 mmol/L    Carbon Dioxide (CO2) 18 (L) 22 - 31 mmol/L    Anion Gap 11 5 - 18 mmol/L    Urea Nitrogen 31 (H) 8 - 28 mg/dL    Creatinine 1.73 (H) 0.60 - 1.10 mg/dL    Calcium 9.5 8.5 - 10.5 mg/dL    Glucose 388 (H) 70 - 125 mg/dL    Alkaline Phosphatase 115 45 - 120 U/L    AST 17 0 - 40 U/L    ALT 15 0 - 45 U/L    Protein Total 7.6 6.0 - 8.0 g/dL    Albumin 3.7 3.5 - 5.0 g/dL    Bilirubin Total 0.6 0.0 - 1.0 mg/dL    GFR Estimate 28 (L) >60 mL/min/1.73m2   Troponin I    Collection Time: 08/04/21  8:23 PM   Result Value Ref Range    Troponin I 0.16 0.00 - 0.29 ng/mL   Lactic acid whole blood    Collection Time: 08/04/21  8:23 PM   Result Value Ref Range    Lactic Acid 2.0 0.7 - 2.0 mmol/L   CBC with platelets and differential    Collection Time: 08/04/21  8:23 PM   Result Value Ref Range    WBC Count 10.2 4.0 - 11.0 10e3/uL    RBC Count 3.53 (L) 3.80 - 5.20 10e6/uL    Hemoglobin 10.7 (L) 11.7 - 15.7 g/dL    Hematocrit 33.2 (L) 35.0 - 47.0 %    MCV 94 78 - 100 fL    MCH 30.3 26.5 - 33.0 pg    MCHC 32.2 31.5 - 36.5 g/dL    RDW 13.4 10.0 - 15.0 %    Platelet Count 217 150 - 450 10e3/uL    % Neutrophils 87 %    % Lymphocytes 7 %    % Monocytes 5 %    % Eosinophils 0 %    % Basophils 0 %    % Immature Granulocytes 1 %    NRBCs per 100 WBC 0 <1 /100    Absolute Neutrophils 9.0 (H) 1.6 - 8.3 10e3/uL    Absolute Lymphocytes 0.7 (L) 0.8 - 5.3 10e3/uL    Absolute Monocytes 0.5 0.0 - 1.3 10e3/uL    Absolute Eosinophils 0.0 0.0 - 0.7 10e3/uL    Absolute Basophils 0.0 0.0 -  0.2 10e3/uL    Absolute Immature Granulocytes 0.1 (H) <=0.0 10e3/uL    Absolute NRBCs 0.0 10e3/uL   Extra Blood Culture Bottle    Collection Time: 08/04/21  8:31 PM   Result Value Ref Range    Hold Specimen [facility]    Blood gas venous    Collection Time: 08/04/21  9:59 PM   Result Value Ref Range    pH Venous 7.33 (L) 7.35 - 7.45    pCO2 Venous 46 35 - 50 mm Hg    pO2 Venous 26 25 - 47 mm Hg    Bicarbonate Venous 22 (L) 24 - 30 mmol/L    Base Excess/Deficit (+/-) -2.1   mmol/L    Oxyhemoglobin Venous 46.6 (L) 70.0 - 75.0 %    O2 Sat, Venous 47.5 (L) 70.0 - 75.0 %   UA with Microscopic reflex to Culture    Collection Time: 08/04/21 10:01 PM    Specimen: Urine, Clean Catch   Result Value Ref Range    Color Urine Light Yellow Colorless, Straw, Light Yellow, Yellow    Appearance Urine Clear Clear    Glucose Urine >1000 (A) Negative mg/dL    Bilirubin Urine Negative Negative    Ketones Urine Trace (A) Negative mg/dL    Specific Gravity Urine 1.018 1.001 - 1.030    Blood Urine 0.2 mg/dL (A) Negative    pH Urine 6.0 5.0 - 7.0    Protein Albumin Urine 300  (A) Negative mg/dL    Urobilinogen Urine <2.0 <2.0 mg/dL    Nitrite Urine Negative Negative    Leukocyte Esterase Urine Negative Negative    RBC Urine 1 <=2 /HPF    WBC Urine 1 <=5 /HPF   SARS-COV2 (COVID-19) Virus RT-PCR    Collection Time: 08/04/21 10:01 PM    Specimen: Nasopharyngeal; Swab   Result Value Ref Range    SARS CoV2 PCR Negative Negative   Glucose by meter    Collection Time: 08/05/21  1:35 AM   Result Value Ref Range    GLUCOSE BY METER POCT 223 (H) 70 - 125 mg/dL   Glucose by meter    Collection Time: 08/05/21  2:37 AM   Result Value Ref Range    GLUCOSE BY METER POCT 223 (H) 70 - 125 mg/dL   Glucose by meter    Collection Time: 08/05/21  3:55 AM   Result Value Ref Range    GLUCOSE BY METER POCT 272 (H) 70 - 125 mg/dL   Hemoglobin A1c    Collection Time: 08/05/21  7:35 AM   Result Value Ref Range    Hemoglobin A1C 9.3 (H) <=5.6 %   Basic metabolic panel     Collection Time: 08/05/21  7:35 AM   Result Value Ref Range    Sodium 138 136 - 145 mmol/L    Potassium 4.6 3.5 - 5.0 mmol/L    Chloride 111 (H) 98 - 107 mmol/L    Carbon Dioxide (CO2) 21 (L) 22 - 31 mmol/L    Anion Gap 6 5 - 18 mmol/L    Urea Nitrogen 30 (H) 8 - 28 mg/dL    Creatinine 1.42 (H) 0.60 - 1.10 mg/dL    Calcium 8.5 8.5 - 10.5 mg/dL    Glucose 273 (H) 70 - 125 mg/dL    GFR Estimate 35 (L) >60 mL/min/1.73m2   Glucose by meter    Collection Time: 08/05/21  8:31 AM   Result Value Ref Range    GLUCOSE BY METER POCT 239 (H) 70 - 125 mg/dL   Glucose by meter    Collection Time: 08/05/21 12:02 PM   Result Value Ref Range    GLUCOSE BY METER POCT 192 (H) 70 - 125 mg/dL       Total time spent for face to face visit, reviewing labs/imaging studies, counseling and coordination of care was: 1 Hour 15 Minutes More than 50% of this time was spent on counseling and coordination of care.      This note was dictated using voice recognition software.  Any grammatical or context distortions are unintentional and inherent to the software.

## 2021-08-05 NOTE — PHARMACY-CONSULT NOTE
Pharmacy Consult to evaluate for medication related stroke core measures    Dacia TAVARES Taneshawillard, 80 year old female admitted for back pain on 8/4/2021.  Found to have left frontoparietal subcortical infarction    Thrombolytic was not given because of Time from onset contraindications    VTE Prophylaxis SCDs /PCDs placed on 8/5, as appropriate prior to end of hospital day 2.    Antithrombotic: aspirin started on 8/5, as appropriate by end of hospital day 2. Continue antithrombotic therapy on discharge to meet quality measures, unless contraindicated.  Planning DAPT with clopidogrel x 21 days    Anticoagulation if history of A-fib/flutter: Patient does not have history of A-fib/flutter - anticoagulation not required for medication related stroke core measures.     LDL Cholesterol Calculated   Date Value Ref Range Status   05/11/2018 114 <=129 mg/dL Final       Patient currently receiving Lipitor (atorvastatin) continue statin on discharge to meet quality measures, unless contraindicated.    Recommendations: None at this time    Thank you for the consult.    Jenifer Browne RPH 8/5/2021 5:59 PM      no

## 2021-08-05 NOTE — PLAN OF CARE
Problem: Adult Inpatient Plan of Care  Goal: Optimal Comfort and Wellbeing  Outcome: No Change    Upon pt's arrival to unit /85, scheduled Lisinipril and Hydrochlorothiazide given, BP still elevated 189/83, Charity valdes, IV Hydralazine given.    Pt's nausea wasn't controlled with IV Zofran, pagejustine Nash for IV Reglan.   No change after 20 minutes, Charity pagejustine to come assess pt.  No obstruction seen on CT scan.     Pt is now NPO.  BS checks q4.  One time dose of IV Morphine and IV Protonix given.    BP has improved 160/69.  Pt remains on continuous pulse oximetry with bed alarm on.

## 2021-08-06 NOTE — PROGRESS NOTES
[facility] AC[facility] PAIN SERVICE    ([facility], [facility], [facility])   Daily PAIN Progress Note    Assessment/Plan:  Dacia Miller is a 80 year old female who was admitted on 8/4/2021.    Pain Service is asked to see the patient for chronic pain with opioid dependence. Admitted for evaluaton after a fall.  Patient fell previous night before coming to ED (22 hours) with increased pain and difficulty ambulating. She also had complaint of nausea and dry heaves with abdominal pain.  Patient was unable to get up from her bed and asked daughter to call EMS.  Work-up in the ED was negative for fractures or acute pathology, however she was hyperglycemic,  Neurosurgery Consult pending.    Patient with complex medical history significant for Stage IV kidney disease, anemia of chronic illness, hypertension, hyperlipidemia, Type II diabetes, chronic pain, fibromyalgia, scoliosis, prior falls with traumatic injury, chronic pain disorder with fibromyalgia, Polymyalgia Rheumatica, non small cell lung cancer, breast cancer.    She is being followed by Neurology with imaging demonstrating a left frontoparietal subcortical infarct and incidental findings of meningioma.  Neurosurgery and Cardiology.  Notes reviewed.  Troponin elevation possibly consistent with non-St elevated MI.   Will have echo later today.     She is resting comfortably and states that pain is under good control.  She is satisfied with current pain management plan.    Has mild constipation.  Senna and miralax added.     PLAN:   1) Pain is consistent with acute pain after falling with history of chronic pain.  Acute pain is under control.   The patient's home MME was up to 45  mg daily.   2)Multimodal Medication Therapy  Topical: lidocaine cream qid   NSAID'S: none stage IV kidney disease  Muscle Relaxants: none currently  Adjuvants: tylenol tid   Antidepressants/anxiolytics: none ordered per  patient is regularly prescribed lorazepam,   need to review frequency of use  Opioids: oxycodone 5-10 mg tid prn   IV Pain medication: none  3)Non-medication interventions  Pharmacy consult- appreciate recommendations   Acupuncture consult- as available Mon and Thursday    Integrative consult - called referral to [phone]   4)Constipation Prophylaxis  Daily stool softener/laxative  5) Follow up   -Opioid prescriber has been Jeet Shell MD  -Discharge Recommendations - We recommend prescribing the following at the time of discharge: TBD  If patient goes to TCU would provide 5 day script of oxycodone/APAP 5/325 mg tablets same as home dose up to 6 tablets/day #30 tablets       Principal Problem:    Left frontoparietal subcortical infarction  Active Problems:    Lower Back Pain    Hyperglycemia    Altered mental status, unspecified altered mental status type    Planum sphenoidale meningioma     LOS: 1 day       Subjective:  Patient reports pain is minimal pain at rest.      acetaminophen  975 mg Oral or Feeding Tube TID    Or     acetaminophen  650 mg Rectal TID     allopurinol  300 mg Oral Daily     aspirin  81 mg Oral Daily    Or     aspirin  81 mg Oral or NG Tube Daily     atorvastatin  40 mg Oral QPM     hydrALAZINE  12.5 mg Oral TID     insulin aspart  1-6 Units Subcutaneous TID w/meals     insulin glargine  20 Units Subcutaneous QAM AC     metoprolol tartrate  25 mg Oral BID     omeprazole  20 mg Oral Daily     sodium chloride (PF)  3 mL Intracatheter Q8H       Objective:  Vital signs in last 24 hours:  Temp:  [98  F (36.7  C)-99.9  F (37.7  C)] 99.2  F (37.3  C)  Pulse:  [71-93] 71  Resp:  [18-20] 20  BP: (164-192)/(70-84) 189/84  SpO2:  [92 %-96 %] 96 %  Weight:   Weight change: 4.037 kg (8 lb 14.4 oz)  Body mass index is 29.92 kg/m .    Intake/Output last 3 shifts:  I/O last 3 completed shifts:  In: 2926.75 [P.O.:660; I.V.:2266.75]  Out: 650 [Urine:650]  Intake/Output this shift:  I/O this shift:  In: -   Out: 175 [Urine:175]    Review of Systems:    As per subjective, all others negative.    Physical Exam:    General Appearance:  Alert, cooperative, no distress, rests in bed, anticipating echo later today   Head:  Normocephalic, without obvious abnormality, atraumatic   Eyes:  PERRL, conjunctiva/corneas clear, EOM's intact   Nose: Nares normal, septum midline, mucosa normal, no drainage   Throat: Lips, mucosa, and tongue normal; teeth and gums normal   Neck: Supple, symmetrical, trachea midline   Back:   Symmetric, no curvature, ROM normal, no CVA tenderness   Lungs:   respirations unlabored   Abdomen:   Soft, non-tender   Extremities: Extremities normal, atraumatic, no cyanosis or edema   Skin: Skin color, texture, turgor normal, no rashes or lesions   Neurologic: Alert and oriented X 3, Moves all 4 extremities          Imaging:  Personally Reviewed.  XR Pelvis and Hip Left 2 Views    Result Date: 8/4/2021  EXAM: XR PELVIS AND HIP LEFT 2 VIEWS LOCATION: [facility] DATE/TIME: 8/4/2021 9:18 PM INDICATION: left hip pain COMPARISON: None.     IMPRESSION: No acute fracture or dislocation. Old healed left parasymphyseal and left inferior pubic ramus fractures. Osteopenia.    US Carotid Bilateral    Result Date: 8/5/2021  EXAM: US CAROTID BILATERAL LOCATION: [facility] DATE/TIME: 8/5/2021 9:30 PM INDICATION: Subacute subcortical stroke, evaluate for obstructive or ulcerative disease. COMPARISON: None. TECHNIQUE: Duplex exam performed utilizing 2D gray-scale imaging, Doppler interrogation with color-flow and spectral waveform analysis. The percent diameter stenosis is determined using NASCET criteria and Society of Radiologists in Ultrasound Consensus Criteria. FINDINGS: RIGHT: Mild to moderate plaque at the bifurcation. The peak systolic velocity in the ICA is less than 125 cm/sec, consistent with less than 50% stenosis. Normal velocities in the ECA. Antegrade flow within the vertebral artery. LEFT: Mild to moderate  plaque at the bifurcation. The peak systolic velocity in the ICA is less than 125 cm/sec, consistent with less than 50% stenosis. Normal velocities in the ECA. Antegrade flow within the vertebral artery. VELOCITY CHART: CCA   Right: 61/9 cm/s   Left: 100/14 cm/s ICA   Right: 108/29 cm/s   Left: 120/25 cm/s ECA   Right: 109/5 cm/s   Left: 71/2 cm/s ICA/CCA PSV Ratio   Right: 1.2   Left: 1.2     IMPRESSION: 1.  Mild to moderate plaque formation, velocities consistent with less than 50% stenosis in the right internal carotid artery. 2.  Mild to moderate plaque formation, velocities consistent with less than 50% stenosis in the left internal carotid artery. 3.  Flow within the vertebral arteries is antegrade.    XR Chest Port 1 View    Result Date: 8/5/2021  EXAM: XR CHEST PORT 1 VIEW LOCATION: [facility] DATE/TIME: 8/5/2021 8:25 PM INDICATION: patient with elevated troponin, evaluate cardiac silhouette, lung fields for infiltrate or edema, recent falls with history of lung cancer and radiation treatment. COMPARISON: Chest CT 08/04/2021 biliary older studies.     IMPRESSION: Low lung volumes. Right suprahilar opacities with retraction again noted. No new parenchymal disease. Heart and pulmonary vascularity are normal. No hydropneumothorax or displaced fractures.    MR Brain w/o & w Contrast    Result Date: 8/5/2021  EXAM: MR BRAIN W/O and W CONTRAST LOCATION: [facility] DATE/TIME: 8/5/2021 2:34 PM INDICATION: Meningioma. Altered mental status. COMPARISON: Head CT 08/04/2021, brain MRI 01/14/2019. CONTRAST: 7ml of Gadavist TECHNIQUE: Routine multiplanar multisequence head MRI without and with intravenous contrast. FINDINGS: There is a 9 mm round focus of restricted diffusion within the subcortical white matter at the left frontoparietal junction that demonstrates T2/FLAIR hyperintensity without enhancement most consistent with a small recent acute or early subacute  infarct.  Otherwise negative diffusion sequence. Extra-axial dural based enhancing mass along the planum sphenoidale measures approximately 2.5 cm transverse by 2.8 cm AP and extends 1 cm above the planum sphenoidale. This compares with approximately 1.9 x 1.7 x 0.8 cm when measured in a similar manner on the 2019 exam. On today's study also extends further dorsally over the sella. It abuts but does not appear to narrow the supraclinoid internal carotid artery flow-voids. Mild mucosal thickening within the ethmoid air cells. Paranasal sinuses are otherwise free from significant disease. Mastoid air cells appear free from significant disease. Intraorbital contents are unremarkable. There is mild to moderate generalized prominence of the sulci and ventricles, consistent with underlying volume loss. Intracranial flow voids are intact. There are patchy areas of T2/FLAIR hyperintensity within the cerebral white matter that are nonspecific but probably reflect the sequela of chronic small vessel ischemic disease. No evidence for acute or chronic intracranial blood products.     IMPRESSION: 1.  9 mm acute or early subacute infarct within the subcortical white matter at the mid left frontoparietal junction. No hemorrhage. 2.  Interval enlargement of a planum sphenoidale meningioma compared to 2019 as detailed above. 3.  Mild to moderate volume loss and presumed chronic small vessel ischemic changes.     Cervical spine CT w/o contrast    Result Date: 8/4/2021  EXAM: CT CERVICAL SPINE W/O CONTRAST LOCATION: [facility] DATE/TIME: 8/4/2021 9:05 PM INDICATION: Traumatic injury. COMPARISON: None. TECHNIQUE: Routine CT Cervical Spine without IV contrast. Multiplanar reformats. Dose reduction techniques were used. FINDINGS: VERTEBRAE: Reversal of the cervical spine curvature. Diffuse bony demineralization suggestive of osteoporosis. Vertebral body heights are preserved. No fracture or posttraumatic  subluxation. CANAL/FORAMINA: Multilevel degenerative changes throughout the cervical spine with at least moderate canal stenosis at C3-C4 and C5-C6. Multilevel bilateral neural foraminal narrowing is greatest at C2-C3 and C3-C4, where there is mild to moderate stenosis. PARASPINAL: No acute extraspinal abnormality. See separately dictated chest CT for intrathoracic findings.     IMPRESSION: 1.  No evidence of an acute displaced fracture. 2.  Degenerative changes as described.    CT Chest Abdomen Pelvis w/o Contrast    Result Date: 8/4/2021  EXAM: CT CHEST ABDOMEN PELVIS W/O CONTRAST LOCATION: [facility] DATE/TIME: 8/4/2021 9:06 PM INDICATION: Many bruises after fall. Altered mental status. History of lung cancer and radiation treatment. COMPARISON: CT chest 06/19/2021 and CT pelvis 02/19/2019 TECHNIQUE: CT scan of the chest, abdomen, and pelvis was performed without IV contrast. Multiplanar reformats were obtained. Dose reduction techniques were used. CONTRAST: None. FINDINGS: LUNGS AND PLEURA: No evidence for pneumothorax. Wedge shaped area of airspace consolidation and volume loss within the right lung apex with air bronchograms, unchanged. 7 mm pulmonary nodule right middle lobe on image 133 of series 5 is unchanged. A minimal scarring or atelectasis in the bases. The lungs otherwise are clear. No effusions. MEDIASTINUM/AXILLAE: No mediastinal hemorrhage. No adenopathy. Moderate size esophageal hiatal hernia. CORONARY ARTERY CALCIFICATION: Severe. HEPATOBILIARY: A few calcified granulomas in the liver. Marked dilatation of the central intrahepatic bile ducts and common bile duct measuring up to 2.7 cm in diameter, unchanged. No evidence for distal obstructing stone or mass. Cholecystectomy. PANCREAS: Normal. SPLEEN: Normal. ADRENAL GLANDS: Small fat-containing lesion right adrenal gland compatible with a myelolipoma, unchanged. KIDNEYS/BLADDER: Small right renal cyst. Calcifications both  renal pierce appear to be vascular. No definite stones. No hydronephrosis. BOWEL: Normal. LYMPH NODES: Normal. VASCULATURE: Advanced atherosclerotic disease abdominal aorta and iliac arteries. Extensive plaque celiac trunk and superior mesenteric artery resulting in moderate to severe stenosis. PELVIC ORGANS: Normal. No hemorrhage, ascites, or free air. MUSCULOSKELETAL: Old inferior left pubic ramus fracture. No acute fractures. Osteopenia. Degenerative changes lumbar spine. Small fat-containing ventral wall hernia.     IMPRESSION: 1.  No evidence for acute traumatic injury within the chest, abdomen or pelvis. 2.  Wedge-shaped airspace consolidation and volume loss right lung apex is unchanged and may represent post radiation treatment changes. Recurrent neoplasm remains a possibility. This could be further assessed with PET/CT. 3.  Stable 7 mm nodule right middle lobe. 4.  Marked dilatation of the central intrahepatic bile ducts and common bile duct, unchanged. 5.  Severe atherosclerotic disease.    CT Head w/o Contrast    Result Date: 8/4/2021  EXAM: CT HEAD W/O CONTRAST LOCATION: [facility] DATE/TIME: 8/4/2021 9:05 PM INDICATION: Traumatic injury COMPARISON: Brain MRI dated 01/14/2019 TECHNIQUE: Routine CT Head without IV contrast. Multiplanar reformats. Dose reduction techniques were used. FINDINGS: INTRACRANIAL CONTENTS: No intracranial hemorrhage, extraaxial collection, or mass effect.  No CT evidence of acute infarct. Moderate presumed chronic small vessel ischemic changes. Moderate generalized volume loss. No hydrocephalus. Dural based mass along the planum sphenoidale has increased in size from the prior exam measuring approximately 2.5 x 2.5 x 1.4 cm in size, previously 2.2 x 1.9 x 0.8 cm. VISUALIZED ORBITS/SINUSES/MASTOIDS: Prior bilateral cataract surgery. Visualized portions of the orbits are otherwise unremarkable. No paranasal sinus mucosal disease. No middle ear or mastoid  effusion. BONES/SOFT TISSUES: No scalp hematoma. No skull fracture.     IMPRESSION: 1.  No CT evidence for acute intracranial process. 2.  Brain atrophy and presumed chronic microvascular ischemic changes as above. 3.  Interval increase dural based mass along the planum sphenoidale, likely reflecting a meningioma.      Lab Results:  Personally Reviewed.   Recent Labs   Lab 08/04/21 2023   WBC 10.2   HGB 10.7*   HCT 33.2*        Recent Labs   Lab 08/05/21  0735 08/04/21 2023    135*   CO2 21* 18*   BUN 30* 31*   ALBUMIN  --  3.7   ALKPHOS  --  115   ALT  --  15   AST  --  17     No results for input(s): INR in the last 168 hours.      Total time spent 25 minutes with greater than 50% in consultation, education and coordination of care.     Also discussed with RN    Concha HATCH, CNS-BC, DNP  Acute Care Pain Management Program  [facility] ([facility], [place], [place])   With questions call [phone]  Preference if for [facility] Aneudy Rodriguez  Click HERE to page [place]

## 2021-08-06 NOTE — PROGRESS NOTES
08/06/21 1410   Quick Adds   Type of Visit Initial Occupational Therapy Evaluation       Present no   Living Environment   People in home child(vinny), adult;other (see comments)   Current Living Arrangements house   Living Environment Comments Pt able to stay on one level at home, uses walker   Self-Care   Activity/Exercise/Self-Care Comment Completes dressing, sponge bathing, and toileting.  Pt gets assist with cooking, cleaning, and laundry.   General Information   Onset of Illness/Injury or Date of Surgery 08/04/21   Referring Physician [facility]   Patient/Family Therapy Goal Statement (OT) home   Existing Precautions/Restrictions fall   Cognitive Status Examination   Orientation Status person;place;time   Cognitive Status Comments Appears confused at times.   Visual Perception   Visual Impairment/Limitations WNL   Sensory   Sensory Quick Adds No deficits were identified   Range of Motion Comprehensive   General Range of Motion no range of motion deficits identified   Strength Comprehensive (MMT)   General Manual Muscle Testing (MMT) Assessment no strength deficits identified   Bed Mobility   Comment (Bed Mobility) SBA   Transfers   Transfer Comments SBA/CGA   Balance   Balance Comments SBA with sitting balance, SBA/CGA with standing balance   Clinical Impression   Criteria for Skilled Therapeutic Interventions Met (OT) yes;skilled treatment is necessary   OT Diagnosis Impaired ADL independence   OT Problem List-Impairments impacting ADL activity tolerance impaired;balance;cognition;mobility;pain   Assessment of Occupational Performance 3-5 Performance Deficits   Planned Therapy Interventions (OT) ADL retraining;balance training;bed mobility training;cognition;strengthening;transfer training   Clinical Decision Making Complexity (OT) moderate complexity   Therapy Frequency (OT) Daily   Predicted Duration of Therapy 5 days - i week   Comment-Clinical Impression Pt seen for OT eval and treatment.   Pt demonstrates decreased cognition, decreased [place] with ADLS and mobility.  Recommend continued OT to address.   OT Discharge Planning    OT Discharge Recommendation (DC Rec) Home with assist   OT Rationale for DC Rec Pt has family assist at home.     Total Evaluation Time (Minutes)   Total Evaluation Time (Minutes) 10

## 2021-08-06 NOTE — PLAN OF CARE
Problem: Pain Acute  Goal: Acceptable Pain Control and Functional Ability  Outcome: Improving   Pt c/o pain 8/10 was given scheduled tylenol at that time, before Pt went down for a procedure. When Pt came back, reported improvement with the pain. Pt scoring 1-3 on NIH scale. Heparin and NS running.

## 2021-08-06 NOTE — CONSULTS
Cardiology Consult Note    Thank you, Dr. Bhavin Bradshaw, for asking the [facility] team to see Dacia Miller in consultation at [facility] to evaluate troponin elevation.      Assessment:   1.  Troponin elevation, possibly consistent with non-ST elevation MI.  ECG without acute ST depression or elevation but does show evidence of a prior inferior and anteroseptal infarct of unknown age.  These changes are new since 1994.  She reports no symptoms of exertional chest discomfort or dyspnea but is quite sedentary due to unsteady gait and need for a walker.  She does have multiple risk factors for coronary artery disease and thus this needs to be strongly considered.  Agree with IV heparin as ordered.  We will trend troponins and obtain echocardiogram to assess for wall motion abnormality.  If identified, would advise consideration of angiography to evaluate her anatomy and revascularization options.  2.  Essential hypertension, currently uncontrolled.  Patient's dose of lisinopril hydrochlorothiazide was held due to worsening renal insufficiency.  We will begin hydralazine 12.5 mg 3 times daily.  3.  Acute on chronic kidney disease, likely due to element of dehydration on admission.  4.  Type 2 diabetes mellitus  5.  Right carotid bruit with carotid ultrasound demonstrating less than 50% stenoses in both internal carotid arteries.  6.  Dyslipidemia with markedly low HDL and elevated LDL of 123.  This is above the recommended goal of an LDL less than 70 and diabetic patients.  Patient currently not on statin agent.  We will begin atorvastatin 40 mg daily.     Plan:   1.  Continue IV heparin infusion  2.  Begin hydralazine 12.5 mg 3 times daily to help with blood pressure control  3.  Begin atorvastatin 40 mg daily for dyslipidemia  4.  Echocardiogram to evaluate for wall motion abnormality.  If present, would consider coronary angiography to further define her anatomy and  revascularization options     Current History:   Ms. Dacia Miller is a 80 year old female with type 2 diabetes mellitus, hypertension, dyslipidemia, lung and breast carcinoma who was brought to the ED due to profound weakness and lying in bed for 22 hours.  Patient had reportedly tried to get up with her walker and fell onto the ground.  She was helped into bed by her son who subsequently went to work.  Because her  would not help her, she stayed in bed for 22 hours and was subsequently brought to the ED for evaluation.  Her EKG showed no acute changes but did raise question of a prior inferior and anterior septal infarct of unknown age. Her initial troponin was normal.  She was admitted for possible dehydration and treatment of electrolyte abnormalities.  Repeat troponin drawn following admission came back markedly elevated at 7.9.  She was started on heparin infusion and cardiac consult requested.  Patient reports no complaints of chest discomfort or exertional dyspnea.  She does very limited walking around her house with a walker.  Tells me it is too hard to walk with a walker in stores.  Has chronic two-pillow orthopnea.  Denies PND or lower extremity edema.  No history of near syncope or true syncope.    Past Medical History:     Past Medical History:   Diagnosis Date     Anemia, iron deficiency 2019     Arthritis      Breast cancer (H)      Cancer (H)      Diabetes mellitus (H)      DM (diabetes mellitus screen)      History of anesthesia complications      HLD (hyperlipidemia)      HTN (hypertension)      Kidney stones      Lung cancer (H)      PONV (postoperative nausea and vomiting)      Sciatica      Scoliosis        Past Surgical History:     Past Surgical History:   Procedure Laterality Date     ABDOMEN SURGERY       BIOPSY SKIN (LOCATION)        SECTION       CHOLECYSTECTOMY       CT BIOPSY LUNG  2018     SC MASTECTOMY,PARTIAL,  WITH AXILLARY LYMPHADENECTOMY Left  9/19/2018    Procedure: Left Lumpectomy; [place] Lymph Node Biopsy;  Surgeon: Lilliana Rodriguez MD;  Location: [facility];  Service: General       Family History:     Family History   Problem Relation Age of Onset     Breast Cancer Mother    No family history of early coronary artery disease.    Social History:    reports that she quit smoking about 52 years ago. She has a 3.50 pack-year smoking history. She has never used smokeless tobacco. She reports that she does not drink alcohol and does not use drugs.    Meds:     Current Facility-Administered Medications:      acetaminophen (TYLENOL) tablet 975 mg, 975 mg, Oral or Feeding Tube, TID, 975 mg at 08/06/21 0851 **OR** acetaminophen (TYLENOL) Suppository 650 mg, 650 mg, Rectal, TID, Negrito Pimentel MD     [Held by provider] allopurinol (ZYLOPRIM) tablet 300 mg, 300 mg, Oral, Daily, Td Nash MD     aspirin EC tablet 81 mg, 81 mg, Oral, Daily, 81 mg at 08/06/21 0851 **OR** aspirin (ASA) chewable tablet 81 mg, 81 mg, Oral or NG Tube, Daily, Musa Zurita MD     atorvastatin (LIPITOR) tablet 20 mg, 20 mg, Oral or Feeding Tube, QPM, Musa Zurita MD, 20 mg at 08/05/21 2051     glucose gel 15-30 g, 15-30 g, Oral, Q15 Min PRN **OR** dextrose 50 % injection 25-50 mL, 25-50 mL, Intravenous, Q15 Min PRN **OR** glucagon injection 1 mg, 1 mg, Subcutaneous, Q15 Min PRN, Td Nash MD     heparin 25,000 units in 0.45% NaCl 250 mL ANTICOAGULANT infusion, 0-5,000 Units/hr, Intravenous, Continuous, Bhavin Bradshaw MD, Last Rate: 5.5 mL/hr at 08/06/21 1130, 550 Units/hr at 08/06/21 1130     hydrALAZINE (APRESOLINE) half-tab 12.5 mg, 12.5 mg, Oral, TID, Janae Hernandez MD     hydrALAZINE (APRESOLINE) injection 10 mg, 10 mg, Intravenous, Q6H PRN, Franck Vuong MD, 10 mg at 08/06/21 0341     insulin aspart (NovoLOG) injection (RAPID ACTING), 1-6 Units, Subcutaneous, TID w/meals, Negrito Pimentel MD, 1 Units at 08/06/21 0853     insulin  glargine (LANTUS PEN) injection 20 Units, 20 Units, Subcutaneous, QAM AC, Negrito Pimentel MD, 20 Units at 08/06/21 0853     lidocaine (LMX4) cream, , Topical, Q1H PRN, Musa Zurita MD     lidocaine 1 % 0.1-1 mL, 0.1-1 mL, Other, Q1H PRN, Musa Zurita MD     medication instruction - No oral meds if patient didn't pass dysphagia screen, , Does not apply, Continuous PRN, Musa Zurita MD     Medication Instructions - Avoid dextrose in IV solutions., , Intravenous, Continuous PRN, Musa Zurita MD     melatonin tablet 1 mg, 1 mg, Oral or Feeding Tube, At Bedtime PRN, Negrito Pimentel MD     metoclopramide (REGLAN) injection 5 mg, 5 mg, Intravenous, Q6H PRN, Franck Vuong MD, 5 mg at 08/05/21 0248     metoprolol tartrate (LOPRESSOR) tablet 25 mg, 25 mg, Oral, BID, Bhavin Bradshaw MD, 25 mg at 08/06/21 0851     naloxone (NARCAN) injection 0.2 mg, 0.2 mg, Intravenous, Q2 Min PRN **OR** naloxone (NARCAN) injection 0.4 mg, 0.4 mg, Intravenous, Q2 Min PRN **OR** naloxone (NARCAN) injection 0.2 mg, 0.2 mg, Intramuscular, Q2 Min PRN **OR** naloxone (NARCAN) injection 0.4 mg, 0.4 mg, Intramuscular, Q2 Min PRN, Negrito Pimentel MD     [Held by provider] omeprazole (priLOSEC) CR capsule 20 mg, 20 mg, Oral, Daily, Td Nash MD     ondansetron (ZOFRAN-ODT) ODT tab 4 mg, 4 mg, Oral, Q6H PRN **OR** ondansetron (ZOFRAN) injection 4 mg, 4 mg, Intravenous, Q6H PRN, Td Nash MD     oxyCODONE (ROXICODONE) tablet 5-10 mg, 5-10 mg, Oral or Feeding Tube, Q4H PRN, Negrito Pimentel MD, 10 mg at 08/06/21 0905     senna-docusate (SENOKOT-S/PERICOLACE) 8.6-50 MG per tablet 1 tablet, 1 tablet, Oral, BID PRN **OR** senna-docusate (SENOKOT-S/PERICOLACE) 8.6-50 MG per tablet 2 tablet, 2 tablet, Oral, BID PRN, Td Nash MD     sodium chloride (PF) 0.9% PF flush 3 mL, 3 mL, Intracatheter, Q8H, Musa Zurita MD, 3 mL at 08/05/21 1825     sodium chloride (PF) 0.9% PF flush 3 mL, 3 mL,  "Intracatheter, q1 min prn, Musa Zurita MD     sodium chloride 0.9% infusion, , Intravenous, Continuous, Td Nash MD, Last Rate: 100 mL/hr at 08/05/21 1605, New Bag at 08/05/21 1605    acetaminophen  975 mg Oral or Feeding Tube TID    Or     acetaminophen  650 mg Rectal TID     [Held by provider] allopurinol  300 mg Oral Daily     aspirin  81 mg Oral Daily    Or     aspirin  81 mg Oral or NG Tube Daily     atorvastatin  20 mg Oral or Feeding Tube QPM     hydrALAZINE  12.5 mg Oral TID     insulin aspart  1-6 Units Subcutaneous TID w/meals     insulin glargine  20 Units Subcutaneous QAM AC     metoprolol tartrate  25 mg Oral BID     [Held by provider] omeprazole  20 mg Oral Daily     sodium chloride (PF)  3 mL Intracatheter Q8H       Allergies:   Amoxicillin-pot clavulanate [augmentin], Cephalexin monohydrate [cephalexin], Contrast dye, Iodinated contrast media [diagnostic x-ray materials], Other allergy (see comments) [external allergen needs reconciliation - see comment], Penicillins, Prochlorperazine edisylate [prochlorperazine], and Sulfa drugs    Review of Systems:   A 12 point comprehensive review of systems was negative except as noted in the current history.    Objective:      Physical Exam  Body mass index is 29.92 kg/m .  BP (!) 189/84 (BP Location: Right arm)   Pulse 71   Temp 99.2  F (37.3  C) (Oral)   Resp 20   Ht 1.6 m (5' 3\")   Wt 76.6 kg (168 lb 14.4 oz)   SpO2 96%   BMI 29.92 kg/m      General Appearance:    Well developed elderly female appears older than her stated age and in no acute distress   HEENT:   Normocephalic, atraumatic.  No scleral icterus.  Mucous membranes appear normal.   Neck:  No jugular venous distention.  Right carotid bruit noted.   Chest:  Symmetric with normal AP diameter   Lungs:    Clear to auscultation and percussion bilaterally.   Cardiovascular:    Regular rate and rhythm.  S1, S2 normal.  No murmur or gallop   Abdomen:   Obese, soft, nondistended, " nontender.  No organomegaly or mass.   Extremities:  No peripheral edema, clubbing or cyanosis.  Distal pulses are symmetric   Skin:  No rash or lesions   Neurologic:  Alert and oriented x3.  No gross focal deficits.             Cardiographics (personally reviewed)  ECG on admission demonstrates normal sinus rhythm, left axis deviation, inferior infarct of unknown age and anteroseptal infarct of unknown age.  No acute ST or T wave changes.  Inferior and anterior infarct patterns are new when compared to 1994.    Imaging (personally reviewed)  Echocardiogram currently pending.    Chest CT demonstrated severe coronary artery calcification.  There is a wedge-shaped airspace consolidation and volume loss in the right lung apex which is unchanged and may represent post radiation treatment findings.  Recurrent neoplasm remains a possibility.  Stable 7 mm nodule right middle lobe.  Marked dilatation of the central intrahepatic bile ducts and common bile duct which is unchanged.    Lab Review (personally reviewed all results)  Lab Results   Component Value Date     08/05/2021    CO2 21 08/05/2021    BUN 30 08/05/2021     Lab Results   Component Value Date    WBC 10.2 08/04/2021    HGB 10.7 08/04/2021    HCT 33.2 08/04/2021    MCV 94 08/04/2021     08/04/2021     Lab Results   Component Value Date    CHOL 190 08/05/2021    TRIG 198 08/05/2021    HDL 27 08/05/2021     Troponin I   Date Value Ref Range Status   08/05/2021 7.90 (HH) 0.00 - 0.29 ng/mL Final   08/04/2021 0.16 0.00 - 0.29 ng/mL Final     No results found for: BNP

## 2021-08-06 NOTE — PROGRESS NOTES
08/06/21 1015   General Information   Onset of Illness/Injury or Date of Surgery 08/04/21   Pertinent History of Current Problem Patient with meningioma and CVA. NIH has improved to 0 per nursing. No concerns with swallowing reported. Orders via stroke code.    Oral Motor   Oral Musculature generally intact   Mucosal Quality good  (wears upper dentures, dentures are not here)   Facial Symmetry (Oral Motor)   Facial Symmetry (Oral Motor) WNL   Lip Function (Oral Motor)   Lip Range of Motion (Oral Motor) WNL   Tongue Function (Oral Motor)   Tongue ROM (Oral Motor) WNL   General Swallowing Observations   Current Diet/Method of Nutritional Intake (General Swallowing Observations, NIS) regular diet;thin liquids (level 0)   Swallowing Evaluation Clinical swallow evaluation   Clinical Swallow Evaluation   Feeding Assistance no assistance needed   Clinical Swallow Eval: Thin Liquid Texture Trial   Mode of Presentation, Thin Liquids straw   Oral Phase of Swallow WFL   Pharyngeal Phase of Swallow intact   Diagnostic Statement No s/s aspiration   Clinical Swallow Evaluation: Solid Food Texture Trial   Mode of Presentation self-fed   Oral Phase WFL   Pharyngeal Phase intact   Diagnostic Statement No s/s aspiration. Mastication adequate despite lack of upper dentition.   Swallowing Recommendations   Diet Consistency Recommendations regular diet;thin liquids (level 0)   Supervision Level for Intake patient independent   SLP Therapy Assessment/Plan   Criteria for Skilled Therapeutic Interventions Met (SLP Eval) does not meet criteria for skilled intervention

## 2021-08-06 NOTE — PLAN OF CARE
Problem: Pain Acute  Goal: Acceptable Pain Control and Functional Ability  Outcome: Improving  Intervention: Develop Pain Management Plan  Recent Flowsheet Documentation  Taken 8/6/2021 1659 by Julia Gary, RN  Pain Management Interventions: medication (see MAR)  Taken 8/6/2021 0905 by Julia Gary, RN  Pain Management Interventions: medication (see MAR)   Patient pain rating ranges from 2-7 out of 10.  Declined afternoon scheduled dose of acetaminophen.  PRN oxycodone administered twice in a 12 hour shift.  Will continue to monitor pain.       Problem: Bowel Elimination Impaired (Stroke, Ischemic/Transient Ischemic Attack)  Goal: Effective Bowel Elimination  Outcome: Improving   New prescription for senna administered.  Patient requests stool softner. Will continue to monitor.     NIHSS remains a 0.    Heparin drip infusing at 550 units/hour.  Anti Xa recheck due at 1730

## 2021-08-06 NOTE — SIGNIFICANT EVENT
Significant Event Note    Time of event: 8:00 PM August 5, 2021    Description of event:  Called with critical value troponin of 7.9.  On patient interview, patient denied chest pain, chest heaviness, chest tightness or pressure. She denies shortness of breath, diaphoresis, nausea, vomiting, lightheadedness, palpitations, pain in jaw arm or shoulder. Denied any of the symptoms for the last 36 hours.  On admission 24 hours ago the patient had a troponin of 0.16 and an unremarkable ECG. No known history of coronary disease.  Vital signs are stable with elevated blood pressures in the last 12 hours.  Diagnosed with subacute stroke, aspirin and Plavix ordered.  Exam: Unremarkable lung and cardiac exam. Lower extremity edema. Abdomen benign. Patient awake, alert, oriented x3    Plan:  Stat ECG ordered: Shows Q waves in anterior and inferior leads but no acute ST-T changes.  Reviewed the case with cardiology and advised to start low-dose heparin without bolus, continue aspirin, low-dose metoprolol.  Reviewed case with neurology and they agreed with proceeding with heparin no bolus.  Consult cardiology in a.m.    Discussed with: On-call cardiology, on-call neurology    Bhavin Bradshaw MD

## 2021-08-06 NOTE — PROGRESS NOTES
Progress Note    Assessment/Plan  80-year-old with CKD stage IV, anemia, dyslipidemia, chronic pain syndrome who initially presented with fall, hyperglycemia hypertension and nausea with upset stomach.  Her troponin was elevated at 7.9 and therefore started on IV heparin.  Cardiology was consulted and awaiting echocardiogram.    Non-STEMI  --Patient currently on aspirin, heparin, statin  --Started on atorvastatin as per cardiology  --Started on hydralazine 12.5 mg 3 times daily as per cardiology  --Continue beta-blocker since hyperkalemia is resolved    Left frontoparietal subcortical infarction  --not a candidate for thrombolytics due to time of onset  --continue with aspirin. Plavix recommended by neurology but not ordered. Order Plavix 75 mg once daily.   Carotid US is unremarkable  --CK total 405  --Speech OT PT consulted        Accelerated hypertension   --Multifactorial  --Lisinopril and hydrochlorothiazide has been discontinued due to hyperkalemia on admission and CKD stage III with GFR of 35  --Replace HCTZ with Lasix 20 mg once daily  --DC IV fluids  --Ordered IV hydralazine 10 mg IV q6hour PRN SBP>180    Insulin-dependent diabetes  --Uncontrolled  --Increase Lantus to 25 units and add mealtime insulin ratio 1 is to 10    Acute on chronic back pain   --neurosurgery consulted by previous hospitalist   --neurology exam is intact  --No neurosurgical intervention at this time  --Pain team consulted    Incidental meningioma  --Defer work-up as outpatient by neurosurgery    CKD stage IV creatinine at baseline ranging from 1.7-1.88    Radiographic findings recommended to follow-up as outpatient:  Wedge shaped airspace consolidation and volume loss, recurrent neoplasm remains a possibility, this could be further assessed with PET/CT  Stable 7 mm nodule right middle lobe    Barriers to discharge: IV heparin    Anticipated discharge date: 8/9        Subjective  Patient new to me.  Chart reviewed.  Appreciate  "cardiology input.  Patient is currently on IV heparin for non-STEMI.  Awaiting echocardiogram.  Review of creatinine reveals a baseline creatinine of 1.8.  Will DC IV fluids given elevated blood pressure.  ACE inhibitor was held on admission due to hyperkalemia.  Potassium is normalized now.  Blood sugars are suboptimal.  Review of system is positive for back pain is chronic    Objective    BP (!) 189/84 (BP Location: Right arm)   Pulse 71   Temp 99.2  F (37.3  C) (Oral)   Resp 20   Ht 1.6 m (5' 3\")   Wt 76.6 kg (168 lb 14.4 oz)   SpO2 96%   BMI 29.92 kg/m    Weight:   Wt Readings from Last 5 Encounters:   08/05/21 76.6 kg (168 lb 14.4 oz)   06/21/21 77.8 kg (171 lb 8 oz)   05/06/20 73 kg (160 lb 14.4 oz)   04/30/19 72.6 kg (160 lb)   04/16/19 72.6 kg (160 lb)       I/O last 3 completed shifts:  In: 2926.75 [P.O.:660; I.V.:2266.75]  Out: 650 [Urine:650]  I/O this shift:  In: -   Out: 175 [Urine:175]          Physical Exam  Alert, oriented*3  No pallor, icterus, clubbing, cyanosis  Body mass index is 29.92 kg/m .    No sinus tenderness  Moist membranes  Neck supple  CVS: S1 S2-N, no murmurs, gallops, rubs  Resp: Left-sided crackles  Abd: soft, No t/g/r  Neuro: no involuntary movements such as tremors  Vasc: no leg edema     Pertinent Labs  ----------------------  Recent Labs   Lab 08/06/21  0838 08/05/21  2230 08/05/21  1739 08/05/21  0735 08/04/21 2023   NA  --   --   --  138 135*   POTASSIUM  --   --   --  4.6 5.3*   CO2  --   --   --  21* 18*   BUN  --   --   --  30* 31*   CR  --   --   --  1.42* 1.73*   * 247* 270* 273* 388*   ALBUMIN  --   --   --   --  3.7   BILITOTAL  --   --   --   --  0.6   ALKPHOS  --   --   --   --  115   ALT  --   --   --   --  15   AST  --   --   --   --  17     Recent Labs   Lab 08/04/21 2023   WBC 10.2   HGB 10.7*   HCT 33.2*        No results for input(s): INR in the last 168 hours.  Glucose Values Latest Ref Rng & Units 8/4/2021 8/5/2021   Bedside Glucose " (mg/dl )  - -- --   GLUCOSE 70 - 125 mg/dL 388(H) 273(H)   Some recent data might be hidden         Pertinent Radiology   Radiology Results: Personally reviewed impression/s  XR Pelvis and Hip Left 2 Views    Result Date: 8/4/2021  EXAM: XR PELVIS AND HIP LEFT 2 VIEWS LOCATION: [facility] DATE/TIME: 8/4/2021 9:18 PM INDICATION: left hip pain COMPARISON: None.     IMPRESSION: No acute fracture or dislocation. Old healed left parasymphyseal and left inferior pubic ramus fractures. Osteopenia.    US Carotid Bilateral    Result Date: 8/5/2021  EXAM: US CAROTID BILATERAL LOCATION: [facility] DATE/TIME: 8/5/2021 9:30 PM INDICATION: Subacute subcortical stroke, evaluate for obstructive or ulcerative disease. COMPARISON: None. TECHNIQUE: Duplex exam performed utilizing 2D gray-scale imaging, Doppler interrogation with color-flow and spectral waveform analysis. The percent diameter stenosis is determined using NASCET criteria and Society of Radiologists in Ultrasound Consensus Criteria. FINDINGS: RIGHT: Mild to moderate plaque at the bifurcation. The peak systolic velocity in the ICA is less than 125 cm/sec, consistent with less than 50% stenosis. Normal velocities in the ECA. Antegrade flow within the vertebral artery. LEFT: Mild to moderate plaque at the bifurcation. The peak systolic velocity in the ICA is less than 125 cm/sec, consistent with less than 50% stenosis. Normal velocities in the ECA. Antegrade flow within the vertebral artery. VELOCITY CHART: CCA   Right: 61/9 cm/s   Left: 100/14 cm/s ICA   Right: 108/29 cm/s   Left: 120/25 cm/s ECA   Right: 109/5 cm/s   Left: 71/2 cm/s ICA/CCA PSV Ratio   Right: 1.2   Left: 1.2     IMPRESSION: 1.  Mild to moderate plaque formation, velocities consistent with less than 50% stenosis in the right internal carotid artery. 2.  Mild to moderate plaque formation, velocities consistent with less than 50% stenosis in the left  internal carotid artery. 3.  Flow within the vertebral arteries is antegrade.    XR Chest Port 1 View    Result Date: 8/5/2021  EXAM: XR CHEST PORT 1 VIEW LOCATION: [facility] DATE/TIME: 8/5/2021 8:25 PM INDICATION: patient with elevated troponin, evaluate cardiac silhouette, lung fields for infiltrate or edema, recent falls with history of lung cancer and radiation treatment. COMPARISON: Chest CT 08/04/2021 biliary older studies.     IMPRESSION: Low lung volumes. Right suprahilar opacities with retraction again noted. No new parenchymal disease. Heart and pulmonary vascularity are normal. No hydropneumothorax or displaced fractures.    MR Brain w/o & w Contrast    Result Date: 8/5/2021  EXAM: MR BRAIN W/O and W CONTRAST LOCATION: [facility] DATE/TIME: 8/5/2021 2:34 PM INDICATION: Meningioma. Altered mental status. COMPARISON: Head CT 08/04/2021, brain MRI 01/14/2019. CONTRAST: 7ml of Gadavist TECHNIQUE: Routine multiplanar multisequence head MRI without and with intravenous contrast. FINDINGS: There is a 9 mm round focus of restricted diffusion within the subcortical white matter at the left frontoparietal junction that demonstrates T2/FLAIR hyperintensity without enhancement most consistent with a small recent acute or early subacute infarct.  Otherwise negative diffusion sequence. Extra-axial dural based enhancing mass along the planum sphenoidale measures approximately 2.5 cm transverse by 2.8 cm AP and extends 1 cm above the planum sphenoidale. This compares with approximately 1.9 x 1.7 x 0.8 cm when measured in a similar manner on the 2019 exam. On today's study also extends further dorsally over the sella. It abuts but does not appear to narrow the supraclinoid internal carotid artery flow-voids. Mild mucosal thickening within the ethmoid air cells. Paranasal sinuses are otherwise free from significant disease. Mastoid air cells appear free from significant  disease. Intraorbital contents are unremarkable. There is mild to moderate generalized prominence of the sulci and ventricles, consistent with underlying volume loss. Intracranial flow voids are intact. There are patchy areas of T2/FLAIR hyperintensity within the cerebral white matter that are nonspecific but probably reflect the sequela of chronic small vessel ischemic disease. No evidence for acute or chronic intracranial blood products.     IMPRESSION: 1.  9 mm acute or early subacute infarct within the subcortical white matter at the mid left frontoparietal junction. No hemorrhage. 2.  Interval enlargement of a planum sphenoidale meningioma compared to 2019 as detailed above. 3.  Mild to moderate volume loss and presumed chronic small vessel ischemic changes.     Cervical spine CT w/o contrast    Result Date: 8/4/2021  EXAM: CT CERVICAL SPINE W/O CONTRAST LOCATION: [facility] DATE/TIME: 8/4/2021 9:05 PM INDICATION: Traumatic injury. COMPARISON: None. TECHNIQUE: Routine CT Cervical Spine without IV contrast. Multiplanar reformats. Dose reduction techniques were used. FINDINGS: VERTEBRAE: Reversal of the cervical spine curvature. Diffuse bony demineralization suggestive of osteoporosis. Vertebral body heights are preserved. No fracture or posttraumatic subluxation. CANAL/FORAMINA: Multilevel degenerative changes throughout the cervical spine with at least moderate canal stenosis at C3-C4 and C5-C6. Multilevel bilateral neural foraminal narrowing is greatest at C2-C3 and C3-C4, where there is mild to moderate stenosis. PARASPINAL: No acute extraspinal abnormality. See separately dictated chest CT for intrathoracic findings.     IMPRESSION: 1.  No evidence of an acute displaced fracture. 2.  Degenerative changes as described.    CT Chest Abdomen Pelvis w/o Contrast    Result Date: 8/4/2021  EXAM: CT CHEST ABDOMEN PELVIS W/O CONTRAST LOCATION: [facility] DATE/TIME:  8/4/2021 9:06 PM INDICATION: Many bruises after fall. Altered mental status. History of lung cancer and radiation treatment. COMPARISON: CT chest 06/19/2021 and CT pelvis 02/19/2019 TECHNIQUE: CT scan of the chest, abdomen, and pelvis was performed without IV contrast. Multiplanar reformats were obtained. Dose reduction techniques were used. CONTRAST: None. FINDINGS: LUNGS AND PLEURA: No evidence for pneumothorax. Wedge shaped area of airspace consolidation and volume loss within the right lung apex with air bronchograms, unchanged. 7 mm pulmonary nodule right middle lobe on image 133 of series 5 is unchanged. A minimal scarring or atelectasis in the bases. The lungs otherwise are clear. No effusions. MEDIASTINUM/AXILLAE: No mediastinal hemorrhage. No adenopathy. Moderate size esophageal hiatal hernia. CORONARY ARTERY CALCIFICATION: Severe. HEPATOBILIARY: A few calcified granulomas in the liver. Marked dilatation of the central intrahepatic bile ducts and common bile duct measuring up to 2.7 cm in diameter, unchanged. No evidence for distal obstructing stone or mass. Cholecystectomy. PANCREAS: Normal. SPLEEN: Normal. ADRENAL GLANDS: Small fat-containing lesion right adrenal gland compatible with a myelolipoma, unchanged. KIDNEYS/BLADDER: Small right renal cyst. Calcifications both renal pierce appear to be vascular. No definite stones. No hydronephrosis. BOWEL: Normal. LYMPH NODES: Normal. VASCULATURE: Advanced atherosclerotic disease abdominal aorta and iliac arteries. Extensive plaque celiac trunk and superior mesenteric artery resulting in moderate to severe stenosis. PELVIC ORGANS: Normal. No hemorrhage, ascites, or free air. MUSCULOSKELETAL: Old inferior left pubic ramus fracture. No acute fractures. Osteopenia. Degenerative changes lumbar spine. Small fat-containing ventral wall hernia.     IMPRESSION: 1.  No evidence for acute traumatic injury within the chest, abdomen or pelvis. 2.  Wedge-shaped airspace  consolidation and volume loss right lung apex is unchanged and may represent post radiation treatment changes. Recurrent neoplasm remains a possibility. This could be further assessed with PET/CT. 3.  Stable 7 mm nodule right middle lobe. 4.  Marked dilatation of the central intrahepatic bile ducts and common bile duct, unchanged. 5.  Severe atherosclerotic disease.    CT Head w/o Contrast    Result Date: 8/4/2021  EXAM: CT HEAD W/O CONTRAST LOCATION: [facility] DATE/TIME: 8/4/2021 9:05 PM INDICATION: Traumatic injury COMPARISON: Brain MRI dated 01/14/2019 TECHNIQUE: Routine CT Head without IV contrast. Multiplanar reformats. Dose reduction techniques were used. FINDINGS: INTRACRANIAL CONTENTS: No intracranial hemorrhage, extraaxial collection, or mass effect.  No CT evidence of acute infarct. Moderate presumed chronic small vessel ischemic changes. Moderate generalized volume loss. No hydrocephalus. Dural based mass along the planum sphenoidale has increased in size from the prior exam measuring approximately 2.5 x 2.5 x 1.4 cm in size, previously 2.2 x 1.9 x 0.8 cm. VISUALIZED ORBITS/SINUSES/MASTOIDS: Prior bilateral cataract surgery. Visualized portions of the orbits are otherwise unremarkable. No paranasal sinus mucosal disease. No middle ear or mastoid effusion. BONES/SOFT TISSUES: No scalp hematoma. No skull fracture.     IMPRESSION: 1.  No CT evidence for acute intracranial process. 2.  Brain atrophy and presumed chronic microvascular ischemic changes as above. 3.  Interval increase dural based mass along the planum sphenoidale, likely reflecting a meningioma.    EKG Results: not reviewed.

## 2021-08-06 NOTE — PLAN OF CARE
Problem: Cerebral Tissue Perfusion (Stroke, Ischemic/Transient Ischemic Attack)  Goal: Optimal Cerebral Tissue Perfusion  Outcome: Improving     Problem: Cognitive Impairment (Stroke, Ischemic/Transient Ischemic Attack)  Goal: Optimal Cognitive Function  Outcome: Improving     Problem: Communication Impairment (Stroke, Ischemic/Transient Ischemic Attack)  Goal: Improved Communication Skills  Outcome: Improving     Problem: Functional Ability Impaired (Stroke, Ischemic/Transient Ischemic Attack)  Goal: Optimal Functional Ability  Outcome: Improving  Intervention: Optimize Functional Ability  Recent Flowsheet Documentation  Taken 8/6/2021 0000 by Savanna Daiz, RN  Activity Management: ambulated to bathroom     Problem: Functional Ability Impaired (Stroke, Ischemic/Transient Ischemic Attack)  Goal: Optimal Functional Ability  Intervention: Optimize Functional Ability  Recent Flowsheet Documentation  Taken 8/6/2021 0000 by Savanna Diaz, RN  Activity Management: ambulated to bathroom

## 2021-08-06 NOTE — PROGRESS NOTES
NEUROLOGY PROGRESS NOTE     ASSESSMENT & PLAN   Visit diagnosis: Stroke    Stroke  Meningioma      MRI brain shows 9 mm acute or subacute infarct in the mid left anterior parietal junction    MRI brain further shows meningioma    Carotid ultrasound shows less than 50% stenosis bilaterally    Echocardiogram shows 66 5% action fraction    Permissive hypertension with slow reduction in blood pressure    Lipid panel and statin    Aspirin Plavix    PT/OT    Outpatient follow-up with neurosurgery for the meningioma    Neurology Discharge Planning : Per primary team.  Dr. Trevino to follow patient tomorrow.    Patient Active Problem List    Diagnosis Date Noted     Gastro-esophageal reflux disease without esophagitis 08/05/2021     Priority: Medium     Generalized anxiety disorder 08/05/2021     Priority: Medium     Stage 3 chronic kidney disease 08/05/2021     Priority: Medium     Vitamin D deficiency 08/05/2021     Priority: Medium     Left frontoparietal subcortical infarction 08/05/2021     Priority: Medium     Planum sphenoidale meningioma 08/05/2021     Priority: Medium     Hyperglycemia 08/04/2021     Priority: Medium     Altered mental status, unspecified altered mental status type 08/04/2021     Priority: Medium     Adenocarcinoma of left lung (H) 05/29/2020     Priority: Medium     Hyperlipidemia 05/29/2020     Priority: Medium     Malignant neoplasm of upper lobe of right lung (H) 02/04/2019     Priority: Medium     Vitamin B12 deficiency (non anemic) 01/22/2019     Priority: Medium     Mass of upper lobe of right lung 11/29/2018     Priority: Medium     Malignant neoplasm of central portion of left breast in female, estrogen receptor positive (H) 09/11/2018     Priority: Medium     Added automatically from request for surgery [id number]         Fibromyalgia 04/04/2017     Priority: Medium     Diabetes type 2, uncontrolled (H) 10/23/2015     Priority: Medium     Lower Back Pain      Priority: Medium     Created by  "Conversion         Gout 05/04/2011     Priority: Medium     Essential hypertension 07/22/2007     Priority: Medium     Medical History  Past Medical History:   Diagnosis Date     Anemia, iron deficiency 1/22/2019     Arthritis      Breast cancer (H)      Cancer (H)      Diabetes mellitus (H)      DM (diabetes mellitus screen)      History of anesthesia complications      HLD (hyperlipidemia)      HTN (hypertension)      Kidney stones      Lung cancer (H)      PONV (postoperative nausea and vomiting)      Sciatica      Scoliosis         SUBJECTIVE     Patient seen in follow-up for Dr. Zurita.  Patient presented with a fall.  Patient is not a candidate for thrombolytics.  Medical management of the stroke is going down.  Discussed with the patient that the stroke is too small to cause any symptoms.  Her leg weakness that she is having is not related to the stroke.  She was also found to have incidental meningioma for which neurosurgery is following the patient.  Recommending outpatient neurosurgery evaluation.  She would need surgery since the meningioma slowly growing is very close to the internal carotid artery.     OBJECTIVE     Vital signs in last 24 hours  Temp:  [98  F (36.7  C)-99.9  F (37.7  C)] 98.3  F (36.8  C)  Pulse:  [71-93] 76  Resp:  [18-20] 20  BP: (164-192)/(70-84) 180/75  SpO2:  [92 %-97 %] 96 %    Review of Systems   No new symptoms    VITALS: BP (!) 180/75 (BP Location: Left arm)   Pulse 76   Temp 98.3  F (36.8  C) (Temporal)   Resp 20   Ht 1.6 m (5' 3\")   Wt 76.6 kg (168 lb 14.4 oz)   SpO2 96%   BMI 29.92 kg/m    GENERAL -Health appearing, No apparent distress  EYES- No scleral icterus, no eyelid droop, Pupils - see Neuro section  HEENT - Normocephalic, atraumatic, Hearing grossly intact; Oral mucosa moist and pink in color. External Ears and nose intact.   Neck - supple with no obvious lymphadenopathy or thyromegaly  PULM - Good spontaneous respiratory effort; Normal palpation of chest. "   CV- Pedal pulses present with no peripheral edema/ No significant varicosities.  MSK- Gait - see Neuro section; Strength and tone- see Neuro section; Range of motion grossly intact.  PSYCH -cooperative    Neurological  Mental status - Patient is awake and oriented to self, place and time. Attention span is normal. Language is fluent and follows commands appropriately.   Cranial nerves - CN II-XII intact. Pupils are reactive and symmetric; EOMI, VFIT, NLF symmetric  Motor - There is no pronator drift. Motor exam shows 5/5 strength in upper extremities.  Lower extremities there is diffuse weakness.  She does have antigravity strength in the lower extremities.  Tone - Tone is symmetric bilaterally in upper and lower extremities.  Coordination - Finger to nose  is without dysmetria.  Gait and station --unable to safely ambulate.  Formal gait testing cannot be done because of safety concerns from ongoing medical issues.       DIAGNOSTIC STUDIES     Pertinent Radiology-all images personally reviewed.  MRI brain  IMPRESSION:  1.  9 mm acute or early subacute infarct within the subcortical white matter at the mid left frontoparietal junction. No hemorrhage.  2.  Interval enlargement of a planum sphenoidale meningioma compared to 2019 as detailed above.  3.  Mild to moderate volume loss and presumed chronic small vessel ischemic changes.    CT head  IMPRESSION:  1.  No CT evidence for acute intracranial process.  2.  Brain atrophy and presumed chronic microvascular ischemic changes as above.  3.  Interval increase dural based mass along the planum sphenoidale, likely reflecting a meningioma.    ECHO  The left ventricle is normal in size.  There is normal left ventricular wall thickness.  Left ventricular function is normal.The ejection fraction is 60-65%.  Normal right ventricle size and systolic function.  There is no color Doppler evidence of an atrial shunt.  Mild sclerodegenerative valve disease is identified without  hemodynamically  significant stenosis or regurgitation.    Carotid US  IMPRESSION:  1.  Mild to moderate plaque formation, velocities consistent with less than 50% stenosis in the right internal carotid artery.  2.  Mild to moderate plaque formation, velocities consistent with less than 50% stenosis in the left internal carotid artery.  3.  Flow within the vertebral arteries is antegrade.    Recent Results (from the past 24 hour(s))   Glucose by meter    Collection Time: 08/05/21  5:39 PM   Result Value Ref Range    GLUCOSE BY METER POCT 270 (H) 70 - 125 mg/dL   Troponin I    Collection Time: 08/05/21  6:15 PM   Result Value Ref Range    Troponin I 7.90 (HH) 0.00 - 0.29 ng/mL   Lipid panel reflex to direct LDL: Non-fasting    Collection Time: 08/05/21  6:15 PM   Result Value Ref Range    Cholesterol 190 <=199 mg/dL    Triglycerides 198 (H) <=149 mg/dL    Direct Measure HDL 27 (L) >=50 mg/dL    LDL Cholesterol Calculated 123 <=129 mg/dL    Patient Fasting > 8hrs? Unknown    CK total    Collection Time: 08/05/21  6:15 PM   Result Value Ref Range     (H) 30 - 190 U/L   ECG 12-LEAD WITH MUSE (L[facility])    Collection Time: 08/05/21  8:17 PM   Result Value Ref Range    Systolic Blood Pressure  mmHg    Diastolic Blood Pressure  mmHg    Ventricular Rate 93 BPM    Atrial Rate 93 BPM    SC Interval 152 ms    QRS Duration 76 ms     ms    QTc 442 ms    P Axis 42 degrees    R AXIS -22 degrees    T Axis 66 degrees    Interpretation ECG       Poor data quality, interpretation may be adversely affected  Sinus rhythm  Inferior infarct (cited on or before 04-AUG-2021)  Abnormal ECG  When compared with ECG of 04-AUG-2021 19:57,  No significant change was found  Confirmed by JOSHUA BARRIENTOS MD LOC:JN ([id number]) on 8/6/2021 2:02:18 PM     Glucose by meter    Collection Time: 08/05/21 10:30 PM   Result Value Ref Range    GLUCOSE BY METER POCT 247 (H) 70 - 125 mg/dL   Heparin Unfractionated Anti Xa Level    Collection Time: 08/06/21   2:55 AM   Result Value Ref Range    Anti Xa Unfractionated Heparin 0.38 For Reference Range, See Comment IU/mL   Extra Red Top Tube    Collection Time: 08/06/21  3:08 AM   Result Value Ref Range    Hold Specimen JIC    Glucose by meter    Collection Time: 08/06/21  8:38 AM   Result Value Ref Range    GLUCOSE BY METER POCT 181 (H) 70 - 125 mg/dL   Heparin Unfractionated Anti Xa Level    Collection Time: 08/06/21  9:44 AM   Result Value Ref Range    Anti Xa Unfractionated Heparin 0.80 For Reference Range, See Comment IU/mL   Extra Red Top Tube    Collection Time: 08/06/21  9:44 AM   Result Value Ref Range    Hold Specimen JIC    Extra Purple Top Tube    Collection Time: 08/06/21  9:44 AM   Result Value Ref Range    Hold Specimen JIC    Troponin I    Collection Time: 08/06/21  9:44 AM   Result Value Ref Range    Troponin I 3.44 (HH) 0.00 - 0.29 ng/mL   Echocardiogram Complete - For age > 60 yrs    Collection Time: 08/06/21  1:21 PM   Result Value Ref Range    LVEF  60-65%    Glucose by meter    Collection Time: 08/06/21  1:26 PM   Result Value Ref Range    GLUCOSE BY METER POCT 201 (H) 70 - 125 mg/dL   Troponin I    Collection Time: 08/06/21  2:01 PM   Result Value Ref Range    Troponin I 3.97 (HH) 0.00 - 0.29 ng/mL         HOSPITAL MEDICATIONS       acetaminophen  975 mg Oral or Feeding Tube TID    Or     acetaminophen  650 mg Rectal TID     allopurinol  300 mg Oral Daily     aspirin  81 mg Oral Daily    Or     aspirin  81 mg Oral or NG Tube Daily     atorvastatin  40 mg Oral QPM     clopidogrel  75 mg Oral Daily     furosemide  20 mg Oral Daily     hydrALAZINE  12.5 mg Oral TID     [START ON 8/7/2021] insulin aspart   Subcutaneous QAM AC     insulin aspart   Subcutaneous Daily with lunch     insulin aspart   Subcutaneous Daily with supper     insulin aspart  1-6 Units Subcutaneous TID w/meals     [START ON 8/7/2021] insulin glargine  25 Units Subcutaneous QAM AC     metoprolol tartrate  25 mg Oral BID     omeprazole   20 mg Oral Daily     [START ON 8/7/2021] polyethylene glycol  17 g Oral Daily     sennosides  2 tablet Oral BID     sodium chloride (PF)  3 mL Intracatheter Q8H        Total time spent for face to face visit, reviewing labs/imaging studies, counseling and coordination of care was: Over 35 min More than 50% of this time was spent on counseling and coordination of care.    Patient new to me.  Reviewing test results/chart.  Counseling patient.  Coordination of care with the primary team/nursing staff.  Neurosurgery notes reviewed.    Donte Rosas MD  Neurologist  [facility]  Tel:- [phone]

## 2021-08-06 NOTE — PROGRESS NOTES
08/06/21 1530   Quick Adds   Type of Visit Initial PT Evaluation   Living Environment   Living Environment Comments per OT   Self-Care   Usual Activity Tolerance moderate   Current Activity Tolerance fair   Regular Exercise No   Equipment Currently Used at Home walker, rolling   Activity/Exercise/Self-Care Comment indep self cares, assist with household duties   General Information   Onset of Illness/Injury or Date of Surgery 08/04/21   Referring Physician Yudith   Patient/Family Therapy Goals Statement (PT) none states   Pertinent History of Current Problem (include personal factors and/or comorbidities that impact the POC) fall at home, chronic LBP   Existing Precautions/Restrictions fall   Cognition   Orientation Status (Cognition) oriented x 4   Affect/Mental Status (Cognition) WFL   Follows Commands (Cognition) WFL   Pain Assessment   Patient Currently in Pain Yes, see Vital Sign flowsheet   Range of Motion (ROM)   ROM Comment LE ROM WFL   Strength   Strength Comments 4-/5 bilat LE, fatigues easily   Transfers   Transfers sit-stand transfer;toilet transfer   Sit-Stand Transfer   Sit-Stand [place] (Transfers) verbal cues;contact guard   Assistive Device (Sit-Stand Transfers) walker, front-wheeled   Sit/Stand Transfer Comments moves slowly but able to complete   Toilet Transfer   Type (Toilet Transfer) sit-stand;stand-sit   [place] Level (Toilet Transfer) supervision;verbal cues   Assistive Device (Toilet Transfer) grab bars/safety frame;walker, front-wheeled   Gait/Stairs (Locomotion)   [place] Level (Gait) supervision;verbal cues   Assistive Device (Gait) walker, front-wheeled   Distance in Feet (Required for LE Total Joints) 20' x2   Pattern (Gait) step-through   Deviations/Abnormal Patterns (Gait) antalgic;anshu decreased;stride length decreased   Balance   Balance Comments standing with FWW for hygiene   Clinical Impression   Criteria for Skilled Therapeutic Intervention yes, treatment  indicated   PT Diagnosis (PT) back pain, decreased mobility   Influenced by the following impairments pain   Functional limitations due to impairments pain   Clinical Presentation Evolving/Changing   Clinical Presentation Rationale presents as medically diagnosed   Clinical Decision Making (Complexity) moderate complexity   Therapy Frequency (PT) Daily   Predicted Duration of Therapy Intervention (days/wks) 7 days   Planned Therapy Interventions (PT) bed mobility training;gait training;stretching;transfer training   Anticipated Equipment Needs at Discharge (PT) walker, rolling   Risk & Benefits of therapy have been explained care plan/treatment goals reviewed;patient   PT Discharge Planning    PT Discharge Recommendation (DC Rec) Transitional Care Facility   PT Rationale for DC Rec decreased mobility 2/2 pain   PT Brief overview of current status  tolerated PT well   Total Evaluation Time   Total Evaluation Time (Minutes) 15

## 2021-08-06 NOTE — PLAN OF CARE
Problem: Adult Inpatient Plan of Care  Goal: Absence of Hospital-Acquired Illness or Injury  Intervention: Identify and Manage Fall Risk  Recent Flowsheet Documentation  Taken 8/5/2021 1718 by Jana Moore RN  Safety Promotion/Fall Prevention:   bed alarm on   nonskid shoes/slippers when out of bed  Goal: Readiness for Transition of Care  Outcome: Improving     Problem: Risk for Delirium  Goal: Optimal Coping  Outcome: Improving   Patient forgetful no acute delirum noted . Patient MRI resulted show stroke .  ASA given, cardiac monitor, PCD, applied. Normal saline runing at 100/hr. Ordered to transfer Patient to Room 126., Called receiving nurse to give report.

## 2021-08-07 NOTE — PROGRESS NOTES
Progress Note    Assessment/Plan  80-year-old with CKD stage IV, anemia, dyslipidemia, chronic pain syndrome who initially presented with fall, hyperglycemia hypertension and nausea with upset stomach.  Her troponin was elevated at 7.9 and therefore started on IV heparin.  Cardiology was consulted and awaiting echocardiogram.    Non-STEMI  --Patient currently on aspirin, heparin, statin  --Started on atorvastatin as per cardiology  --Started on hydralazine 12.5 mg 3 times daily as per cardiology.  Increase to 25 mg 3 times daily  --Continue beta-blocker since hyperkalemia is resolved  --Awaiting cardiology input regarding angiography  --Repeat troponin next a.m.    Left frontoparietal subcortical infarction  --not a candidate for thrombolytics due to time of onset  --continue with aspirin. Plavix recommended by neurology but not ordered. Order Plavix 75 mg once daily.   Carotid US is unremarkable  --CK total 405  --Speech OT PT consulted  --Neurology recommended to bring blood pressure down gradually        Accelerated hypertension   --Multifactorial  --Lisinopril and hydrochlorothiazide has been discontinued due to hyperkalemia on admission and CKD stage III with GFR of 35  --Replace HCTZ with Lasix .  Increase the dose to 40 mg  --DC IV fluids  --Increase hydralazine to 25 mg 3 times daily  --Continue IV hydralazine 10 mg IV q6hour PRN SBP>180    Insulin-dependent diabetes  --Suboptimal control  --Increase Lantus to 30 units and add mealtime insulin ratio 1 is to 10    Acute on chronic back pain   --neurosurgery consulted by previous hospitalist   --neurology exam is intact  --No neurosurgical intervention at this time  --Pain team consulted    Incidental meningioma  --Defer work-up as outpatient by neurosurgery    CKD stage IV creatinine at baseline ranging from 1.7-1.88  --Creatinine of 1.53 today    Radiographic findings recommended to follow-up as outpatient:  Wedge shaped airspace consolidation and volume  "loss, recurrent neoplasm remains a possibility, this could be further assessed with PET/CT  Stable 7 mm nodule right middle lobe    Barriers to discharge: IV heparin    Anticipated discharge date: 8/9        Subjective    Patient continues to be on IV heparin.  Blood pressure remains elevated.  No new symptoms such as headache double vision chest pain or shortness of breath.  Daughter at bedside updated about patient's condition.  Discussed with Dr. Schultz who recommended double platelet therapy for 3 weeks followed by aspirin.  Patient is agreeable to angiography if needed.  Awaiting cardiology input.     Objective    BP (!) 180/81 (BP Location: Right arm)   Pulse 71   Temp 97.8  F (36.6  C) (Oral)   Resp 18   Ht 1.6 m (5' 3\")   Wt 76.6 kg (168 lb 14.4 oz)   SpO2 95%   BMI 29.92 kg/m    Weight:   Wt Readings from Last 5 Encounters:   08/05/21 76.6 kg (168 lb 14.4 oz)   06/21/21 77.8 kg (171 lb 8 oz)   05/06/20 73 kg (160 lb 14.4 oz)   04/30/19 72.6 kg (160 lb)   04/16/19 72.6 kg (160 lb)       I/O last 3 completed shifts:  In: 2190 [P.O.:1160; I.V.:1030]  Out: 1825 [Urine:1825]  I/O this shift:  In: 200 [P.O.:200]  Out: 350 [Urine:350]          Physical Exam  Alert, oriented*3  No pallor, icterus, clubbing, cyanosis  Body mass index is 29.92 kg/m .    No sinus tenderness  Moist membranes  Neck supple  CVS: S1 S2-N, no murmurs, gallops, rubs  Resp: No crackles on auscultation  Abd: soft, No t/g/r  Neuro: no involuntary movements such as tremors  Vasc: no leg edema     Pertinent Labs  ----------------------  Recent Labs   Lab 08/07/21  1211 08/07/21  0843 08/07/21  0819 08/05/21  0735 08/04/21 2023   NA  --   --  138 138 135*   POTASSIUM  --   --  4.0 4.6 5.3*   CO2  --   --  23 21* 18*   BUN  --   --  30* 30* 31*   CR  --   --  1.53* 1.42* 1.73*   * 122 138* 273* 388*   ALBUMIN  --   --   --   --  3.7   BILITOTAL  --   --   --   --  0.6   ALKPHOS  --   --   --   --  115   ALT  --   --   --   --  15 "   AST  --   --   --   --  17     Recent Labs   Lab 08/04/21 2023   WBC 10.2   HGB 10.7*   HCT 33.2*        No results for input(s): INR in the last 168 hours.  Glucose Values Latest Ref Rng & Units 8/4/2021 8/5/2021 8/7/2021   Bedside Glucose (mg/dl )  - -- -- --   GLUCOSE 70 - 125 mg/dL 388(H) 273(H) 138(H)   Some recent data might be hidden         Pertinent Radiology   Echo done on 8/6/2021    Interpretation Summary     The left ventricle is normal in size.  There is normal left ventricular wall thickness.  Left ventricular function is normal.The ejection fraction is 60-65%.  Normal right ventricle size and systolic function.  There is no color Doppler evidence of an atrial shunt.  Mild sclerodegenerative valve disease is identified without hemodynamically  significant stenosis or regurgitation.    EKG Results: not reviewed.

## 2021-08-07 NOTE — PROGRESS NOTES
Cardiology Progress Note    Assessment/Plan:  1. Elevated troponin, possibly consistent with non-ST elevation MI or demand ischemia in the setting of significant hypertension. Echocardiogram yesterday demonstrates no regional wall motion abnormality with normal systolic function. Have recommended discontinuation of heparin as she has been on it nearly 48 hours. Would advise nuclear stress test on Monday to further evaluate for occult coronary artery disease. If this is positive, she will likely need angiography to further define her anatomy.  2. Accelerated hypertension, inadequately controlled. Patient now on hydralazine to help with blood pressure management. Dose has been increased today due to persistently elevated blood pressures  3. Left frontoparietal subcortical infarction, etiology unclear. Agree with clopidogrel.  4. Stage IV chronic kidney disease, stable    Subjective:  Patient reports no complaints of chest pain or shortness of breath. More alert and responsive to questioning today.      acetaminophen  975 mg Oral or Feeding Tube TID    Or     acetaminophen  650 mg Rectal TID     allopurinol  300 mg Oral Daily     aspirin  81 mg Oral Daily    Or     aspirin  81 mg Oral or NG Tube Daily     atorvastatin  40 mg Oral QPM     clopidogrel  75 mg Oral Daily     [START ON 8/8/2021] furosemide  40 mg Oral Daily     hydrALAZINE  25 mg Oral TID     insulin aspart   Subcutaneous QAM AC     insulin aspart   Subcutaneous Daily with lunch     insulin aspart   Subcutaneous Daily with supper     insulin aspart  1-6 Units Subcutaneous TID w/meals     [START ON 8/8/2021] insulin glargine  30 Units Subcutaneous QAM AC     metoprolol tartrate  25 mg Oral BID     miconazole   Topical BID     omeprazole  20 mg Oral Daily     polyethylene glycol  17 g Oral Daily     sennosides  2 tablet Oral BID     sodium chloride (PF)  3 mL Intracatheter Q8H         Objective:   Vital signs in last 24 hours:  Temp:  [97.8  F (36.6   C)-98.9  F (37.2  C)] 98.1  F (36.7  C)  Pulse:  [65-90] 74  Resp:  [18-20] 18  BP: (135-200)/(63-91) 135/63  SpO2:  [93 %-97 %] 94 %  Weight:   [unfilled]     Review of Systems:  Negative    Physical Exam:  General appearance: alert, cooperative, no distress   Head: Normocephalic, without obvious abnormality, atraumatic  Neck: no JVD   Lungs: clear to auscultation bilaterally   Heart: Regular rate and rhythm. S1, S2 normal. No murmur or gallop  Extremities: No peripheral edema bilaterally    Cardiographics (personally reviewed):   Telemetry demonstrates sinus rhythm    Imaging (personally reviewed):   Echo Complete With Contrast 2021    Narrative  [id number]  [id number]  [id number]  [id number]^LORETTA^[place] [address]    Name: LIVE PRAKASH  MRN: [id number]  : 1940  Study Date: 2021 01:04 PM  Age: 80 yrs  Gender: Female  Patient Location: [facility]  Reason For Study: Cerebrovascular Incident  Ordering Physician: DORON BURGOS  Referring Physician: DORON BURGOS  Performed By: ALEXX    BSA: 1.8 m2  Height: 63 in  Weight: 168 lb  HR: 78  ______________________________________________________________________________  Procedure  Complete Echo Adult. Adequate quality two-dimensional was performed and  interpreted.  ______________________________________________________________________________  Interpretation Summary    The left ventricle is normal in size.  There is normal left ventricular wall thickness.  Left ventricular function is normal.The ejection fraction is 60-65%.  Normal right ventricle size and systolic function.  There is no color Doppler evidence of an atrial shunt.  Mild sclerodegenerative valve disease is identified without hemodynamically  significant stenosis or regurgitation.  ______________________________________________________________________________  I      WMSI = 1.00     % Normal = 100    X - Cannot   0 -                      (2) -  Mildly 2 -          Segments  Size  Interpret    Hyperkinetic 1 - Normal  Hypokinetic  Hypokinetic  1-2     small  7 -          3-5  moderate  3 - Akinetic 4 -          5 -         6 - Akinetic Dyskinetic   6-14    large  Dyskinetic   Aneurysmal  w/scar       w/scar       15-16   diffuse    Left Ventricle  The left ventricle is normal in size. Left ventricular function is normal.The  ejection fraction is 60-65%. There is normal left ventricular wall thickness.  Left ventricular diastolic function is normal. No regional wall motion  abnormalities noted.    Right Ventricle  Normal right ventricle size and systolic function.    Atria  Normal left atrial size. Right atrial size is normal. There is no color  Doppler evidence of an atrial shunt.    Mitral Valve  The mitral valve leaflets are mildly thickened. There is mild mitral annular  calcification. There is physiologic mitral regurgitation. There is no mitral  valve stenosis.    Tricuspid Valve  Tricuspid valve leaflets appear normal. There is no evidence of tricuspid  stenosis or clinically significant tricuspid regurgitation. Right ventricle  systolic pressure estimate normal. The right ventricular systolic pressure is  approximated at 20.8 mmHg plus the right atrial pressure. There is physiologic  tricuspid regurgitation.    Aortic Valve  The aortic valve is trileaflet. There is mild trileaflet aortic sclerosis.    Pulmonic Valve  The pulmonic valve is not well seen, but is grossly normal. There is trace  pulmonic valvular regurgitation. This degree of valvular regurgitation is  within normal limits.    Vessels  The aorta root is normal. Normal size ascending aorta. IVC diameter <2.1 cm  collapsing >50% with sniff suggests a normal RA pressure of 3 mmHg.    Pericardium  There is no pericardial effusion.    Rhythm  Sinus rhythm was noted.    ______________________________________________________________________________  MMode/2D Measurements & Calculations  IVSd:  0.95 cm  LVIDd: 4.3 cm  LVIDs: 2.5 cm  LVPWd: 1.1 cm  FS: 41.1 %    LV mass(C)d: 144.9 grams  LV mass(C)dI: 80.7 grams/m2  Ao root diam: 3.2 cm  LA dimension: 3.8 cm  asc Aorta Diam: 3.2 cm  LA/Ao: 1.2  LVOT diam: 1.9 cm  LVOT area: 2.8 cm2  RWT: 0.49    Time Measurements  MM HR: 65.0 BPM    Doppler Measurements & Calculations  MV E max solis: 121.0 cm/sec  MV A max solis: 127.9 cm/sec  MV E/A: 0.95  MV dec slope: 552.4 cm/sec2  MV dec time: 0.22 sec  Ao V2 max: 124.1 cm/sec  Ao max P.0 mmHg  Ao V2 mean: 92.8 cm/sec  Ao mean PG: 3.8 mmHg  Ao V2 VTI: 27.1 cm  SERGEY(I,D): 2.4 cm2  SERGEY(V,D): 2.2 cm2  LV V1 max PG: 3.8 mmHg  LV V1 max: 97.9 cm/sec  LV V1 VTI: 23.3 cm  SV(LVOT): 64.1 ml  SI(LVOT): 35.7 ml/m2  PA acc time: 0.11 sec    PI end-d solis: 97.1 cm/sec  TR max solis: 228.0 cm/sec  TR max P.8 mmHg  AV Solis Ratio (DI): 0.79  SERGEY Index (cm2/m2): 1.3  E/E' av.8  Lateral E/e': 10.7  Medial E/e': 14.8    ______________________________________________________________________________  Report approved by: Gilberto Bah 2021 02:51 PM      Lab Results (personally reviewed):   Recent Labs   Lab 21  0819      CO2 23   BUN 30*       INR   Date Value Ref Range Status   2018 0.93 0.90 - 1.10 Final       Troponin I   Date Value Ref Range Status   2021 3.97 (HH) 0.00 - 0.29 ng/mL Final   2021 3.44 (HH) 0.00 - 0.29 ng/mL Final   2021 7.90 (HH) 0.00 - 0.29 ng/mL Final       No results found for: BNP    Hemoglobin   Date Value Ref Range Status   2021 10.7 (L) 11.7 - 15.7 g/dL Final         Janae Hernandez MD

## 2021-08-07 NOTE — PROGRESS NOTES
This is a neurological follow-up note    80-year-old admitted to hospital on 8/4/2021  Original neurologic consultation by Dr. Zurita  Followed by Dr. Balderrama    Chief complaint  Left frontal parietal subcortical infarct (9 mm)  Presented with intractable nausea vomiting and fall  Dural based mass along the planum sphenoidal increase in size      HPI  80-year-old presented to the hospital on 8/4/2021 after she had fallen and was unable to get up for over 22 hours.  She was hyperglycemic and hypertensive  She had intractable nausea  She does have a history of chronic pain and opioid dependence  And hyponatremia on presentation  Patient denied any loss of consciousness with her fall    On work-up patient found to have left frontoparietal subcortical infarct    Has a meningioma evaluated by neurosurgery felt that this should be monitored and it is growing only minimally in 2 years.  (Compared to scan in 2019)  Past medical history  Breast cancer (status post lumpectomy and radiation)  Lung cancer (adenocarcinoma)  Hypertension and hyperlipidemia  Diabetes  Chronic kidney disease  Kidney stones  Meningioma dural base ,planum spheniodale  B12 deficiency  Fibromyalgia/PMR  Chronic opioid dependence  Scoliosis  Sciatica        Habits  Past smoker quit 1968  Does not drink alcohol    Family history  Mother with breast cancer    Work-up  CT cervical spine 8/4/2021 no subluxation or fracture  CT scan head 8/4/2021  A. Dural based mass along the planum sphenoidale has increased in size from the prior exam measuring approximately 2.5 x 2.5 x 1.4 cm in size, previously 2.2 x 1.9 x 0.8 cm.  B.  No acute stroke or bleed small vessel changes atrophy  MRI scan brain without contrast 8/5/2021  A.  Left frontal parietal junction subcortical 9 mm restricted diffusion consistent with subacute stroke  B.  Meningioma dural based enhancing mass planum spheniodale 2.5 x 2.8 x 1 cm (previously 1.9 x 1.7 x 0.8 cm, 2019 close parentheses  C.   Mild to moderate volume loss  Carotid Dopplers  A.  Right ICA peak systolic velocity 108 cm/s no significant stenosis, mild/moderate plaque  B.  Left ICA peak systolic velocity 128 cm/s, no significant stenosis, mild to moderate plaque    AST 17/ALT 15  EKG sinus rhythm  Echo 60-65% ejection fraction, left atrium normal size no shunt  Troponin, 0.16-7.9-3.97  HDL 27/  COVID-19 negative    Labs  Sodium 138 potassium 4.0  BUN 30 creatinine 1.53  Glucose 138-122  WBC 10.2, hemoglobin 10.7, platelets 217,000    Exam  Blood pressure 200/91, pulse 81, temperature 98.3  Blood pressure range 160//91  Pulse range 71-90  T-max 99.2    Review of systems pertinent positives and negatives  No headache no chest pain no shortness of breath  Currently no nausea or vomiting no abdominal pain    No aphasia  No visual field changes  No diplopia dysarthria dysphagia  No new focal weakness    Frustrated about needing cardiac angiogram    General exam per primary MD  Alert oriented x3 sitting up in the chair  HEENT normal  Lungs clear  Heart rate regular  Abdomen soft  Symmetrical pulses  No edema the feet    Neurologic visit  Alert oriented x3  Normal prosody speech  Normal naming  Normal repetition  Normal comprehension  No aphasia  No neglect  Memory recall at bedside normal    Cranials 2 through 12 normal  No ophthalmoplegia  No nystagmus  Visual fields intact  Face symmetrical  Tongue twisters good    Upper extremities  No drift  No tremor  Finger-nose okay    Lower extremities  Wiggles toes bilaterally  Toes downgoing    Discussed with patient and daughter at bedside  Discussed with primary MD face-to-face  Reviewed work-up and findings    Assessment/plan    1.  Left frontoparietal subcortical infarct (9 mm)    2.  Risk factors for stroke hypertension hyperlipidemia diabetes chronic kidney disease    3.  Complicating medical issues adenocarcinoma of the lung/history of breast cancer.    4.  Chronic pain opioid  dependence    5. Meningioma dural based enhancing mass planum spheniodale 2.5 x 2.8 x 1 cm (previously 1.9 x 1.7 x 0.8 cm, 2019) evaluated by neurosurgery following clinically    6.  Elevated troponin during admission evaluated by cardiology non-STEMI    7.  Patient had a fall during admission total  ALT 15/ALT 17        Diagnosis  Left frontal parietal subcortical subacute infarct 9 mm  Meningioma  Non-STEMI  Accelerated hypertension on hospitalization  Fall with mild rhabdomyolysis  Chronic kidney disease      Dual antiplatelet agents (aspirin and Plavix for 21 days)  Then switch to aspirin monotherapy    Aspirin 81 mg once per day  Plavix 75 mg once per day  Atorvastatin 40 mg daily    Discussed with patient and daughter at bedside  Discussed with primary MD face-to-face  Reviewed work-up and findings    Further work-up per cardiology and primary MD  Neurologic exam stable  Neurology will sign off at this time.    30 minutes total care time today greater than 50% face-to-face patient care team discussing and evaluating the above

## 2021-08-07 NOTE — PROGRESS NOTES
Will not see today:  PAIN MANAGEMENT SERVICE CHART CHECK    This patient's chart has been reviewed by the Pain Service. Patient with complex medical history significant for Stage IV kidney disease, anemia of chronic illness, hypertension, hyperlipidemia, Type II diabetes, chronic pain, fibromyalgia, scoliosis, prior falls with traumatic injury, chronic pain disorder with fibromyalgia, Polymyalgia Rheumatica, non small cell lung cancer, breast cancer.    She is being followed by Neurology with imaging demonstrating a left frontoparietal subcortical infarct and incidental findings of meningioma.  Non STEMI.   Medically stable    In 24 hours, patient has utilized 40mg of oxycodone for an MME 60.    Doing well with Physical Therapy.  Pain appears under good control    No change in pain plan.    We will continue to follow along.    Thank you!    Concha HATCH, CNS-BC, DNP  Acute Care Pain Management Program  [facility] ([place], [place])   With questions call [phone]  Preference if for Nat Rodriguez  Click HERE to page Tracy

## 2021-08-07 NOTE — PLAN OF CARE
Writer took over care at 1900. Minimal complaints of chronic back pain. Scheduled Tylenol given with relief. No nausea noted. NIH score was 0. NSR on the monitor. Heparin currently infusing. Anti-xa to be checked at 0130. Trops trending down. Cards/Neuro follow. Diabetic. Pt is alert and orientated. Up with an assist of one with transfers and ambulation using a walker. Will continue to monitor.

## 2021-08-07 NOTE — PLAN OF CARE
Problem: Functional Ability Impaired (Stroke, Ischemic/Transient Ischemic Attack)  Goal: Optimal Functional Ability  Outcome: Improving  Intervention: Optimize Functional Ability  Recent Flowsheet Documentation  Taken 8/7/2021 0932 by Julia Gary, RN  Activity Management: up in chair  Taken 8/7/2021 0854 by Julia Gary, RN  Activity Management: ambulated to bathroom   NIHSS remains 0.  Assist of one with front wheeled walker and gait belt.  Patient encouraged to ambulate in halls, patient declined due to chronic back pain.  Patient up to chair.  Baseline numbness and tingling due to neuropathy.  Alert and oriented x4, can be forgetful at times.     Denies any chest pain or difficulty breathing.  Remains on heparin drip.  Anti-Xa was 0.66 this am.  Per Heparin protocol heparin was paused for one hour and resumed at 500units per hour.  (previous dose was 700 units/hour-200).  Heparin drip discontinued per cardiology.

## 2021-08-07 NOTE — PLAN OF CARE
Pt slept well overnight. Did complain of generalized aches and pains from fall. PRN Oxycodone given x2 with adequate relief. No nausea noted. NSR on the monitor. NIH every 4hrs and pt is scoring 0. Heparin drip at 700u/hr. Last anti-xa was 0.23. Bolus was given and rate was increased with a recheck at 0815. Trops trending down. Purewick in place. IV SL. Diabetic. Cards/neuro following. Pt is alert and orientated. Up with an assist of one with transfers and ambulation. Will continue to monitor.

## 2021-08-08 NOTE — PROGRESS NOTES
Care Management Follow Up    Length of Stay (days): 3    Expected Discharge Date: 08/10/2021     Concerns to be Addressed:       Patient plan of care discussed at interdisciplinary rounds: Yes    Anticipated Discharge Disposition:  Home with services     Anticipated Discharge Services:  PT, HHA, RN   Anticipated Discharge DME:      Patient/family educated on Medicare website which has current facility and service quality ratings:  yes  Education Provided on the Discharge Plan:  yes  Patient/Family in Agreement with the Plan:  yes    Referrals Placed by CM/SW:    Private pay costs discussed: Not applicable    Additional Information:  SW met with pt in pt room,introduced self and reason for visit. SW dicussed PT recommendations for pt,  pt declining TCU. Pt agreeable to home care. SW offered pt choice of medicare/gov list, and pt was agreeable to local home care agency who has availability, Pt agreeable to PT, RN, NORMA. Referral sent to [facility] and accepted.       [employer] [place]

## 2021-08-08 NOTE — PLAN OF CARE
Plan is for Lexiscan tomorrow.  Patient aware.  Patient has not had any caffeine today.      Assist of one with gait belt and front wheeled walker.      No neurological changes noted.      BG remain elevated.  Sliding scale coverage and carb counting ordered.  Will continue to monitor.

## 2021-08-08 NOTE — PROGRESS NOTES
Progress Note    Assessment/Plan  80-year-old with CKD stage IV, anemia, dyslipidemia, chronic pain syndrome who initially presented with fall, hyperglycemia hypertension and nausea with upset stomach.  Her troponin was elevated at 7.9 and therefore started on IV heparin.  Cardiology was consulted and awaiting stress test.     Non-STEMI  --Patient currently on aspirin,plavix, statin. OFF IV heparin as per cardiology  --tolerating on atorvastatin as per cardiology  --Started on hydralazine 12.5 mg 3 times daily as per cardiology.  tolerating  25 mg 3 times daily  --Continue beta-blocker since hyperkalemia is resolved  --cardiology ordered stress test for next am. She may need angiogram if stress test is abnormal.   --troponin trending down     Left frontoparietal subcortical infarction  --not a candidate for thrombolytics due to time of onset  --continue with aspirin. Plavix recommended by neurology but not ordered. Order Plavix 75 mg once daily.   Carotid US is unremarkable  --CK total 405  --Speech OT PT consulted  --Neurology recommended to bring blood pressure down gradually        Accelerated hypertension   --improving with lasix and increased dose of hydralazine.   --Multifactorial  --Lisinopril and hydrochlorothiazide has been discontinued due to hyperkalemia on admission and CKD stage III with GFR of 35  --Replace HCTZ with Lasix .  Increase the dose to 40 mg  --DC IV fluids  --Increase hydralazine to 25 mg 3 times daily  --Continue IV hydralazine 10 mg IV q6hour PRN SBP>180    Insulin-dependent diabetes  --Suboptimal control  --Icontinue Lantus at 30 units and decrease mealtime insulin ratio 1 is to 5    Acute on chronic back pain   --neurosurgery consulted by previous hospitalist   --neurology exam is intact  --No neurosurgical intervention at this time  --Pain team consulted    Incidental meningioma  --Defer work-up as outpatient by neurosurgery    CKD stage IV creatinine at baseline ranging from  "1.7-1.88  --Creatinine is stable    Radiographic findings recommended to follow-up as outpatient:  Wedge shaped airspace consolidation and volume loss, recurrent neoplasm remains a possibility, this could be further assessed with PET/CT  Stable 7 mm nodule right middle lobe    Barriers to discharge: stress test    Anticipated discharge date: 8/9        Subjective    Patient is off IV heparin.  Blood pressure has improved.  No new symptoms such as headache double vision chest pain or shortness of breath.  Daughter at bedside updated about patient's condition.  Stress test ordered for next am.   ROS is negative    Objective    /63 (BP Location: Right arm)   Pulse 87   Temp 99.2  F (37.3  C) (Oral)   Resp 18   Ht 1.6 m (5' 3\")   Wt 76.6 kg (168 lb 14.4 oz)   SpO2 96%   BMI 29.92 kg/m    Weight:   Wt Readings from Last 5 Encounters:   08/05/21 76.6 kg (168 lb 14.4 oz)   06/21/21 77.8 kg (171 lb 8 oz)   05/06/20 73 kg (160 lb 14.4 oz)   04/30/19 72.6 kg (160 lb)   04/16/19 72.6 kg (160 lb)       I/O last 3 completed shifts:  In: 1080 [P.O.:1080]  Out: 1300 [Urine:1300]  No intake/output data recorded.          Physical Exam  Alert, oriented*3  No pallor, icterus, clubbing, cyanosis  Body mass index is 29.92 kg/m .    No sinus tenderness  Moist membranes  Neck supple  CVS: S1 S2-N, no murmurs, gallops, rubs  Resp: No crackles on auscultation  Abd: soft, No t/g/r  Neuro: no involuntary movements such as tremors  Vasc: no leg edema     Pertinent Labs  ----------------------  Recent Labs   Lab 08/08/21  1245 08/08/21  0904 08/08/21  0814 08/07/21  0819 08/05/21  0735 08/04/21 2023   NA  --  138  --  138 138 135*   POTASSIUM  --  3.8  --  4.0 4.6 5.3*   CO2  --  23  --  23 21* 18*   BUN  --  30*  --  30* 30* 31*   CR  --  1.50*  --  1.53* 1.42* 1.73*   * 171* 160* 138* 273* 388*   ALBUMIN  --   --   --   --   --  3.7   BILITOTAL  --   --   --   --   --  0.6   ALKPHOS  --   --   --   --   --  115   ALT  -- "   --   --   --   --  15   AST  --   --   --   --   --  17     Recent Labs   Lab 08/04/21 2023   WBC 10.2   HGB 10.7*   HCT 33.2*        No results for input(s): INR in the last 168 hours.  Glucose Values Latest Ref Rng & Units 8/4/2021 8/5/2021 8/7/2021 8/8/2021   Bedside Glucose (mg/dl )  - -- -- -- --   GLUCOSE 70 - 125 mg/dL 388(H) 273(H) 138(H) 171(H)   Some recent data might be hidden         Pertinent Radiology   Echo done on 8/6/2021    Interpretation Summary     The left ventricle is normal in size.  There is normal left ventricular wall thickness.  Left ventricular function is normal.The ejection fraction is 60-65%.  Normal right ventricle size and systolic function.  There is no color Doppler evidence of an atrial shunt.  Mild sclerodegenerative valve disease is identified without hemodynamically  significant stenosis or regurgitation.    EKG Results: not reviewed.

## 2021-08-08 NOTE — PROGRESS NOTES
Will not see today:  PAIN MANAGEMENT SERVICE CHART CHECK    This patient's chart has been reviewed by the Pain Service. Pain appears under good control.  Currently has oxycodone 5-10 mg tid prn and scheduled tylenol.  If return home could resume home prescription of oxycodone/APAP 5/325 mg tablets up to 6 tablets/day.  Prescription provided by her Primary Provider.  If patient goes to TCU would recommend continuing scheduled tylenol with oxycodone 5-10 mg tid prn #30 tablets.    Pain Service with no additional recommendations.  We will sign off.  If we can be of additional assistance please text or call or place new Consult order.    Thank you!    Concha HATCH, CNS-BC, DNP  Acute Care Pain Management Program  [facility] ([facility], [place], [place])   With questions call [phone]  Preference if for [facility] [place] Rodriguez  Click HERE to page Tracy

## 2021-08-08 NOTE — PROGRESS NOTES
"      Impression and Plan     Impression:   1. Elevated troponin. No symptoms of ischemia. May be demand-mediated from prolonged immobility, dehydration, uncontrolled hypertension, and acute stroke. Underlying severe coronary artery disease is possible.  2. Acute-to-subacute left frontoparietal subcortical infarction. Unclear etiology, possibly due to uncontrolled hypertension. No evidence of atrial fibrillation.  3. Accelerated hypertension with baseline essential hypertension.  4. Hyperlipidemia.  5. Insulin-dependent diabetes mellitus type 2. Last HbA1c 9.3%.  6. Chronic kidney disease stage III/IV.    Plan:    Pharmacologic nuclear stress test tomorrow    If there is any evidence of ischemia on her stress test, especially given the degree of troponin elevation, will likely plan for coronary angiography    On dual antiplatelet therapy for her stroke with aspirin 81 mg and clopidogrel 75 mg daily    Atorvastatin 40 mg daily    Metoprolol tartrate 25 mg twice daily    Blood pressure management in the setting of acute stroke per nephrology    Primary Cardiologist: none    Subjective     - denies chest pain, shortness of breath, or palpitations    Cardiac Diagnostics   Telemetry (personally reviewed): SR, brief run of PSVT approximately 10-15 beats    ECG 8/5/2021 (personally reviewed and interpreted): SR, inferior Q waves    Echocardiogram 8/6/2021 (results reviewed): *  The left ventricle is normal in size.  There is normal left ventricular wall thickness.  Left ventricular function is normal.The ejection fraction is 60-65%.  Normal right ventricle size and systolic function.  There is no color Doppler evidence of an atrial shunt.  Mild sclerodegenerative valve disease is identified without hemodynamically  significant stenosis or regurgitation.    Physical Examination       BP (!) 178/77 (BP Location: Left arm)   Pulse 86   Temp 99.2  F (37.3  C) (Oral)   Resp 18   Ht 1.6 m (5' 3\")   Wt 76.6 kg (168 lb 14.4 " oz)   SpO2 97%   BMI 29.92 kg/m          Wt Readings from Last 3 Encounters:   08/05/21 76.6 kg (168 lb 14.4 oz)   06/21/21 77.8 kg (171 lb 8 oz)   05/06/20 73 kg (160 lb 14.4 oz)               Intake/Output Summary (Last 24 hours) at 8/8/2021 1346  Last data filed at 8/8/2021 1343  Gross per 24 hour   Intake 480 ml   Output 1300 ml   Net -820 ml       General: pleasant female. No acute distress.   HENT: external ears normal. Nares patent. Mucous membranes moist.  Eyes: perrla, extraocular muscles intact. No scleral icterus.   Neck: No JVD  Lungs: clear to auscultation  COR:  regular rate and rhythm, No murmurs, rubs, or gallops  Abd: nondistended, BS present  Extrem: No edema         Imaging      Carotid ultrasound 8/5/2021 (report reviewed):  1.  Mild to moderate plaque formation, velocities consistent with less than 50% stenosis in the right internal carotid artery.  2.  Mild to moderate plaque formation, velocities consistent with less than 50% stenosis in the left internal carotid artery.  3.  Flow within the vertebral arteries is antegrade.    Lab Results   Lab Results   Component Value Date     08/08/2021    CO2 23 08/08/2021    BUN 30 08/08/2021     Lab Results   Component Value Date    WBC 10.2 08/04/2021    HGB 10.7 08/04/2021    HCT 33.2 08/04/2021    MCV 94 08/04/2021     08/04/2021     Lab Results   Component Value Date    CHOL 190 08/05/2021    TRIG 198 08/05/2021    HDL 27 08/05/2021     Lab Results   Component Value Date    INR 0.93 12/13/2018     Lab Results   Component Value Date    TROPONINI 1.89 08/08/2021    TROPONINI 3.97 08/06/2021    TROPONINI 3.44 08/06/2021     Lab Results   Component Value Date    A1C 9.3 (H) 08/05/2021           Current Inpatient Scheduled Medications   Scheduled Meds:    acetaminophen  975 mg Oral or Feeding Tube TID    Or     acetaminophen  650 mg Rectal TID     allopurinol  300 mg Oral Daily     aspirin  81 mg Oral Daily    Or     aspirin  81 mg Oral or  NG Tube Daily     atorvastatin  40 mg Oral QPM     clopidogrel  75 mg Oral Daily     furosemide  40 mg Oral Daily     hydrALAZINE  25 mg Oral TID     insulin aspart   Subcutaneous QAM AC     insulin aspart   Subcutaneous Daily with lunch     insulin aspart   Subcutaneous Daily with supper     insulin aspart  1-6 Units Subcutaneous TID w/meals     insulin glargine  30 Units Subcutaneous QAM AC     metoprolol tartrate  25 mg Oral BID     miconazole   Topical BID     omeprazole  20 mg Oral Daily     polyethylene glycol  17 g Oral Daily     sennosides  2 tablet Oral BID     sodium chloride (PF)  3 mL Intracatheter Q8H     Continuous Infusions:    - MEDICATION INSTRUCTIONS -       - MEDICATION INSTRUCTIONS -              Medications Prior to Admission   Prior to Admission medications    Medication Sig Start Date End Date Taking? Authorizing Provider   allopurinol (ZYLOPRIM) 300 MG tablet [ALLOPURINOL (ZYLOPRIM) 300 MG TABLET] Take 1 tablet by mouth daily.       9/7/16  Yes Provider, Historical   aspirin 325 MG tablet [ASPIRIN 325 MG TABLET] Take 1 tablet by mouth daily. 9/24/07  Yes Provider, Historical   ferrous sulfate 140 mg (45 mg iron) TbER [FERROUS SULFATE 140 MG (45 MG IRON) TBER] 1 tablet tid 1/22/19  Yes Tory Mackey MD   insulin lispro (HUMALOG KWIKPEN) 100 unit/mL pen [INSULIN LISPRO (HUMALOG KWIKPEN) 100 UNIT/ML PEN] INJECT 15 UNITS IN THE MORNING AND 26 UNITS AT DINNER ( PLUS SLIDING SCALE UP TO 70 UNITS DAILY ) 1/14/20  Yes Provider, Historical   insulin NPH isoph U-100 human (HUMULIN N NPH INSULIN KWIKPEN) 100 unit/mL (3 mL) pen [INSULIN NPH ISOPH U-100 HUMAN (HUMULIN N NPH INSULIN KWIKPEN) 100 UNIT/ML (3 ML) PEN] 35 units with breakfast 5/31/18  Yes Provider, Historical   lisinopril-hydrochlorothiazide (PRINZIDE,ZESTORETIC) 20-12.5 mg per tablet [LISINOPRIL-HYDROCHLOROTHIAZIDE (PRINZIDE,ZESTORETIC) 20-12.5 MG PER TABLET] Take 1 tablet by mouth daily. 2/19/19  Yes Provider, Historical    LORazepam (ATIVAN) 0.5 MG tablet [LORAZEPAM (ATIVAN) 0.5 MG TABLET] Take 0.5 mg by mouth every 8 (eight) hours as needed for anxiety. 2/19/19  Yes Provider, Historical   omeprazole (PRILOSEC) 20 MG capsule [OMEPRAZOLE (PRILOSEC) 20 MG CAPSULE] Take 20 mg by mouth daily. 2/22/19  Yes Provider, Historical   oxyCODONE-acetaminophen (PERCOCET) 5-325 MG tablet Take 1 tablet by mouth every 6 hours as needed for severe pain   Yes Unknown, Entered By History   ketoconazole (NIZORAL) 2 % cream [KETOCONAZOLE (NIZORAL) 2 % CREAM] Apply topically as needed. 8/22/19   Provider, Historical   NYSTOP powder [NYSTOP POWDER] HOMERO EXT AA  TID 10/10/18   Provider, Historical          Doug Buitrago MD [facility]  Non-Invasive Cardiologist  [facility]  Pager [phone]

## 2021-08-08 NOTE — PLAN OF CARE
Denies nausea. Pain controlled with scheduled and PRN medications. Scoring a 0 on the NIH stroke scale. Tele is NSR. LS clear. BS active. Up with assist of one and walker. Able to make needs known.

## 2021-08-09 NOTE — PLAN OF CARE
Pt slept well overnight. Did complain of back/generalized pain. PRN Oxycodone given with adequate relief. No nausea noted. NIH score is 0. NSR on the monitor. Cards/Neuro following. Diabetic. Pt is alert and orientated. Up with an assist of one with transfers and ambulation using  a walker. Stress test scheduled this am. If positive, possible angio. Will continue to monitor.

## 2021-08-09 NOTE — PLAN OF CARE
Denies nausea. Complains of abdominal pain which is well controlled with PRN and scheduled medications. Scoring a zero on the NIH stroke scale. Tele is NSR. No caffeine today for upcoming stress test on 8/9. LS clear. BS active but still complaining of constipation. Up with assist of one and walker. Able to make needs known.

## 2021-08-09 NOTE — PLAN OF CARE
Problem: Pain Acute  Goal: Acceptable Pain Control and Functional Ability  Outcome: Improving  Intervention: Develop Pain Management Plan  Recent Flowsheet Documentation  Taken 8/9/2021 1200 by Shruthi Wadsworth, RN  Pain Management Interventions: emotional support  Taken 8/9/2021 0854 by Shruthi Wadsworth, RN  Pain Management Interventions:   medication (see MAR)   emotional support   Patient c/o chronic generalized pain rated 10/10. PRN Oxycodone given with relief. Patient currently rates pin 3-4/10.       Problem: Functional Ability Impaired (Stroke, Ischemic/Transient Ischemic Attack)  Goal: Optimal Functional Ability  Outcome: Improving  Intervention: Optimize Functional Ability  Recent Flowsheet Documentation  Taken 8/9/2021 1200 by Shruthi Wadsworth, RN  Activity Management: activity adjusted per tolerance  Taken 8/9/2021 0854 by Shruthi Wadsworth, RN  Activity Management: activity adjusted per tolerance  Taken 8/9/2021 0820 by Shruthi Wadsworth, RN  Activity Management: activity adjusted per tolerance   A1 with gait belt and walker.     -NIH score of 0  -NM Lexiscan stress test is ongoing  -Patient c/o nausea after first half of stress test. PRN Zofran given and effective.

## 2021-08-09 NOTE — TELEPHONE ENCOUNTER
PATIENT NAME:  Dacia Miller  YOB: 1940  MRN: [id number]  SURGEON: Dr. Wong  DATE of CONSULT: 08/06/2021  Consult for meningioma    FOLLOW-UP PLAN:    Hospital Follow Up Visit: 3-4 weeks  Provider: Dr. Wong    DIAGNOSTICS:  n/a  DISPOSITION:  pending    ADDITIONAL INSTRUCTIONS FOR MEDICAL STAFF:    Cee Rodney RN, CNRN

## 2021-08-09 NOTE — PROGRESS NOTES
Inpt here for lexiscan nuclear stress test for ischemic evaluation.  Elevated troponin's trending down.  Denies increase Shortness of breath or chest pain.  Verbalized understanding of test no questions at this time.  Rosy Blue RN

## 2021-08-09 NOTE — PROGRESS NOTES
Impression and Plan     Impression:   1. Elevated troponin. No symptoms of ischemia. May be demand-mediated from prolonged immobility, dehydration, uncontrolled hypertension, and acute stroke. Stress test does not show ischemia but does show possible infarction, which along with her significantly elevated troponin is concerning for underlying severe coronary artery disease is possible.  2. Acute-to-subacute left frontoparietal subcortical infarction. Unclear etiology, possibly due to uncontrolled hypertension. No evidence of atrial fibrillation.  3. Accelerated hypertension with baseline essential hypertension.  4. Hyperlipidemia.  5. Insulin-dependent diabetes mellitus type 2. Last HbA1c 9.3%.  6. Chronic kidney disease stage III/IV. Creatinine stable.    Plan:    Proceed with coronary angiography. Explained the risks and benefits to the patient. She demonstrates understanding and wishes to proceed.    Will premedicate for non-anaphylaxis contrast allergy with oral prednisone 50 mg given 13 hours, 7 hours, and 1 hour prior to the procedure along with 50 mg oral diphenhydramine 1 hours prior to the procedure    Continue dual antiplatelet therapy with aspirin 81 mg and clopidogrel 75 mg daily, which she is receiving for her stroke    Atorvastatin 40 mg daily    Metoprolol tartrate 50 mg twice daily    Blood pressure management in the setting of acute stroke per nephrology    Primary Cardiologist: none    Subjective     - no chest pain or shortness of breath  - possible evidence of myocardial infarction on nuclear stress    Cardiac Diagnostics   Telemetry (personally reviewed): SR, no arrhythmias    ECG 8/5/2021 (personally reviewed and interpreted): SR, inferior Q waves    Echocardiogram 8/6/2021 (results reviewed):  The left ventricle is normal in size.  There is normal left ventricular wall thickness.  Left ventricular function is normal.The ejection fraction is 60-65%.  Normal right ventricle size and  "systolic function.  There is no color Doppler evidence of an atrial shunt.  Mild sclerodegenerative valve disease is identified without hemodynamically  significant stenosis or regurgitation.    Cardiac Stress Testing 8/7/2021 (results reviewed):      There is no prior study for comparison.     Pharmacological regadenoson stress ECG is nondiagnostic due to baseline ECG abnormality.     Pharmacologic regadenoson nuclear stress test is abnormal. There is no reserble change indicating inducible myocardial ischemia.  There is mild to moderately reduced radiotracer uptake in basal to mid inferolateral wall indicating previous nontransmural myocardial infarction.  But the patient's left arm was not raised which could cause artifacts in left inferolateral wall.     Normal left ventricular size, wall motion and systolic function.  The calculated left ventricular ejection fraction is 70%.     The patient is at an intermediate risk of future cardiac ischemic events.    Physical Examination       /61 (BP Location: Right arm)   Pulse 92   Temp 99.2  F (37.3  C) (Oral)   Resp 16   Ht 1.6 m (5' 3\")   Wt 76.6 kg (168 lb 14.4 oz)   SpO2 95%   BMI 29.92 kg/m          Wt Readings from Last 3 Encounters:   08/05/21 76.6 kg (168 lb 14.4 oz)   06/21/21 77.8 kg (171 lb 8 oz)   05/06/20 73 kg (160 lb 14.4 oz)               Intake/Output Summary (Last 24 hours) at 8/9/2021 1633  Last data filed at 8/9/2021 1434  Gross per 24 hour   Intake 600 ml   Output 925 ml   Net -325 ml       General: pleasant female. No acute distress.   HENT: external ears normal. Nares patent. Mucous membranes moist.  Eyes: perrla, extraocular muscles intact. No scleral icterus.   Neck: No JVD  Lungs: clear to auscultation  COR:  regular rate and rhythm, No murmurs, rubs, or gallops  Abd: nondistended, BS present  Extrem: No edema           Lab Results   Lab Results   Component Value Date     08/09/2021    CO2 23 08/09/2021    BUN 36 08/09/2021 "     Lab Results   Component Value Date    WBC 10.2 08/04/2021    HGB 10.7 08/04/2021    HCT 33.2 08/04/2021    MCV 94 08/04/2021     08/04/2021     Lab Results   Component Value Date    CHOL 190 08/05/2021    TRIG 198 08/05/2021    HDL 27 08/05/2021     Lab Results   Component Value Date    INR 0.93 12/13/2018       Lab Results   Component Value Date    TROPONINI 1.89 08/08/2021    TROPONINI 3.97 08/06/2021    TROPONINI 3.44 08/06/2021             Current Inpatient Scheduled Medications   Scheduled Meds:    acetaminophen  975 mg Oral or Feeding Tube TID    Or     acetaminophen  650 mg Rectal TID     allopurinol  300 mg Oral Daily     aspirin  81 mg Oral Daily    Or     aspirin  81 mg Oral or NG Tube Daily     atorvastatin  40 mg Oral QPM     clopidogrel  75 mg Oral Daily     [START ON 8/10/2021] furosemide  20 mg Oral Daily     hydrALAZINE  25 mg Oral TID     insulin aspart   Subcutaneous QAM AC     insulin aspart   Subcutaneous Daily with lunch     insulin aspart   Subcutaneous Daily with supper     insulin aspart  1-6 Units Subcutaneous TID w/meals     [START ON 8/10/2021] insulin glargine  35 Units Subcutaneous QAM AC     metoprolol tartrate  50 mg Oral BID     miconazole   Topical BID     omeprazole  20 mg Oral Daily     polyethylene glycol  17 g Oral Daily     predniSONE  50 mg Oral Once     [START ON 8/10/2021] predniSONE  50 mg Oral Once     [START ON 8/10/2021] predniSONE  50 mg Oral See Admin Instructions     sennosides  2 tablet Oral BID     sodium chloride (PF)  3 mL Intracatheter Q8H     Continuous Infusions:    - MEDICATION INSTRUCTIONS -       - MEDICATION INSTRUCTIONS -              Medications Prior to Admission   Prior to Admission medications    Medication Sig Start Date End Date Taking? Authorizing Provider   allopurinol (ZYLOPRIM) 300 MG tablet [ALLOPURINOL (ZYLOPRIM) 300 MG TABLET] Take 1 tablet by mouth daily.       9/7/16  Yes Provider, Historical   aspirin 325 MG tablet [ASPIRIN 325 MG  TABLET] Take 1 tablet by mouth daily. 9/24/07  Yes Provider, Historical   ferrous sulfate 140 mg (45 mg iron) TbER [FERROUS SULFATE 140 MG (45 MG IRON) TBER] 1 tablet tid 1/22/19  Yes Tory Mackey MD   insulin lispro (HUMALOG KWIKPEN) 100 unit/mL pen [INSULIN LISPRO (HUMALOG KWIKPEN) 100 UNIT/ML PEN] INJECT 15 UNITS IN THE MORNING AND 26 UNITS AT DINNER ( PLUS SLIDING SCALE UP TO 70 UNITS DAILY ) 1/14/20  Yes Provider, Historical   insulin NPH isoph U-100 human (HUMULIN N NPH INSULIN KWIKPEN) 100 unit/mL (3 mL) pen [INSULIN NPH ISOPH U-100 HUMAN (HUMULIN N NPH INSULIN KWIKPEN) 100 UNIT/ML (3 ML) PEN] 35 units with breakfast 5/31/18  Yes Provider, Historical   lisinopril-hydrochlorothiazide (PRINZIDE,ZESTORETIC) 20-12.5 mg per tablet [LISINOPRIL-HYDROCHLOROTHIAZIDE (PRINZIDE,ZESTORETIC) 20-12.5 MG PER TABLET] Take 1 tablet by mouth daily. 2/19/19  Yes Provider, Historical   LORazepam (ATIVAN) 0.5 MG tablet [LORAZEPAM (ATIVAN) 0.5 MG TABLET] Take 0.5 mg by mouth every 8 (eight) hours as needed for anxiety. 2/19/19  Yes Provider, Historical   omeprazole (PRILOSEC) 20 MG capsule [OMEPRAZOLE (PRILOSEC) 20 MG CAPSULE] Take 20 mg by mouth daily. 2/22/19  Yes Provider, Historical   oxyCODONE-acetaminophen (PERCOCET) 5-325 MG tablet Take 1 tablet by mouth every 6 hours as needed for severe pain   Yes Unknown, Entered By History   ketoconazole (NIZORAL) 2 % cream [KETOCONAZOLE (NIZORAL) 2 % CREAM] Apply topically as needed. 8/22/19   Provider, Historical   NYSTOP powder [NYSTOP POWDER] HOMERO EXT AA  TID 10/10/18   Provider, Historical          Doug Buitrago MD [facility]  Non-Invasive Cardiologist  [facility]  Pager [phone]

## 2021-08-09 NOTE — PROGRESS NOTES
Progress Note    Assessment/Plan  80-year-old with CKD stage IV, anemia, dyslipidemia, chronic pain syndrome who initially presented with fall, hyperglycemia hypertension and nausea with upset stomach.  Her troponin was elevated at 7.9 and therefore started on IV heparin.  Cardiology was consulted and awaiting stress test.     Non-STEMI  --Patient currently on aspirin,plavix, statin. OFF IV heparin as per cardiology  --tolerating on atorvastatin as per cardiology  --Started on hydralazine 12.5 mg 3 times daily as per cardiology.  tolerating  25 mg 3 times daily  --Continue beta-blocker since hyperkalemia is resolved  --Stress test on 8/9 was abnormal.  Awaiting cardiology input regarding angiogram  --troponin trending down     Left frontoparietal subcortical infarction  --not a candidate for thrombolytics due to time of onset  --continue with aspirin.  Continue Plavix   carotid US is unremarkable  --CK total 405  --Speech OT PT consulted  --Neurology recommended to bring blood pressure down gradually    Nausea  --Continue symptomatic treatment    Accelerated hypertension   --improving with lasix and increased dose of hydralazine.   --Multifactorial  --Lisinopril and hydrochlorothiazide has been discontinued due to hyperkalemia on admission and CKD stage III with GFR of 35  --Replace HCTZ with Lasix .  Increase the dose to 40 mg.  Creatinine rising and therefore will decrease Lasix to 20 mg.  Repeat BMP next a.m.  --DC IV fluids  --Increase hydralazine to 25 mg 3 times daily. Increase metoprolol to 50 mg bid  --Continue IV hydralazine 10 mg IV q6hour PRN SBP>180     Insulin-dependent diabetes  --Suboptimal control  --Icontinue Lantus at 35 units and decrease mealtime insulin ratio 1 is to 5    Acute on chronic back pain   --neurosurgery consulted by previous hospitalist   --neurology exam is intact  --No neurosurgical intervention at this time  --Pain team consulted    Incidental meningioma  --Defer work-up as  "outpatient by neurosurgery    CKD stage IV creatinine at baseline ranging from 1.7-1.88  --Creatinine is slightly rising and therefore will decrease Lasix to 20 mg f    Radiographic findings recommended to follow-up as outpatient:  Wedge shaped airspace consolidation and volume loss, recurrent neoplasm remains a possibility, this could be further assessed with PET/CT  Stable 7 mm nodule right middle lobe    Barriers to discharge: Angiogram    Anticipated discharge date: 8/9        Subjective   Had episode of nausea this morning. Nuclear stress test is abnormal.  Awaiting cardiology input.  No new symptoms.    Objective    /61 (BP Location: Right arm)   Pulse 92   Temp 99.2  F (37.3  C) (Oral)   Resp 16   Ht 1.6 m (5' 3\")   Wt 76.6 kg (168 lb 14.4 oz)   SpO2 95%   BMI 29.92 kg/m    Weight:   Wt Readings from Last 5 Encounters:   08/05/21 76.6 kg (168 lb 14.4 oz)   06/21/21 77.8 kg (171 lb 8 oz)   05/06/20 73 kg (160 lb 14.4 oz)   04/30/19 72.6 kg (160 lb)   04/16/19 72.6 kg (160 lb)       I/O last 3 completed shifts:  In: 600 [P.O.:600]  Out: 925 [Urine:925]  No intake/output data recorded.          Physical Exam  Alert, oriented*3  No pallor, icterus, clubbing, cyanosis  Body mass index is 29.92 kg/m .    No sinus tenderness  Moist membranes  Neck supple  CVS: S1 S2-N, no murmurs, gallops, rubs  Resp: No crackles on auscultation  Abd: soft, No t/g/r  Neuro: no involuntary movements such as tremors  Vasc: no leg edema     Pertinent Labs  ----------------------  Recent Labs   Lab 08/09/21  1226 08/09/21  0835 08/09/21  0751 08/08/21  0904 08/07/21  0819 08/05/21  0135 08/04/21 2023   NA  --  137  --  138 138   < > 135*   POTASSIUM  --  3.9  --  3.8 4.0   < > 5.3*   CO2  --  23  --  23 23   < > 18*   BUN  --  36*  --  30* 30*   < > 31*   CR  --  1.75*  --  1.50* 1.53*   < > 1.73*   * 202* 180* 171* 138*   < > 388*   ALBUMIN  --   --   --   --   --   --  3.7   BILITOTAL  --   --   --   --   --   --  " 0.6   ALKPHOS  --   --   --   --   --   --  115   ALT  --   --   --   --   --   --  15   AST  --   --   --   --   --   --  17    < > = values in this interval not displayed.     Recent Labs   Lab 08/04/21 2023   WBC 10.2   HGB 10.7*   HCT 33.2*        No results for input(s): INR in the last 168 hours.  Glucose Values Latest Ref Rng & Units 8/4/2021 8/5/2021 8/7/2021 8/8/2021 8/9/2021   Bedside Glucose (mg/dl )  - -- -- -- -- --   GLUCOSE 70 - 125 mg/dL 388(H) 273(H) 138(H) 171(H) 202(H)   Some recent data might be hidden         Pertinent Radiology   Echo done on 8/6/2021    Interpretation Summary     The left ventricle is normal in size.  There is normal left ventricular wall thickness.  Left ventricular function is normal.The ejection fraction is 60-65%.  Normal right ventricle size and systolic function.  There is no color Doppler evidence of an atrial shunt.  Mild sclerodegenerative valve disease is identified without hemodynamically  significant stenosis or regurgitation.    EKG Results: not reviewed.

## 2021-08-10 PROBLEM — R79.89 ELEVATED TROPONIN: Status: ACTIVE | Noted: 2021-01-01

## 2021-08-10 PROBLEM — R94.39 ABNORMAL STRESS TEST: Status: ACTIVE | Noted: 2021-01-01

## 2021-08-10 NOTE — PROGRESS NOTES
Hospitalist paged and notified of blood sugar 383 30 minutes after Aspart administration.  No further action at this time, will allow medication to work as number has decreased

## 2021-08-10 NOTE — PRE-PROCEDURE
GENERAL PRE-PROCEDURE:   Procedure:  Coronary angiogram with possible PCI  Date/Time:  8/10/2021 8:15 AM    Written consent obtained?: Yes    Risks and benefits: Risks, benefits and alternatives were discussed    Consent given by:  Patient  Patient states understanding of procedure being performed: Yes    Patient's understanding of procedure matches consent: Yes    Procedure consent matches procedure scheduled: Yes    Expected level of sedation:  Moderate  Appropriately NPO:  Yes  Mallampati  :  Grade 1- soft palate, uvula, tonsillar pillars, and posterior pharyngeal wall visible  Lungs:  Lungs clear with good breath sounds bilaterally  Heart:  Normal heart sounds and rate  History & Physical reviewed:  History and physical reviewed and no updates needed  Statement of review:  I have reviewed the lab findings, diagnostic data, medications, and the plan for sedation    Patient has a documented contrast dye allergy and was pre-medicated prior to today's procedure

## 2021-08-10 NOTE — PLAN OF CARE
A&Ox4.  NIH 0.  Denied pain most of shift.  C/o generalized pain at bed time, received scheduled acetaminophen and PRN oxycodone 10mg at 2037 - very effective.  Tele showing sinus rhythm.  Discussed NPO midnight in case angiogram scheduled for tomorrow.     Problem: Arrhythmia/Dysrhythmia (Cardiac Catheterization)  Goal: Stable Heart Rate and Rhythm  Outcome: Improving     Problem: Cognitive Impairment (Stroke, Ischemic/Transient Ischemic Attack)  Goal: Optimal Cognitive Function  Outcome: Improving

## 2021-08-10 NOTE — PROGRESS NOTES
Critical Blood sugar discussed with Dr. Pool. Pt am Lantus delayed as pt taken to cath lab, also on prednisone.New orders received for Insulin and fluids. Will repeat accucheck 30 minutes after insulin.

## 2021-08-10 NOTE — PROGRESS NOTES
Patient left the unit for her procedure (angiogram) on a wheelchair around 0720. Report given to the day shift nurse.

## 2021-08-10 NOTE — PROGRESS NOTES
Reviewed results of angiogram performed earlier today and discussed case with Dr. Lea.  Agree with surgical opinion although I also feel she would not be an ideal candidate.  In the meantime, we will add amlodipine beginning 2.5 mg daily.  Continue beta-blocker therapy.  Further recommendations pending surgical opinion.

## 2021-08-10 NOTE — PROGRESS NOTES
Progress Note    Assessment/Plan  80-year-old with CKD stage IV, anemia, dyslipidemia, chronic pain syndrome who initially presented with fall, hyperglycemia hypertension and nausea with upset stomach.  Her troponin was elevated at 7.9 and therefore started on IV heparin.  Cardiology was consulted and awaiting stress test.     Non-STEMI  --Patient currently on aspirin,plavix, statin. OFF IV heparin as per cardiology  --tolerating on atorvastatin as per cardiology.  Increased to 80 mg on 8/10  --Started on hydralazine 12.5 mg 3 times daily as per cardiology.  tolerating  25 mg 3 times daily.  Amlodipine added by cardiology on 8/10  --Continue beta-blocker since hyperkalemia is resolved  --Stress test on 8/9 was abnormal.  Angiogram done on 8/10 showed multivessel disease.  Patient premedicated with prednisone 50 mg x 2 thereby leading to uncontrolled blood sugars  --Awaiting input by cardiology and cardiothoracic surgery    Left frontoparietal subcortical infarction  --not a candidate for thrombolytics due to time of onset  --continue with aspirin.  Continue Plavix   carotid US is unremarkable  --CK total 405  --Speech OT PT consulted  --Neurology recommended to bring blood pressure down gradually    Nausea resolved  --Continue symptomatic treatment    Accelerated hypertension   --improving with lasix and increased dose of hydralazine.   --Multifactorial  --Lisinopril and hydrochlorothiazide has been discontinued due to hyperkalemia on admission and CKD stage III with GFR of 35  --Replace HCTZ with Lasix .  Increase the dose to 40 mg.  Creatinine rising a and therefore will DC Lasix  --Increase hydralazine to 25 mg 3 times daily.  Tolerating p.o. metoprolol to 50 mg bid  --Amlodipine added on 8/10 per cardiology  --Continue IV hydralazine 10 mg IV q6hour PRN SBP>180     Insulin-dependent diabetes  --Uncontrolled multifactorial due to prednisone 50 mg x2 and holding Lantus insulin on the day of coronary angiogram.   More ever patient received only 5 units of NovoLog on 8/10 with meal and no carbohydrate counting was done.  Cardiac nurse educated regarding carbohydrate counting.  Ordered 10 units of NovoLog once as a rescue for hyperglycemia  --Continue Lantus at 35 units and decrease mealtime insulin ratio 1 is to 5 with sliding scale insulin    Hyponatremia likely due to hypovolemia  --Hold Lasix  --Order gentle IV hydration  --Repeat BMP    Acute on chronic back pain   --neurosurgery consulted by previous hospitalist   --neurology exam is intact  --No neurosurgical intervention at this time  --Pain team consulted    Incidental meningioma  --Defer work-up as outpatient by neurosurgery    Acute renal failure on CKD stage IV creatinine at baseline ranging from 1.7-1.88  --Altered factorial due to Lasix and contrast-induced nephropathy  --Creatinine is slightly rising 2.16 and DC Lasix  --Repeat BMP next a.m.  --Order gentle IV hydration s/p contrast to prevent contrast-induced nephropathy  --DC IV fluids in 18 hours    Radiographic findings recommended to follow-up as outpatient:  Wedge shaped airspace consolidation and volume loss, recurrent neoplasm remains a possibility, this could be further assessed with PET/CT  Stable 7 mm nodule right middle lobe    Barriers to discharge: CTS consult, hyperglycemia    Anticipated discharge date: 8/11        Subjective   Patient has been at coronary angiography unit since early morning.  Coronary angiography shows multivessel disease.  Cardiothoracic surgery will be consulted by cardiology for opinion regarding the same.  Patient's blood sugar was 450 at the cardiac unit.  Ordered 10 units of NovoLog.  Patient received 50 mg of prednisone last night followed by 50 mg of prednisone this morning as premedication for dye allergy.  She did not receive Lantus 35 units this morning.  Ordered Lantus 35 units at mealtime.  Creatinine was elevated at 2.1 and therefore started the patient on IV  "fluids.      Objective    BP (!) 146/65   Pulse 84   Temp 98.2  F (36.8  C) (Axillary)   Resp 24   Ht 1.6 m (5' 3\")   Wt 76.6 kg (168 lb 12.8 oz)   SpO2 97%   BMI 29.90 kg/m    Weight:   Wt Readings from Last 5 Encounters:   08/09/21 76.6 kg (168 lb 12.8 oz)   06/21/21 77.8 kg (171 lb 8 oz)   05/06/20 73 kg (160 lb 14.4 oz)   04/30/19 72.6 kg (160 lb)   04/16/19 72.6 kg (160 lb)       I/O last 3 completed shifts:  In: 480 [P.O.:480]  Out: 200 [Urine:200]  No intake/output data recorded.          Physical Exam  Patient is not in the room a    Pertinent Labs  ----------------------  Recent Labs   Lab 08/10/21  1524 08/10/21  1156 08/10/21  0607 08/09/21  0835 08/08/21  0904 08/05/21  0135 08/04/21 2023   *  --   --  137 138   < > 135*   POTASSIUM 4.4  --   --  3.9 3.8   < > 5.3*   CO2 16*  --   --  23 23   < > 18*   BUN 42*  --   --  36* 30*   < > 31*   CR 2.16*  --   --  1.75* 1.50*   < > 1.73*   * 351* 333* 202* 171*   < > 388*   ALBUMIN  --   --   --   --   --   --  3.7   BILITOTAL  --   --   --   --   --   --  0.6   ALKPHOS  --   --   --   --   --   --  115   ALT  --   --   --   --   --   --  15   AST  --   --   --   --   --   --  17    < > = values in this interval not displayed.     Recent Labs   Lab 08/04/21 2023   WBC 10.2   HGB 10.7*   HCT 33.2*        No results for input(s): INR in the last 168 hours.  Glucose Values Latest Ref Rng & Units 8/4/2021 8/5/2021 8/7/2021 8/8/2021 8/9/2021 8/10/2021   Bedside Glucose (mg/dl )  - -- -- -- -- -- --   GLUCOSE 70 - 125 mg/dL 388(H) 273(H) 138(H) 171(H) 202(H) 493(HH)   Some recent data might be hidden         Pertinent Radiology   Echo done on 8/6/2021    Interpretation Summary     The left ventricle is normal in size.  There is normal left ventricular wall thickness.  Left ventricular function is normal.The ejection fraction is 60-65%.  Normal right ventricle size and systolic function.  There is no color Doppler evidence of an " atrial shunt.  Mild sclerodegenerative valve disease is identified without hemodynamically  significant stenosis or regurgitation.    EKG Results: not reviewed.

## 2021-08-10 NOTE — PLAN OF CARE
Problem: Adult Inpatient Plan of Care  Goal: Plan of Care Review  Outcome: Improving    NPO all night. Chlorhexidine wipes used for bath. Received PRN oxycodone twice for generalized body pain. Scheduled prednisone given at 0500. Assist of 1 to the bathroom.

## 2021-08-11 NOTE — PLAN OF CARE
Problem: Arrhythmia/Dysrhythmia (Cardiac Catheterization)  Goal: Stable Heart Rate and Rhythm  Outcome: No Change     Problem: Pain (Cardiac Catheterization)  Goal: Acceptable Pain Control  Outcome: No Change     Patient was kept comfortable during post-procedure stay. C/o of her fibromyalgia pain post-procedure, medicated with pain medicine after eating lunch. Blood sugar monitored closely & hospitalist updated. Femoral groin remains dry & free from signs of bleeding after sheath pull @ 1735. Belongings returned. Transferred to  in stable condition. Report given to Lillian EUCEDA.

## 2021-08-11 NOTE — PROVIDER NOTIFICATION
On call MD notified via text page that pt blood sugar at HS was 308. Pt does not have any coverage at bedtime. Waiting for response.

## 2021-08-11 NOTE — CONSULTS
CARDIOTHORACIC SURGERY CONSULT NOTE  8/11/2021      Reason for Consult: Severe Multi-Vessel Coronary Artery Disease      ASSESSMENT/PLAN: Patient is a 80 year old female with a history of breast and lung cancer who presented after a fall found to have a NSTEMI secondary to severe multi-vessel coronary artery disease with heavily calcified vessels.    -Given patients multiple comorbidities including chronic renal disease, diabetes, prior chest radiation, possibly recurrent lung cancer, recommend re-attempt at PCI with stenting rather than undergoing surgical revascularization  -Currently, patient is reluctant to undergo surgical revascularization particularly given the possibility of recurrent lung cancer  -Recommend PET/CT to evaluate for possible recurrent disease  -STS risk of overall mortality of 7.2%, 6.6% risk of permanent stroke, 8.5% risk of renal failure, and 1.6% risk of prolonged intubation   -Overall morbidity and mortality of 27.3%   -Continue optimal medical management with aspirin (clopidogrel currently with recent CVA), statin, beta blockade and tight blood glucose control  -Recommend evaluation by physical therapy and social work as concerns exist over patients living situation and safety  -Patient evaluated at bedside, plan discussed with Dr. Lainez.    Juno Borja MD  Cardiothoracic Surgery Fellow  [phone]    ________________________________________________________________________________________________    HPI: Patient is a 80 year old female with a history of lung and breast cancer, chronic pain, fibromyalgia, chronic kidney disease and diabetes and who presented on 8/4 after suffering a fall during the night. She denies hitting her head, loss of consciousness or prolonged down time. She notes she was able to get back to bed but was not able to get out of bed for 22 hours noting pain and difficulty ambulating in addition to nausea and abdominal pain. On evaluation in the [facility] she was noted  to have diffuse ecchymosis and dehydration. Imaging was negative for any acute processes. Meningioma was noted and MRI also demonstrated a left frontoparietal subcortical infarction. She has a history on NSCLC of the right lung, found to be a non-operative candidate and underwent radiation therapy as well as left breast cancer s/p partial mastectomy and radiation therapy in additional to poorly controlled diabetes, obesity, stage IV chronic kidney disease and chronic pain on opiates.  Shortly after admission she was noted to have a troponin elevation to 7.9 consistent with an NSTEMI which was normal on admission. Cardiac evaluation has demonstrated severe multi-vessel coronary artery disease with with LAD (80%), RCA (80%) and circumflex (90%) in addition to preserved ejection fraction of 60-70% and evidence of a previous basal to mid inferolateral wall nontransmural myocardial infarction.    PMH:  Past Medical History:   Diagnosis Date     Anemia, iron deficiency 2019     Arthritis      Breast cancer (H)      Cancer (H)      Diabetes mellitus (H)      DM (diabetes mellitus screen)      History of anesthesia complications      HLD (hyperlipidemia)      HTN (hypertension)      Kidney stones      Lung cancer (H)      PONV (postoperative nausea and vomiting)      Sciatica      Scoliosis      PSH:  Past Surgical History:   Procedure Laterality Date     ABDOMEN SURGERY       BIOPSY SKIN (LOCATION)        SECTION       CHOLECYSTECTOMY       CT BIOPSY LUNG  2018     [place] MASTECTOMY,PARTIAL,  WITH AXILLARY LYMPHADENECTOMY Left 2018    Procedure: Left Lumpectomy; [place] Lymph Node Biopsy;  Surgeon: Lilliana Rodriguez MD;  Location: [facility];  Service: General     FH:  Family History   Problem Relation Age of Onset     Breast Cancer Mother      SH:  Social History     Socioeconomic History     Marital status:      Spouse name: None     Number of children: None     Years of education: None      Highest education level: None   Occupational History     None   Tobacco Use     Smoking status: Former Smoker     Packs/day: 0.50     Years: 7.00     Pack years: 3.50     Quit date: 1968     Years since quittin.6     Smokeless tobacco: Never Used   Substance and Sexual Activity     Alcohol use: No     Drug use: No     Sexual activity: Never   Other Topics Concern     None   Social History Narrative     None     Home Meds:  Medications Prior to Admission   Medication Sig Dispense Refill Last Dose     allopurinol (ZYLOPRIM) 300 MG tablet [ALLOPURINOL (ZYLOPRIM) 300 MG TABLET] Take 1 tablet by mouth daily.         2021 at Unknown time     aspirin 325 MG tablet [ASPIRIN 325 MG TABLET] Take 1 tablet by mouth daily.   2021 at Unknown time     ferrous sulfate 140 mg (45 mg iron) TbER [FERROUS SULFATE 140 MG (45 MG IRON) TBER] 1 tablet tid 90 tablet 3 2021 at Unknown time     insulin lispro (HUMALOG KWIKPEN) 100 unit/mL pen [INSULIN LISPRO (HUMALOG KWIKPEN) 100 UNIT/ML PEN] INJECT 15 UNITS IN THE MORNING AND 26 UNITS AT DINNER ( PLUS SLIDING SCALE UP TO 70 UNITS DAILY )   8/3/2021 at Unknown time     insulin NPH isoph U-100 human (HUMULIN N NPH INSULIN KWIKPEN) 100 unit/mL (3 mL) pen [INSULIN NPH ISOPH U-100 HUMAN (HUMULIN N NPH INSULIN KWIKPEN) 100 UNIT/ML (3 ML) PEN] 35 units with breakfast   8/3/2021 at Unknown time     lisinopril-hydrochlorothiazide (PRINZIDE,ZESTORETIC) 20-12.5 mg per tablet [LISINOPRIL-HYDROCHLOROTHIAZIDE (PRINZIDE,ZESTORETIC) 20-12.5 MG PER TABLET] Take 1 tablet by mouth daily.   8/3/2021 at Unknown time     LORazepam (ATIVAN) 0.5 MG tablet [LORAZEPAM (ATIVAN) 0.5 MG TABLET] Take 0.5 mg by mouth every 8 (eight) hours as needed for anxiety.   Past Week at prn     omeprazole (PRILOSEC) 20 MG capsule [OMEPRAZOLE (PRILOSEC) 20 MG CAPSULE] Take 20 mg by mouth daily.  0 2021 at Unknown time     oxyCODONE-acetaminophen (PERCOCET) 5-325 MG tablet Take 1 tablet by mouth every 6  hours as needed for severe pain   2021 at Unknown time     ketoconazole (NIZORAL) 2 % cream [KETOCONAZOLE (NIZORAL) 2 % CREAM] Apply topically as needed.  3      NYSTOP powder [NYSTOP POWDER] HOMERO EXT AA  TID  5      Allergies:  Allergies   Allergen Reactions     Amoxicillin-Pot Clavulanate [Augmentin] Hives     Cephalexin Monohydrate [Cephalexin] Itching and Rash     Contrast Dye Other (See Comments)     Flushed, rashes, blotches     Iodinated Contrast Media [Diagnostic X-Ray Materials] Other (See Comments)     Flushed, rashes, blotches     Other Allergy (See Comments) [External Allergen Needs Reconciliation - See Comment] Unknown     Contrast Media Ready-Box [facility], 2013.; Contrast Media Ready-Box [employer], 2013.       Penicillins Unknown     Prochlorperazine Edisylate [Prochlorperazine] Unknown     Sulfa Drugs Rash     ROS: Negative other than for pertinent findings noted above.     Physical Exam:  Temp:  [98  F (36.7  C)-99  F (37.2  C)] 98.5  F (36.9  C)  Pulse:  [] 73  Resp:  [12-24] 18  BP: (129-186)/(40-80) 154/65  SpO2:  [95 %-99 %] 96 %  Gen: No acute distress, resting comfortably in bed, conversational, stout habitus, BMI 29.5.   HEENT: Normocephalic, atraumatic cranium, EOMI, sclerae anicteric, oral mucosa pink and moist, midline trachea.  Lungs: Symmetric chest wall expansion, non-labored respirations.   CV: RRR, no peripheral edema, extremities warm and well perfused.    Abd: Prior scars from  and gallbladder surgeries, soft and non distended.  Neuro: Alert, oriented, GCS 15, no focal deficits.  Mental: Flat mood and affect, regular rate of speech.    Labs:  CBC  Recent Labs   Lab 21  0511 21   WBC  --  10.2   HGB 8.2* 10.7*    217     BMP  Recent Labs   Lab 21  0753 21  0511 08/10/21  2153 08/10/21  1908 08/10/21  1524 21  0835 21  0904   NA  --  137  --   --  133* 137 138   POTASSIUM  --  4.2  --   --  4.4 3.9 3.8   CHLORIDE  --   109*  --   --  103 104 104   CO2  --  20*  --   --  16* 23 23   BUN  --  52*  --   --  42* 36* 30*   CR  --  1.98*  --   --  2.16* 1.75* 1.50*   * 164* 308* 280* 493* 202* 171*     LFT  Recent Labs   Lab 08/04/21 2023   AST 17   ALT 15   ALKPHOS 115   BILITOTAL 0.6   ALBUMIN 3.7     Imaging:  Recent Results (from the past 24 hour(s))   Cardiac Catheterization    Narrative      Lexus was admitted with weakness and found to have a stroke with a   troponin elevation to 7.9. Echo grossly normal and EKG without ischemia.   She is a diabetic with hypertensive urgency, and renal dysfunction.    Left dominant artery system. Diffuse coronary calcification.    Minimal left main stenosis at the ostium.    Moderate proximal and mid LAD stenoses. Severe disease in the mid   section of the first diagonal, which is not a large caliber artery.    Severe proximal and mid dominant circumflex stenoses. Attempted PCI with   plan for rotational atherectomy was no successful as neither the   rotafloppy wire or multiple catheters would pass the proximal lesion.    Severe mid vessel disease in the small, non-dominant RCA.    LV EDP 27 mmHg.    Blood loss < 20 cc.               Echocardiogram 8/6/2021:  The left ventricle is normal in size.  There is normal left ventricular wall thickness.  Left ventricular function is normal.The ejection fraction is 60-65%.  Normal right ventricle size and systolic function.  There is no color Doppler evidence of an atrial shunt.  Mild sclerodegenerative valve disease is identified without hemodynamically  significant stenosis or regurgitation.     Cardiac Stress Testing 8/7/2021:        Pharmacologic regadenoson nuclear stress test is abnormal. There is no reversible change indicating inducible myocardial ischemia.  There is mild to moderately reduced radiotracer uptake in basal to mid inferolateral wall indicating previous nontransmural myocardial infarction.  But the patient's left arm was not  raised which could cause artifacts in left inferolateral wall.     Normal left ventricular size, wall motion and systolic function.  The calculated left ventricular ejection fraction is 70%.     The patient is at an intermediate risk of future cardiac ischemic events.    Carotid Duplex (8/5/2021)  -Mild to moderate plaque formation, velocities consistent with less than 50% stenosis in the right internal carotid artery.  -Mild to moderate plaque formation, velocities consistent with less than 50% stenosis in the left internal carotid artery.  -Flow within the vertebral arteries is antegrade.    MR Brain (8/5/2021)  -9 mm acute or early subacute infarct within the subcortical white matter at the mid left frontoparietal junction. -No hemorrhage.  -Interval enlargement of a planum sphenoidale meningioma compared to 2019 as detailed above.  -Mild to moderate volume loss and presumed chronic small vessel ischemic changes.

## 2021-08-11 NOTE — PROGRESS NOTES
Cardiology Progress Note    Assessment/Plan:  1.  CAD, status post non-ST elevation MI with severe stenoses in the proximal and mid left circumflex which is the dominant vessel.  Moderate disease noted in the LAD.  Attempted percutaneous intervention unsuccessful.  Seen this morning by CV surgery who feels she would be high risk for surgical revascularization.  They favor another attempt at percutaneous intervention.  This may occur in several weeks as outpatient.  In the meantime, we will continue with medical therapy and initiate cardiac rehab.  2.  Acute to subacute left frontoparietal subcortical infarction.  Etiology unclear.  May be due to uncontrolled hypertension.  No evidence of atrial fibrillation.  Currently on clopidogrel and aspirin therapy.  3.  Accelerated hypertension with baseline essential hypertension.  Blood pressures have improved since hospitalization.  4.  Insulin-dependent diabetes mellitus type 2  5.  Chronic kidney disease stage III/IV.  Renal function remains stable.    Subjective:  Patient depressed with regards to information she just received from CV surgery.  We talked about not giving up hope as we can reattempt percutaneous intervention using  techniques which may allow us to get stents into that area of her circumflex.      acetaminophen  975 mg Oral or Feeding Tube TID    Or     acetaminophen  650 mg Rectal TID     allopurinol  300 mg Oral Daily     amLODIPine  2.5 mg Oral Daily     aspirin  81 mg Oral Daily    Or     aspirin  81 mg Oral or NG Tube Daily     aspirin  81 mg Oral Daily     atorvastatin  80 mg Oral QPM     clopidogrel  75 mg Oral Daily     [Held by provider] furosemide  20 mg Oral Daily     hydrALAZINE  25 mg Oral TID     insulin aspart   Subcutaneous QAM AC     insulin aspart   Subcutaneous Daily with lunch     insulin aspart   Subcutaneous Daily with supper     insulin aspart  1-6 Units Subcutaneous TID w/meals     insulin glargine  35 Units Subcutaneous QAM  AC     metoprolol tartrate  50 mg Oral BID     miconazole   Topical BID     omeprazole  20 mg Oral Daily     polyethylene glycol  17 g Oral Daily     predniSONE  50 mg Oral See Admin Instructions     sennosides  2 tablet Oral BID     sodium chloride (PF)  3 mL Intracatheter Q8H         Objective:   Vital signs in last 24 hours:  Temp:  [98  F (36.7  C)-99  F (37.2  C)] 98.5  F (36.9  C)  Pulse:  [] 73  Resp:  [12-24] 18  BP: (129-186)/(40-80) 154/65  SpO2:  [95 %-99 %] 96 %  Weight:   [unfilled]     Review of Systems:  Negative    Physical Exam:  General appearance: alert, cooperative, no distress   Head: Normocephalic, without obvious abnormality, atraumatic  Neck: no JVD   Lungs: clear to auscultation bilaterally   Heart: Regular rate and rhythm.  S1, S2 normal.  No murmur or gallop  Extremities: No peripheral edema bilaterally    Cardiographics (personally reviewed):   Telemetry demonstrates sinus rhythm    Imaging (personally reviewed):   No additional cardiac imaging.   Left Heart Cath, Left Heart Cath, Left Heart Cath 08/10/2021    Conclusion    Lexus was admitted with weakness and found to have a stroke with a troponin elevation to 7.9. Echo grossly normal and EKG without ischemia. She is a diabetic with hypertensive urgency, and renal dysfunction.    Left dominant artery system. Diffuse coronary calcification.    Minimal left main stenosis at the ostium.    Moderate proximal and mid LAD stenoses. Severe disease in the mid section of the first diagonal, which is not a large caliber artery.    Severe proximal and mid dominant circumflex stenoses. Attempted PCI with plan for rotational atherectomy was no successful as neither the rotafloppy wire or multiple catheters would pass the proximal lesion.    Severe mid vessel disease in the small, non-dominant RCA.    LV EDP 27 mmHg.    Blood loss < 20 cc.      Lab Results (personally reviewed):   Recent Labs   Lab 08/11/21  0511      CO2 20*   BUN 52*        INR   Date Value Ref Range Status   12/13/2018 0.93 0.90 - 1.10 Final       Troponin I   Date Value Ref Range Status   08/08/2021 1.89 (HH) 0.00 - 0.29 ng/mL Final   08/06/2021 3.97 (HH) 0.00 - 0.29 ng/mL Final   08/06/2021 3.44 (HH) 0.00 - 0.29 ng/mL Final       No results found for: BNP    Hemoglobin   Date Value Ref Range Status   08/11/2021 8.2 (L) 11.7 - 15.7 g/dL Final         Janae Hernandez MD

## 2021-08-11 NOTE — PROGRESS NOTES
NUTRITION EDUCATION      REASON FOR ASSESSMENT:  Consult for heart healthy diet education    NUTRITION HISTORY:  Information obtained from patient    Pt reports she is very nauseous to discuss diet    CURRENT DIET:  Heart healthy diet    NUTRITION DIAGNOSIS:  Food- and nutrition-related knowledge deficit R/t illness    INTERVENTIONS:    Nutrition Prescription:  Heart healhty diet    Implementation:      *  Nutrition Education (Content):   A)  Provided handout heart  Healthy diet guidelines   B)  Discussion deferred d/t N    Goals:      *  Patient will verbalize understanding of diet     Follow Up/Monitoring:      *  Provided RD contact information for future questions      *  Recommended Out-Patient Nutrition Referral, if further diet instructions are needed

## 2021-08-11 NOTE — DISCHARGE INSTRUCTIONS
Interventional Cardiology  Coronary Angiogram/Angioplasty/Stent/Atherectomy Discharge Instructions -   Femoral Approach    The instructions below are to help you understand how to take care of yourself. There is also information about when to call the doctor or emergency services    **Do not stop your aspirin or platelet inhibitor unless directed by your Cardiologist.  These medications help to prevent platelets in your blood from sticking together and forming a clot.  Examples of these medications are:  Ticagrelor (Brilinta), Clopidigrel (Plavix), Prasugrel (Effient)          For the first 72 hours after your procedure:    No driving for 24 hours    Do not lift more than 15 pounds.  If you usually lift 50 pounds or more daily, please contact your cardiologist    Avoid any hard work or tiring activities, this includes, yard work, jogging, biking, sexual activity    During the day get up and walk around every 2 hours    You may return to work after 72 hours if you are feeling well and your job does not involve heavy lifting          Groin Site / Wound Care / Bleeding      You may take off the dressing on your groin the day after your procedure    Keep the area dry and clean    It is ok to shower with regular soap.  Pat dry, do not rub    You do not need to use a bandage    No tub/pool or hot tub for 1 week    If your groin starts to bleed or begins to swell suddenly after leaving the hospital, lie flat and apply firm pressure above the site for 15 minutes.  If bleeding continues, call 9-1-1    Bruising around the groin area is normal.  It may take 2-3 weeks for this to go away.  It is normal for the bruised area to turn green and/or yellow as it is healing.  A small lump may also be present and may last 2-3 months.    Your leg may be sore or stiff for a few days.  You may take Tylenol or a pain medicine recommended by your doctor    If you have a fever over 100.4, that lasts more than one day - call your  cardiologist.      Our Cardiac Rehab staff may visit briefly with you while your in the hospital.  If they miss you, someone will contact you after you are home.  You are encouraged to enroll in an Outpatient Cardiac Rehab Program     ? No elective dental work for 6 weeks after having a stent.     ? If you are on metformin, ActoPlus Met , Glucophage , Glucovance , Avandamet , Fortamet , Glumetza , Janumet , Riomet , PrandiMet, OR Metaglip , resume this medication only after your kidney function test is done and you are told to do so by your primary care provider.    ? No driving for 24 hours      Your Procedural Physician was: {CV Physicians:[id number]}  the phone number is: [phone]      [facility]:  [phone]  If you are calling after hours, please listen to the entire voicemail, a live  will answer at the end of the message

## 2021-08-11 NOTE — PROGRESS NOTES
I spoke with Lexus about her angiogram results and her treatment options. Surgery feels that she is a high risk surgical candidate. I explained our PCI options with use of  equipment by Dr. Addison as early as tomorrow and Lexus wishes to go home tomorrow and see Dr. Hernandez in a couple of weeks to decide if she wants to re-attempt PCI. I will reach out to our schedulers to see if we can get her in to see Dr. Hernandez this month.

## 2021-08-11 NOTE — PROGRESS NOTES
ROJELIO contacted [facility] regarding update on pt discharge date. Left message for  with plan to discharge on 8/12/21 with ROJELIO contact phone number. #[phone].     SUMEET Newsome

## 2021-08-11 NOTE — PLAN OF CARE
Problem: Pain Acute  Goal: Acceptable Pain Control and Functional Ability  Outcome: Improving  Intervention: Develop Pain Management Plan  Recent Flowsheet Documentation  Taken 8/11/2021 1408 by Zollinger, Nancy K, RN  Pain Management Interventions: medication (see MAR)  Taken 8/11/2021 0915 by Zollinger, Nancy K, RN  Pain Management Interventions: medication (see MAR)   Patient complaining of generalized pain.  Oxycodone and tylenol helpful for pain relief.  Right femoral angio site intact with some bruising.  NSR on tele.  Ambulating in room with SBA and walker.

## 2021-08-11 NOTE — PROGRESS NOTES
[facility]  Hospitalist Progress Note  [facility]        Date of Service (when I saw the patient): 08/11/2021  Roque Ochoa, DO  08/11/2021        Interval History:      Patient states she is doing well, updated on plan of care, verbalized understanding.          Assessment and Plan:        80-year-old with CKD stage IV, anemia, dyslipidemia, chronic pain syndrome who initially presented with fall, hyperglycemia hypertension and nausea with upset stomach.  Her troponin was elevated at 7.9.      Non-STEMI Stress test on 8/9 was abnormal.  Angiogram done on 8/10 showed multivessel disease.  Patient premedicated with prednisone 50 mg x 2 thereby leading to uncontrolled blood sugars  - Defer meds to Cardiology. Plavix, ASA.   - CV surgery consulted.   - Antiplatelet agents per Cards.   - Cardiology following.      Left frontoparietal subcortical infarction not a candidate for thrombolytics due to time of onset  - Antiplatelet agent(s) per Neuro/Cards.   - Neurology following.   - Therapy following.      Nausea resolved  - Continue symptomatic treatment     Accelerated hypertension Multifactorial  - Defer to Cards.      Insulin-dependent diabetes  - Continue Lantus at 35 units  - mealtime insulin ratio 1 is to 5   - sliding scale insulin     Hyponatremia likely due to hypovolemia  - Monitor.      Acute on chronic back pain   - Neurosurgery consulted previously, per report.   - Pain team consulted, per report.      Incidental meningioma  - Defer work-up as outpatient by neurosurgery     Acute renal failure on CKD stage IV creatinine at baseline ranging from 1.7-1.88  - Avoid nephrotoxins.      Wedge shaped airspace consolidation Stable 7 mm nodule right middle lobe and volume loss, recurrent neoplasm remains a possibility, this could be further assessed with PET/CT  - Follow-up as outpatient.     Diet: Orders Placed This Encounter      Low Saturated Fat Na <2400 mg    DVT  "Prophylaxis: SCD's.     Code: Full Code    Anticipated discharge date:  pending CV surgery decision for CABG.   Milestones/needs for discharge: CV Surgery evaluation.   Pending tests or labs: None.   Status of family updates: Updated patient.   Length of Stay:  LOS: 6 days /LOS: 6                   Physical Exam:           Blood pressure (!) 162/66, pulse 82, temperature 98.7  F (37.1  C), temperature source Oral, resp. rate 16, height 1.6 m (5' 3\"), weight 75.6 kg (166 lb 9.6 oz), SpO2 97 %.    Vital Sign Ranges  Temperature Temp  Av.4  F (36.9  C)  Min: 98  F (36.7  C)  Max: 99  F (37.2  C)   Blood pressure Systolic (24hrs), Av , Min:129 , Max:186        Diastolic (24hrs), Av, Min:40, Max:80      Pulse Pulse  Av.9  Min: 71  Max: 119   Respirations Resp  Av.1  Min: 12  Max: 24   Pulse oximetry SpO2  Av %  Min: 95 %  Max: 99 %     Vital Signs with Ranges  Temp:  [98  F (36.7  C)-99  F (37.2  C)] 98.7  F (37.1  C)  Pulse:  [] 82  Resp:  [12-24] 16  BP: (129-186)/(40-80) 162/66  SpO2:  [95 %-99 %] 97 %  Temp:  [98  F (36.7  C)-99  F (37.2  C)] 98.7  F (37.1  C)  Pulse:  [] 82  Resp:  [12-24] 16  BP: (129-186)/(40-80) 162/66  SpO2:  [95 %-99 %] 97 %    I/O Last 3 Shifts:   I/O last 3 completed shifts:  In: 1354 [P.O.:480; I.V.:874]  Out: 600 [Urine:600]    I/O past 24 hours:     Intake/Output Summary (Last 24 hours) at 2021 0743  Last data filed at 2021 0517  Gross per 24 hour   Intake 1354 ml   Output 600 ml   Net 754 ml       Oxygen  Temp: 98.7  F (37.1  C) Temp src: Oral BP: (!) 162/66 (after activity) Pulse: 82   Resp: 16 SpO2: 97 % O2 Device: None (Room air) Oxygen Delivery: 2 LPM  Vitals:    21 2100 21 1842 21 0203   Weight: 76.6 kg (168 lb 14.4 oz) 76.6 kg (168 lb 12.8 oz) 75.6 kg (166 lb 9.6 oz)       Lines  Peripheral IV 08/10/21 Anterior;Right Lower forearm (Active)   Site Assessment WDL 21 0525   Line Status Infusing 21 0525 "   Dressing Intervention New dressing  08/10/21 2245   Phlebitis Scale 0-->no symptoms 08/11/21 0525   Infiltration Scale 0 08/11/21 0525   Infiltration Site Treatment Method  None 08/11/21 0525   Number of days: 1       Right Groin Interventional Procedure Access (Active)   Site Assessment WDL 08/11/21 0523   Hemostasis management Femostop on patient 08/11/21 0523   Femoral Bruit present 08/10/21 2352   CMS Right Extremity WDL 08/11/21 0523   Dorsalis Pulse - Right Leg Normal 08/11/21 0523   Popliteal Pulse - Right Leg Normal 08/11/21 0523   Posterior Tibial Pulse - Right Leg Normal 08/10/21 2230   Number of days: 1     GENERAL: Alert. NAD. Conversational.   HEENT: Normocephalic. EOMI. No icterus or injection. Nares normal.   LUNGS: Decrement to bases. No dyspnea at rest.   HEART: Regular rate. Extremities perfused.   ABDOMEN: Soft, nontender, and nondistended. Positive bowel sounds.   EXTREMITIES: LE edema noted.   NEUROLOGIC: Moves extremities spontaneously, follows commands.          Prior to Admission Medications:        Medications Prior to Admission   Medication Sig Dispense Refill Last Dose     allopurinol (ZYLOPRIM) 300 MG tablet [ALLOPURINOL (ZYLOPRIM) 300 MG TABLET] Take 1 tablet by mouth daily.         8/4/2021 at Unknown time     aspirin 325 MG tablet [ASPIRIN 325 MG TABLET] Take 1 tablet by mouth daily.   8/4/2021 at Unknown time     ferrous sulfate 140 mg (45 mg iron) TbER [FERROUS SULFATE 140 MG (45 MG IRON) TBER] 1 tablet tid 90 tablet 3 8/4/2021 at Unknown time     insulin lispro (HUMALOG KWIKPEN) 100 unit/mL pen [INSULIN LISPRO (HUMALOG KWIKPEN) 100 UNIT/ML PEN] INJECT 15 UNITS IN THE MORNING AND 26 UNITS AT DINNER ( PLUS SLIDING SCALE UP TO 70 UNITS DAILY )   8/3/2021 at Unknown time     insulin NPH isoph U-100 human (HUMULIN N NPH INSULIN KWIKPEN) 100 unit/mL (3 mL) pen [INSULIN NPH ISOPH U-100 HUMAN (HUMULIN N NPH INSULIN KWIKPEN) 100 UNIT/ML (3 ML) PEN] 35 units with breakfast   8/3/2021 at  Unknown time     lisinopril-hydrochlorothiazide (PRINZIDE,ZESTORETIC) 20-12.5 mg per tablet [LISINOPRIL-HYDROCHLOROTHIAZIDE (PRINZIDE,ZESTORETIC) 20-12.5 MG PER TABLET] Take 1 tablet by mouth daily.   8/3/2021 at Unknown time     LORazepam (ATIVAN) 0.5 MG tablet [LORAZEPAM (ATIVAN) 0.5 MG TABLET] Take 0.5 mg by mouth every 8 (eight) hours as needed for anxiety.   Past Week at prn     omeprazole (PRILOSEC) 20 MG capsule [OMEPRAZOLE (PRILOSEC) 20 MG CAPSULE] Take 20 mg by mouth daily.  0 8/4/2021 at Unknown time     oxyCODONE-acetaminophen (PERCOCET) 5-325 MG tablet Take 1 tablet by mouth every 6 hours as needed for severe pain   8/4/2021 at Unknown time     ketoconazole (NIZORAL) 2 % cream [KETOCONAZOLE (NIZORAL) 2 % CREAM] Apply topically as needed.  3      NYSTOP powder [NYSTOP POWDER] HOMERO EXT AA  TID  5             Medications:        Current Facility-Administered Medications   Medication Last Rate     - MEDICATION INSTRUCTIONS -       - MEDICATION INSTRUCTIONS -       - MEDICATION INSTRUCTIONS -       - MEDICATION INSTRUCTIONS -       - MEDICATION INSTRUCTIONS -       Percutaneous Coronary Intervention orders placed (this is information for BPA alerting)       sodium chloride 75 mL/hr at 08/11/21 0422     Current Facility-Administered Medications   Medication Dose Route Frequency     acetaminophen  975 mg Oral or Feeding Tube TID    Or     acetaminophen  650 mg Rectal TID     allopurinol  300 mg Oral Daily     amLODIPine  2.5 mg Oral Daily     aspirin  81 mg Oral Daily    Or     aspirin  81 mg Oral or NG Tube Daily     aspirin  81 mg Oral Daily     atorvastatin  80 mg Oral QPM     clopidogrel  75 mg Oral Daily     [Held by provider] furosemide  20 mg Oral Daily     hydrALAZINE  25 mg Oral TID     insulin aspart   Subcutaneous QAM AC     insulin aspart   Subcutaneous Daily with lunch     insulin aspart   Subcutaneous Daily with supper     insulin aspart  1-6 Units Subcutaneous TID w/meals     insulin glargine  35  Units Subcutaneous QAM AC     metoprolol tartrate  50 mg Oral BID     miconazole   Topical BID     omeprazole  20 mg Oral Daily     polyethylene glycol  17 g Oral Daily     predniSONE  50 mg Oral See Admin Instructions     sennosides  2 tablet Oral BID     sodium chloride (PF)  3 mL Intracatheter Q8H     Current Facility-Administered Medications   Medication Dose Route Frequency     acetaminophen  650 mg Oral Q4H PRN     - MEDICATION INSTRUCTIONS -   Does not apply DOES NOT GO TO MAR     - MEDICATION INSTRUCTIONS -   Does not apply DOES NOT GO TO MAR     - MEDICATION INSTRUCTIONS -   Does not apply DOES NOT GO TO MAR     glucose  15-30 g Oral Q15 Min PRN    Or     dextrose  25-50 mL Intravenous Q15 Min PRN    Or     glucagon  1 mg Subcutaneous Q15 Min PRN     fentaNYL  25 mcg Intravenous Q15 Min PRN     HOLD MEDICATION   Does not apply HOLD     hydrALAZINE  10 mg Intravenous Q4H PRN     hydrALAZINE  10 mg Intravenous Q6H PRN     lidocaine 4%   Topical Q1H PRN     lidocaine (buffered or not buffered)  0.1-1 mL Other Q1H PRN     - MEDICATION INSTRUCTIONS -   Does not apply Continuous PRN     - MEDICATION INSTRUCTIONS -   Intravenous Continuous PRN     melatonin  1 mg Oral or Feeding Tube At Bedtime PRN     metoclopramide  5 mg Intravenous Q6H PRN     metoprolol  5-10 mg Intravenous Q15 Min PRN     midazolam  0.5 mg Intravenous Q5 Min PRN     naloxone  0.2 mg Intravenous Q2 Min PRN    Or     naloxone  0.4 mg Intravenous Q2 Min PRN    Or     naloxone  0.2 mg Intramuscular Q2 Min PRN    Or     naloxone  0.4 mg Intramuscular Q2 Min PRN     nitroGLYcerin  0.4 mg Sublingual Q5 Min PRN     ondansetron  4 mg Oral Q6H PRN    Or     ondansetron  4 mg Intravenous Q6H PRN     oxyCODONE  5 mg Oral Q4H PRN    Or     oxyCODONE  10 mg Oral Q4H PRN     Percutaneous Coronary Intervention orders placed (this is information for BPA alerting)   Does not apply DOES NOT GO TO MAR     phenylephrine-mineral oil-petrolatum   Rectal BID PRN      senna-docusate  1 tablet Oral BID PRN    Or     senna-docusate  2 tablet Oral BID PRN     sodium chloride (PF)  3 mL Intracatheter q1 min prn     ___________________________________________________________________________  Medications     - MEDICATION INSTRUCTIONS -       - MEDICATION INSTRUCTIONS -       - MEDICATION INSTRUCTIONS -       - MEDICATION INSTRUCTIONS -       - MEDICATION INSTRUCTIONS -       Percutaneous Coronary Intervention orders placed (this is information for BPA alerting)       sodium chloride 75 mL/hr at 08/11/21 0422       acetaminophen  975 mg Oral or Feeding Tube TID    Or     acetaminophen  650 mg Rectal TID     allopurinol  300 mg Oral Daily     amLODIPine  2.5 mg Oral Daily     aspirin  81 mg Oral Daily    Or     aspirin  81 mg Oral or NG Tube Daily     aspirin  81 mg Oral Daily     atorvastatin  80 mg Oral QPM     clopidogrel  75 mg Oral Daily     [Held by provider] furosemide  20 mg Oral Daily     hydrALAZINE  25 mg Oral TID     insulin aspart   Subcutaneous QAM AC     insulin aspart   Subcutaneous Daily with lunch     insulin aspart   Subcutaneous Daily with supper     insulin aspart  1-6 Units Subcutaneous TID w/meals     insulin glargine  35 Units Subcutaneous QAM AC     metoprolol tartrate  50 mg Oral BID     miconazole   Topical BID     omeprazole  20 mg Oral Daily     polyethylene glycol  17 g Oral Daily     predniSONE  50 mg Oral See Admin Instructions     sennosides  2 tablet Oral BID     sodium chloride (PF)  3 mL Intracatheter Q8H          Data:      Lab data reviewed.     Data   Recent Labs   Lab 08/11/21  0511 08/10/21  2153 08/10/21  1908 08/10/21  1524 08/09/21  0835 08/05/21  0135 08/04/21 2023   WBC  --   --   --   --   --   --  10.2   HGB 8.2*  --   --   --   --   --  10.7*   MCV  --   --   --   --   --   --  94     --   --   --   --   --  217     --   --  133* 137   < > 135*   POTASSIUM 4.2  --   --  4.4 3.9   < > 5.3*   CHLORIDE 109*  --   --  103 104   <  > 106   CO2 20*  --   --  16* 23   < > 18*   BUN 52*  --   --  42* 36*   < > 31*   CR 1.98*  --   --  2.16* 1.75*   < > 1.73*   ANIONGAP 8  --   --  14 10   < > 11   MOHAN 7.8*  --   --  8.2* 8.1*   < > 9.5   * 308* 280* 493* 202*   < > 388*   ALBUMIN  --   --   --   --   --   --  3.7   PROTTOTAL  --   --   --   --   --   --  7.6   BILITOTAL  --   --   --   --   --   --  0.6   ALKPHOS  --   --   --   --   --   --  115   ALT  --   --   --   --   --   --  15   AST  --   --   --   --   --   --  17    < > = values in this interval not displayed.           Imaging:      Imaging data reviewed.     Recent Results (from the past 24 hour(s))   Cardiac Catheterization    Narrative      Lexus was admitted with weakness and found to have a stroke with a   troponin elevation to 7.9. Echo grossly normal and EKG without ischemia.   She is a diabetic with hypertensive urgency, and renal dysfunction.    Left dominant artery system. Diffuse coronary calcification.    Minimal left main stenosis at the ostium.    Moderate proximal and mid LAD stenoses. Severe disease in the mid   section of the first diagonal, which is not a large caliber artery.    Severe proximal and mid dominant circumflex stenoses. Attempted PCI with   plan for rotational atherectomy was no successful as neither the   rotafloppy wire or multiple catheters would pass the proximal lesion.    Severe mid vessel disease in the small, non-dominant RCA.    LV EDP 27 mmHg.    Blood loss < 20 cc.          Dr. Roque Ochoa DO, CATHRYN  [facility] Hospitalist  Pager [phone]

## 2021-08-11 NOTE — PLAN OF CARE
NIHSS was 0. Angio site healing well.   Problem: Adult Inpatient Plan of Care  Goal: Plan of Care Review  Outcome: No Change  Goal: Patient-Specific Goal (Individualized)  Outcome: No Change  Goal: Absence of Hospital-Acquired Illness or Injury  Outcome: No Change  Intervention: Identify and Manage Fall Risk  Recent Flowsheet Documentation  Taken 8/10/2021 8575 by Sheila Hills RN  Safety Promotion/Fall Prevention: bed alarm on  Goal: Optimal Comfort and Wellbeing  Outcome: No Change  Goal: Readiness for Transition of Care  Outcome: No Change     Problem: Risk for Delirium  Goal: Optimal Coping  Outcome: No Change  Goal: Improved Behavioral Control  Outcome: No Change  Goal: Improved Attention and Thought Clarity  Outcome: No Change  Goal: Improved Sleep  Outcome: No Change     Problem: Pain Acute  Goal: Acceptable Pain Control and Functional Ability  Outcome: No Change     Problem: Adjustment to Illness (Stroke, Ischemic/Transient Ischemic Attack)  Goal: Optimal Coping  Outcome: No Change     Problem: Bowel Elimination Impaired (Stroke, Ischemic/Transient Ischemic Attack)  Goal: Effective Bowel Elimination  Outcome: No Change     Problem: Cerebral Tissue Perfusion (Stroke, Ischemic/Transient Ischemic Attack)  Goal: Optimal Cerebral Tissue Perfusion  Outcome: No Change     Problem: Cognitive Impairment (Stroke, Ischemic/Transient Ischemic Attack)  Goal: Optimal Cognitive Function  Outcome: No Change     Problem: Communication Impairment (Stroke, Ischemic/Transient Ischemic Attack)  Goal: Improved Communication Skills  Outcome: No Change     Problem: Functional Ability Impaired (Stroke, Ischemic/Transient Ischemic Attack)  Goal: Optimal Functional Ability  Outcome: No Change     Problem: Respiratory Compromise (Stroke, Ischemic/Transient Ischemic Attack)  Goal: Effective Oxygenation and Ventilation  Outcome: No Change     Problem: Sensorimotor Impairment (Stroke, Ischemic/Transient Ischemic Attack)  Goal:  Improved Sensorimotor Function  Outcome: No Change     Problem: Eating/Swallowing Impairment (Stroke, Ischemic/Transient Ischemic Attack)  Goal: Oral Intake without Aspiration  Outcome: No Change     Problem: Urinary Elimination Impaired (Stroke, Ischemic/Transient Ischemic Attack)  Goal: Effective Urinary Elimination  Outcome: No Change     Problem: Arrhythmia/Dysrhythmia (Cardiac Catheterization)  Goal: Stable Heart Rate and Rhythm  Outcome: No Change     Problem: Pain (Cardiac Catheterization)  Goal: Acceptable Pain Control  Outcome: No Change

## 2021-08-11 NOTE — PLAN OF CARE
Problem: Adjustment to Illness (Stroke, Ischemic/Transient Ischemic Attack)  Goal: Optimal Coping  Outcome: Improving     Pt scoring 0 on NIHSS. Pt back from angiogram at 1900, R femoral site without bleeding or hematoma. Pt completed bedrest and ambulated to the bathroom with AST 1 with walker. VSS, blood sugars remain elevated. MD paged d/t HS blood sugar of 308 and no coverage.

## 2021-08-12 NOTE — CONSULTS
NUTRITION EDUCATION      REASON FOR ASSESSMENT:  Heart healthy diet education    NUTRITION HISTORY:  Information obtained from pt    Pt states she followed mostly a regular diet at home.  Sometimes tried to avoid sweets.  She has not taken any nutritional supplements and states her appetite was normal    CURRENT DIET:  Low saturated fat < 2,400 mg sodium    INTERVENTIONS:    Nutrition Prescription:  Heart healthy diet    Implementation:      *  Nutrition Education (Content):   A)  Provided handout heart healthy eating nutrition therapy and food choice guidelines for heart healthy eating   B)  Discussed decreasing saturated and trans fats and increasing monounsaturated fats, omega 3 fatty acids, increasing fiber and limiting sugar      *  Nutrition Education (Application):   A)  Discussed current eating habits and recommended alternative food choices      Goals:      *  Patient will verbalize understanding of diet-met      *  All of the above goals met during the education session    Follow Up/Monitoring:      *  Provided RD contact information for future questions      *  Recommended Out-Patient Nutrition Referral, if further diet instructions are needed

## 2021-08-12 NOTE — DISCHARGE SUMMARY
Physician Discharge Summary        Primary Care Physician:  Rajinder Kitchen Admission Date: 8/4/2021   Discharge Provider: Roque Ochoa DO Discharge Date: 8/12/2021   Disposition: Home Code Status: Full Code   Activity: Per cardiology recommendations.  Diet: Orders Placed This Encounter      Low Saturated Fat Na <2400 mg      Diet        Condition at Discharge: Stable.       Discharge Diagnoses/Hospital Summary:      80-year-old with CKD stage IV, anemia, dyslipidemia, chronic pain syndrome who initially presented with fall, hyperglycemia hypertension and nausea with upset stomach.  Her troponin was elevated at 7.9.      Non-STEMI Stress test on 8/9 was abnormal.  Angiogram done on 8/10 showed multivessel disease.  Patient premedicated with prednisone 50 mg x 2 thereby leading to uncontrolled blood sugars  - Defer meds to Cardiology. Plavix, ASA.   - CV surgery consulted. Not good candidate for CABG.   - Close outpatient follow up.   - Antiplatelet agents per Cards.   - Cardiology follow up.      Left frontoparietal subcortical infarction not a candidate for thrombolytics due to time of onset  - Antiplatelet agent(s) per Neuro/Cards.   - Neurology follow up.   - Therapy following as inpatient.      Nausea resolved  - Resolved.      Accelerated hypertension Multifactorial  - Defer to Cards.   - Close outpatient follow up.      Insulin-dependent diabetes  - Continue Lantus      Hyponatremia likely due to hypovolemia  - Monitor as outpatient.      Acute on chronic back pain   - Neurosurgery consulted previously during inpatient stay, per report. Follow up as directed by their service.   - Pain team consulted during inpatient stay, per report.      Incidental meningioma  - Defer work-up as outpatient by neurosurgery     Acute renal failure on CKD stage IV creatinine at baseline ranging from 1.7-1.88  - Close outpatient follow up.      Wedge shaped airspace consolidation Stable 7 mm nodule right middle lobe and  "volume loss, recurrent neoplasm remains a possibility, this could be further assessed with PET/CT  - Follow-up as outpatient.     Discharge Medications:      Review of your medicines      START taking      Dose / Directions   amLODIPine 2.5 MG tablet  Commonly known as: NORVASC      Dose: 2.5 mg  Take 1 tablet (2.5 mg) by mouth daily  Quantity: 30 tablet  Refills: 0     aspirin 81 MG EC tablet  Commonly known as: ASA  Replaces: aspirin 325 MG tablet      Dose: 81 mg  Take 1 tablet (81 mg) by mouth daily  Quantity: 30 tablet  Refills: 0     atorvastatin 80 MG tablet  Commonly known as: LIPITOR      Dose: 80 mg  Take 1 tablet (80 mg) by mouth every evening  Quantity: 30 tablet  Refills: 0     clopidogrel 75 MG tablet  Commonly known as: PLAVIX      Dose: 75 mg  Take 1 tablet (75 mg) by mouth daily  Quantity: 30 tablet  Refills: 0     hydrALAZINE 25 MG tablet  Commonly known as: APRESOLINE      Dose: 25 mg  Take 1 tablet (25 mg) by mouth 3 times daily  Quantity: 90 tablet  Refills: 0     insulin glargine 100 UNIT/ML pen  Commonly known as: LANTUS PEN      Dose: 35 Units  Inject 35 Units Subcutaneous every morning (before breakfast)  Quantity: 3 mL  Refills: 0     metoprolol tartrate 50 MG tablet  Commonly known as: LOPRESSOR      Dose: 50 mg  Take 1 tablet (50 mg) by mouth 2 times daily  Quantity: 60 tablet  Refills: 0     nitroGLYcerin 0.4 MG sublingual tablet  Commonly known as: NITROSTAT      One tablet under the tongue every 5 minutes if needed for chest pain. May repeat every 5 minutes for a maximum of 3 doses in 15 minutes\"  Quantity: 25 tablet  Refills: 3        CONTINUE these medicines which have NOT CHANGED      Dose / Directions   allopurinol 300 MG tablet  Commonly known as: ZYLOPRIM      Dose: 1 tablet  [ALLOPURINOL (ZYLOPRIM) 300 MG TABLET] Take 1 tablet by mouth daily.  Refills: 0     ferrous sulfate 140 (45 Fe) MG Tbcr CR tablet  Used for: Mass of upper lobe of right lung      [FERROUS SULFATE 140 MG (45 " MG IRON) TBER] 1 tablet tid  Quantity: 90 tablet  Refills: 3     ketoconazole 2 % external cream  Commonly known as: NIZORAL      [KETOCONAZOLE (NIZORAL) 2 % CREAM] Apply topically as needed.  Refills: 3     Nystop 622683 UNIT/GM external powder  Generic drug: nystatin      [NYSTOP POWDER] HOMERO EXT AA  TID  Refills: 5     omeprazole 20 MG DR capsule  Commonly known as: priLOSEC      Dose: 20 mg  [OMEPRAZOLE (PRILOSEC) 20 MG CAPSULE] Take 20 mg by mouth daily.  Refills: 0     oxyCODONE-acetaminophen 5-325 MG tablet  Commonly known as: PERCOCET      Dose: 1 tablet  Take 1 tablet by mouth every 6 hours as needed for severe pain  Refills: 0        STOP taking    aspirin 325 MG tablet  Commonly known as: ASA  Replaced by: aspirin 81 MG EC tablet        insulin lispro 100 UNIT/ML pen  Commonly known as: HumaLOG PEN        insulin  UNIT/ML injection        lisinopril-hydrochlorothiazide 20-12.5 MG tablet  Commonly known as: ZESTORETIC        LORazepam 0.5 MG tablet  Commonly known as: ATIVAN              Where to get your medicines      Some of these will need a paper prescription and others can be bought over the counter. Ask your nurse if you have questions.    Bring a paper prescription for each of these medications    amLODIPine 2.5 MG tablet    aspirin 81 MG EC tablet    atorvastatin 80 MG tablet    clopidogrel 75 MG tablet    hydrALAZINE 25 MG tablet    insulin glargine 100 UNIT/ML pen    metoprolol tartrate 50 MG tablet    nitroGLYcerin 0.4 MG sublingual tablet               Discharge Instructions:  Follow up appointment with Primary Care Physician: Rajinder Kitchen  Follow up appointment with Specialist: Neurology, cardiology, and neurosurgery as directed.        Vital Signs in last 24 hours:    Temp:  [97.4  F (36.3  C)-98.8  F (37.1  C)] 98  F (36.7  C)  Pulse:  [73-83] 76  Resp:  [16-20] 16  BP: (133-177)/(52-75) 164/72  SpO2:  [95 %-97 %] 96 %    GENERAL: Alert. NAD. Conversational.   HEENT: Normocephalic.  EOMI. No icterus or injection. Nares normal.   LUNGS: Decrement to bases. No dyspnea at rest.   HEART: Regular rate. Extremities perfused.   ABDOMEN: Soft, nontender, and nondistended. Positive bowel sounds.   EXTREMITIES: LE edema noted.   NEUROLOGIC: Moves extremities spontaneously, follows commands.     Total Time on discharge process is: 48 minutes    Dr. Roque Ochoa DO, CATHRYN  Internal Medicine Hospitalist  8/12/2021

## 2021-08-12 NOTE — PLAN OF CARE
Problem: Adult Inpatient Plan of Care  Goal: Absence of Hospital-Acquired Illness or Injury  Intervention: Identify and Manage Fall Risk  Recent Flowsheet Documentation  Taken 8/12/2021 0305 by Anju Quinn RN  Safety Promotion/Fall Prevention: bed alarm on  Taken 8/11/2021 2000 by Anju Quinn RN  Safety Promotion/Fall Prevention: bed alarm on     Problem: Pain Acute  Goal: Acceptable Pain Control and Functional Ability  Intervention: Develop Pain Management Plan  Recent Flowsheet Documentation  Taken 8/12/2021 0300 by Anju Quinn RN  Pain Management Interventions: medication (see MAR)  Taken 8/11/2021 1945 by Anju Quinn, RN  Pain Management Interventions: (offered alternatives to meds.  patient refused) --     Problem: Pain (Cardiac Catheterization)  Goal: Acceptable Pain Control  Intervention: Prevent or Manage Pain  Recent Flowsheet Documentation  Taken 8/12/2021 0300 by Anju Quinn RN  Pain Management Interventions: medication (see MAR)  Taken 8/11/2021 1945 by Anju Quinn RN  Pain Management Interventions: (offered alternatives to meds.  patient refused) --

## 2021-08-12 NOTE — PLAN OF CARE
Physical Therapy Discharge Summary    Reason for therapy discharge:    Discharged to home with home therapy.    Progress towards therapy goal(s). See goals on Care Plan in [facility] electronic health record for goal details.  Goals not met.  Barriers to achieving goals:   discharge from facility.    Therapy recommendation(s):    Continued therapy is recommended.  Rationale/Recommendations:  PT goals not met.

## 2021-08-12 NOTE — PROGRESS NOTES
Care Management Discharge Note    Discharge Date: 08/12/2021       Discharge Disposition:  Home with home care    Discharge Services:  Hpme RN, PT, HHA    Discharge DME:  Walker, cane    Discharge Transportation:  Family    Private pay costs discussed: Not applicable    PAS Confirmation Code:  N/A  Patient/family educated on Medicare website which has current facility and service quality ratings:      Education Provided on the Discharge Plan:  Yes  Persons Notified of Discharge Plans: Patient  Patient/Family in Agreement with the Plan:  Yes, patient in agreement    Handoff Referral Completed: No    Additional Information:  ROJELIO completed chart review, discussed updates with MD. Patient stable for discharge, orders completed.   ROJELIO met with patient in her room to introduce self, review discharge plan to home with home care. Patient confirms this plan and is in agreement to discharge today with home care. Patient reports her son will transport her home. Patient lives with her  and son.   ROJELIO spoke to Celia at [facility] ([phone]). ROJELIO faxed discharge orders to [facility]. Will confirm receipt of orders.     KAYLA Campos

## 2021-08-12 NOTE — PLAN OF CARE
Occupational Therapy Discharge Summary    Reason for therapy discharge:    Discharged to home.    Progress towards therapy goal(s). See goals on Care Plan in [facility] electronic health record for goal details.  Progressing towards goals-TCU was recommended.    Therapy recommendation(s):    Continued therapy is recommended. Goals not met.

## 2021-08-13 NOTE — PROGRESS NOTES
Clinic Care Coordination Contact  Care Team Conversations    CC SW performed chart review and noted the pt is out of scope. Pt has been in contact with clinic for [facility]CH once out of the hospital.     Megha Martin [facility]  Clinic Care Cordinator  [facility]   [phone]

## 2021-09-08 NOTE — PROGRESS NOTES
Thank you, Dr. Jeet Shell, for asking the [facility] team to see Ms. Dacia Miller to follow-up on coronary artery disease.    Assessment/Recommendations   Assessment:    1.  Coronary artery disease, status post recent non-ST elevation MI with finding of 80% stenosis in the proximal portion of the dominant left circumflex artery.  Moderate disease noted in the LAD.  Attempt at PCI of the left circumflex was unsuccessful.  Patient subsequently seen by CV surgery who felt she was too high risk for surgical revascularization.  Medical management initiated.  She tells me at today's visit that she did not start any of the new medications that were prescribed as they were not really explained to her.  I talked about the rationale for clopidogrel, amlodipine and atorvastatin and the benefits they could provide.  Given her low blood pressure, I did not recommend initiation of amlodipine today but did recommend resumption of both clopidogrel and atorvastatin.  She was ultimately agreeable and I submitted new prescriptions along with a prescription for Nitrostat.  At this point, she is not certain that she is having any more chest pain.  For now recommended continued trial of medical therapy.  2.  Mixed hyperlipidemia, previously untreated.  Initiated high-dose atorvastatin in the hospital.  She is agreeable to give this a try.  3.  Leg cramps, possibly due to hypomagnesemia.  Recommended recheck of magnesium level today along with recheck of potassium and kidney function.  4.  Insulin-dependent type 2 diabetes mellitus with poor diabetic control  5.  Chronic kidney disease stage III/IV    Plan:  1.  Continue current medications and restart clopidogrel 75 mg daily and atorvastatin 80 mg daily  2.  Follow-up in 6 weeks or sooner if recurring chest discomfort       History of Present Illness    Ms. Dacia Miller is a 81 year old female with history of type 2 diabetes mellitus on insulin,  "mixed hyperlipidemia which is untreated, essential hypertension who presented to [facility] following a fall at home.  She was ultimately found to have evidence of a subacute left frontoparietal subcortical infarct as well as an elevated troponin.  Echocardiogram demonstrated no regional wall motion abnormality and she subsequently underwent a Lexiscan stress study demonstrating a moderate area of nontransmural infarction involving the inferior lateral wall.  This led to a subsequent coronary angiogram revealing an 80% stenosis in the dominant left circumflex.  Attempt at percutaneous intervention was unsuccessful.  She was subsequently seen by our CV surgical team who felt that she was too high risk given her other medical problems.  An attempt at medical therapy was initiated and she was subsequently discharged home.    Today in the office, she tells me she did not take any of the new medications that were prescribed as no one really explained to her what the medications were for.  She is unclear whether she has been having chest discomfort as she states \"I am uncertain whether the pain is actual or whether I am making it up mentally\".  She does report significant anxiety which may be precipitating any discomfort.  She also tells me she has discomfort throughout her body including severe spasms in her legs.  I do note recent laboratory studies demonstrating low magnesium level prompting initiation of magnesium supplement.  I told her this could be a cause of muscle spasms and that we should recheck her magnesium level.    ECG (personally reviewed): No ECG today    Cardiac Imaging Studies (personally reviewed): No recent cardiac imaging     Physical Examination Review of Systems   /62 (BP Location: Right arm, Patient Position: Sitting, Cuff Size: Adult Large)   Pulse 100   Resp 16   Ht 1.6 m (5' 3\")   Wt 72.6 kg (160 lb)   BMI 28.34 kg/m    Body mass index is 28.34 kg/m .  Wt Readings from Last " "3 Encounters:   21 72.6 kg (160 lb)   21 76.9 kg (169 lb 9.6 oz)   21 77.8 kg (171 lb 8 oz)     General Appearance:   Awake, Alert, No acute distress.   HEENT:  No scleral icterus; the mucous membranes were pink and moist.   Neck: No cervical bruits or jugular venous distention    Chest: The spine was straight. The chest was symmetric.   Lungs:   Respirations unlabored; the lungs are clear to auscultation. No wheezing   Cardiovascular:    Regular rate and rhythm.  S1, S2 normal.  1/6 systolic ejection murmur heard at the left upper sternal border.   Abdomen:  No organomegaly, masses, bruits, or tenderness. Bowels sounds are present   Extremities:  No peripheral edema bilaterally.   Skin: No xanthelasma. Warm, Dry.   Musculoskeletal: No tenderness.   Neurologic: Mood and affect are appropriate.    Enc Vitals  BP: 108/62  Pulse: 100  Resp: 16  Weight: 72.6 kg (160 lb) (Today's weight at home)  Height: 160 cm (5' 3\")                                         Medical History  Surgical History Family History Social History   Past Medical History:   Diagnosis Date     Anemia, iron deficiency 2019     Arthritis      Breast cancer (H)      Cancer (H)      Diabetes mellitus (H)      DM (diabetes mellitus screen)      History of anesthesia complications      HLD (hyperlipidemia)      HTN (hypertension)      Kidney stones      Lung cancer (H)      PONV (postoperative nausea and vomiting)      Sciatica      Scoliosis     Past Surgical History:   Procedure Laterality Date     ABDOMEN SURGERY       BIOPSY SKIN (LOCATION)        SECTION       CHOLECYSTECTOMY       CT BIOPSY LUNG  2018     CV CORONARY ANGIOGRAM N/A 8/10/2021    Procedure: Coronary Angiogram;  Surgeon: Briana Lea MD;  Location: [facility] CATH LAB CV     CV LEFT HEART CATH N/A 8/10/2021    Procedure: Left Heart Cath;  Surgeon: Briana Lea MD;  Location: [facility] CATH LAB CV     CV PCI N/A 8/10/2021    Procedure: " Percutaneous Coronary Intervention;  Surgeon: Briana Lea MD;  Location: [facility] LAB CV     WI MASTECTOMY,PARTIAL,  WITH AXILLARY LYMPHADENECTOMY Left 2018    Procedure: Left Lumpectomy; [place] Lymph Node Biopsy;  Surgeon: Lilliana Rodriguez MD;  Location: [facility];  Service: General    Family History   Problem Relation Age of Onset     Breast Cancer Mother     Social History     Socioeconomic History     Marital status:      Spouse name: Not on file     Number of children: Not on file     Years of education: Not on file     Highest education level: Not on file   Occupational History     Not on file   Tobacco Use     Smoking status: Former Smoker     Packs/day: 0.50     Years: 7.00     Pack years: 3.50     Quit date: 1968     Years since quittin.7     Smokeless tobacco: Never Used   Substance and Sexual Activity     Alcohol use: No     Drug use: No     Sexual activity: Never   Other Topics Concern     Not on file   Social History Narrative     Not on file     Social Determinants of Health     Financial Resource Strain:      Difficulty of Paying Living Expenses:    Food Insecurity:      Worried About Running Out of Food in the Last Year:      Ran Out of Food in the Last Year:    Transportation Needs:      Lack of Transportation (Medical):      Lack of Transportation (Non-Medical):    Physical Activity:      Days of Exercise per Week:      Minutes of Exercise per Session:    Stress:      Feeling of Stress :    Social Connections:      Frequency of Communication with Friends and Family:      Frequency of Social Gatherings with Friends and Family:      Attends [employer] Services:      Active Member of Clubs or Organizations:      Attends Club or Organization Meetings:      Marital Status:    Intimate Partner Violence:      Fear of Current or Ex-Partner:      Emotionally Abused:      Physically Abused:      Sexually Abused:           Medications  Allergies   Current Outpatient  "Medications   Medication Sig Dispense Refill     allopurinol (ZYLOPRIM) 300 MG tablet [ALLOPURINOL (ZYLOPRIM) 300 MG TABLET] Take 1 tablet by mouth daily.             amLODIPine (NORVASC) 2.5 MG tablet Take 1 tablet (2.5 mg) by mouth daily 30 tablet 0     aspirin (ASA) 81 MG EC tablet Take 1 tablet (81 mg) by mouth daily 30 tablet 0     atorvastatin (LIPITOR) 80 MG tablet Take 1 tablet (80 mg) by mouth every evening 30 tablet 11     citalopram (CELEXA) 40 MG tablet Take 40 mg by mouth daily       clopidogrel (PLAVIX) 75 MG tablet Take 1 tablet (75 mg) by mouth daily 90 tablet 3     DULoxetine (CYMBALTA) 30 MG capsule 1 capsule       hydrALAZINE (APRESOLINE) 25 MG tablet Take 1 tablet (25 mg) by mouth 3 times daily 90 tablet 0     insulin NPH (HUMULIN N KWIKPEN) 100 UNIT/ML injection        ketoconazole (NIZORAL) 2 % cream [KETOCONAZOLE (NIZORAL) 2 % CREAM] Apply topically as needed.  3     nitroGLYcerin (NITROSTAT) 0.4 MG sublingual tablet For chest pain place 1 tablet under the tongue every 5 minutes for 3 doses. If symptoms persist 5 minutes after 1st dose call 911. 25 tablet 3     NYSTOP powder daily as needed   5     oxyCODONE-acetaminophen (PERCOCET) 5-325 MG tablet Take 1 tablet by mouth every 6 hours as needed for severe pain       ferrous sulfate 140 mg (45 mg iron) TbER [FERROUS SULFATE 140 MG (45 MG IRON) TBER] 1 tablet tid (Patient not taking: Reported on 9/8/2021) 90 tablet 3     insulin glargine (LANTUS PEN) 100 UNIT/ML pen Inject 35 Units Subcutaneous every morning (before breakfast) (Patient not taking: Reported on 9/8/2021) 3 mL 0     metoprolol tartrate (LOPRESSOR) 50 MG tablet Take 1 tablet (50 mg) by mouth 2 times daily (Patient not taking: Reported on 9/8/2021) 60 tablet 0     nitroGLYcerin (NITROSTAT) 0.4 MG sublingual tablet One tablet under the tongue every 5 minutes if needed for chest pain. May repeat every 5 minutes for a maximum of 3 doses in 15 minutes\" (Patient not taking: Reported on " 9/8/2021) 25 tablet 3     omeprazole (PRILOSEC) 20 MG capsule [OMEPRAZOLE (PRILOSEC) 20 MG CAPSULE] Take 20 mg by mouth daily. (Patient not taking: Reported on 9/8/2021)  0      Allergies   Allergen Reactions     Amoxicillin-Pot Clavulanate [Augmentin] Hives     Atorvastatin      Muscle cramps  Muscle cramps       Cephalexin Monohydrate [Cephalexin] Itching and Rash     Contrast Dye Other (See Comments)     Flushed, rashes, blotches     Iodinated Contrast Media [Diagnostic X-Ray Materials] Other (See Comments)     Flushed, rashes, blotches     Other Allergy (See Comments) [External Allergen Needs Reconciliation - See Comment] Unknown     Contrast Media Ready-[facility] [place], 02/13/2013.; Contrast Media Ready-Box [facility], 02/13/2013.       Penicillins Unknown     Prochlorperazine Edisylate [Prochlorperazine] Unknown     Sulfa Drugs Rash         Lab Results    Chemistry/lipid CBC Cardiac Enzymes/BNP/TSH/INR   Recent Labs   Lab Test 08/21/21  1447 08/05/21  1815   TRIG  --  198*   LDL  --  123   BUN 29*  --      --    CO2 21*  --     Recent Labs   Lab Test 08/12/21  0512   WBC 12.7*   HGB 8.3*   HCT 26.7*   MCV 98       Recent Labs   Lab Test 08/21/21  1428 08/08/21  0904 12/13/18  0744   TROPONINI  --  1.89*  --    *  --   --    INR  --   --  0.93        A total of 50 minutes was spent reviewing patient's medical records, obtaining history and performing examination, as well as discussing diagnoses/ recommendations with patient and answering all questions.

## 2021-09-08 NOTE — PATIENT INSTRUCTIONS
1. Continue current medication.  2. Begin the clopidogrel 75 mg daily to reduce risk of clot forming on the coronary narrowing. Also begin the atorvastatin 80 mg to lower your cholesterol level but also help stabilize the cholesterol plaque and decreas inflammation.  3. Call if an new or increase in chest pain   4. Obtain blood work today to check potassium and magnesium levels  5. Follow up in 6 weeks

## 2021-09-08 NOTE — LETTER
9/8/2021    Jeet Shell MD  [facility] [address]  [place] [address]    RE: Dacia Miller       Dear Colleague,    I had the pleasure of seeing Dacia Miller in the [facility].        Thank you, Dr. Jeet Shell, for asking the [facility] team to see Ms. Dacia Miller to follow-up on coronary artery disease.    Assessment/Recommendations   Assessment:    1.  Coronary artery disease, status post recent non-ST elevation MI with finding of 80% stenosis in the proximal portion of the dominant left circumflex artery.  Moderate disease noted in the LAD.  Attempt at PCI of the left circumflex was unsuccessful.  Patient subsequently seen by CV surgery who felt she was too high risk for surgical revascularization.  Medical management initiated.  She tells me at today's visit that she did not start any of the new medications that were prescribed as they were not really explained to her.  I talked about the rationale for clopidogrel, amlodipine and atorvastatin and the benefits they could provide.  Given her low blood pressure, I did not recommend initiation of amlodipine today but did recommend resumption of both clopidogrel and atorvastatin.  She was ultimately agreeable and I submitted new prescriptions along with a prescription for Nitrostat.  At this point, she is not certain that she is having any more chest pain.  For now recommended continued trial of medical therapy.  2.  Mixed hyperlipidemia, previously untreated.  Initiated high-dose atorvastatin in the hospital.  She is agreeable to give this a try.  3.  Leg cramps, possibly due to hypomagnesemia.  Recommended recheck of magnesium level today along with recheck of potassium and kidney function.  4.  Insulin-dependent type 2 diabetes mellitus with poor diabetic control  5.  Chronic kidney disease stage III/IV    Plan:  1.  Continue current  "medications and restart clopidogrel 75 mg daily and atorvastatin 80 mg daily  2.  Follow-up in 6 weeks or sooner if recurring chest discomfort       History of Present Illness    Ms. Dacia Miller is a 81 year old female with history of type 2 diabetes mellitus on insulin, mixed hyperlipidemia which is untreated, essential hypertension who presented to [facility] following a fall at home.  She was ultimately found to have evidence of a subacute left frontoparietal subcortical infarct as well as an elevated troponin.  Echocardiogram demonstrated no regional wall motion abnormality and she subsequently underwent a Lexiscan stress study demonstrating a moderate area of nontransmural infarction involving the inferior lateral wall.  This led to a subsequent coronary angiogram revealing an 80% stenosis in the dominant left circumflex.  Attempt at percutaneous intervention was unsuccessful.  She was subsequently seen by our CV surgical team who felt that she was too high risk given her other medical problems.  An attempt at medical therapy was initiated and she was subsequently discharged home.    Today in the office, she tells me she did not take any of the new medications that were prescribed as no one really explained to her what the medications were for.  She is unclear whether she has been having chest discomfort as she states \"I am uncertain whether the pain is actual or whether I am making it up mentally\".  She does report significant anxiety which may be precipitating any discomfort.  She also tells me she has discomfort throughout her body including severe spasms in her legs.  I do note recent laboratory studies demonstrating low magnesium level prompting initiation of magnesium supplement.  I told her this could be a cause of muscle spasms and that we should recheck her magnesium level.    ECG (personally reviewed): No ECG today    Cardiac Imaging Studies (personally reviewed): No recent cardiac " "imaging     Physical Examination Review of Systems   /62 (BP Location: Right arm, Patient Position: Sitting, Cuff Size: Adult Large)   Pulse 100   Resp 16   Ht 1.6 m (5' 3\")   Wt 72.6 kg (160 lb)   BMI 28.34 kg/m    Body mass index is 28.34 kg/m .  Wt Readings from Last 3 Encounters:   21 72.6 kg (160 lb)   21 76.9 kg (169 lb 9.6 oz)   21 77.8 kg (171 lb 8 oz)     General Appearance:   Awake, Alert, No acute distress.   HEENT:  No scleral icterus; the mucous membranes were pink and moist.   Neck: No cervical bruits or jugular venous distention    Chest: The spine was straight. The chest was symmetric.   Lungs:   Respirations unlabored; the lungs are clear to auscultation. No wheezing   Cardiovascular:    Regular rate and rhythm.  S1, S2 normal.  1/6 systolic ejection murmur heard at the left upper sternal border.   Abdomen:  No organomegaly, masses, bruits, or tenderness. Bowels sounds are present   Extremities:  No peripheral edema bilaterally.   Skin: No xanthelasma. Warm, Dry.   Musculoskeletal: No tenderness.   Neurologic: Mood and affect are appropriate.    Enc Vitals  BP: 108/62  Pulse: 100  Resp: 16  Weight: 72.6 kg (160 lb) (Today's weight at home)  Height: 160 cm (5' 3\")                                         Medical History  Surgical History Family History Social History   Past Medical History:   Diagnosis Date     Anemia, iron deficiency 2019     Arthritis      Breast cancer (H)      Cancer (H)      Diabetes mellitus (H)      DM (diabetes mellitus screen)      History of anesthesia complications      HLD (hyperlipidemia)      HTN (hypertension)      Kidney stones      Lung cancer (H)      PONV (postoperative nausea and vomiting)      Sciatica      Scoliosis     Past Surgical History:   Procedure Laterality Date     ABDOMEN SURGERY       BIOPSY SKIN (LOCATION)        SECTION       CHOLECYSTECTOMY       CT BIOPSY LUNG  2018     CV CORONARY ANGIOGRAM N/A " 8/10/2021    Procedure: Coronary Angiogram;  Surgeon: Briana Lea MD;  Location: [facility] CATH LAB CV     CV LEFT HEART CATH N/A 8/10/2021    Procedure: Left Heart Cath;  Surgeon: Briana Lea MD;  Location: [facility] LAB CV     CV PCI N/A 8/10/2021    Procedure: Percutaneous Coronary Intervention;  Surgeon: Briana Lea MD;  Location: [facility] CV     ND MASTECTOMY,PARTIAL,  WITH AXILLARY LYMPHADENECTOMY Left 2018    Procedure: Left Lumpectomy; [place] Lymph Node Biopsy;  Surgeon: Lilliana Rodriguez MD;  Location: [facility];  Service: General    Family History   Problem Relation Age of Onset     Breast Cancer Mother     Social History     Socioeconomic History     Marital status:      Spouse name: Not on file     Number of children: Not on file     Years of education: Not on file     Highest education level: Not on file   Occupational History     Not on file   Tobacco Use     Smoking status: Former Smoker     Packs/day: 0.50     Years: 7.00     Pack years: 3.50     Quit date: 1968     Years since quittin.7     Smokeless tobacco: Never Used   Substance and Sexual Activity     Alcohol use: No     Drug use: No     Sexual activity: Never   Other Topics Concern     Not on file   Social History Narrative     Not on file     Social Determinants of Health     Financial Resource Strain:      Difficulty of Paying Living Expenses:    Food Insecurity:      Worried About Running Out of Food in the Last Year:      Ran Out of Food in the Last Year:    Transportation Needs:      Lack of Transportation (Medical):      Lack of Transportation (Non-Medical):    Physical Activity:      Days of Exercise per Week:      Minutes of Exercise per Session:    Stress:      Feeling of Stress :    Social Connections:      Frequency of Communication with Friends and Family:      Frequency of Social Gatherings with Friends and Family:      Attends [employer] Services:      Active Member of Clubs  or Organizations:      Attends Club or Organization Meetings:      Marital Status:    Intimate Partner Violence:      Fear of Current or Ex-Partner:      Emotionally Abused:      Physically Abused:      Sexually Abused:           Medications  Allergies   Current Outpatient Medications   Medication Sig Dispense Refill     allopurinol (ZYLOPRIM) 300 MG tablet [ALLOPURINOL (ZYLOPRIM) 300 MG TABLET] Take 1 tablet by mouth daily.             amLODIPine (NORVASC) 2.5 MG tablet Take 1 tablet (2.5 mg) by mouth daily 30 tablet 0     aspirin (ASA) 81 MG EC tablet Take 1 tablet (81 mg) by mouth daily 30 tablet 0     atorvastatin (LIPITOR) 80 MG tablet Take 1 tablet (80 mg) by mouth every evening 30 tablet 11     citalopram (CELEXA) 40 MG tablet Take 40 mg by mouth daily       clopidogrel (PLAVIX) 75 MG tablet Take 1 tablet (75 mg) by mouth daily 90 tablet 3     DULoxetine (CYMBALTA) 30 MG capsule 1 capsule       hydrALAZINE (APRESOLINE) 25 MG tablet Take 1 tablet (25 mg) by mouth 3 times daily 90 tablet 0     insulin NPH (HUMULIN N KWIKPEN) 100 UNIT/ML injection        ketoconazole (NIZORAL) 2 % cream [KETOCONAZOLE (NIZORAL) 2 % CREAM] Apply topically as needed.  3     nitroGLYcerin (NITROSTAT) 0.4 MG sublingual tablet For chest pain place 1 tablet under the tongue every 5 minutes for 3 doses. If symptoms persist 5 minutes after 1st dose call 911. 25 tablet 3     NYSTOP powder daily as needed   5     oxyCODONE-acetaminophen (PERCOCET) 5-325 MG tablet Take 1 tablet by mouth every 6 hours as needed for severe pain       ferrous sulfate 140 mg (45 mg iron) TbER [FERROUS SULFATE 140 MG (45 MG IRON) TBER] 1 tablet tid (Patient not taking: Reported on 9/8/2021) 90 tablet 3     insulin glargine (LANTUS PEN) 100 UNIT/ML pen Inject 35 Units Subcutaneous every morning (before breakfast) (Patient not taking: Reported on 9/8/2021) 3 mL 0     metoprolol tartrate (LOPRESSOR) 50 MG tablet Take 1 tablet (50 mg) by mouth 2 times daily (Patient  "not taking: Reported on 9/8/2021) 60 tablet 0     nitroGLYcerin (NITROSTAT) 0.4 MG sublingual tablet One tablet under the tongue every 5 minutes if needed for chest pain. May repeat every 5 minutes for a maximum of 3 doses in 15 minutes\" (Patient not taking: Reported on 9/8/2021) 25 tablet 3     omeprazole (PRILOSEC) 20 MG capsule [OMEPRAZOLE (PRILOSEC) 20 MG CAPSULE] Take 20 mg by mouth daily. (Patient not taking: Reported on 9/8/2021)  0      Allergies   Allergen Reactions     Amoxicillin-Pot Clavulanate [Augmentin] Hives     Atorvastatin      Muscle cramps  Muscle cramps       Cephalexin Monohydrate [Cephalexin] Itching and Rash     Contrast Dye Other (See Comments)     Flushed, rashes, blotches     Iodinated Contrast Media [Diagnostic X-Ray Materials] Other (See Comments)     Flushed, rashes, blotches     Other Allergy (See Comments) [External Allergen Needs Reconciliation - See Comment] Unknown     Contrast Media Ready-Box [facility], 02/13/2013.; Contrast Media Ready-Box [facility], 02/13/2013.       Penicillins Unknown     Prochlorperazine Edisylate [Prochlorperazine] Unknown     Sulfa Drugs Rash         Lab Results    Chemistry/lipid CBC Cardiac Enzymes/BNP/TSH/INR   Recent Labs   Lab Test 08/21/21  1447 08/05/21  1815   TRIG  --  198*   LDL  --  123   BUN 29*  --      --    CO2 21*  --     Recent Labs   Lab Test 08/12/21  0512   WBC 12.7*   HGB 8.3*   HCT 26.7*   MCV 98       Recent Labs   Lab Test 08/21/21  1428 08/08/21  0904 12/13/18  0744   TROPONINI  --  1.89*  --    *  --   --    INR  --   --  0.93        A total of 50 minutes was spent reviewing patient's medical records, obtaining history and performing examination, as well as discussing diagnoses/ recommendations with patient and answering all questions.                          Thank you for allowing me to participate in the care of your patient.      Sincerely,     Janae Hernandez MD     [facility]" [place] [facility]  cc:   No referring provider defined for this encounter.

## 2021-09-14 NOTE — TELEPHONE ENCOUNTER
----- Message from Gabrielle Cuellar sent at 9/14/2021  3:42 PM CDT -----  Regarding: RX  Patient has already contacted their pharmacy. The medication or refill issue is below:    Ordering Cardiologist: garry  Medication: insulin glargine (LANTUS PEN)   Issue / Concern: refill  Preferred Pharmacy/City: [id number] [place] [address] AT [facility] [address]  [facility] Phone Number for Patient: [phone]    Additional Info:

## 2021-09-22 NOTE — TELEPHONE ENCOUNTER
----- Message from Jannet Cuellar sent at 9/22/2021  7:40 AM CDT -----  Regarding: Need to be scheduled  Pt no show on 09/13, will need to be called on and rescheduled for labs and office visit.    Thanks,  Jannet

## 2021-09-22 NOTE — TELEPHONE ENCOUNTER
I called and spoke with Lexus about the missed appointment and rescheduling it. She stated that she will call back to get this scheduled because she needs to get her PET scan completed for Dr. Goddard and have a follow up with them and would like to have our appt the same day.

## 2021-09-23 NOTE — TELEPHONE ENCOUNTER
Patient returned my call.  She is scheduled for her PET scan on Wed., 10/20/21, check in at 1:15 pm for 1:45 pm scan.

## 2021-10-01 PROBLEM — A41.9 SEVERE SEPSIS (H): Status: ACTIVE | Noted: 2021-01-01

## 2021-10-01 PROBLEM — R65.20 SEVERE SEPSIS (H): Status: ACTIVE | Noted: 2021-01-01

## 2021-10-01 PROBLEM — R10.84 ABDOMINAL PAIN, GENERALIZED: Status: ACTIVE | Noted: 2021-01-01

## 2021-10-01 PROBLEM — E87.20 LACTIC ACIDOSIS: Status: ACTIVE | Noted: 2021-01-01

## 2021-10-01 NOTE — ED NOTES
[facility]   ED Nurse to Floor Handoff     Dacia Miller is a 81 year old female who speaks English and lives with family members,  in a home  They arrived in the ED by ambulance from home    ED Chief Complaint: Fall and Generalized Weakness    ED Dx;   Final diagnoses:   Lactic acidosis   Severe sepsis (H)   Abdominal pain, generalized         Needed?: No    Allergies:   Allergies   Allergen Reactions     Amoxicillin-Pot Clavulanate [Augmentin] Hives     Atorvastatin      Muscle cramps  Muscle cramps       Cephalexin Monohydrate [Cephalexin] Itching and Rash     Contrast Dye Other (See Comments)     Flushed, rashes, blotches     Iodinated Contrast Media [Diagnostic X-Ray Materials] Other (See Comments)     Flushed, rashes, blotches     Other Allergy (See Comments) [External Allergen Needs Reconciliation - See Comment] Unknown     Contrast Media Ready-[facility] [place], 02/13/2013.; Contrast [employer] Ready-[employer] [facility], 02/13/2013.       Penicillins Unknown     Prochlorperazine Edisylate [Prochlorperazine] Unknown     Sulfa Drugs Rash   .  Past Medical Hx:   Past Medical History:   Diagnosis Date     Anemia, iron deficiency 1/22/2019     Arthritis      Breast cancer (H)      Cancer (H)      Diabetes mellitus (H)      DM (diabetes mellitus screen)      History of anesthesia complications      HLD (hyperlipidemia)      HTN (hypertension)      Kidney stones      Lung cancer (H)      PONV (postoperative nausea and vomiting)      Sciatica      Scoliosis       Baseline Mental status: WDL  Current Mental Status changes: at basesline    Infection present or suspected this encounter: yes enteric  Sepsis suspected: Yes  Isolation type: No active isolations  Patient tested for COVID 19 prior to admission: YES     Activity level - Baseline/Home:  Independent  Activity Level - Current:   Total Care    Bariatric equipment needed?: No    In the ED these meds were given:   Medications    ondansetron (ZOFRAN) injection 8 mg (8 mg Intravenous Not Given 9/30/21 2145)   vancomycin (VANCOCIN) 1,750 mg in sodium chloride 0.9 % 500 mL intermittent infusion (1,750 mg Intravenous New Bag 10/1/21 0018)   vancomycin place thorne - receiving intermittent dosing (has no administration in time range)   HYDROmorphone (DILAUDID) injection 1 mg (1 mg Intravenous Given 10/1/21 0058)   heparin 25,000 units in 0.45% NaCl 250 mL ANTICOAGULANT infusion (1,300 Units/hr Intravenous New Bag 10/1/21 0149)   diphenhydrAMINE (BENADRYL) injection 50 mg (has no administration in time range)   ketorolac (TORADOL) injection 15 mg (has no administration in time range)   0.9% sodium chloride BOLUS (0 mLs Intravenous Stopped 9/30/21 2310)   HYDROmorphone (PF) (DILAUDID) injection 0.5 mg (0.5 mg Intravenous Given 10/1/21 0015)   HYDROmorphone (PF) (DILAUDID) injection 0.5 mg (0.5 mg Intravenous Given 9/30/21 2150)   ciprofloxacin (CIPRO) infusion 400 mg (0 mg Intravenous Stopped 10/1/21 0154)   metroNIDAZOLE (FLAGYL) infusion 500 mg (0 mg Intravenous Stopped 10/1/21 0047)   0.9% sodium chloride BOLUS (0 mLs Intravenous Stopped 10/1/21 0154)   methylPREDNISolone sodium succinate (solu-MEDROL) injection 37.5 mg (37.5 mg Intravenous Given 10/1/21 0057)   heparin ANTICOAGULANT Loading dose for HIGH INTENSITY TREATMENT * Give BEFORE starting heparin infusion (5,800 Units Intravenous Given 10/1/21 0149)       Drips running?  Yes    Home pump  No    Current LDAs  Peripheral IV 09/30/21 Anterior;Right (Active)   Number of days: 1       Peripheral IV 09/30/21 Left Lower forearm (Active)   Number of days: 1       Labs results:   Labs Ordered and Resulted from Time of ED Arrival Up to the Time of Departure from the ED   INR - Abnormal; Notable for the following components:       Result Value    INR 1.26 (*)     All other components within normal limits   COMPREHENSIVE METABOLIC PANEL - Abnormal; Notable for the following components:    Carbon  Dioxide (CO2) 13 (*)     Anion Gap 15 (*)     Urea Nitrogen 55 (*)     Creatinine 2.09 (*)     Glucose 239 (*)     Albumin 3.2 (*)     GFR Estimate 22 (*)     All other components within normal limits   LACTIC ACID WHOLE BLOOD - Abnormal; Notable for the following components:    Lactic Acid 7.2 (*)     All other components within normal limits   CBC WITH PLATELETS AND DIFFERENTIAL - Abnormal; Notable for the following components:    WBC Count 24.5 (*)     RBC Count 3.66 (*)     Hemoglobin 10.9 (*)     MCHC 30.9 (*)     Absolute Neutrophils 22.4 (*)     Absolute Immature Granulocytes 0.3 (*)     All other components within normal limits   LACTIC ACID WHOLE BLOOD - Abnormal; Notable for the following components:    Lactic Acid 4.4 (*)     All other components within normal limits   PARTIAL THROMBOPLASTIN TIME - Normal   COVID-19 VIRUS (CORONAVIRUS) BY PCR - Normal    Narrative:     Testing was performed using the Xpert Xpress SARS-CoV-2 Assay on the  Cepheid Gene-Xpert Instrument Systems. Additional information about  this Emergency Use Authorization (EUA) assay can be found via the Lab  Guide. This test should be ordered for the detection of SARS-CoV-2 in  individuals who meet SARS-CoV-2 clinical and/or epidemiological  criteria. Test performance is unknown in asymptomatic patients. This  test is for in vitro diagnostic use under the FDA EUA for  laboratories certified under CLIA to perform high complexity testing.  This test has not been FDA cleared or approved. A negative result  does not rule out the presence of PCR inhibitors in the specimen or  target RNA in concentration below the limit of detection for the  assay. The possibility of a false negative should be considered if  the patient's recent exposure or clinical presentation suggests  COVID-19. This test was validated by the [facility] Infectious  Dise[facility]. This laboratory is certified under  the Clinical Laboratory Improvement  Amendments of 1988 (CLIA-88) as  qualified to perform high complexity laboratory testing.     TROPONIN I - Normal   EXTRA BLOOD CULTURE BOTTLE   EXTRA RED TOP TUBE   ROUTINE UA WITH MICROSCOPIC REFLEX TO CULTURE   CBC WITH PLATELETS   COMPREHENSIVE METABOLIC PANEL   LACTIC ACID WHOLE BLOOD   CARDIAC CONTINUOUS MONITORING   PERIPHERAL IV CATHETER   IV ACCESS   MEASURE WEIGHT   NOTIFY PHYSICIAN   NOTIFY PHYSICIAN   TYPE AND SCREEN, ADULT   BLOOD CULTURE   BLOOD CULTURE   CBC WITH PLATELETS & DIFFERENTIAL    Narrative:     The following orders were created for panel order CBC with platelets differential.  Procedure                               Abnormality         Status                     ---------                               -----------         ------                     CBC with platelets and d...[[id number]]  Abnormal            Final result                 Please view results for these tests on the individual orders.   EXTRA TUBE    Narrative:     The following orders were created for panel order Extra Tube.  Procedure                               Abnormality         Status                     ---------                               -----------         ------                     Extra Blood Culture Bottle[[id number]]                       Final result               Extra Red Top Tube[[id number]]                               Final result                 Please view results for these tests on the individual orders.   ABO/RH TYPE AND SCREEN    Narrative:     The following orders were created for panel order ABO/Rh type and screen.  Procedure                               Abnormality         Status                     ---------                               -----------         ------                     Adult Type and Screen[[id number]]                            In process                   Please view results for these tests on the individual orders.       Imaging Studies:   Recent Results (from the past 24  hour(s))   POC US RESUSCITATION    Impression    Limited Bedside ED Cardiac Ultrasound  PROCEDURE: PERFORMED BY: Dr. Emmett Richard MD  INDICATIONS/SYMPTOM:  Hypotension/shock  PROBE: Cardiac phased array probe  BODY LOCATION: Chest (cardiac)  FINDINGS: The ultrasound was performed utilizing the subcostal, parasternal long axis, parasternal short axis and apical 4 chamber views.  Grossly normal-to-hyperdynamic LV contractility. No RV dilation visualized. No pericardial effusion visualized.   INTERPRETATION:    Grossly normal-to-hyperdynamic LV contractility. No pericardial effusion. No RV dilation.   IMAGE DOCUMENTATION: Images were archived to PACs system.      Limited Bedside ED Aorta Ultrasound:  PROCEDURE: PERFORMED BY: Dr. Emmett Richard MD  INDICATIONS:  Abdominal Pain and Hypotension    PROBE:  Low frequency convex probe  BODY LOCATION: Abdomen  FINDINGS:  The ultrasound was performed using longitudinal and transverse views. No gross aortic dilation, no visualized dissection flap. No evidence of free fluid in hepatorenal (Morison's pouch), perisplenic, or and pelvic areas. No evidence of pericardial effusion.  INTERPRETATION:  The evaluation of the aorta was of grossly normal caliber without evidence of aneurysm or dilation. There was no abdominal free fluid. There was incidental fluid-filled bowel visualized.   IMAGE DOCUMENTATION: Images were archived to PACs system.     CT Abdomen Pelvis w/o Contrast    Narrative    EXAM: CT ABDOMEN PELVIS W/O CONTRAST  LOCATION: [facility]  DATE/TIME: 9/30/2021 10:12 PM    INDICATION: Abdominal pain, hypotension, decreased BM, vomiting, distention.  COMPARISON: 8/4/2021.  TECHNIQUE: CT scan of the abdomen and pelvis was performed without IV contrast. Multiplanar reformats were obtained. Dose reduction techniques were used.  CONTRAST: None.    FINDINGS:   LOWER CHEST: Moderate size hiatal hernia. Mild scarring at the  "lung bases. Coronary artery atherosclerotic calcifications. The heart size is normal. Asymmetric soft tissue at the lateral aspect of the right breast is not significantly changed from prior   exams.    HEPATOBILIARY: There is again biliary dilatation into the pancreas head without significant change. The gallbladder is absent.    PANCREAS: Normal.    SPLEEN: Normal.    ADRENAL GLANDS: Stable 1.1 cm right adrenal gland nodule.    KIDNEYS/BLADDER: 4 mm stone in the lower pole of the right kidney. Few renal cortical cysts bilaterally. No hydronephrosis.    BOWEL: Mild wall thickening and inflammation involving the distal transverse colon through mid sigmoid colon. There are colonic diverticula without acute diverticulitis. No bowel obstruction. No free intraperitoneal gas or fluid.    LYMPH NODES: Normal.    VASCULATURE: Extensive atherosclerotic calcification of the aorta and its branches. No aneurysm.    PELVIC ORGANS: Stable 1.4 cm right ovarian cyst. Small uterine fundal fibroid.    MUSCULOSKELETAL: Small midline ventral hernia containing fat. Degenerative disease and scoliosis in the spine. Old healed left pelvic fractures.      Impression    IMPRESSION:   1.  Nonspecific colitis extending from distal transverse colon through mid sigmoid colon. No bowel obstruction.  2.  Colonic diverticula without acute diverticulitis.  3.  Hiatal hernia.  4.  Midline ventral hernia containing fat.         Recent vital signs:   /49   Pulse 78   Temp 97.4  F (36.3  C) (Oral)   Resp 15   Ht 1.6 m (5' 2.99\")   Wt 72.6 kg (160 lb 0.9 oz)   SpO2 97%   BMI 28.36 kg/m      [place] Coma Scale Score: 15 (10/01/21 0207)       Cardiac Rhythm: Tachycardia  Pt needs tele? Yes  Skin/wound Issues: bruises on arms    Code Status: Full Code    Pain control: poor    Nausea control: good    Abnormal labs/tests/findings requiring intervention: see epic    Family present during ED course? Yes   Family Comments/Social Situation comments: " daughter here, son to come tomorrow    Tasks needing completion: CT at 0700 to assess for mesenteric ischemia     Briana Ponce, RN    [phone] ED

## 2021-10-01 NOTE — ED NOTES
Family in room with patient, MD informed family of CT results. Patient made DNR/DNI per husbands request.

## 2021-10-01 NOTE — CONSULTS
[facility]  Palliative Care Consultation Note    Patient: Dacia Miller  Date of Admission:  9/30/2021    Requesting Clinician / Team: [facility]- ED staff  Reason for consult: Symptom management  Goals of care  Decisional support  Patient and family support    Recommendations/discussion:    Patient seen and examined.  Discussed with ER staff and primary team.    Full comfort measures at this time supported by family given underlying end-of-life bowel ischemia.  Patient appears reasonably comfortable at this time on present infusion of hydromorphone.  Bolus hydromorphone available as needed.  Discontinuing monitoring and future lab work /diagnostics.  Added Biotene for oral dryness.  Encourage family to offer sips of fluids for comfort only.    Briefly reviewed options with family regarding home discharge with hospice care.  Given her life expectancy of 24 hours to 48 hours it was thought to keep her in the hospital for comfort care. Her adult children are aware. One daughter lives in [place] and is unavailable by phone this am.     Patient was seen in joint visit with palliative care .  [facility]  anointed patient this afternoon.    Palliative care offered empathetic listening and support given sudden change in patient's health status.  We will continue to follow for same.    These recommendations have been discussed with pt family and primary team.      Thank you for the opportunity to participate in the care of this patient and family. Our team: will continue to follow.     During regular M-F work hours -- if you are not sure who specifically to contact -- please contact us by sending a text page to our team consult pager at [phone].    After regular work hours and on weekends/holidays, you can call our answering service at [phone]. Also, who's on call for us is available in [facility] Smart Web.   Mario HATCH NP  Nurse  Practitioner- Lead Advanced Practice Provider  [facility] Palliative Medicine Consult Service   [phone]  TT spent: 75 minutes of which 55 minutes were spent in direct face to face contact with patient/family. Patient teaching done regarding basic role and tenets of palliative care consult. Greater than 50% of time spent counseling and/or coordinating care.     Assessment:     Per record and family report, Dacia Miller is a 81-year-old female with a past medical history of hypertension, lung cancer( several years ago- post chemo/rad intervention), diabetes, coronary artery disease with disease with NSTEMI  (8/2021) status post cath with failed PCI, who presented to the ED via EMS on 9/30/21 with 12 hours of markedly different abdominal pain, nausea and vomiting after collapse. Work up in ED-  mesenteric ischemia not amenable to surgical intervention with terminal prognosis.   Imaging with colitis on CT,  peritoneal signs on initial exam with pain out of proportion, presented with a lactic of 7 and worsening acidosis (pH 7.1) despite starting bicarb drip. Picture consistent with rapidly progressive mesenteric ischemia & sequelae. Suspect this is secondary to atherosclerotic disease of abdominal vasculature (confirmed on CTA) and correlates with recent NSTEMI and stroke. Not consistent with IBD and infectious colitis unlikely.       PC consult for support and sx management in the setting of above.  # Acute Mesenteric ischemia  # End of life cares    Prognosis, Goals, & Planning:      Functional Status just prior to hospitalization: 3 (Capable of only limited self-care; needs help with ADLs; in bed/chair >50% of waking hours)      Prognosis, Goals, and/or Advance Care Planning were addressed today: Yes        Summary/Comments: Patient was made DNR/DNI today with news of her mesenteric ischemia.   indicated that the patient never wanted to have conversations about healthcare directives in the past.   Family is aware of prognosis.  Difficult to assess patient awareness.      Patient's decision making preferences: shared with support from loved ones          Patient has decision-making capacity today for complex decisions: No            I have concerns about the patient/family's health literacy today: No           Patient has a completed Health Care Directive: No.       Code status: No CPR / No Intubation    Coping, Meaning, & Spirituality:   Mood, coping, and/or meaning in the context of serious illness were addressed today: Yes  Summary/Comments: Patient enjoys time with family and relies on them for coping.  Her relationships give meaning in her life.  Spends her free time watching television.  Longstanding [employer] kavya has a personal belief basis but not regular Latter day going basis.    Social:     Living situation: Lives with spouse in [facility].  Have lived there for 30+ years.  2 of 3 adult children live in area.    Key family / caregivers: Patient's  Saul is primary caregiver when needed.  He uses a walker for support so his abilities to assist are limited.  Adult children live nearby.    Current in-home services: None other than family.    History of Present Illness:  History gathered today from: family/loved ones, medical chart, medical team members, unit team members  Patient appeared comfortable lying in ER cart.  Dilaudid infusion in place.  O2 by nasal cannula.  Family present.  Plan for comfort care in hospital given brevity of life expectancy.    Key Palliative Symptom Data:  # Pain severity the last 12 hours: moderate  # Dyspnea severity the last 12 hours: low  # Nausea severity the last 12 hours: low    Patient is on opioids: bowels not assessed today.    ROS:  Comprehensive ROS is unreliable secondary to patient sedation at end of life setting.     Past Medical History:  Past Medical History:   Diagnosis Date     Anemia, iron deficiency 1/22/2019     Arthritis      Breast  cancer (H)      Cancer (H)      Diabetes mellitus (H)      DM (diabetes mellitus screen)      History of anesthesia complications      HLD (hyperlipidemia)      HTN (hypertension)      Kidney stones      Lung cancer (H)      PONV (postoperative nausea and vomiting)      Sciatica      Scoliosis         Past Surgical History:  Past Surgical History:   Procedure Laterality Date     ABDOMEN SURGERY       BIOPSY SKIN (LOCATION)        SECTION       CHOLECYSTECTOMY       CT BIOPSY LUNG  2018     CV CORONARY ANGIOGRAM N/A 8/10/2021    Procedure: Coronary Angiogram;  Surgeon: Briana Lea MD;  Location: [facility] CATH LAB CV     CV LEFT HEART CATH N/A 8/10/2021    Procedure: Left Heart Cath;  Surgeon: Briana Lea MD;  Location: [facility] LAB CV     CV PCI N/A 8/10/2021    Procedure: Percutaneous Coronary Intervention;  Surgeon: Briana Lea MD;  Location: [facility] LAB CV     WA MASTECTOMY,PARTIAL,  WITH AXILLARY LYMPHADENECTOMY Left 2018    Procedure: Left Lumpectomy; [place] Lymph Node Biopsy;  Surgeon: Lilliana Rodriguez MD;  Location: [facility];  Service: General         Family History:  Family History   Problem Relation Age of Onset     Breast Cancer Mother        Allergies:  Allergies   Allergen Reactions     Amoxicillin-Pot Clavulanate [Augmentin] Hives     Atorvastatin      Muscle cramps  Muscle cramps       Cephalexin Monohydrate [Cephalexin] Itching and Rash     Contrast Dye Other (See Comments)     Flushed, rashes, blotches     Iodinated Contrast Media [Diagnostic X-Ray Materials] Other (See Comments)     Flushed, rashes, blotches     Other Allergy (See Comments) [External Allergen Needs Reconciliation - See Comment] Unknown     Contrast Media Ready[place] [facility] [place] Contrast [employer] [facility], 2013.       Penicillins Unknown     Prochlorperazine Edisylate [Prochlorperazine] Unknown     Sulfa Drugs Rash        Medications:  I have reviewed this patient's  medication profile and medications from this hospitalization.   On PCA dilaudid infustion 1 mg per hour with 1 mg q 30 minute bolus for pain/sob.  Lorazepam IV for anxiety  Transitioned to CMO in ED with orders per primary team.    Physical Exam:  Vital Signs: Temp: 97.4  F (36.3  C) Temp src: Oral BP: 108/45 Pulse: 86   Resp: 24 SpO2: 96 % O2 Device: Nasal cannula Oxygen Delivery: 2 LPM  Weight: 160 lbs .86 oz    General appearance: Patient drowsy, rouses to voice.  Some facial grimacing.  Able to articulate that she has abdominal pain.  HEENT: Symmetric features.  Nonicteric sclera.  EOMI.  O2 by nasal cannula.  Mucous membranes appear mildly dry.  Respiratory: No increased work of breathing.  Lungs clear anteriorly.  No wheeze or rales.  Cardiac: S1-S2, regular rate and rhythm.  Harsh systolic murmur.  Abdomen: Brief exam with diffuse tenderness.  Mild distention.  Hypoactive to absent bowel sounds.  Skin: Extensive ecchymosis in the dorsum of the left hand from old IV sites.  No rashes on exposed surfaces.  Musculoskeletal: Appears to have normal tone.  Unable to assess strength.  Neurologic: Appears sedated and drifts off to sleep easily.  Earlier in the day was alert and oriented according to notes.  Difficult to assess how much patient understands of her current situation.      Data reviewed:  ROUTINE LABS (Last four results)  BMPRecent Labs   Lab 10/01/21  1301 10/01/21  1152 10/01/21  1038 10/01/21  1003 10/01/21  1000 10/01/21  0915 10/01/21  0800 10/01/21  0640 10/01/21  0200 10/01/21  0200 09/30/21  2133 09/30/21  2133   NA  --   --   --   --   --   --   --  137  --  138  --  136   POTASSIUM  --   --   --   --  5.7*  --   --  6.5*  6.6*  --  5.6*   < > 4.9   CHLORIDE  --   --   --   --   --   --   --  116*  --  116*  --  108   MOHAN  --   --   --   --   --   --   --  8.6  --  8.2*  --  9.9   CO2  --   --   --   --   --   --   --  12*  --  11*  --  13*   BUN  --   --   --   --   --   --   --  56*  --  54*   --  55*   CR  --   --   --   --   --   --   --  1.93*  --  1.90*  --  2.09*   * 332* 307* 289*  --    < >   < > 298*   < > 258*   < > 239*    < > = values in this interval not displayed.     CBC  Recent Labs   Lab 10/01/21  1000 10/01/21  0200 09/30/21  2133   WBC 18.6* 18.7* 24.5*   RBC 2.82* 3.27* 3.66*   HGB 8.4* 9.6* 10.9*   HCT 27.3* 31.7* 35.3   MCV 97 97 96   MCH 29.8 29.4 29.8   MCHC 30.8* 30.3* 30.9*   RDW 13.7 13.4 13.4    180 280     INR  Recent Labs   Lab 10/01/21  0640 09/30/21  2133   INR 1.54* 1.26*

## 2021-10-01 NOTE — PHARMACY-VANCOMYCIN DOSING SERVICE
Pharmacy Vancomycin Initial Note  Date of Service 2021  Patient's  1940  81 year old, female    Indication: Intra-abdominal infection and Sepsis    Current estimated CrCl = Estimated Creatinine Clearance: 20.8 mL/min (A) (based on SCr of 2.09 mg/dL (H)).    Creatinine for last 3 days  2021:  9:33 PM Creatinine 2.09 mg/dL    Recent Vancomycin Level(s) for last 3 days  No results found for requested labs within last 72 hours.      Vancomycin IV Administrations (past 72 hours)      No vancomycin orders with administrations in past 72 hours.                Nephrotoxins and other renal medications (From now, onward)    Start     Dose/Rate Route Frequency Ordered Stop    21  vancomycin (VANCOCIN) 1,750 mg in sodium chloride 0.9 % 500 mL intermittent infusion      1,750 mg  over 120 Minutes Intravenous ONCE 21  vancomycin place thorne - receiving intermittent dosing      1 each Intravenous SEE ADMIN INSTRUCTIONS 21            Contrast Orders - past 72 hours (72h ago, onward)    None               Plan:  1. Start vancomycin  1750 mg (~25 mg/kg) IV once then intermittent dosing.   2. Vancomycin monitoring method: Trough (Method 2 = manual dose calculation)  3. Vancomycin therapeutic monitoring goal: 15-20 mg/L  4. Pharmacy will check vancomycin levels as appropriate in 1-3 Days.    5. Serum creatinine levels will be ordered daily for the first week of therapy and at least twice weekly for subsequent weeks.      Roque Caballero [facility]

## 2021-10-01 NOTE — PROGRESS NOTES
Sepsis Evaluation Progress Note    I was called to see Dacia Miller due to labs and imaging findings. She is known to have an infection. She has non-operable mesenteric ischemia.     Physical Exam   Vital Signs:  Temp: 97.4  F (36.3  C) Temp src: Oral BP: 129/40 Pulse: 75   Resp: 15 SpO2: 96 % O2 Device: None (Room air)       General: acutely ill appearing  Mental Status: AAOx4.     Remainder of physical exam is significant for peritonitic abdominal exam.     Data   Lactic Acid   Date Value Ref Range Status   10/01/2021 3.1 (H) 0.7 - 2.0 mmol/L Final   09/30/2021 4.4 (HH) 0.7 - 2.0 mmol/L Final     Comment:     Value above critical limit       Assessment & Plan   Dacia Miller meets SIRS criteria but does NOT have a lactate >2 or other evidence of acute organ damage.  These vital sign, lab and physical exam findings constitute a diagnosis of SEPSIS.    Sepsis Time-Zero (time Sepsis diagnosis confirmed):  ED arrival 10/01/21    Anti-infectives (From now, onward)    Start     Dose/Rate Route Frequency Ordered Stop    10/02/21 0000  ciprofloxacin (CIPRO) infusion 400 mg     Note to Pharmacy: Please adjust dose as needed.    400 mg  200 mL/hr over 60 Minutes Intravenous EVERY 24 HOURS 10/01/21 0244      10/01/21 0600  metroNIDAZOLE (FLAGYL) infusion 500 mg      500 mg  100 mL/hr over 60 Minutes Intravenous EVERY 8 HOURS 10/01/21 0244 11/03/21 1359    09/30/21 2212  vancomycin place thorne - receiving intermittent dosing      1 each Intravenous SEE ADMIN INSTRUCTIONS 09/30/21 2213          Current antibiotic coverage is appropriate for source of infection.     Disposition: The patient will be transferred to [facility] [place]..  Lillian Villarreal MD    Sepsis Criteria   Sepsis: 2+ SIRS criteria due to infection  Severe Sepsis: Sepsis AND 1+ new sign of acute organ dysfunction (Note: lactate >2 or acute encephalopathy each qualify as organ dysfunction)  Septic Shock: Sepsis AND hypotension despite volume resuscitation with  30 ml/kg crystalloid or lactate >=4  Note: HYPOTENSION is defined as 2 BP readings measured 3 hrs apart that have a SBP <90, MAP <65, or decrease >40 mmHg, occurring 6 hrs before or after t-zero

## 2021-10-01 NOTE — PROGRESS NOTES
[facility]    Medicine Progress Note - Hospitalist Service, Gold 5       Date of Admission:  2021    Assessment & Plan    Dacia Miller is a 81-year-old female with a past medical history notable for hypertension, lung cancer, diabetes, coronary artery disease with disease with NSTEMI status post cath with failed PCI, kidney stones, status post cholecystectomy, status post , who presents to the ED with 1 afternoon of abdominal pain, vomiting after collapsing and found to have mesenteric ischemia not amenable to surgical intervention with very poor prognosis.    # Acute Mesenteric ischemia  # End of life cares  Patient with colitis on CT, history of NSTEMI/stroke recently, peritoneal signs on initial exam with pain out of proportion, presented with a lactic of 7 andworsening acidosis (pH 7.1) despite starting bicarb drip. Picture consistent with rapidly progressive mesenteric ischemia & sequelae. Suspect this is secondary to atherosclerotic disease of abdominal vasculature (confirmed on CTA) and correlates with recent NSTEMI and stroke. Not consistent with IBD and infectious colitis unlikely. Patient and family aware that this is a life-limiting condition.  Surgery consulted in ED and had maria r conversation with patient and daughter that she was approaching end-of-life and there were no surgical options for her at this time.  Palliative care consulted and comfort care orders placed.  -DNR/DNI  -Continue pain management with PCA  -Comfort care order set  -Stop lab draws     Diet: NPO for Medical/Clinical Reasons Except for: Meds, Ice Chips    DVT Prophylaxis: Mechanical  Paez Catheter: Not present  Central Lines: None  Code Status: No CPR- Do NOT Intubate      Disposition Plan   Expected discharge: Comfort Care     The patient's care was discussed with the Attending Physician, Dr. Dr. Oneil.    Tiki Schulz PA-C  Hospitalist Service [address]  [facility]  Securely message with the Vocera Web Console (learn more here)  Text page via [employer] Paging/Directory  Please see sign in/sign out for up to date coverage information      ______________________________________________________________________    Interval History   Saw patient at bedside this morning in emergency department.  Patient clearly in a significant amount of pain, but improved with IV Dilaudid.  Patient also more drowsy after receiving IV Dilaudid.  No longer opening eyes but groaning in pain.  Met with patient's daughter and  and son, who are also at bedside.  Decision was made to make patient as comfortable as possible.  Questions were answered regarding diagnosis as well as lack of treatment options.  Family decided to make patient DNR, DNI.    Data reviewed today: I reviewed all medications, new labs and imaging results over the last 24 hours.     Physical Exam   Vital Signs: Temp: 97.4  F (36.3  C) Temp src: Oral BP: 108/45 Pulse: 86   Resp: 24 SpO2: 96 % O2 Device: Nasal cannula Oxygen Delivery: 2 LPM  Weight: 160 lbs .86 oz  General Appearance: Drowsy after receiving IV Dilaudid, however continues to moan intermittently in pain  *Rest of physical exam was deferred given decision to move patient to comfort care.    Data   Recent Labs   Lab 10/01/21  1301 10/01/21  1152 10/01/21  1038 10/01/21  1003 10/01/21  1000 10/01/21  0800 10/01/21  0640 10/01/21  0200 10/01/21  0200 09/30/21  2133 09/30/21  2133   WBC  --   --   --   --  18.6*  --   --   --  18.7*  --  24.5*   HGB  --   --   --   --  8.4*  --   --   --  9.6*  --  10.9*   MCV  --   --   --   --  97  --   --   --  97  --  96   PLT  --   --   --   --  171  --   --   --  180  --  280   INR  --   --   --   --   --   --  1.54*  --   --   --  1.26*   NA  --   --   --   --   --   --  137  --  138  --  136   POTASSIUM  --   --   --   --  5.7*  --  6.5*  6.6*   < > 5.6*   < > 4.9   CHLORIDE   --   --   --   --   --   --  116*  --  116*  --  108   CO2  --   --   --   --   --   --  12*  --  11*  --  13*   BUN  --   --   --   --   --   --  56*  --  54*  --  55*   CR  --   --   --   --   --   --  1.93*  --  1.90*  --  2.09*   ANIONGAP  --   --   --   --   --   --  9  --  11  --  15*   MOHAN  --   --   --   --   --   --  8.6  --  8.2*  --  9.9   * 332* 307*   < >  --    < > 298*   < > 258*   < > 239*   ALBUMIN  --   --   --   --   --   --   --   --  2.5*  --  3.2*   PROTTOTAL  --   --   --   --   --   --   --   --  5.9*  --  7.4   BILITOTAL  --   --   --   --   --   --   --   --  0.6  --  0.6   ALKPHOS  --   --   --   --   --   --   --   --  87  --  107   ALT  --   --   --   --   --   --   --   --  18  --  17   AST  --   --   --   --   --   --   --   --  23  --  19   TROPONIN  --   --   --   --   --   --   --   --   --   --  <0.015    < > = values in this interval not displayed.     Recent Results (from the past 24 hour(s))   POC US RESUSCITATION    Impression    Limited Bedside ED Cardiac Ultrasound  PROCEDURE: PERFORMED BY: Dr. Emmett Richard MD  INDICATIONS/SYMPTOM:  Hypotension/shock  PROBE: Cardiac phased array probe  BODY LOCATION: Chest (cardiac)  FINDINGS: The ultrasound was performed utilizing the subcostal, parasternal long axis, parasternal short axis and apical 4 chamber views.  Grossly normal-to-hyperdynamic LV contractility. No RV dilation visualized. No pericardial effusion visualized.   INTERPRETATION:    Grossly normal-to-hyperdynamic LV contractility. No pericardial effusion. No RV dilation.   IMAGE DOCUMENTATION: Images were archived to PACs system.      Limited Bedside ED Aorta Ultrasound:  PROCEDURE: PERFORMED BY: Dr. Emmett Richard MD  INDICATIONS:  Abdominal Pain and Hypotension    PROBE:  Low frequency convex probe  BODY LOCATION: Abdomen  FINDINGS:  The ultrasound was performed using longitudinal and transverse views. No gross aortic dilation, no visualized dissection  flap. No evidence of free fluid in hepatorenal (Morison's pouch), perisplenic, or and pelvic areas. No evidence of pericardial effusion.  INTERPRETATION:  The evaluation of the aorta was of grossly normal caliber without evidence of aneurysm or dilation. There was no abdominal free fluid. There was incidental fluid-filled bowel visualized.   IMAGE DOCUMENTATION: Images were archived to PACs system.     CT Abdomen Pelvis w/o Contrast    Narrative    EXAM: CT ABDOMEN PELVIS W/O CONTRAST  LOCATION: [facility]  DATE/TIME: 9/30/2021 10:12 PM    INDICATION: Abdominal pain, hypotension, decreased BM, vomiting, distention.  COMPARISON: 8/4/2021.  TECHNIQUE: CT scan of the abdomen and pelvis was performed without IV contrast. Multiplanar reformats were obtained. Dose reduction techniques were used.  CONTRAST: None.    FINDINGS:   LOWER CHEST: Moderate size hiatal hernia. Mild scarring at the lung bases. Coronary artery atherosclerotic calcifications. The heart size is normal. Asymmetric soft tissue at the lateral aspect of the right breast is not significantly changed from prior   exams.    HEPATOBILIARY: There is again biliary dilatation into the pancreas head without significant change. The gallbladder is absent.    PANCREAS: Normal.    SPLEEN: Normal.    ADRENAL GLANDS: Stable 1.1 cm right adrenal gland nodule.    KIDNEYS/BLADDER: 4 mm stone in the lower pole of the right kidney. Few renal cortical cysts bilaterally. No hydronephrosis.    BOWEL: Mild wall thickening and inflammation involving the distal transverse colon through mid sigmoid colon. There are colonic diverticula without acute diverticulitis. No bowel obstruction. No free intraperitoneal gas or fluid.    LYMPH NODES: Normal.    VASCULATURE: Extensive atherosclerotic calcification of the aorta and its branches. No aneurysm.    PELVIC ORGANS: Stable 1.4 cm right ovarian cyst. Small uterine fundal  fibroid.    MUSCULOSKELETAL: Small midline ventral hernia containing fat. Degenerative disease and scoliosis in the spine. Old healed left pelvic fractures.      Impression    IMPRESSION:   1.  Nonspecific colitis extending from distal transverse colon through mid sigmoid colon. No bowel obstruction.  2.  Colonic diverticula without acute diverticulitis.  3.  Hiatal hernia.  4.  Midline ventral hernia containing fat.     CTA Abdomen Pelvis with Contrast    Narrative    Exam: Computed tomographic angiography of the abdomen and pelvis  without and with contrast, including 3D reformations dated 10/1/2021  7:52 AM    Clinical information:  Mesenteric ischemia, acute; ; high suspicion of  mesenteric ischemia, earlier non-contrast CT with non-specific colonic  inflammation, now pre-medicated for contrast    Technique: Helical scans through the abdomen and pelvis obtained  following the injection of contrast media in the arterial phase and  portal venous phases. Source images reviewed as well as multi-planar  reconstructions.    Contrast: 99 cc of isovue 370    Comparison study: 9/30/2021 at 2209 hours    Findings:    Circumferential colonic wall thickening involving the distal  transverse colon and extending to the mid sigmoid colon. In the  arterial phase, there is trace mucosal enhancement with the remainder  of the bowel wall without significant enhancement. Decreased  enhancement of the thickened bowel wall during the portal venous  phase. Pericolonic inflammatory stranding and accumulation of fluid  within the left pericolic gutter and pelvis is increased since prior  exam. No evidence of perforation or abscess. No pneumatosis.    Severe atherosclerotic disease throughout the large arteries. Mild  stenosis at the origin of the celiac artery. Heavy atherosclerotic  calcification at the origin of the superior mesenteric artery and  associated near occlusion. Renal arteries are patent. Focal near  occlusion in the  proximal inferior mesenteric artery (series 8, image  345) which appears secondary to calcified and soft atherosclerotic  plaque with potentially superimposed thrombus. Bilateral hypogastric  arteries are patent with mild/moderate atherosclerotic changes and  stenosis. Bilateral common and external iliac arteries are patent.  Bilateral common femoral arteries patent.    The splenic vein, portal vein, SMV and renal veins are patent. The  hepatic veins and IVC are patent.    Abdomen and pelvis:   No suspicious focal hepatic lesion. Cholecystectomy with unchanged  intrahepatic and extrahepatic biliary dilation. Main pancreatic duct  is normal in caliber. Spleen, adrenal glands are unremarkable.  Multiple benign-appearing renal cysts. Ureters and urinary bladder are  unremarkable. Abnormal thickening of the endometrium up to 1.9 cm. No  abdominal pelvic lymphadenopathy.    Lower thorax:  Large hiatal hernia. Streaky atelectasis in the left lower lobe. Heavy  coronary artery calcification.    Bones and soft tissues:  No acute or suspicious osseous lesion.      Impression    Impression:  1. Colitis involving the distal transverse colon extending to the mid  sigmoid colon. Suspect ischemic etiology in the setting of near  occlusion demonstrated at the origin of the superior mesenteric artery  and within the proximal inferior mesenteric artery. Increased  pericolonic inflammatory stranding and fluid layering in the pelvis  since exam performed 10 hours prior. No perforated bowel. Infectious  or inflammatory etiology remains a differential consideration, though  considered less likely.  2. Abnormal endometrial thickening up to 1.9 cm which can be seen in  endometrial cancer.    I have personally reviewed the examination and initial interpretation  and I agree with the findings.    PJ TRIMBLE MD         SYSTEM ID:  [id number]

## 2021-10-01 NOTE — ED NOTES
[facility]   ED Nurse to Floor Handoff     Dacia Miller is a 81 year old female who speaks English and lives with a spouse,  in a home  They arrived in the ED by ambulance from home    ED Chief Complaint: Fall and Generalized Weakness    ED Dx;   Final diagnoses:   Lactic acidosis   Severe sepsis (H)   Abdominal pain, generalized         Needed?: No    Allergies:   Allergies   Allergen Reactions     Amoxicillin-Pot Clavulanate [Augmentin] Hives     Atorvastatin      Muscle cramps  Muscle cramps       Cephalexin Monohydrate [Cephalexin] Itching and Rash     Contrast Dye Other (See Comments)     Flushed, rashes, blotches     Iodinated Contrast Media [Diagnostic X-Ray Materials] Other (See Comments)     Flushed, rashes, blotches     Other Allergy (See Comments) [External Allergen Needs Reconciliation - See Comment] Unknown     Contrast Media [facility] [place], 02/13/2013.; Contrast Media [employer], 02/13/2013.       Penicillins Unknown     Prochlorperazine Edisylate [Prochlorperazine] Unknown     Sulfa Drugs Rash   .  Past Medical Hx:   Past Medical History:   Diagnosis Date     Anemia, iron deficiency 1/22/2019     Arthritis      Breast cancer (H)      Cancer (H)      Diabetes mellitus (H)      DM (diabetes mellitus screen)      History of anesthesia complications      HLD (hyperlipidemia)      HTN (hypertension)      Kidney stones      Lung cancer (H)      PONV (postoperative nausea and vomiting)      Sciatica      Scoliosis       Baseline Mental status: WDL  Current Mental Status changes: other confused    Infection present or suspected this encounter: cultures pending  Sepsis suspected: Yes  Isolation type: Enteric  Patient tested for COVID 19 prior to admission: NO     Activity level - Baseline/Home:  Independent  Activity Level - Current:   Total Care    Bariatric equipment needed?: No    In the ED these meds were given:   Medications   ondansetron  (ZOFRAN) injection 8 mg (8 mg Intravenous Not Given 9/30/21 2145)   vancomycin place thorne - receiving intermittent dosing (has no administration in time range)   heparin 25,000 units in 0.45% NaCl 250 mL ANTICOAGULANT infusion ( Intravenous Held by provider 10/1/21 0742)   lidocaine 1 % 0.1-1 mL (has no administration in time range)   lidocaine (LMX4) cream (has no administration in time range)   sodium chloride (PF) 0.9% PF flush 3 mL (3 mLs Intracatheter Not Given 10/1/21 0408)   sodium chloride (PF) 0.9% PF flush 3 mL (has no administration in time range)   melatonin tablet 1 mg (has no administration in time range)   ondansetron (ZOFRAN-ODT) ODT tab 4 mg (has no administration in time range)     Or   ondansetron (ZOFRAN) injection 4 mg (has no administration in time range)   metroNIDAZOLE (FLAGYL) infusion 500 mg (500 mg Intravenous Not Given 10/1/21 1415)   ciprofloxacin (CIPRO) infusion 400 mg (has no administration in time range)   sodium bicarbonate 150 mEq in D5W 1,000 mL infusion ( Intravenous Stopped 10/1/21 1414)   EPINEPHrine (ADRENALIN) kit 0.3 mg (0.3 mg Intramuscular Not Given 10/1/21 0902)   glucose gel 15-30 g ( Oral Unheld by provider 10/1/21 0741)     Or   dextrose 50 % injection 25-50 mL ( Intravenous Unheld by provider 10/1/21 0741)     Or   glucagon injection 1 mg ( Subcutaneous Unheld by provider 10/1/21 0741)   dextrose 10% infusion ( Intravenous Not Given 10/1/21 0802)   dextrose 50 % injection 25 g (25 g Intravenous Not Given 10/1/21 0801)   HYDROmorphone (DILAUDID) injection 1 mg (1 mg Intravenous Not Given 10/1/21 0953)   senna-docusate (SENOKOT-S/PERICOLACE) 8.6-50 MG per tablet 1 tablet (1 tablet Oral Not Given 10/1/21 1103)     Or   senna-docusate (SENOKOT-S/PERICOLACE) 8.6-50 MG per tablet 2 tablet ( Oral See Alternative 10/1/21 1103)   polyethylene glycol (MIRALAX) Packet 17 g (17 g Oral Not Given 10/1/21 1103)   HYDROmorphone (DILAUDID) PCA 0.2 mg/mL OPIOID TOLERANT ( Intravenous  New Syringe/Cartridge 10/1/21 1006)   naloxone (NARCAN) injection 0.1 mg (has no administration in time range)   naloxone (NARCAN) injection 0.2 mg (has no administration in time range)   LORazepam (ATIVAN) injection 1 mg (has no administration in time range)     Or   LORazepam (ATIVAN) tablet 1 mg (has no administration in time range)   ondansetron (ZOFRAN-ODT) ODT tab 4 mg (has no administration in time range)     Or   ondansetron (ZOFRAN) injection 4 mg (has no administration in time range)   atropine 1 % ophthalmic solution 2 drop (has no administration in time range)   artificial saliva (BIOTENE MT) solution 2 spray (has no administration in time range)   acetaminophen (TYLENOL) Suppository 325 mg (has no administration in time range)   0.9% sodium chloride BOLUS (0 mLs Intravenous Stopped 9/30/21 2310)   HYDROmorphone (PF) (DILAUDID) injection 0.5 mg (0.5 mg Intravenous Given 10/1/21 0015)   HYDROmorphone (PF) (DILAUDID) injection 0.5 mg (0.5 mg Intravenous Given 9/30/21 2150)   ciprofloxacin (CIPRO) infusion 400 mg (0 mg Intravenous Stopped 10/1/21 0154)   metroNIDAZOLE (FLAGYL) infusion 500 mg (0 mg Intravenous Stopped 10/1/21 0047)   vancomycin (VANCOCIN) 1,750 mg in sodium chloride 0.9 % 500 mL intermittent infusion (0 mg Intravenous Stopped 10/1/21 0245)   0.9% sodium chloride BOLUS (0 mLs Intravenous Stopped 10/1/21 0154)   methylPREDNISolone sodium succinate (solu-MEDROL) injection 37.5 mg (37.5 mg Intravenous Given 10/1/21 0057)   heparin ANTICOAGULANT Loading dose for HIGH INTENSITY TREATMENT * Give BEFORE starting heparin infusion (5,800 Units Intravenous Given 10/1/21 0149)   diphenhydrAMINE (BENADRYL) injection 50 mg (50 mg Intravenous Given 10/1/21 0624)   ketorolac (TORADOL) injection 15 mg (15 mg Intravenous Given 10/1/21 0846)   sodium bicarbonate 8.4 % injection 50 mEq (50 mEq Intravenous Given 10/1/21 0814)   insulin regular 1 unit/mL injection 7.3 Units (7.3 Units Intravenous Given 10/1/21  0809)   calcium gluconate 1 g in 50 mL sodium chloride intermittent infusion (premix) (1 g Intravenous Given 10/1/21 0808)   iopamidol (ISOVUE-370) solution 99 mL (99 mLs Intravenous Given 10/1/21 0739)   sodium chloride (PF) 0.9% PF flush 83 mL (83 mLs Intravenous Given 10/1/21 0740)       Drips running?  Yes    Home pump  No    Current LDAs  Peripheral IV 09/30/21 Anterior;Right (Active)   Number of days: 1       Peripheral IV 09/30/21 Left Lower forearm (Active)   Number of days: 1       Peripheral IV 10/01/21 Left;Anterior Upper forearm (Active)   Site Assessment WDL 10/01/21 0509   Line Status Saline locked 10/01/21 0509   Dressing Intervention New dressing  10/01/21 0509   Phlebitis Scale 0-->no symptoms 10/01/21 0509   Infiltration Scale 0 10/01/21 0509   Number of days: 0       Labs results:   Labs Ordered and Resulted from Time of ED Arrival Up to the Time of Departure from the ED   INR - Abnormal; Notable for the following components:       Result Value    INR 1.26 (*)     All other components within normal limits   COMPREHENSIVE METABOLIC PANEL - Abnormal; Notable for the following components:    Carbon Dioxide (CO2) 13 (*)     Anion Gap 15 (*)     Urea Nitrogen 55 (*)     Creatinine 2.09 (*)     Glucose 239 (*)     Albumin 3.2 (*)     GFR Estimate 22 (*)     All other components within normal limits   LACTIC ACID WHOLE BLOOD - Abnormal; Notable for the following components:    Lactic Acid 7.2 (*)     All other components within normal limits   CBC WITH PLATELETS AND DIFFERENTIAL - Abnormal; Notable for the following components:    WBC Count 24.5 (*)     RBC Count 3.66 (*)     Hemoglobin 10.9 (*)     MCHC 30.9 (*)     Absolute Neutrophils 22.4 (*)     Absolute Immature Granulocytes 0.3 (*)     All other components within normal limits   LACTIC ACID WHOLE BLOOD - Abnormal; Notable for the following components:    Lactic Acid 4.4 (*)     All other components within normal limits   CBC WITH PLATELETS -  Abnormal; Notable for the following components:    WBC Count 18.7 (*)     RBC Count 3.27 (*)     Hemoglobin 9.6 (*)     Hematocrit 31.7 (*)     MCHC 30.3 (*)     All other components within normal limits   COMPREHENSIVE METABOLIC PANEL - Abnormal; Notable for the following components:    Potassium 5.6 (*)     Chloride 116 (*)     Carbon Dioxide (CO2) 11 (*)     Urea Nitrogen 54 (*)     Creatinine 1.90 (*)     Calcium 8.2 (*)     Glucose 258 (*)     Protein Total 5.9 (*)     Albumin 2.5 (*)     GFR Estimate 25 (*)     All other components within normal limits   LACTIC ACID WHOLE BLOOD - Abnormal; Notable for the following components:    Lactic Acid 3.1 (*)     All other components within normal limits   LACTIC ACID WHOLE BLOOD - Abnormal; Notable for the following components:    Lactic Acid 3.5 (*)     All other components within normal limits   BASIC METABOLIC PANEL - Abnormal; Notable for the following components:    Potassium 6.6 (*)     Chloride 116 (*)     Carbon Dioxide (CO2) 12 (*)     Urea Nitrogen 56 (*)     Creatinine 1.93 (*)     Glucose 298 (*)     GFR Estimate 24 (*)     All other components within normal limits   BLOOD GAS VENOUS - Abnormal; Notable for the following components:    pH Venous 7.13 (*)     pCO2 Venous 35 (*)     pO2 Venous 24 (*)     Bicarbonate Venous 12 (*)     Base Excess/Deficit (+/-) -16.3 (*)     Potassium 6.5 (*)     All other components within normal limits   HEPARIN UNFRACTIONATED ANTI XA LEVEL - Abnormal; Notable for the following components:    Anti Xa Unfractionated Heparin >1.10 (*)     All other components within normal limits    Narrative:     Therapeutic Range: UFH: 0.25-0.50 IU/mL for low intensity dosing,  0.30-0.70 IU/mL for high intensity dosing DVT and PE.  This test is not validated for other direct factor X inhibitors (e.g. rivaroxaban, apixaban, edoxaban, betrixaban, fondaparinux) and should not be used for monitoring of other medications.   LACTIC ACID WHOLE BLOOD  - Abnormal; Notable for the following components:    Lactic Acid 3.8 (*)     All other components within normal limits   INR - Abnormal; Notable for the following components:    INR 1.54 (*)     All other components within normal limits   GLUCOSE BY METER - Abnormal; Notable for the following components:    GLUCOSE BY METER POCT 302 (*)     All other components within normal limits   GLUCOSE BY METER - Abnormal; Notable for the following components:    GLUCOSE BY METER POCT 289 (*)     All other components within normal limits   POTASSIUM - Abnormal; Notable for the following components:    Potassium 5.7 (*)     All other components within normal limits   GLUCOSE BY METER - Abnormal; Notable for the following components:    GLUCOSE BY METER POCT 277 (*)     All other components within normal limits   CBC WITH PLATELETS AND DIFFERENTIAL - Abnormal; Notable for the following components:    WBC Count 18.6 (*)     RBC Count 2.82 (*)     Hemoglobin 8.4 (*)     Hematocrit 27.3 (*)     MCHC 30.8 (*)     Absolute Neutrophils 17.1 (*)     Absolute Lymphocytes 0.5 (*)     Absolute Immature Granulocytes 0.1 (*)     All other components within normal limits   GLUCOSE BY METER - Abnormal; Notable for the following components:    GLUCOSE BY METER POCT 289 (*)     All other components within normal limits   GLUCOSE BY METER - Abnormal; Notable for the following components:    GLUCOSE BY METER POCT 307 (*)     All other components within normal limits   LACTIC ACID WHOLE BLOOD - Abnormal; Notable for the following components:    Lactic Acid 2.2 (*)     All other components within normal limits   GLUCOSE BY METER - Abnormal; Notable for the following components:    GLUCOSE BY METER POCT 332 (*)     All other components within normal limits   GLUCOSE BY METER - Abnormal; Notable for the following components:    GLUCOSE BY METER POCT 350 (*)     All other components within normal limits   PARTIAL THROMBOPLASTIN TIME - Normal    COVID-19 VIRUS (CORONAVIRUS) BY PCR - Normal    Narrative:     Testing was performed using the Xpert Xpress SARS-CoV-2 Assay on the  Cepheid Gene-Xpert Instrument Systems. Additional information about  this Emergency Use Authorization (EUA) assay can be found via the Lab  Guide. This test should be ordered for the detection of SARS-CoV-2 in  individuals who meet SARS-CoV-2 clinical and/or epidemiological  criteria. Test performance is unknown in asymptomatic patients. This  test is for in vitro diagnostic use under the FDA EUA for  laboratories certified under CLIA to perform high complexity testing.  This test has not been FDA cleared or approved. A negative result  does not rule out the presence of PCR inhibitors in the specimen or  target RNA in concentration below the limit of detection for the  assay. The possibility of a false negative should be considered if  the patient's recent exposure or clinical presentation suggests  COVID-19. This test was validated by the [facility]. This laboratory is certified under  the Clinical Laboratory Improvement Amendments of 1988 (CLIA-88) as  qualified to perform high complexity laboratory testing.     TROPONIN I - Normal   HEPARIN UNFRACTIONATED ANTI XA LEVEL - Normal    Narrative:     Therapeutic Range: UFH: 0.25-0.50 IU/mL for low intensity dosing,  0.30-0.70 IU/mL for high intensity dosing DVT and PE.  This test is not validated for other direct factor X inhibitors (e.g. rivaroxaban, apixaban, edoxaban, betrixaban, fondaparinux) and should not be used for monitoring of other medications.   FIBRINOGEN ACTIVITY - Normal   BLOOD CULTURE - Normal   EXTRA BLOOD CULTURE BOTTLE   EXTRA RED TOP TUBE   EXTRA GREEN TOP (LITHIUM HEPARIN) TUBE   EXTRA PURPLE TOP TUBE   GLUCOSE MONITOR NURSING POCT   GLUCOSE MONITOR NURSING POCT   CARDIAC CONTINUOUS MONITORING   IP ASSIGN PROVIDER TEAM TO TREATMENT TEAM   NO VTE PROPHYLAXIS    PERIPHERAL IV CATHETER   IV ACCESS   NOTIFY PHYSICIAN   NOTIFY PHYSICIAN   PERIPHERAL IV CATHETER   PULSE OXIMETRY NURSING   INTAKE AND OUTPUT   ASSESS FOR HYPOGLYCEMIA SYMPTOMS   NOTIFY PHYSICIAN   PULSE OXIMETRY NURSING   COMFORT CARE   PAIN ASSESSMENT   DRESSING   COMFORT CARE   TURN   TYPE AND SCREEN, ADULT   BLOOD CULTURE   CBC WITH PLATELETS & DIFFERENTIAL    Narrative:     The following orders were created for panel order CBC with platelets differential.  Procedure                               Abnormality         Status                     ---------                               -----------         ------                     CBC with platelets and d...[[id number]]  Abnormal            Final result                 Please view results for these tests on the individual orders.   ABO/RH TYPE AND SCREEN    Narrative:     The following orders were created for panel order ABO/Rh type and screen.  Procedure                               Abnormality         Status                     ---------                               -----------         ------                     Adult Type and Screen[[id number]]                            Final result                 Please view results for these tests on the individual orders.   EXTRA TUBE    Narrative:     The following orders were created for panel order Extra Tube.  Procedure                               Abnormality         Status                     ---------                               -----------         ------                     Extra Blood Culture Bottle[[id number]]                       Final result               Extra Red Top Tube[[id number]]                               Final result                 Please view results for these tests on the individual orders.   EXTRA TUBE    Narrative:     The following orders were created for panel order Extra Tube.  Procedure                               Abnormality         Status                     ---------                                -----------         ------                     Extra Green Top (Lithium...[[id number]]                      Final result                 Please view results for these tests on the individual orders.   CBC WITH PLATELETS & DIFFERENTIAL    Narrative:     The following orders were created for panel order CBC with platelets differential.  Procedure                               Abnormality         Status                     ---------                               -----------         ------                     CBC with platelets and d...[[id number]]  Abnormal            Final result                 Please view results for these tests on the individual orders.   EXTRA TUBE    Narrative:     The following orders were created for panel order Extra Tube.  Procedure                               Abnormality         Status                     ---------                               -----------         ------                     Extra Purple Top Tube[[id number]]                            Final result                 Please view results for these tests on the individual orders.       Imaging Studies:   Recent Results (from the past 24 hour(s))   POC US RESUSCITATION    Impression    Limited Bedside ED Cardiac Ultrasound  PROCEDURE: PERFORMED BY: Dr. Emmett Richard MD  INDICATIONS/SYMPTOM:  Hypotension/shock  PROBE: Cardiac phased array probe  BODY LOCATION: Chest (cardiac)  FINDINGS: The ultrasound was performed utilizing the subcostal, parasternal long axis, parasternal short axis and apical 4 chamber views.  Grossly normal-to-hyperdynamic LV contractility. No RV dilation visualized. No pericardial effusion visualized.   INTERPRETATION:    Grossly normal-to-hyperdynamic LV contractility. No pericardial effusion. No RV dilation.   IMAGE DOCUMENTATION: Images were archived to PACs system.      Limited Bedside ED Aorta Ultrasound:  PROCEDURE: PERFORMED BY: Dr. Emmett Richard MD  INDICATIONS:  Abdominal Pain and  Hypotension    PROBE:  Low frequency convex probe  BODY LOCATION: Abdomen  FINDINGS:  The ultrasound was performed using longitudinal and transverse views. No gross aortic dilation, no visualized dissection flap. No evidence of free fluid in hepatorenal (Morison's pouch), perisplenic, or and pelvic areas. No evidence of pericardial effusion.  INTERPRETATION:  The evaluation of the aorta was of grossly normal caliber without evidence of aneurysm or dilation. There was no abdominal free fluid. There was incidental fluid-filled bowel visualized.   IMAGE DOCUMENTATION: Images were archived to PACs system.     CT Abdomen Pelvis w/o Contrast    Narrative    EXAM: CT ABDOMEN PELVIS W/O CONTRAST  LOCATION: [facility]  DATE/TIME: 9/30/2021 10:12 PM    INDICATION: Abdominal pain, hypotension, decreased BM, vomiting, distention.  COMPARISON: 8/4/2021.  TECHNIQUE: CT scan of the abdomen and pelvis was performed without IV contrast. Multiplanar reformats were obtained. Dose reduction techniques were used.  CONTRAST: None.    FINDINGS:   LOWER CHEST: Moderate size hiatal hernia. Mild scarring at the lung bases. Coronary artery atherosclerotic calcifications. The heart size is normal. Asymmetric soft tissue at the lateral aspect of the right breast is not significantly changed from prior   exams.    HEPATOBILIARY: There is again biliary dilatation into the pancreas head without significant change. The gallbladder is absent.    PANCREAS: Normal.    SPLEEN: Normal.    ADRENAL GLANDS: Stable 1.1 cm right adrenal gland nodule.    KIDNEYS/BLADDER: 4 mm stone in the lower pole of the right kidney. Few renal cortical cysts bilaterally. No hydronephrosis.    BOWEL: Mild wall thickening and inflammation involving the distal transverse colon through mid sigmoid colon. There are colonic diverticula without acute diverticulitis. No bowel obstruction. No free intraperitoneal gas or fluid.    LYMPH  NODES: Normal.    VASCULATURE: Extensive atherosclerotic calcification of the aorta and its branches. No aneurysm.    PELVIC ORGANS: Stable 1.4 cm right ovarian cyst. Small uterine fundal fibroid.    MUSCULOSKELETAL: Small midline ventral hernia containing fat. Degenerative disease and scoliosis in the spine. Old healed left pelvic fractures.      Impression    IMPRESSION:   1.  Nonspecific colitis extending from distal transverse colon through mid sigmoid colon. No bowel obstruction.  2.  Colonic diverticula without acute diverticulitis.  3.  Hiatal hernia.  4.  Midline ventral hernia containing fat.     CTA Abdomen Pelvis with Contrast    Narrative    Exam: Computed tomographic angiography of the abdomen and pelvis  without and with contrast, including 3D reformations dated 10/1/2021  7:52 AM    Clinical information:  Mesenteric ischemia, acute; ; high suspicion of  mesenteric ischemia, earlier non-contrast CT with non-specific colonic  inflammation, now pre-medicated for contrast    Technique: Helical scans through the abdomen and pelvis obtained  following the injection of contrast media in the arterial phase and  portal venous phases. Source images reviewed as well as multi-planar  reconstructions.    Contrast: 99 cc of isovue 370    Comparison study: 9/30/2021 at 2209 hours    Findings:    Circumferential colonic wall thickening involving the distal  transverse colon and extending to the mid sigmoid colon. In the  arterial phase, there is trace mucosal enhancement with the remainder  of the bowel wall without significant enhancement. Decreased  enhancement of the thickened bowel wall during the portal venous  phase. Pericolonic inflammatory stranding and accumulation of fluid  within the left pericolic gutter and pelvis is increased since prior  exam. No evidence of perforation or abscess. No pneumatosis.    Severe atherosclerotic disease throughout the large arteries. Mild  stenosis at the origin of the  celiac artery. Heavy atherosclerotic  calcification at the origin of the superior mesenteric artery and  associated near occlusion. Renal arteries are patent. Focal near  occlusion in the proximal inferior mesenteric artery (series 8, image  345) which appears secondary to calcified and soft atherosclerotic  plaque with potentially superimposed thrombus. Bilateral hypogastric  arteries are patent with mild/moderate atherosclerotic changes and  stenosis. Bilateral common and external iliac arteries are patent.  Bilateral common femoral arteries patent.    The splenic vein, portal vein, SMV and renal veins are patent. The  hepatic veins and IVC are patent.    Abdomen and pelvis:   No suspicious focal hepatic lesion. Cholecystectomy with unchanged  intrahepatic and extrahepatic biliary dilation. Main pancreatic duct  is normal in caliber. Spleen, adrenal glands are unremarkable.  Multiple benign-appearing renal cysts. Ureters and urinary bladder are  unremarkable. Abnormal thickening of the endometrium up to 1.9 cm. No  abdominal pelvic lymphadenopathy.    Lower thorax:  Large hiatal hernia. Streaky atelectasis in the left lower lobe. Heavy  coronary artery calcification.    Bones and soft tissues:  No acute or suspicious osseous lesion.      Impression    Impression:  1. Colitis involving the distal transverse colon extending to the mid  sigmoid colon. Suspect ischemic etiology in the setting of near  occlusion demonstrated at the origin of the superior mesenteric artery  and within the proximal inferior mesenteric artery. Increased  pericolonic inflammatory stranding and fluid layering in the pelvis  since exam performed 10 hours prior. No perforated bowel. Infectious  or inflammatory etiology remains a differential consideration, though  considered less likely.  2. Abnormal endometrial thickening up to 1.9 cm which can be seen in  endometrial cancer.    I have personally reviewed the examination and initial  "interpretation  and I agree with the findings.    PJ TRIMBLE MD         SYSTEM ID:  [id number]       Recent vital signs:   /45   Pulse 86   Temp 97.4  F (36.3  C) (Oral)   Resp 24   Ht 1.6 m (5' 2.99\")   Wt 72.6 kg (160 lb 0.9 oz)   SpO2 96%   BMI 28.36 kg/m      Queen Coma Scale Score: 9 (10/01/21 0730)       Cardiac Rhythm: Normal Sinus  Pt needs tele? No  Skin/wound Issues: skin breakdown on buttocks    Code Status: Comfort Care    Pain control: good    Nausea control: good    Abnormal labs/tests/findings requiring intervention: N/A, pt on comfort cares now    Family present during ED course? Yes   Family Comments/Social Situation comments:     Tasks needing completion: None    Aliyah Levi RN  [facility] -- [phone] [facility]  [phone] [facility]    "

## 2021-10-01 NOTE — CONSULTS
General Surgery Consultation    Dacia Miller MRN# [id number]   Age: 81 year old YOB: 1940     Date of Admission:  9/30/2021    Date of Consult:   9/30/21    Reason for consult: Concern for acute mesenteric ischemia       Requesting service: Emergency Medicine; requesting provider: Dr. Richard                   Assessment and Plan:   82 yo female with hypertension, hyperlipidemia, fibromyalgia, CKD, lung cancer s/p radiation with possible recurrence, recent NSTEMI and stroke s/p failed PCI, now with acute abdominal pain, nausea, vomiting, hypotension, leukocytosis to 24,500, lactate 7, peritonitis, and colonic wall thickening at splenic flexure consistent acute mesenteric ischemia. I discussed with the patient and her daughter that all signs point to her having dead bowel, which normally requires major surgery to treat. Unfortunately she is at prohibitively high risk of death and complications to proceed with surgery. ACS surgical risk calculator places her at 35% risk of death and 48% risk of any complication within 30 days. Even if we were to proceed with laparotomy and bowel resection, her extensive comorbidities make it unlikely for any surgical intervention to change her poor prognosis. The patient and the daughter understand that she is approaching the end of life.  -We recommend admission to medicine for palliative care.  -Discussed with ED physician, Dr. Richard, and admitting hospitalist, Dr. Villarreal.  -General surgery remains available for questions should they arise.      Discussed with chief, Dr. Quintana, and staff, Dr. Norman, who agree with the above plan.    Osbaldo Magana MD  Surgery resident  [phone]           Chief Complaint:   Acute generalized abdominal pain.         History of Present Illness:   Lexus Miller is a a 82 yo female with h/o hypertension, hyperlipidemia, insulin-dependent diabetes, CKD, breast cancer s/p mastectomy, lung cancer s/p radiation with new concern for  recurrence, hypertension, dyslipidemia, fibromyalgia, CKD, anemia, recent NSTEMI and stroke who presents with several hours of abdominal pain, nausea, and vomiting after a fall at home. She fell this evening in the bathroom, and around this time she developed severe, sharp, generalized abdominal pain. Has had several nonbloody nonbilious episodes of emesis. She has been constipated with no BM for the last week and complains of generalized fatigue. She had a recent NSTEMI and stroke last month, was found to have 80% stenosis of dominant LCX. Failed percutaneous intervention, unable to cross the lesion with a wire. She was not an opperative candidate for surgical revascularization by cardiac surgery. In the ED she was hypotensive with MAPs in 50s, complaining of severe abdominal pain. Wbc 24, lactate 7, with obvious peritonitis on exam. CT shows bowel thickening near the splenic flexure.               Past Medical History:     Past Medical History:   Diagnosis Date     Anemia, iron deficiency 2019     Arthritis      Breast cancer (H)      Cancer (H)      Diabetes mellitus (H)      DM (diabetes mellitus screen)      History of anesthesia complications      HLD (hyperlipidemia)      HTN (hypertension)      Kidney stones      Lung cancer (H)      PONV (postoperative nausea and vomiting)      Sciatica      Scoliosis              Past Surgical History:     Past Surgical History:   Procedure Laterality Date     ABDOMEN SURGERY       BIOPSY SKIN (LOCATION)        SECTION       CHOLECYSTECTOMY       CT BIOPSY LUNG  2018     CV CORONARY ANGIOGRAM N/A 8/10/2021    Procedure: Coronary Angiogram;  Surgeon: Briana Lea MD;  Location: [facility] CATH LAB CV     CV LEFT HEART CATH N/A 8/10/2021    Procedure: Left Heart Cath;  Surgeon: Briana Lea MD;  Location: [facility] CATH LAB CV     CV PCI N/A 8/10/2021    Procedure: Percutaneous Coronary Intervention;  Surgeon: Briana Lea MD;  Location: [facility]  CATH LAB CV     KS MASTECTOMY,PARTIAL,  WITH AXILLARY LYMPHADENECTOMY Left 2018    Procedure: Left Lumpectomy; [place] Lymph Node Biopsy;  Surgeon: Lilliana Rodriguez MD;  Location: [facility];  Service: General             Social History:     Social History     Tobacco Use     Smoking status: Former Smoker     Packs/day: 0.50     Years: 7.00     Pack years: 3.50     Quit date: 1968     Years since quittin.7     Smokeless tobacco: Never Used   Substance Use Topics     Alcohol use: No             Family History:     Family History   Problem Relation Age of Onset     Breast Cancer Mother                 Allergies:     Allergies   Allergen Reactions     Amoxicillin-Pot Clavulanate [Augmentin] Hives     Atorvastatin      Muscle cramps  Muscle cramps       Cephalexin Monohydrate [Cephalexin] Itching and Rash     Contrast Dye Other (See Comments)     Flushed, rashes, blotches     Iodinated Contrast Media [Diagnostic X-Ray Materials] Other (See Comments)     Flushed, rashes, blotches     Other Allergy (See Comments) [External Allergen Needs Reconciliation - See Comment] Unknown     Contrast Media Ready-Box [facility] [place] Contrast Media Ready-[employer] [facility], 2013.       Penicillins Unknown     Prochlorperazine Edisylate [Prochlorperazine] Unknown     Sulfa Drugs Rash             Medications:     Current Facility-Administered Medications   Medication     0.9% sodium chloride BOLUS     ciprofloxacin (CIPRO) infusion 400 mg     HYDROmorphone (PF) (DILAUDID) injection 0.5 mg     metroNIDAZOLE (FLAGYL) infusion 500 mg     ondansetron (ZOFRAN) injection 8 mg     vancomycin (VANCOCIN) 1,750 mg in sodium chloride 0.9 % 500 mL intermittent infusion     vancomycin place thorne - receiving intermittent dosing     Current Outpatient Medications   Medication Sig     allopurinol (ZYLOPRIM) 300 MG tablet [ALLOPURINOL (ZYLOPRIM) 300 MG TABLET] Take 1 tablet by mouth daily.           amLODIPine (NORVASC) 2.5 MG  "tablet Take 1 tablet (2.5 mg) by mouth daily     aspirin (ASA) 81 MG EC tablet Take 1 tablet (81 mg) by mouth daily     atorvastatin (LIPITOR) 80 MG tablet Take 1 tablet (80 mg) by mouth every evening     citalopram (CELEXA) 40 MG tablet Take 40 mg by mouth daily     clopidogrel (PLAVIX) 75 MG tablet Take 1 tablet (75 mg) by mouth daily     DULoxetine (CYMBALTA) 30 MG capsule 1 capsule     ferrous sulfate 140 mg (45 mg iron) TbER [FERROUS SULFATE 140 MG (45 MG IRON) TBER] 1 tablet tid (Patient not taking: Reported on 9/8/2021)     hydrALAZINE (APRESOLINE) 25 MG tablet Take 1 tablet (25 mg) by mouth 3 times daily     insulin glargine (LANTUS PEN) 100 UNIT/ML pen Inject 35 Units Subcutaneous every morning (before breakfast) (Patient not taking: Reported on 9/8/2021)     insulin NPH (HUMULIN N KWIKPEN) 100 UNIT/ML injection      ketoconazole (NIZORAL) 2 % cream [KETOCONAZOLE (NIZORAL) 2 % CREAM] Apply topically as needed.     metoprolol tartrate (LOPRESSOR) 50 MG tablet Take 1 tablet (50 mg) by mouth 2 times daily (Patient not taking: Reported on 9/8/2021)     nitroGLYcerin (NITROSTAT) 0.4 MG sublingual tablet For chest pain place 1 tablet under the tongue every 5 minutes for 3 doses. If symptoms persist 5 minutes after 1st dose call 911.     nitroGLYcerin (NITROSTAT) 0.4 MG sublingual tablet One tablet under the tongue every 5 minutes if needed for chest pain. May repeat every 5 minutes for a maximum of 3 doses in 15 minutes\" (Patient not taking: Reported on 9/8/2021)     NYSTOP powder daily as needed      omeprazole (PRILOSEC) 20 MG capsule [OMEPRAZOLE (PRILOSEC) 20 MG CAPSULE] Take 20 mg by mouth daily. (Patient not taking: Reported on 9/8/2021)     oxyCODONE-acetaminophen (PERCOCET) 5-325 MG tablet Take 1 tablet by mouth every 6 hours as needed for severe pain               Review of Systems:   A 10 point review of systems was conducted and found to be negative unless otherwise noted in the above HPI.          " Physical Exam:   BP (!) 78/34   Pulse 82   Temp 97.4  F (36.3  C)   Resp 20   SpO2 98%     No intake or output data in the 24 hours ending 09/30/21 2318    GEN: A&Ox3, no acute distress  NEURO: CN II-XII grossly intact. No gross neurologic deficits  RESP: Mild tachypnea on room air, no cough  CV: RRR by radial pulse, noncyanotic  ABD: obese, mild distension. Diffuse tenderness, worse over LUQ, with voluntary guarding and rebound tenderness. Obvious peritonitis.  MSK: Moves all extremities independently. Normal active range of motion.  PSYCH: anxious           Data:   All laboratory data reviewed    Results:  BMP  Recent Labs   Lab 09/30/21 2133      POTASSIUM 4.9   CHLORIDE 108   CO2 13*   BUN 55*   CR 2.09*   *     CBC  Recent Labs   Lab 09/30/21 2133   WBC 24.5*   HGB 10.9*        LFT  Recent Labs   Lab 09/30/21 2133   AST 19   ALT 17   ALKPHOS 107   BILITOTAL 0.6   ALBUMIN 3.2*   INR 1.26*     Recent Labs   Lab 09/30/21 2133   *       Imaging:  Results for orders placed or performed during the hospital encounter of 09/30/21   POC US RESUSCITATION    Impression    Limited Bedside ED Cardiac Ultrasound  PROCEDURE: PERFORMED BY: Dr. Emmett Richard MD  INDICATIONS/SYMPTOM:  Hypotension/shock  PROBE: Cardiac phased array probe  BODY LOCATION: Chest (cardiac)  FINDINGS: The ultrasound was performed utilizing the subcostal, parasternal long axis, parasternal short axis and apical 4 chamber views.  Grossly normal-to-hyperdynamic LV contractility. No RV dilation visualized. No pericardial effusion visualized.   INTERPRETATION:    Grossly normal-to-hyperdynamic LV contractility. No pericardial effusion. No RV dilation.   IMAGE DOCUMENTATION: Images were archived to PACs system.      Limited Bedside ED Aorta Ultrasound:  PROCEDURE: PERFORMED BY: Dr. Emmett Richard MD  INDICATIONS:  Abdominal Pain and Hypotension    PROBE:  Low frequency convex probe  BODY LOCATION:  Abdomen  FINDINGS:  The ultrasound was performed using longitudinal and transverse views. No gross aortic dilation, no visualized dissection flap. No evidence of free fluid in hepatorenal (Morison's pouch), perisplenic, or and pelvic areas. No evidence of pericardial effusion.  INTERPRETATION:  The evaluation of the aorta was of grossly normal caliber without evidence of aneurysm or dilation. There was no abdominal free fluid. There was incidental fluid-filled bowel visualized.   IMAGE DOCUMENTATION: Images were archived to PACs system.     CT Abdomen Pelvis w/o Contrast    Impression    IMPRESSION:   1.  Nonspecific colitis extending from distal transverse colon through mid sigmoid colon. No bowel obstruction.  2.  Colonic diverticula without acute diverticulitis.  3.  Hiatal hernia.  4.  Midline ventral hernia containing fat.

## 2021-10-01 NOTE — PROGRESS NOTES
SPIRITUAL HEALTH SERVICES Significant Event  [employer] [employer]  [facility] ED    Pt anointed by Father Anh Moran   Pager [phone]

## 2021-10-01 NOTE — H&P
[facility]    History and Physical - Hospitalist Service, Gold 5       Date of Admission:  2021    Assessment & Plan        Dacia Miller is a 81 year old female with PMH notable for hypertension, lung cancer, DM, CAD with NSTEMI s/p cath with failed PCI, kidney stones, status post cholecystectomy, status post  who presents to the ED with 1 afternoon of abdominal pain, vomiting after collapsing and found to have mesenteric ischemia not amenable to surgical intervention with very poor prognosis.     Mesenteric ischemia  Acidosis  Patient with colitis on CT, history of NSTEMI/stroke recently, peritoneal signs on exam, pain out of proportion to exam and presented with a lactic of 7, with worsening acidosis (pH 7.1) despite starting bicarb drip. Picture consistent with rapidly progressive mesenteric ischemia & sequelae. Suspect this is secondary to atherosclerotic disease of abdominal vasculature and correlates with recent NSTEMI and stroke. Not consistent with IBD and infectious colitis unlikely. Patient and family aware that this is a life-limiting condition.  -CTA per patient desire   +s/p steroids and benedryl  -IR aware of case   +available to review case if thrombus noted, no formal consult placed yet  -patient interested in workup up other etiologies even if unlikely   +enteric panel, c diff  -family coming to bedside  -bicarb ggt   -will defer further lab monitoring to day team  -heparin ggt for suspected thrombus    Hyperkalemia  Potassium spiking, consistent with progression of mesenteric ischemia. Will put temporizing measures in place to shift patient as family drives in to see her.   -Per shifting protocol:    +Insulin/Dextrose fluids    +Calcium gluconate   +potassium q2h checks x2   -telemetry while full code  -will defer further monitoring to day team    Coagulation defect  Platelet defect  INR rising, suspect DIC  -trend unless comfort  cares      Pain  -IV Dilaudid q30 minutes  -continuous pulse ox    CHRONIC ISSUES    T2DM  -BG checks per shifting protocol  -depending on goals of care, could order home insulin, pending BG after starting insulin for shifting protocol    CAD  H/o NSTEMI  -hold PTA meds    Social   On last conversation, patient had full capacity and was still desiring resuscitative measures.   -Full code  -review CTA results with family and patient when results available for ongoing goals of care discussions         Diet:  NPO unless patient wants to eat, ice chips  DVT Prophylaxis: Heparin SQ  Paez Catheter: Not present  Central Lines: None  Code Status:  Full code    Disposition Plan   Expected discharge: Unlikely to survive this hospitalization.      The patient's care was discussed with the Surgery &  Interventional Radiology Teams.    Lillian Villarreal MD  [facility]  Securely message with the Vocera Web Console (learn more here)  Text page via [facility] Paging/Directory    ______________________________________________________________________    Chief Complaint   Severe abdominal pain    History is obtained from the patient and chart    History of Present Illness     Dacia Miller is a 81 year old female with PMH notable for hypertension, lung cancer, DM, CAD with NSTEMI s/p cath with failed PCI, kidney stones, status post cholecystectomy, status post  who presents to the ED with 1 afternoon of abdominal pain and vomiting after collapsing.      At the time this provider interviewed patient, she was uncomfortable and did not want to talk, so history is partially from patient and partially from emergency room provider.    Per report and documentation, patient developed severe, diffuse abdominal discomfort, unclear what tempo it developed, and had an urge to defecate.  While walking to the bathroom, she suddenly felt extremely weak and collapsed to the ground.  No head  trauma, loss of consciousness or other injuries, though she noted some mild hip discomfort.  Per report she also had an episode of nonbloody emesis.     In the emergency department, patient was hypotensive to 78/34, but this improved to 133/95 with fluids.  BMP significant for anion gap metabolic acidosis, with bicarb of 13 and lactic of 7.2.  Lactic improved to 4.4 after fluids and later improved to 3.  CBC significant for white blood cell count of 24, hemoglobin 10.9 and platelets 280.  Creatinine above baseline at 2 (baseline approximately 1.5.)  INR 1.26 with normal troponin.  Blood cultures drawn.  EKG showed sinus rhythm.  Given severe abdominal pain as well as patient's history of intra-abdominal surgery and had not had a bowel movement in a week, CT was obtained.  CT unfortunately showed colitis from distal transverse colon to mid sigmoid colon. Surgery was consulted and confirmed this is very consistent with acute mesenteric ischemia.  Per surgery and imaging read there is significant atherosclerotic disease of abdominal vasculature.  Given her very high intra-operative risk, surgical resection was not offered. A maria r conversation including patient, her daughter, surgery and the emergency room provider took place and patient was advised that this process was likely to end her life within hours to days. She and her daughter did not want to change to DNR/DNI at that time.  It was explained that, given that surgery is the definitive treatment for her condition and that she is not a surgical candidate, CTA to evaluate for thrombus would not likely lead to change in outcome.  If thrombus was noted, interventional radiology would likely not remove thrombus, as it would involve risk without benefit.  Patient is still interested in getting CTA and was treated with steroids and Benadryl as pretreatment given her contrast allergy.  Per report of discussion between patient and emergency room provider, patient is  aware that contrast allergy adds additional risk to obtaining this imaging and she still is requesting it.    This provider spoke with surgery overnight, who reiterated the above.  Also spoke with the emergency room provider who was present for these conversations.  Additionally, spoke with interventional radiology to review case.  They are aware of her and ready to review case further if thrombus is found on morning CTA study, but may not offer intervention given thrombectomy would probably not change outcome.    When this provider evaluated patient at bedside, she reported diffuse, severe abdominal discomfort that was made worse with movement and would be provoked if the provider rocked the bed back and forth.  She declined to discuss her current condition, prognosis and initially did not want to discuss CODE STATUS.  She did give permission to update her family.  Her  and son were called and were updated on all of the above.  When provider came back to touch base with patient again, she reported that she would want resuscitative measures if she her heart were to stop, she is aware that resuscitation would likely fail.    Overnight, bicarb became worse, down to 11 on last check.  Bicarb drip was started.  A couple hours later VBG was checked and despite bicarb drip pH was 7.1.  Potassium rising significantly to 6.6.  INR rising.     Given the is grave results, called patient's  and son again and recommended they come to the hospital as soon as safely possible and to update any family members who would want to see her. Her family is arriving at the hospital to see her.     Regarding other recent history contributing to patient's tenuous picture and likely contributing to her presentation today: Patient was hospitalized from 8/4-8/12 for NSTEMI. Cath found severe stenoses in the proximal and mid left circumflex which is the dominant vessel.  Moderate disease noted in the LAD. PCI was unsuccessful. CV  surgery deemed her not a candidate for CABG.  Left frontoparietal subcortical infarction was also noted.  Options were discussed and she preferred to follow up with cardiology as outpatient.    Also of note, lung nodule concerning for recurrence of her adenocarcinoma of the lung was incidentally found during hospitalization.  There was a PET scan planned as an outpatient.       Review of Systems    The 10 point Review of Systems is negative other than noted in the HPI or here.     Past Medical History    I have reviewed this patient's medical history and updated it with pertinent information if needed.   Past Medical History:   Diagnosis Date     Anemia, iron deficiency 2019     Arthritis      Breast cancer (H)      Cancer (H)      Diabetes mellitus (H)      DM (diabetes mellitus screen)      History of anesthesia complications      HLD (hyperlipidemia)      HTN (hypertension)      Kidney stones      Lung cancer (H)      PONV (postoperative nausea and vomiting)      Sciatica      Scoliosis        Past Surgical History   I have reviewed this patient's surgical history and updated it with pertinent information if needed.  Past Surgical History:   Procedure Laterality Date     ABDOMEN SURGERY       BIOPSY SKIN (LOCATION)        SECTION       CHOLECYSTECTOMY       CT BIOPSY LUNG  2018     CV CORONARY ANGIOGRAM N/A 8/10/2021    Procedure: Coronary Angiogram;  Surgeon: Briana Lea MD;  Location: [facility] CV     CV LEFT HEART CATH N/A 8/10/2021    Procedure: Left Heart Cath;  Surgeon: Briana Lea MD;  Location: [facility] CV     CV PCI N/A 8/10/2021    Procedure: Percutaneous Coronary Intervention;  Surgeon: Briana Lea MD;  Location: [facility]     IL MASTECTOMY,PARTIAL,  WITH AXILLARY LYMPHADENECTOMY Left 2018    Procedure: Left Lumpectomy; [place] Lymph Node Biopsy;  Surgeon: Lilliana Rodriguez MD;  Location: [facility];  Service: General       Social  History   I have reviewed this patient's social history and updated it with pertinent information if needed.  Social History     Tobacco Use     Smoking status: Former Smoker     Packs/day: 0.50     Years: 7.00     Pack years: 3.50     Quit date: 1968     Years since quittin.7     Smokeless tobacco: Never Used   Substance Use Topics     Alcohol use: No     Drug use: No       Family History   I have reviewed this patient's family history and updated it with pertinent information if needed.  Family History   Problem Relation Age of Onset     Breast Cancer Mother        Prior to Admission Medications   Prior to Admission Medications   Prescriptions Last Dose Informant Patient Reported? Taking?   DULoxetine (CYMBALTA) 30 MG capsule   Yes No   Si capsule   NYSTOP powder   Yes No   Sig: daily as needed    allopurinol (ZYLOPRIM) 300 MG tablet   Yes No   Sig: [ALLOPURINOL (ZYLOPRIM) 300 MG TABLET] Take 1 tablet by mouth daily.         amLODIPine (NORVASC) 2.5 MG tablet   No No   Sig: Take 1 tablet (2.5 mg) by mouth daily   aspirin (ASA) 81 MG EC tablet   No No   Sig: Take 1 tablet (81 mg) by mouth daily   atorvastatin (LIPITOR) 80 MG tablet   No No   Sig: Take 1 tablet (80 mg) by mouth every evening   citalopram (CELEXA) 40 MG tablet   Yes No   Sig: Take 40 mg by mouth daily   clopidogrel (PLAVIX) 75 MG tablet   No No   Sig: Take 1 tablet (75 mg) by mouth daily   ferrous sulfate 140 mg (45 mg iron) TbER   No No   Sig: [FERROUS SULFATE 140 MG (45 MG IRON) TBER] 1 tablet tid   Patient not taking: Reported on 2021   hydrALAZINE (APRESOLINE) 25 MG tablet   No No   Sig: Take 1 tablet (25 mg) by mouth 3 times daily   insulin NPH (HUMULIN N KWIKPEN) 100 UNIT/ML injection   Yes No   insulin glargine (LANTUS PEN) 100 UNIT/ML pen   No No   Sig: Inject 35 Units Subcutaneous every morning (before breakfast)   Patient not taking: Reported on 2021   ketoconazole (NIZORAL) 2 % cream   Yes No   Sig: [KETOCONAZOLE  "(NIZORAL) 2 % CREAM] Apply topically as needed.   metoprolol tartrate (LOPRESSOR) 50 MG tablet   No No   Sig: Take 1 tablet (50 mg) by mouth 2 times daily   Patient not taking: Reported on 9/8/2021   nitroGLYcerin (NITROSTAT) 0.4 MG sublingual tablet   No No   Sig: One tablet under the tongue every 5 minutes if needed for chest pain. May repeat every 5 minutes for a maximum of 3 doses in 15 minutes\"   Patient not taking: Reported on 9/8/2021   nitroGLYcerin (NITROSTAT) 0.4 MG sublingual tablet   No No   Sig: For chest pain place 1 tablet under the tongue every 5 minutes for 3 doses. If symptoms persist 5 minutes after 1st dose call 911.   omeprazole (PRILOSEC) 20 MG capsule   Yes No   Sig: [OMEPRAZOLE (PRILOSEC) 20 MG CAPSULE] Take 20 mg by mouth daily.   Patient not taking: Reported on 9/8/2021   oxyCODONE-acetaminophen (PERCOCET) 5-325 MG tablet   Yes No   Sig: Take 1 tablet by mouth every 6 hours as needed for severe pain      Facility-Administered Medications: None     Allergies   Allergies   Allergen Reactions     Amoxicillin-Pot Clavulanate [Augmentin] Hives     Atorvastatin      Muscle cramps  Muscle cramps       Cephalexin Monohydrate [Cephalexin] Itching and Rash     Contrast Dye Other (See Comments)     Flushed, rashes, blotches     Iodinated Contrast Media [Diagnostic X-Ray Materials] Other (See Comments)     Flushed, rashes, blotches     Other Allergy (See Comments) [External Allergen Needs Reconciliation - See Comment] Unknown     Contrast Media Ready-[place] [facility] [place] 02[place]13[place]2013.; Contrast Media Ready-Box [facility], 02/13/2013.       Penicillins Unknown     Prochlorperazine Edisylate [Prochlorperazine] Unknown     Sulfa Drugs Rash       Physical Exam   Vital Signs: Temp: 97.4  F (36.3  C)   BP: (!) 133/95 Pulse: 76   Resp: 15 SpO2: 97 % O2 Device: None (Room air)    Weight: 0 lbs 0 oz    Constitutional: awake, cooperative, grimacing in pain, and appears stated age  Eyes: Lids and lashes normal, pupils equal, round " "and reactive to light, extra ocular muscles intact, sclera clear, conjunctiva normal  ENT: Normocephalic, without obvious abnormality, atraumatic, external ears without lesions, oral pharynx with moist mucous membranes, tonsils without erythema or exudates, gums normal and good dentition.  Respiratory: No increased work of breathing, good air exchange, clear to auscultation bilaterally, no crackles or wheezing  Cardiovascular: Normal apical impulse, regular rate and rhythm, normal S1 and S2, no S3 or S4, grade 2 harsh systolic murmur noted at left upper sternal border.   GI: Diffusely tender and distended abdomen with guarding and pain out of proportion to exam. Hypoactive bowel sounds. Fat hernia noted.    Skin: no rashes  Musculoskeletal: Tone is normal. No LE edema.   Neurologic: Awake, alert, oriented to name, place and time.  Cranial nerves II-XII are grossly intact.    Neuropsychiatric: Became upset when provider broached her condition, asking, \"I heard what the doctor [the surgeon] said! Do we have to talk about this?\"     Data   Data reviewed today: I reviewed all medications, new labs and imaging results over the last 24 hours. I personally reviewed the EKG tracing showing NSR. And CT images showing colitis, see HPI.     Recent Labs   Lab 10/01/21  0837 10/01/21  0800 10/01/21  0640 10/01/21  0200 10/01/21  0200 09/30/21 2133 09/30/21 2133   WBC  --   --   --   --  18.7*  --  24.5*   HGB  --   --   --   --  9.6*  --  10.9*   MCV  --   --   --   --  97  --  96   PLT  --   --   --   --  180  --  280   INR  --   --  1.54*  --   --   --  1.26*   NA  --   --  137  --  138  --  136   POTASSIUM  --   --  6.5*  6.6*  --  5.6*   < > 4.9   CHLORIDE  --   --  116*  --  116*  --  108   CO2  --   --  12*  --  11*  --  13*   BUN  --   --  56*  --  54*  --  55*   CR  --   --  1.93*  --  1.90*  --  2.09*   ANIONGAP  --   --  9  --  11  --  15*   MOHAN  --   --  8.6  --  8.2*  --  9.9   * 302* 298*   < > 258*   < > " 239*   ALBUMIN  --   --   --   --  2.5*  --  3.2*   PROTTOTAL  --   --   --   --  5.9*  --  7.4   BILITOTAL  --   --   --   --  0.6  --  0.6   ALKPHOS  --   --   --   --  87  --  107   ALT  --   --   --   --  18  --  17   AST  --   --   --   --  23  --  19   TROPONIN  --   --   --   --   --   --  <0.015    < > = values in this interval not displayed.

## 2021-10-01 NOTE — ED TRIAGE NOTES
Pt BIBA from home after a fall today. At around 4pm pt went to lay down, 7pm she woke up and had a fall due to feeling weak. Pt reported she was diaphoretic and having severe abd pain during this time.    Per EMS:  BP 80/40  EKG NSR    En route, unable to palpate radial pulse  No IVF given    Hx MI

## 2021-10-01 NOTE — PROGRESS NOTES
Per discussion with patient alternate decision-maker, , as well as adult children- given clinical outlook, decision was made to change patient CODE STATUS to DNR/DNI.  We will place comfort care orders.  Placed patient on PCA drip for pain management.  Full progress note to follow.    Tiki Schulz PA-C  Hospitalist Service  [phone]

## 2021-10-01 NOTE — ED NOTES
Pt transported to CT with RN, pt on Zoll monitor, ambu bag at bedside. RN instructed by MD that if patient's codes to perform CPR.

## 2021-10-01 NOTE — ED PROVIDER NOTES
History     Chief Complaint   Patient presents with     Fall     Generalized Weakness     HPI  Dacia Miller is a 81 year old female with PMH notable for hypertension, lung cancer, DM, CAD, kidney stones, status post cholecystectomy, status post  who presents to the ED with abdominal pain and generalized weakness. Patient reports abdominal pain started a few hours ago, severe and diffuse. She had vomiting started around the same time, vomiting has been frequent, nonbloody, nonblack. Last bowel movement was 1 week ago. Patient has history of past cholecystectomy and  surgeries. Abdomen has felt more distended. Patient noted feeling very weak when trying to walk to the bathroom, lowered herself to the ground, denies loss of consciousness, denies injuries from fall.     Past Medical History  Past Medical History:   Diagnosis Date     Anemia, iron deficiency 2019     Arthritis      Breast cancer (H)      Cancer (H)      Diabetes mellitus (H)      DM (diabetes mellitus screen)      History of anesthesia complications      HLD (hyperlipidemia)      HTN (hypertension)      Kidney stones      Lung cancer (H)      PONV (postoperative nausea and vomiting)      Sciatica      Scoliosis      Past Surgical History:   Procedure Laterality Date     ABDOMEN SURGERY       BIOPSY SKIN (LOCATION)        SECTION       CHOLECYSTECTOMY       CT BIOPSY LUNG  2018     CV CORONARY ANGIOGRAM N/A 8/10/2021    Procedure: Coronary Angiogram;  Surgeon: Briana Lea MD;  Location: [facility]     CV LEFT HEART CATH N/A 8/10/2021    Procedure: Left Heart Cath;  Surgeon: Briana Lea MD;  Location: [facility] CV     CV PCI N/A 8/10/2021    Procedure: Percutaneous Coronary Intervention;  Surgeon: Briana Lea MD;  Location: [facility] CV     NH MASTECTOMY,PARTIAL,  WITH AXILLARY LYMPHADENECTOMY Left 2018    Procedure: Left Lumpectomy; [place] Lymph Node Biopsy;   Surgeon: Lilliana Rodriguez MD;  Location: [facility];  Service: General     allopurinol (ZYLOPRIM) 300 MG tablet  amLODIPine (NORVASC) 2.5 MG tablet  aspirin (ASA) 81 MG EC tablet  atorvastatin (LIPITOR) 80 MG tablet  citalopram (CELEXA) 40 MG tablet  clopidogrel (PLAVIX) 75 MG tablet  DULoxetine (CYMBALTA) 30 MG capsule  ferrous sulfate 140 mg (45 mg iron) TbER  hydrALAZINE (APRESOLINE) 25 MG tablet  insulin glargine (LANTUS PEN) 100 UNIT/ML pen  insulin NPH (HUMULIN N KWIKPEN) 100 UNIT/ML injection  ketoconazole (NIZORAL) 2 % cream  metoprolol tartrate (LOPRESSOR) 50 MG tablet  nitroGLYcerin (NITROSTAT) 0.4 MG sublingual tablet  nitroGLYcerin (NITROSTAT) 0.4 MG sublingual tablet  NYSTOP powder  omeprazole (PRILOSEC) 20 MG capsule  oxyCODONE-acetaminophen (PERCOCET) 5-325 MG tablet      Allergies   Allergen Reactions     Amoxicillin-Pot Clavulanate [Augmentin] Hives     Atorvastatin      Muscle cramps  Muscle cramps       Cephalexin Monohydrate [Cephalexin] Itching and Rash     Contrast Dye Other (See Comments)     Flushed, rashes, blotches     Iodinated Contrast Media [Diagnostic X-Ray Materials] Other (See Comments)     Flushed, rashes, blotches     Other Allergy (See Comments) [External Allergen Needs Reconciliation - See Comment] Unknown     Contrast Media Ready-Box [facility] [place], 2013.; Contrast Media Ready-Box [facility], 2013.       Penicillins Unknown     Prochlorperazine Edisylate [Prochlorperazine] Unknown     Sulfa Drugs Rash     Social History   Social History     Tobacco Use     Smoking status: Former Smoker     Packs/day: 0.50     Years: 7.00     Pack years: 3.50     Quit date: 1968     Years since quittin.7     Smokeless tobacco: Never Used   Substance Use Topics     Alcohol use: No     Drug use: No      Past medical history and social history were reviewed with the patient. Additional pertinent items: None     Review of Systems  A complete review of systems was performed with pertinent  positives and negatives noted in the HPI, and all other systems negative.    Physical Exam   BP: (!) 78/34  Pulse: 82  Temp: 97.4  F (36.3  C)  Resp: 20  SpO2: 98 %    Physical Exam  General: ill appearing and very uncomfortable. Appears stated age.   HENT: MMM, no oropharyngeal lesions  Eyes: PERRL, normal sclerae  Neck: non-tender, supple  Cardio: regular rate. Regular rhythm. Extremities well perfused  Resp: Normal work of breathing, normal respiratory rate.  Chest/Back: no visual signs of trauma, no CVA tenderness  Abdomen: diffuse tenderness, mildly distended without fluid wave, no rebound, no guarding. Pain out of proportion. Old healed surgical scars in RUQ and midline lower abdomen.   Neuro: alert and fully oriented. CN II-XII grossly intact. Grossly normal strength and sensation in all extremities.   MSK: no deformities. Grossly normal ROM in extremities.   Integumentary/Skin: no rash visualized, normal color  Psych: normal affect, normal behavior    ED Course      Procedures  Results for orders placed during the hospital encounter of 09/30/21    POC US RESUSCITATION    Impression  Limited Bedside ED Cardiac Ultrasound  PROCEDURE: PERFORMED BY: Dr. Emmett Richard MD  INDICATIONS/SYMPTOM:  Hypotension/shock  PROBE: Cardiac phased array probe  BODY LOCATION: Chest (cardiac)  FINDINGS: The ultrasound was performed utilizing the subcostal, parasternal long axis, parasternal short axis and apical 4 chamber views.  Grossly normal-to-hyperdynamic LV contractility. No RV dilation visualized. No pericardial effusion visualized.  INTERPRETATION:    Grossly normal-to-hyperdynamic LV contractility. No pericardial effusion. No RV dilation.  IMAGE DOCUMENTATION: Images were archived to PACs system.      Limited Bedside ED Aorta Ultrasound:  PROCEDURE: PERFORMED BY: Dr. Emmett Richard MD  INDICATIONS:  Abdominal Pain and Hypotension  PROBE:  Low frequency convex probe  BODY LOCATION: Abdomen  FINDINGS:  The  ultrasound was performed using longitudinal and transverse views. No gross aortic dilation, no visualized dissection flap. No evidence of free fluid in hepatorenal (Morison's pouch), perisplenic, or and pelvic areas. No evidence of pericardial effusion.  INTERPRETATION:  The evaluation of the aorta was of grossly normal caliber without evidence of aneurysm or dilation. There was no abdominal free fluid. There was incidental fluid-filled bowel visualized.  IMAGE DOCUMENTATION: Images were archived to PACs system.            EKG Interpretation:      Interpreted by Emmett Richard MD  Time reviewed: 2134  Symptoms at time of EKG: hypotension, abdominal pain   Rhythm: normal sinus   Rate: Normal  Axis: Normal  Ectopy: none  Conduction: normal  ST Segments/ T Waves: No ST-T wave changes and No acute ischemic changes  Q Waves: III  Comparison to prior: Unchanged from 8/11/2021    Clinical Impression: no acute changes, no evidence of acute ischemia      Critical Care Addendum  My initial assessment, based on my review of prehospital provider report, review of vital signs and focused history, established that Dacia Miller has severe hypotension, suspicion of ruptured abdominal aortic aneurysm and suspicion for sepsis and need for evaluation and early goal-directed therapy, which requires immediate intervention, and therefore she is critically ill.     After the initial assessment, the care team initiated multiple lab tests, initiated IV fluid administration, initiated medication therapy with hydromorphone and broad spectrum antibiotics and performed bedside ultrasound of the aorta, abdomen, and heart to provide stabilization care. Due to the critical nature of this patient, I reassessed nursing observations, vital signs, physical exam, review of cardiac rhythm monitor, 12 lead ECG analysis and interpretation of lab and imaging studies multiple times prior to her disposition.     Time also spent performing  documentation, discussion with family to obtain medical information for decision making, reviewing test results, discussion with consultants and coordination of care.     Critical care time (excluding teaching time and procedures): 44 minutes.       The patient has signs of Severe Sepsis   If one the following conditions is present, a 30 mL/kg bolus is recommended as part of the 6 hour bundle (IBW can be used for BMI >30, or document refusal/contraindication):      1.   Initial hypotension  defined as 2 bps < 90 or map < 65 in the 6hrs before or 6hrs after time zero.     2.  Lactate >4.     The patient has signs of Severe Sepsis as evidenced by:    1. 2 SIRS criteria, AND  2. Suspected infection, AND   3. Organ dysfunction:  SBP <90, MAP < 65, or SBP decrease of >40 from baseline due to infection and Lactic Acidosis with value >2.0    Time severe sepsis diagnosis confirmed: 2152  10/01/21 as this was the time when Lactate resulted, and the level was > 2.0    3 Hour Severe Sepsis Bundle Completion:    1. Initial Lactic Acid Result:   Recent Labs   Lab Test 09/30/21 2340 09/30/21  2133 08/10/21  1358   LACT 4.4* 7.2* 1.9     2. Blood Cultures before Antibiotics: Yes  3. Broad Spectrum Antibiotics Administered:  yes       Anti-infectives (From admission through now)    Start     Dose/Rate Route Frequency Ordered Stop    09/30/21 2215  vancomycin (VANCOCIN) 1,750 mg in sodium chloride 0.9 % 500 mL intermittent infusion      1,750 mg  over 120 Minutes Intravenous ONCE 09/30/21 2213 09/30/21 2212  vancomycin place thorne - receiving intermittent dosing      1 each Intravenous SEE ADMIN INSTRUCTIONS 09/30/21 2213 09/30/21 2205  ciprofloxacin (CIPRO) infusion 400 mg      400 mg  200 mL/hr over 60 Minutes Intravenous ONCE 09/30/21 2200 10/01/21 0112    09/30/21 2205  metroNIDAZOLE (FLAGYL) infusion 500 mg      500 mg  100 mL/hr over 60 Minutes Intravenous ONCE 09/30/21 2200 10/01/21 0047          4. Fluid volume  administered in ED:  Full 30 mL/kg bolus given (see amount below).    BMI Readings from Last 1 Encounters:   09/08/21 28.34 kg/m      30 mL/kg fluids based on weight: 2,180 mL  30 mL/kg fluids based on IBW (must be >= 60 inches tall): 1,570 mL                Severe Sepsis reassessment:  1. Repeat Lactic Acid Level: 4.4  2. MAP>65 after initial IVF bolus, will continue to monitor fluid status and vital signs    I attest to having performed a repeat sepsis exam and assessment of perfusion at 0040 and the results demonstrate improved perfusion.           Labs Ordered and Resulted from Time of ED Arrival Up to the Time of Departure from the ED   INR - Abnormal; Notable for the following components:       Result Value    INR 1.26 (*)     All other components within normal limits   COMPREHENSIVE METABOLIC PANEL - Abnormal; Notable for the following components:    Carbon Dioxide (CO2) 13 (*)     Anion Gap 15 (*)     Urea Nitrogen 55 (*)     Creatinine 2.09 (*)     Glucose 239 (*)     Albumin 3.2 (*)     GFR Estimate 22 (*)     All other components within normal limits   LACTIC ACID WHOLE BLOOD - Abnormal; Notable for the following components:    Lactic Acid 7.2 (*)     All other components within normal limits   CBC WITH PLATELETS AND DIFFERENTIAL - Abnormal; Notable for the following components:    WBC Count 24.5 (*)     RBC Count 3.66 (*)     Hemoglobin 10.9 (*)     MCHC 30.9 (*)     Absolute Neutrophils 22.4 (*)     Absolute Immature Granulocytes 0.3 (*)     All other components within normal limits   LACTIC ACID WHOLE BLOOD - Abnormal; Notable for the following components:    Lactic Acid 4.4 (*)     All other components within normal limits   PARTIAL THROMBOPLASTIN TIME - Normal   COVID-19 VIRUS (CORONAVIRUS) BY PCR - Normal    Narrative:     Testing was performed using the Xpert Xpress SARS-CoV-2 Assay on the  Cepheid Gene-Xpert Instrument Systems. Additional information about  this Emergency Use Authorization (EUA)  assay can be found via the Lab  Guide. This test should be ordered for the detection of SARS-CoV-2 in  individuals who meet SARS-CoV-2 clinical and/or epidemiological  criteria. Test performance is unknown in asymptomatic patients. This  test is for in vitro diagnostic use under the FDA EUA for  laboratories certified under CLIA to perform high complexity testing.  This test has not been FDA cleared or approved. A negative result  does not rule out the presence of PCR inhibitors in the specimen or  target RNA in concentration below the limit of detection for the  assay. The possibility of a false negative should be considered if  the patient's recent exposure or clinical presentation suggests  COVID-19. This test was validated by the [facility]. This laboratory is certified under  the Clinical Laboratory Improvement Amendments of 1988 (CLIA-88) as  qualified to perform high complexity laboratory testing.     TROPONIN I - Normal   EXTRA BLOOD CULTURE BOTTLE   EXTRA RED TOP TUBE   ROUTINE UA WITH MICROSCOPIC REFLEX TO CULTURE   CBC WITH PLATELETS   CARDIAC CONTINUOUS MONITORING   PERIPHERAL IV CATHETER   IV ACCESS   MEASURE WEIGHT   NOTIFY PHYSICIAN   NOTIFY PHYSICIAN   TYPE AND SCREEN, ADULT   BLOOD CULTURE   BLOOD CULTURE   CBC WITH PLATELETS & DIFFERENTIAL    Narrative:     The following orders were created for panel order CBC with platelets differential.  Procedure                               Abnormality         Status                     ---------                               -----------         ------                     CBC with platelets and d...[[id number]]  Abnormal            Final result                 Please view results for these tests on the individual orders.   EXTRA TUBE    Narrative:     The following orders were created for panel order Extra Tube.  Procedure                               Abnormality         Status                     ---------                                -----------         ------                     Extra Blood Culture Bottle[[id number]]                       Final result               Extra Red Top Tube[[id number]]                               Final result                 Please view results for these tests on the individual orders.   ABO/RH TYPE AND SCREEN    Narrative:     The following orders were created for panel order ABO/Rh type and screen.  Procedure                               Abnormality         Status                     ---------                               -----------         ------                     Adult Type and Screen[[id number]]                            In process                   Please view results for these tests on the individual orders.     CT Abdomen Pelvis w/o Contrast   Final Result   IMPRESSION:    1.  Nonspecific colitis extending from distal transverse colon through mid sigmoid colon. No bowel obstruction.   2.  Colonic diverticula without acute diverticulitis.   3.  Hiatal hernia.   4.  Midline ventral hernia containing fat.         POC US RESUSCITATION   Final Result   Limited Bedside ED Cardiac Ultrasound   PROCEDURE: PERFORMED BY: Dr. Emmett Richard MD   INDICATIONS/SYMPTOM:  Hypotension/shock   PROBE: Cardiac phased array probe   BODY LOCATION: Chest (cardiac)   FINDINGS: The ultrasound was performed utilizing the subcostal, parasternal long axis, parasternal short axis and apical 4 chamber views.   Grossly normal-to-hyperdynamic LV contractility. No RV dilation visualized. No pericardial effusion visualized.    INTERPRETATION:    Grossly normal-to-hyperdynamic LV contractility. No pericardial effusion. No RV dilation.    IMAGE DOCUMENTATION: Images were archived to PACs system.         Limited Bedside ED Aorta Ultrasound:   PROCEDURE: PERFORMED BY: Dr. Emmett Richard MD   INDICATIONS:  Abdominal Pain and Hypotension     PROBE:  Low frequency convex probe   BODY LOCATION: Abdomen    FINDINGS:  The ultrasound was performed using longitudinal and transverse views. No gross aortic dilation, no visualized dissection flap. No evidence of free fluid in hepatorenal (Morison's pouch), perisplenic, or and pelvic areas. No evidence of pericardial effusion.   INTERPRETATION:  The evaluation of the aorta was of grossly normal caliber without evidence of aneurysm or dilation. There was no abdominal free fluid. There was incidental fluid-filled bowel visualized.    IMAGE DOCUMENTATION: Images were archived to PACs system.         CTA Abdomen Pelvis with Contrast    (Results Pending)          Assessments & Plan (with Medical Decision Making)   Patient presenting with abdominal pain and vomiting. Vitals in the ED notable for BP 78/34 upon arrival. Patient then moved to critical care bay. NS bolus started. 2nd PIV placed. Nursing notes reviewed. Initial differential diagnosis includes but not limited to SBO, AAA, mesenteric ischemia, gastroenteritis, septic shock, severe sepsis.     Bedside ultrasound demonstrated normal to hyperdynamic LV contractility, no pericardial effusion visualized, no AAA visualized, no aortic dissection flap visualized, no free fluid in the abdomen, did see fluid filled bowel that was not overly dilated.  Initial labs notable for WBC 24.5, lactate 7.2.  High concern for mesenteric ischemia given patient's history of atherosclerosis, abdominal pain out of proportion to exam, very elevated lactate.  Patient has iodine contrast allergy making CT with IV contrast contraindicated.  Noncontrast CT demonstrated nonspecific inflammatory findings in the transverse to sigmoid colon.  Surgery consulted, their assessment also is that this likely is mesenteric ischemia with poor prognosis, patient not a surgical candidate, recommended care focus on comfort and palliative consult in the morning.    Blood cultures drawn.  Metronidazole, ciprofloxacin, vancomycin given.  Repeat lactate drawn after  1.5 L NS was down to 4.4 from 7.2. Discussed the risks, benefits, indications, and alternatives of CT angio after pre-medication for allergy with the patient and daughter. The patient demonstrated understanding and capacity and elected to go ahead with pre-medication and CTA. Methylprednisolone given for potential premedication for CT angiogram, pre-CT diphenhydramine and CTA ordered for the morning in line with accelerated pre-medication protocol.     In the ED, the patient's symptoms were managed with hydromorphone and ondansetron, with improvement in symptoms upon reassessment.     The complete clinical picture is most consistent with severe sepsis with lactic acidosis likely 2/2 mesenteric ischemia. After counseling on the diagnosis, work-up, and treatment plan, the patient was admitted to medicine intermediate care unit with surgery consulting.       Final diagnoses:   Lactic acidosis   Severe sepsis (H)   Abdominal pain, generalized     New Prescriptions    No medications on file       --  Emmett Richard MD   Emergency Medicine   [facility] EMERGENCY DEPARTMENT  9/30/2021     Emmett Richard MD  10/01/21 0048       Emmett Richard MD  10/01/21 0113

## 2021-10-01 NOTE — PROGRESS NOTES
"SPIRITUAL HEALTH SERVICES  PALLIATIVE SPIRITUAL ASSESSMENT   [facility] ([place] ED    PRIMARY FOCUS:     Emotional distress    Support for coping    Visit with patient Dacia \"Lexus\" Angela's  Saul and son Lenny at her bedside; co-visit with Palliative Care TALAT Hyde.    Distress: Family are understandably distressed by the change in Lexus's condition. They are grieving and keeping grayson at bedside. They wonder whether she is suffering, and they are reassured by information regarding comfort medications.    Coping/Meaning Making: Family are mutually supportive and supportive of Lexus. Gnosticist kavya is a source of meaning, and Saul requested that   give Lexus a blessing, which he did.    Intervention: Reviewed documentation. Offered spiritual and emotional support mainly through reflective listening and gentle exploration of sources of distress.    PLAN: I will follow for spiritual support while Palliative Care is consulted.    Sussy Pro  Palliative Care Consult Service   Pager [phone]  [facility] Inpatient Team Consult pager [phone] (M-F 8-4:30)  After-hours Answering Service [phone]       "

## 2021-10-02 NOTE — PLAN OF CARE
COMFORT CARES     Neuro: ARANZA- Pt had difficulty speaking, could only form a word or two.  Pt unable to consistently follow directions   Cardiac: WDL  Resp: WDL, RA  GI/: incont void. No BM this shift. Watery stool.  Diet: NPO  Skin: bruise on L arm   Activity: lift   Lines: R PIV- infusing PCA dilaudid,  1 L PIV, SL   Pain: PCA dilaudid to help with pain. Pt c/o pain during  repositioning. RN gave prn  Dilaudid x2.      New this shift: Distal PIV L arm lost patency, was removed.

## 2021-10-02 NOTE — PROGRESS NOTES
[facility]  Palliative Care Daily Progress Note       Recommendations & Counseling       No changes to POC recommended today--she seems to be comfortable on her current doses.     Low threshold though to increase doses of prn or drip dilaudid if any sense they are inadequate to keep her comfortable. Would just increase to 2 mg as the next dose increase if ever needed.    Family are at bedside; seem well-informed and understanding of what's happening. Let them know I think prognosis remains 0-couple days. Let them know about spiritual care resources for them & patient. Fr Kamara annointed patient yesterday. Answered their Qs.    D/w RN  35 min on unit today over half counseling & coord as above  Neftali Gutierres MD / Palliative Medicine / Marleni me via [facility].          Assessments          She has MMP (CAD, CKD, others)  presented to ED 9/30 with abd pain and found to have mesenteric ischemia-->decision for comfort-only cares with anticipation of death in the hospital.     Today, the patient was seen for:  Mesenteric ischemia    Prognosis, Goals, or Advance Care Planning was addressed today with: Yes.  Mood, coping, and/or meaning in the context of serious illness were addressed today: Yes.  Summary/Comments:            Interval History:     Chart review/discussion with unit or clinical team members:   Moved to 5A  Remains on 'comfort care'    Per patient or family/caregivers today:  Dtr at bedside, family taking shifts to be with her.    Per family and nurse--neither noticing grimacing, moaning, clutching abd. Patient has some trembling and twitching and we discussed that's normal and not anything I'd worry about but more to look for grimacing, moaning, holding her belly               Review of Systems:     Unable to obtain          Medications:     I have reviewed this patient's medication profile and medications during this hospitalization.    Noted meds:    Dilaudid  infusion 1mg/hr  Dilaudid IV prn; 1mg--5mg in last 24h           Physical Exam:   Vitals were reviewed      BP: 108/45 Pulse: 86     SpO2: 96 % O2 Device: Nasal cannula Oxygen Delivery: 2 LPM  NAD  Eyes briefly open to voice and light touch, no eye contact, no response  Bilat UE trembling  Resps unlabored  Feet warm, hyperdyanamic pulses  Abd -- no signs of tenderness to gentle palpation             Data Reviewed:     Reviewed recent pertinent imaging, comments:   CT  Impression:  1. Colitis involving the distal transverse colon extending to the mid  sigmoid colon. Suspect ischemic etiology in the setting of near  occlusion demonstrated at the origin of the superior mesenteric artery  and within the proximal inferior mesenteric artery. Increased  pericolonic inflammatory stranding and fluid layering in the pelvis  since exam performed 10 hours prior. No perforated bowel. Infectious  or inflammatory etiology remains a differential consideration, though  considered less likely.  2. Abnormal endometrial thickening up to 1.9 cm which can be seen in  endometrial cancer.    Reviewed recent labs, comments:   Last K 5.7, Cr 1.9

## 2021-10-02 NOTE — PLAN OF CARE
Pt lying comfortably in bed this shift. Family at bedside. PCA continuous Dilaudid 1 mg/hr. Additional PRN IV dilaudid x3 to decrease pain during movement to clean up. Two episode of incontinence, purewick placed to decrease need to roll pt. Lethargic this shift, occasional small mumble. L PIV infusing PCA, R PIV saline locked.

## 2021-10-02 NOTE — PHARMACY-ADMISSION MEDICATION HISTORY
Admission Medication History Completed by Pharmacy    See [facility] Admission Navigator for allergy information, preferred outpatient pharmacy, prior to admission medications and immunization status.     Medication History Sources:     Patient's spouse Saul, dispense report, care everywhere.    Changes made to PTA medication list (reason):    Added:   o humalog kwikpen  o Lorazepam 0.5mg tablet  o Lisinopril-hydrochlorothiazide 20-12.5mg tablet    Deleted: None    Changed: None    Additional Information:    Medications verified with patient's spouse via phone.    Prior to Admission medications    Medication Sig Last Dose Taking? Auth Provider   allopurinol (ZYLOPRIM) 300 MG tablet [ALLOPURINOL (ZYLOPRIM) 300 MG TABLET] Take 1 tablet by mouth daily.       Past Week Yes Provider, Historical   amLODIPine (NORVASC) 2.5 MG tablet Take 1 tablet (2.5 mg) by mouth daily Past Week Yes Roque Ochoa DO   aspirin (ASA) 81 MG EC tablet Take 1 tablet (81 mg) by mouth daily Past Week Yes Roque Ochoa DO   atorvastatin (LIPITOR) 80 MG tablet Take 1 tablet (80 mg) by mouth every evening Past Week Yes Janae Hernandez MD   citalopram (CELEXA) 40 MG tablet Take 40 mg by mouth daily Past Week Yes Reported, Patient   clopidogrel (PLAVIX) 75 MG tablet Take 1 tablet (75 mg) by mouth daily Past Week Yes Janae Hernandez MD   ferrous sulfate 140 mg (45 mg iron) TbER [FERROUS SULFATE 140 MG (45 MG IRON) TBER] 1 tablet tid  Patient taking differently: Take 140 mg by mouth 3 times daily (with meals)  Past Week Yes Tory Mackey MD   hydrALAZINE (APRESOLINE) 25 MG tablet Take 1 tablet (25 mg) by mouth 3 times daily Past Week Yes Roque Ochoa DO   insulin glargine (LANTUS PEN) 100 UNIT/ML pen Inject 35 Units Subcutaneous every morning (before breakfast) Past Week Yes Roque Ochoa DO   insulin lispro (HUMALOG KWIKPEN) 100 UNIT/ML (1 unit dial) KWIKPEN Inject 16 units subcutaneously every morning and 28  "units every evening with dinner +sliding scale, maximum 70 units per day Past Week Yes Unknown, Entered By History   insulin NPH (HUMULIN N KWIKPEN) 100 UNIT/ML injection Inject 20 units subcutaneously every morning, and inject 21 units subcutaneously every evening Past Week Yes Reported, Patient   ketoconazole (NIZORAL) 2 % cream [KETOCONAZOLE (NIZORAL) 2 % CREAM] Apply topically as needed. Past Month Yes Provider, Historical   lisinopril-hydrochlorothiazide (ZESTORETIC) 20-12.5 MG tablet Take 1 tablet by mouth 2 times daily Past Week Yes Unknown, Entered By History   LORazepam (ATIVAN) 0.5 MG tablet Take 0.5 mg by mouth every 6 hours as needed for anxiety Past Week Yes Unknown, Entered By History   metoprolol tartrate (LOPRESSOR) 50 MG tablet Take 1 tablet (50 mg) by mouth 2 times daily Past Week Yes Roque Ochoa,    nitroGLYcerin (NITROSTAT) 0.4 MG sublingual tablet One tablet under the tongue every 5 minutes if needed for chest pain. May repeat every 5 minutes for a maximum of 3 doses in 15 minutes\"  Yes Briana Lea MD   NYSTOP powder Apply 1 g topically daily as needed   Yes Provider, Historical   omeprazole (PRILOSEC) 20 MG capsule Take 20 mg by mouth daily  Past Week Yes Provider, Historical   oxyCODONE-acetaminophen (PERCOCET) 5-325 MG tablet Take 1 tablet by mouth every 6 hours as needed for severe pain Past Week Yes Unknown, Entered By History       Date completed: 10/02/21    Medication history completed by: Mohan Greenfield    "

## 2021-10-02 NOTE — PROGRESS NOTES
[facility]    Medicine Progress Note - Hospitalist Service, Gold 5       Date of Admission:  2021    Assessment & Plan    Dacia Miller is a 81-year-old female with a past medical history notable for hypertension, lung cancer, diabetes, coronary artery disease with disease with NSTEMI status post cath with failed PCI, kidney stones, status post cholecystectomy, status post , who presents to the ED with 1 afternoon of abdominal pain, vomiting after collapsing and found to have mesenteric ischemia not amenable to surgical intervention with very poor prognosis.    # Acute Mesenteric ischemia  # End of life cares  Patient with colitis on CT, history of NSTEMI/stroke recently, peritoneal signs on initial exam with pain out of proportion, presented with a lactic of 7 andworsening acidosis (pH 7.1) despite starting bicarb drip. Picture consistent with rapidly progressive mesenteric ischemia & sequelae. Suspect this is secondary to atherosclerotic disease of abdominal vasculature (confirmed on CTA) and correlates with recent NSTEMI and stroke. Not consistent with IBD and infectious colitis unlikely. Patient and family aware that this is a life-limiting condition.  Surgery consulted in ED and had maria r conversation with patient and daughter that she was approaching end-of-life and there were no surgical options for her at this time.  Palliative care consulted and comfort care orders placed.  -DNR/DNI  -Continue pain management with PCA  -Comfort care order set  -Stop lab draws     Diet: NPO for Medical/Clinical Reasons Except for: Meds, Ice Chips    DVT Prophylaxis: Mechanical  Paez Catheter: Not present  Central Lines: None  Code Status: No CPR- Do NOT Intubate      Disposition Plan   Expected discharge: Comfort Care     The patient's care was discussed with the Attending Physician, Dr. Dr. Oneil.    Tiki Schulz PA-C  Hospitalist Service [address]  [facility]  Securely message with the Vocera Web Console (learn more here)  Text page via [employer] Paging/Directory  Please see sign in/sign out for up to date coverage information      ______________________________________________________________________    Interval History    and son at bedside.  Patient appears comfortable.    Data reviewed today: I reviewed all medications, new labs and imaging results over the last 24 hours.     Physical Exam   Vital Signs:     BP: 108/45 Pulse: 86     SpO2: 96 % O2 Device: Nasal cannula Oxygen Delivery: 2 LPM  Weight: 160 lbs .86 oz  General Appearance: Drowsy after receiving IV Dilaudid, however continues to moan intermittently in pain  Cards: Tachycardic, regular rhythm   Pulm: Normal WOB on 2L for comfort  Extremities: Warm and well perfused, no peripheral edema    Data   Recent Labs   Lab 10/01/21  1301 10/01/21  1152 10/01/21  1038 10/01/21  1003 10/01/21  1000 10/01/21  0800 10/01/21  0640 10/01/21  0200 10/01/21  0200 09/30/21  2133 09/30/21  2133   WBC  --   --   --   --  18.6*  --   --   --  18.7*  --  24.5*   HGB  --   --   --   --  8.4*  --   --   --  9.6*  --  10.9*   MCV  --   --   --   --  97  --   --   --  97  --  96   PLT  --   --   --   --  171  --   --   --  180  --  280   INR  --   --   --   --   --   --  1.54*  --   --   --  1.26*   NA  --   --   --   --   --   --  137  --  138  --  136   POTASSIUM  --   --   --   --  5.7*  --  6.5*  6.6*   < > 5.6*   < > 4.9   CHLORIDE  --   --   --   --   --   --  116*  --  116*  --  108   CO2  --   --   --   --   --   --  12*  --  11*  --  13*   BUN  --   --   --   --   --   --  56*  --  54*  --  55*   CR  --   --   --   --   --   --  1.93*  --  1.90*  --  2.09*   ANIONGAP  --   --   --   --   --   --  9  --  11  --  15*   MOHAN  --   --   --   --   --   --  8.6  --  8.2*  --  9.9   * 332* 307*   < >  --    < > 298*   < > 258*   < > 239*   ALBUMIN  --   --   --   --    --   --   --   --  2.5*  --  3.2*   PROTTOTAL  --   --   --   --   --   --   --   --  5.9*  --  7.4   BILITOTAL  --   --   --   --   --   --   --   --  0.6  --  0.6   ALKPHOS  --   --   --   --   --   --   --   --  87  --  107   ALT  --   --   --   --   --   --   --   --  18  --  17   AST  --   --   --   --   --   --   --   --  23  --  19   TROPONIN  --   --   --   --   --   --   --   --   --   --  <0.015    < > = values in this interval not displayed.     No results found for this or any previous visit (from the past 24 hour(s)).

## 2021-10-02 NOTE — PROGRESS NOTES
"Time: 7346-9830    Unspecified septicemia(038.9) (H) [A41.9]  Abdominal pain, generalized [R10.84]  Lactic acidosis [E87.2]  Severe sepsis (H) [A41.9, R65.20]  Severe sepsis without septic shock (CODE) (H) [R65.20]  Encounter for screening laboratory testing for severe acute respiratory syndrome coronavirus 2 (SARS-CoV-2) [Z20.822]     /45   Pulse 86   Temp 97.4  F (36.3  C) (Oral)   Resp 24   Ht 1.6 m (5' 2.99\")   Wt 72.6 kg (160 lb 0.9 oz)   SpO2 96%   BMI 28.36 kg/m         COMFORT CARES    Neuro: ARANZA- pt slept most of shift    Cardiac: WDL  Resp: WDL, RA  GI/: incont void. No BM this shift   Diet: NPO  Skin: bruise on L hand   Activity: lift   Lines: R PIV- infusing PCA dilaudid,  2 L PIV, SL.   Pain: PCA dilaudid to help with pain. Pt c/o pain during  repositioning. RN gave prn  Dilaudid x2.       New this shift: comfort cares, transferred from ED.       Plan: comfort cares.      "

## 2021-10-03 NOTE — PROGRESS NOTES
[facility]    Medicine Progress Note - Hospitalist Service, Gold 5       Date of Admission:  2021    Assessment & Plan    Dacia Miller is a 81-year-old female with a past medical history notable for hypertension, lung cancer, diabetes, coronary artery disease with disease with NSTEMI status post cath with failed PCI, kidney stones, status post cholecystectomy, status post , who presents to the ED with 1 afternoon of abdominal pain, vomiting after collapsing and found to have mesenteric ischemia not amenable to surgical intervention with very poor prognosis.    # Acute Mesenteric ischemia  # End of life cares  Patient with colitis on CT, history of NSTEMI/stroke recently, peritoneal signs on initial exam with pain out of proportion, presented with a lactic of 7 andworsening acidosis (pH 7.1) despite starting bicarb drip. Picture consistent with rapidly progressive mesenteric ischemia & sequelae. Suspect this is secondary to atherosclerotic disease of abdominal vasculature (confirmed on CTA) and correlates with recent NSTEMI and stroke. Not consistent with IBD and infectious colitis unlikely. Patient and family aware that this is a life-limiting condition.  Surgery consulted in ED and had maria r conversation with patient and daughter that she was approaching end-of-life and there were no surgical options for her at this time.  Palliative care consulted and comfort care orders placed.  -DNR/DNI  -Continue pain management with PCA  -Comfort care order set  -Stop lab draws     Diet: NPO for Medical/Clinical Reasons Except for: Meds, Ice Chips    DVT Prophylaxis: Mechanical  Paez Catheter: Not present  Central Lines: None  Code Status: No CPR- Do NOT Intubate      Disposition Plan   Expected discharge: Comfort Care     The patient's care was discussed with the Attending Physician, Dr. Dr. Oneil.    Tiki Schulz PA-C  Hospitalist Service [address]  [facility]  Securely message with the Vocera Web Console (learn more here)  Text page via [facility] Paging/Directory  Please see sign in/sign out for up to date coverage information      ______________________________________________________________________    Interval History   Family at bedside. Appears comfortable. Family states that she seems comfortable as well.     Data reviewed today: I reviewed all medications, new labs and imaging results over the last 24 hours.     Physical Exam   Vital Signs:                    Weight: 160 lbs .86 oz  General Appearance: Drowsy after receiving IV Dilaudid, however continues to moan intermittently in pain  Cards: Tachycardic, regular rhythm   Pulm: Normal WOB on 2L for comfort  Extremities: Warm and well perfused, no peripheral edema    Data   Recent Labs   Lab 10/01/21  1301 10/01/21  1152 10/01/21  1038 10/01/21  1003 10/01/21  1000 10/01/21  0800 10/01/21  0640 10/01/21  0200 10/01/21  0200 09/30/21  2133 09/30/21 2133   WBC  --   --   --   --  18.6*  --   --   --  18.7*  --  24.5*   HGB  --   --   --   --  8.4*  --   --   --  9.6*  --  10.9*   MCV  --   --   --   --  97  --   --   --  97  --  96   PLT  --   --   --   --  171  --   --   --  180  --  280   INR  --   --   --   --   --   --  1.54*  --   --   --  1.26*   NA  --   --   --   --   --   --  137  --  138  --  136   POTASSIUM  --   --   --   --  5.7*  --  6.5*  6.6*   < > 5.6*   < > 4.9   CHLORIDE  --   --   --   --   --   --  116*  --  116*  --  108   CO2  --   --   --   --   --   --  12*  --  11*  --  13*   BUN  --   --   --   --   --   --  56*  --  54*  --  55*   CR  --   --   --   --   --   --  1.93*  --  1.90*  --  2.09*   ANIONGAP  --   --   --   --   --   --  9  --  11  --  15*   MOHAN  --   --   --   --   --   --  8.6  --  8.2*  --  9.9   * 332* 307*   < >  --    < > 298*   < > 258*   < > 239*   ALBUMIN  --   --   --   --   --   --   --   --  2.5*  --  3.2*    PROTTOTAL  --   --   --   --   --   --   --   --  5.9*  --  7.4   BILITOTAL  --   --   --   --   --   --   --   --  0.6  --  0.6   ALKPHOS  --   --   --   --   --   --   --   --  87  --  107   ALT  --   --   --   --   --   --   --   --  18  --  17   AST  --   --   --   --   --   --   --   --  23  --  19   TROPONIN  --   --   --   --   --   --   --   --   --   --  <0.015    < > = values in this interval not displayed.     No results found for this or any previous visit (from the past 24 hour(s)).

## 2021-10-03 NOTE — PLAN OF CARE
Time: 1900-0730    Reason for admission: Mesenteric Ischemia, comfort cares.  Activity:  Lift  Pain: Nonverbal indicators of pain treated w/ prn dilauded.  PCA pump delivering 1 mg dilaudid/hr.  Administered prn dilaudid 4x  Neuro:  Unable to answer orientation questions, can sometimes follow simple directions.  Slept most of shift.  Cardiac:   WDL  Respiratory: Difficulty with secretions  GI/:  Incontinent of urine, no BM this shift  Diet:  NPO  Lines:  L PIV infusing PCA, R PIV SL.  Skin/Wounds: Bruising L forearm and hand  Labs/imaging:      New changes this shift: No acute changes this shift    Plan:  Comfort cares

## 2021-10-04 NOTE — PLAN OF CARE
Time: 8714-5052    Pt admitted for mesenteric ischemia, now on comfort cares.  Pt minimally responsive.  Pt slept for most of shift and appeared comfortable with no vocalizations of pain or guarding of stomach.  Pt on continuous dilaudid PCA at 2 mg/hr, no PRN dilaudid given this shift. Purewick changed.  Family at bedside.

## 2021-10-04 NOTE — PROGRESS NOTES
Care Management Follow Up    Length of Stay (days): 3    Expected Discharge Date: 10/04/2021     Concerns to be Addressed:  End of life concerns     Patient plan of care discussed at interdisciplinary rounds: Yes    Anticipated Discharge Disposition:  Comfort cares, likely to pass inpatient.    Patient/family educated on Medicare website which has current facility and service quality ratings:  N/A  Education Provided on the Discharge Plan:  yes  Patient/Family in Agreement with the Plan:  yes    Referrals Placed by CM/SW:  N/A  Private pay costs discussed: Not applicable    Additional Information:  SW received update from Dr. Oneil that family was requesting to meet with SW. SW met with pt and spouse- Saul at bedside. Pt sleeping and unable to engage. SW addressed all questions and concerns with Saul and provided emotional support throughout. Saul wondered if the team was looking to discharge patient and how much time pt may have. SW denied hearing from the team that pt was medically stable for discharge. Pt has a continuous dilaudid PCA pump that team has been adjusting to address pain control. SW noted that per provider notes, pt has limited time and may pass inpatient. SW agreed to update spouse if another update is received.    Saul wondered about making  arrangements. SW encouraged Saul to reach out to a  home to discuss finances and other needs with them. Saul identified having a  home in mind and agreed to call them to discuss further.    SW available for additional questions and concerns as they arise.    SUMEET Longo, SW   Acute Care   PH: [phone]  Pager: [phone]  [facility]

## 2021-10-04 NOTE — PROGRESS NOTES
[facility]  Palliative Care Daily Progress Note       Recommendations & Counseling     Full comfort measures at this time supported by family given underlying end-of-life bowel ischemia. Family attentive and present at bedside. PC continues with empathetic listening and support for Lexus.   Patient appears reasonably comfortable at this time on present infusion of hydromorphone.  Bolus hydromorphone available as needed.   Encourage family to offer sips of fluids for comfort only.   It seems unlikely that patient can be discharged home with hospice due to family resources and support.  PC continues to follow and let them know overall prognosis remains 0-couple days. Let them know about spiritual care resources for them & patient. Fr Kamara has annointed patient         Assessments          Per record and family report, Daciaoswaldo Miller is a 81-year-old female with a past medical history of hypertension, lung cancer( several years ago- post chemo/rad intervention), diabetes, coronary artery disease with disease with NSTEMI  (8/2021) status post cath with failed PCI, who presented to the ED via EMS on 9/30/21 with 12 hours of markedly different abdominal pain, nausea and vomiting after collapse. Work up in ED-  mesenteric ischemia not amenable to surgical intervention with terminal prognosis.   Imaging with colitis on CT, peritoneal signs on initial exam with pain out of proportion, presented with a lactic of 7 and worsening acidosis (pH 7.1) despite starting bicarb drip. Picture consistent with rapidly progressive mesenteric ischemia & sequelae. Suspect this is secondary to atherosclerotic disease of abdominal vasculature (confirmed on CTA) and correlates with recent NSTEMI and stroke. Not consistent with IBD and infectious colitis unlikely.      Prognosis, Goals, or Advance Care Planning was addressed today with: Yes.  Mood, coping, and/or meaning in the context of  serious illness were addressed today: No.  Summary/Comments: pt on comfort cares- family coping with transition.   Mario HATCH NP ACHPN  Nurse Practitioner- Lead Advanced Practice Provider  [facility] Palliative Medicine Consult Service   [phone]  TT spent: 40 minutes of which 25 minutes were spent in direct face to face contact with patient/family. Greater than 50% of time spent counseling and/or coordinating care.        Interval History:     Chart review/discussion with unit or clinical team members:   Minimally responsive     Key Palliative Symptoms:  We are helping to manage comfort care meds and symptoms currently in this patient.             Review of Systems:      A system ROS was unable to be obtained due to AMS and end of life status.          Medications:     I have reviewed this patient's medication profile and medications during this hospitalization.  Comfort care meds- MS alternating with Lorazepam.            Physical Exam:   Vitals were reviewed       General appearance: Patient drowsy, tries to open eyes to voice. No apparent shortness of breath or facial grimacing at rest-.  Able to articulate that she has abdominal pain.  HEENT: Symmetric features. Nonicteric sclera. EOMI.  On Room air. Mucous membranes appear mildly dry.  Respiratory: No increased work of breathing.  Lungs clear anteriorly.  No wheeze or rales.  Cardiac: S1-S2, regular rate and rhythm. Baseline harsh systolic murmur.  Abdomen: Brief exam without palpation- no apparent resting tenderness. Mild distention. Absent bowel sounds.  Skin: Extensive ecchymosis in the dorsum of the left hand from old IV sites.  No rashes on exposed surfaces.  Musculoskeletal: Appears to have normal tone. Unable to assess strength.  Neurologic: Appears sedated - minimally responsive.                                 Data Reviewed:     Pt on comfort cares- no ongoing lab surveillance

## 2021-10-04 NOTE — PLAN OF CARE
Comfort cares continued. PCA dilaudid 2mg continuous per hour. Family at bedside. Purewick in place.

## 2021-10-04 NOTE — PROGRESS NOTES
"SPIRITUAL HEALTH SERVICES  SPIRITUAL ASSESSMENT Progress Note (Palliative Focus)  [facility] ([place] 5A    REFERRAL SOURCE: Palliative care follow up.    Visit with patient Dacia \"Lexus\" Angela's  Saul at bedside.    Saul and other family are taking turns keeping grayson with Lexus. They are reassured that she looks comfortable while concerned that she not suffer if possible. Saul reflected that many things in this life had taught him to be patient, and he has needed that skill in recent months, when Lexus has been ill and he has been unable to help her as much as he would like.    Family are mutually supportive and supportive of Lexus, appreciative of spiritual (Mormonism) and grief support.     Plan: I will follow for spiritual support while Palliative Care is consulted.    Sussy Pro  Palliative   Pager [phone]  [facility] Inpatient Team Consult pager [phone] (M-F 8-4:30)  After-hours Answering Service [phone]    "

## 2021-10-04 NOTE — PROGRESS NOTES
[facility]    Medicine Progress Note - Hospitalist Service, Gold 5       Date of Admission:  2021    Assessment & Plan             Dacia Miller is a 81-year-old female with a past medical history notable for hypertension, lung cancer, diabetes, coronary artery disease with disease with NSTEMI status post cath with failed PCI, kidney stones, status post cholecystectomy, status post , who presents to the ED with 1 afternoon of abdominal pain, vomiting after collapsing and found to have mesenteric ischemia not amenable to surgical intervention with very poor prognosis.    # Acute Mesenteric ischemia  # End of life cares  Patient with colitis on CT, history of NSTEMI/stroke recently, peritoneal signs on initial exam with pain out of proportion, presented with a lactic of 7 andworsening acidosis (pH 7.1) despite starting bicarb drip. Picture consistent with rapidly progressive mesenteric ischemia & sequelae. Suspect this is secondary to atherosclerotic disease of abdominal vasculature (confirmed on CTA) and correlates with recent NSTEMI and stroke. Not consistent with IBD and infectious colitis unlikely. Patient and family aware that this is a life-limiting condition.  Surgery consulted in ED and had maria r conversation with patient and daughter that she was approaching end-of-life and there were no surgical options for her at this time.  Palliative care consulted and comfort care orders placed.  -DNR/DNI  -Continue pain management with PCA (currently at 2mg/hr)  -Comfort care order set  -Stop lab draws        Diet: NPO for Medical/Clinical Reasons Except for: Meds, Ice Chips    DVT Prophylaxis: Comfort Cares  Paez Catheter: Not present  Central Lines: None  Code Status: No CPR- Do NOT Intubate      Disposition Plan   Expected discharge: 10/04/2021   recommended to N/A once will likely die in hospital.     The patient's care was discussed with the Bedside  Nurse, Patient and Patient's Family.    Joe Oneil MD  Hospitalist Service, [address]  Securely message with the Vocera Web Console (learn more here)  Text page via [facility] Paging/Directory  Please see sign in/sign out for up to date coverage information    Clinically Significant Risk Factors Present on Admission               ______________________________________________________________________    Interval History   No acute events overnight. Appears comfortable this morning. Opening eyes to voice.     Family's questions answered.     4 Point ROS unable to be performed.     Data reviewed today: I reviewed all medications, new labs and imaging results over the last 24 hours. I personally reviewed no images or EKG's today.    Physical Exam   Gen: appears comfortable. Opens eyes to voice  Abd; no grimace on palpation

## 2021-10-04 NOTE — PLAN OF CARE
Pt lying comfortably in bed, no signs of pain. PCA pump running continuous Dilaudid 2mg/hr. Purewick in place. Family at bedside throughout day.

## 2021-10-04 NOTE — PLAN OF CARE
Pt admitted on 9/30 for mesenteric ischemia, and now on comfort cares. Pt non responsive. Pt appeared to be comfortable. However, pt would moan when repositioned. Pt on a continuous Dilaudid PCA. Purewick changed. Oral care performed every 2 hours. Family at bedside. Continue with plan of care.

## 2021-10-05 NOTE — CONSULTS
"SPIRITUAL HEALTH SERVICES  SPIRITUAL ASSESSMENT Progress Note (Palliative Focus)  [facility] ([place]) 5A    REFERRAL SOURCE: Family request.    Visit with patient Dacia \"Lexus\" Maria Victoria's family at bedside following her death.  Saul requested end of life prayers in the [employer] tradition, in which he and son Lenny joined. Family expressed both grief and relief that Lexus is no longer suffering. They are mutually supportive as they make  plans.    Plan: No plans to follow unless further bereavement support is requested.    Sussy Pro  Palliative   Pager [phone]  [facility] Inpatient Team Consult pager [phone] (M-F 8-4:30)  After-hours Answering Service [phone]    "

## 2021-10-05 NOTE — PROGRESS NOTES
Pt has passed this shift. Family visited at bedside,  consulted. Record of death forms completed. Security notified.

## 2021-10-05 NOTE — PLAN OF CARE
Admission dx:  Unspecified septicemia(038.9) (H) [A41.9]  Abdominal pain, generalized [R10.84]  Lactic acidosis [E87.2]  Severe sepsis (H) [A41.9, R65.20]  Severe sepsis without septic shock (CODE) (H) [R65.20]  Encounter for screening laboratory testing for severe acute respiratory syndrome coronavirus 2 (SARS-CoV-2) [Z20.822]    [id number]  Comfort cares. PCA dilaudid continuous 2mg/hr with no signs distress or pain. Slept throughout shift. Purewick in place-brick rust color, 150 mL output. Daughter at bedside, spent the night.

## 2021-10-05 NOTE — PROGRESS NOTES
[facility]  Palliative Care Daily Progress Note       Recommendations & Counseling     Full comfort measures at this time supported by family given underlying end-of-life bowel ischemia. Family attentive and daughter -Krista-  present at bedside. PC continues with empathetic listening and support for Lexus.   Patient appears reasonably comfortable at this time on present infusion of hydromorphone.  Bolus hydromorphone available as needed.   Encourage family to offer sips of fluids for comfort only and continue with oral cares.  Appears patient is entering actively dying phase and will past in next 24 hours..  PC continues to follow, let them know about spiritual care resources for them & patient. Fr Kamara has annointed patient         Assessments          Per record and family report, Dacia ARMEN Miller is a 81-year-old female with a past medical history of hypertension, lung cancer (several years ago- post chemo/rad intervention), diabetes, coronary artery disease with disease with NSTEMI  (8/2021) status post cath with failed PCI, who presented to the ED via EMS on 9/30/21 with 12 hours of markedly different abdominal pain, nausea and vomiting after collapse. Work up in ED-  mesenteric ischemia not amenable to surgical intervention with terminal prognosis.   Imaging with colitis on CT, peritoneal signs on initial exam with pain out of proportion, presented with a lactic of 7 and worsening acidosis (pH 7.1) despite starting bicarb drip. Picture consistent with rapidly progressive mesenteric ischemia & sequelae. Suspect this is secondary to atherosclerotic disease of abdominal vasculature (confirmed on CTA) and correlates with recent NSTEMI and stroke. Not consistent with IBD and infectious colitis unlikely.      Prognosis, Goals, or Advance Care Planning was addressed today with: Yes.  Mood, coping, and/or meaning in the context of serious illness were addressed  today: No.  Summary/Comments: pt on comfort cares- family coping with transition.   Mario HATCH NP A[facility]  Nurse Practitioner- Lead Advanced Practice Provider  [facility] Palliative Medicine Consult Service   [phone]  TT spent: 25 minutes of which 15 minutes were spent in direct face to face contact with patient/family. Greater than 50% of time spent counseling and/or coordinating care.        Interval History:     Chart review/discussion with unit or clinical team members:   Minimally responsive     Key Palliative Symptoms:  We are helping to manage comfort care meds and symptoms currently in this patient.             Review of Systems:      A system ROS was unable to be obtained due to AMS and end of life status.          Medications:     I have reviewed this patient's medication profile and medications during this hospitalization.  Comfort care meds- hydromorphone infusion at 2 mg per hour.. PRN Lorazepam.            Physical Exam:   Vitals - on CMO.        General appearance: Patient minimally responsive, No apparent shortness of breath or facial grimacing at rest-.unable to make needs known.  HEENT: Symmetric features. Nonicteric sclera.. On Room air. Mucous membranes appear wet with secretions amenable to suction and atropine gtts.  Respiratory: No increased work of breathing. Lungs clear anteriorly with sonorous rhonchi.  Cardiac: S1-S2, regular rate and rhythm. Baseline harsh systolic murmur.  Abdomen: Brief exam without palpation- no apparent resting tenderness. Mild distention. Absent bowel sounds.  Skin: Extensive ecchymosis in the dorsum of the left hand from old IV sites.  No rashes on exposed surfaces.  Musculoskeletal: Appears to have normal tone. Unable to assess strength.  Neurologic: Appears minimally responsive.                                 Data Reviewed:     Pt on comfort cares- no ongoing lab surveillance

## 2021-10-06 LAB — BACTERIA BLD CULT: NO GROWTH

## 2021-10-06 NOTE — DISCHARGE SUMMARY
[facility]    Death Summary - Hospitalist Service, Gold 5     Date of Admission:  2021  Date of Death: 10/5/2021  12:45PM  Discharging Provider: Debbie Nunez MD    Discharge Diagnoses   Acute mesenteric Ischemia   CAD     Cause of death: catastrophic acute mesenteric ischemia     Hospital Course   Dacia Miller is a 81-year-old female with a past medical history notable for hypertension, lung cancer, diabetes, coronary artery disease with disease with NSTEMI status post cath with failed PCI, kidney stones, status post cholecystectomy, status post , who presented to the ED with one day of abdominal pain, vomiting after collapsing and found to have mesenteric ischemia not amenable to surgical intervention with very poor prognosis. Palliative care was consulted and she was started on comfort cares. She passed with family at bedside on 10/5.     Debbie Nunez MD  [facility]  ______________________________________________________________________      Consultations This Hospital Stay   PHARMACY TO DOSE VANCO  VASCULAR ACCESS CARE ADULT IP CONSULT  PHARMACY IP CONSULT  PHARMACY IP CONSULT  PHARMACY TO DOSE VANCO  PHARMACY IP CONSULT  VASCULAR ACCESS CARE ADULT IP CONSULT  PALLIATIVE CARE ADULT IP CONSULT  SPIRITUAL HEALTH SERVICES IP CONSULT  SPIRITUAL HEALTH SERVICES IP CONSULT  SPIRITUAL HEALTH SERVICES IP CONSULT    Primary Care Physician   Jeet Shell    Time Spent on this Encounter   I, Debbie Nunez MD, personally saw the patient today and spent greater than or equal to 30 minutes discharging this patient.

## 2021-10-06 NOTE — DEATH PRONOUNCEMENT
MD DEATH PRONOUNCEMENT    Called to pronounce Dacia Miller dead.    Physical Exam: Unresponsive to noxious stimuli, Spontaneous respirations absent, Breath sounds absent, Carotid pulse absent, Heart sounds absent, Pupillary light reflex absent and Corneal blink reflex absent    Patient was pronounced dead at 12:45 PM, 2021.    Preliminary Cause of Death: intestinal ischemia    Active Problems:    Abdominal pain, generalized    Lactic acidosis    Severe sepsis (H)    Lung cancer (H)       Infectious disease present?: NO    Communicable disease present? (examples: HIV, chicken pox, TB, Ebola, CJD) :  NO    Multi-drug resistant organism present? (example: MRSA): NO    Please consider an autopsy if any of the following exist:  NO Unexpected or unexplained death during or following any dental, medical, or surgical diagnostic treatment procedures.   NO Death of mother at or up to seven days after delivery.     NO All  and pediatric deaths.     NO Death where the cause is sufficiently obscure to delay completion of the death certificate.   NO Deaths in which autopsy would confirm a suspected illness/condition that would affect surviving family members or recipients of transplanted organs.     The following deaths must be reported to the 's Office:  NO A death that may be due entirely or in part to any factors other than natural disease (recent surgery, recent trauma, suspected abuse/neglect).   NO A death that may be an accident, suicide, or homicide.     NO Any sudden, unexpected death in which there is no prior history of significant heart disease or any other condition associated with sudden death.   NO A death under suspicious, unusual, or unexpected circumstances.    NO Any death which is apparently due to natural causes but in which the  does not have a personal physician familiar with the patient s medical history, social, or environmental situation or the circumstances of the  terminal event.   NO Any death apparently due to Sudden Infant Death Syndrome.     NO Deaths that occur during, in association with, or as consequences of a diagnostic, therapeutic, or anesthetic procedure.   NO Any death in which a fracture of a major bone has occurred within the past (6) six months.   NO A death of persons note seen by their physician within 120 days of demise.     NO Any death in which the  was an inmate of a public institution or was in the custody of Law Enforcement personnel.   NO  All unexpected deaths of children   NO Solid organ donors   NO Unidentified bodies   NO Deaths of persons whose bodies are to be cremated or otherwise disposed of so that the bodies will later be unavailable for examination;   NO Deaths unattended by a physician outside of a licensed healthcare facility or licensed residential hospice program   NO Deaths occurring within 24 hours of arrival to a health care facility if death is unexpected.    NO Deaths associated with the decedent s employment.   NO Deaths attributed to acts of terrorism.   NO Any death in which there is uncertainty as to whether it is a medical examiner s care should be discussed with the medical investigator.        Body disposition: Autopsy was discussed with family member:  Family members in person.  Permission for autopsy was declined.    Debbie Nunez MD   Internal Medicine & Pediatrics Hospitalist   Pager: [phone]

## 2021-11-19 LAB
CV STRESS CURRENT BP HE: NORMAL
CV STRESS CURRENT HR HE: 72
CV STRESS CURRENT HR HE: 75
CV STRESS CURRENT HR HE: 85
CV STRESS CURRENT HR HE: 87
CV STRESS CURRENT HR HE: 88
CV STRESS CURRENT HR HE: 89
CV STRESS CURRENT HR HE: 91
CV STRESS CURRENT HR HE: 92
CV STRESS CURRENT HR HE: 92
CV STRESS CURRENT HR HE: 93
CV STRESS CURRENT HR HE: 93
CV STRESS CURRENT HR HE: 94
CV STRESS CURRENT HR HE: 94
CV STRESS CURRENT HR HE: 97
CV STRESS CURRENT HR HE: 98
CV STRESS DEVIATION TIME HE: NORMAL
CV STRESS ECHO PERCENT HR HE: NORMAL
CV STRESS EXERCISE STAGE HE: NORMAL
CV STRESS FINAL RESTING BP HE: NORMAL
CV STRESS FINAL RESTING HR HE: 89
CV STRESS MAX HR HE: 98
CV STRESS MAX TREADMILL GRADE HE: 0
CV STRESS MAX TREADMILL SPEED HE: 0
CV STRESS PEAK DIA BP HE: NORMAL
CV STRESS PEAK SYS BP HE: NORMAL
CV STRESS PHASE HE: NORMAL
CV STRESS PROTOCOL HE: NORMAL
CV STRESS RESTING PT POSITION HE: NORMAL
CV STRESS ST DEVIATION AMOUNT HE: NORMAL
CV STRESS ST DEVIATION ELEVATION HE: NORMAL
CV STRESS ST EVELATION AMOUNT HE: NORMAL
CV STRESS TEST TYPE HE: NORMAL
CV STRESS TOTAL STAGE TIME MIN 1 HE: NORMAL
RATE PRESSURE PRODUCT: NORMAL
STRESS ECHO BASELINE DIASTOLIC HE: 74
STRESS ECHO BASELINE HR: 75
STRESS ECHO BASELINE SYSTOLIC BP: 175
STRESS ECHO CALCULATED PERCENT HR: 69 %
STRESS ECHO LAST STRESS DIASTOLIC BP: 59
STRESS ECHO LAST STRESS HR: 92
STRESS ECHO LAST STRESS SYSTOLIC BP: 120
STRESS ECHO TARGET HR: 142

## 2023-09-14 NOTE — TELEPHONE ENCOUNTER
----- Message from Brooklyn Krishnamurthy sent at 8/30/2021 11:23 AM CDT -----  Regarding: MARIAH BIRD  HI,    THIS PT HAD AN APPT TODAY 8/30 AND GOT HERE TOO LATE.    I RESCHEDULED HER FOR OCT 1 BECAUSE SHE WANTED TO COME TO [facility] AND DR LEMUS IS BOOKED.      SO, SHE WOULD LIKE A CALL FROM DR LEMUS (OR YOU) EXPLAINING WHY SHE WAS TO SEE DR LEMUS.  THE PT DOESN'T KNOW WHY SHE HAD AN APPT TODAY.    SHE HAD A PROCEDURE, I GUESS, THAT DIDN'T WORK OUT, SO IS IT ABOUT THE NEXT PROCEDURE?    PT WAS VERY UPSET AT EXIT....SHE WILL BE WAITING FOR A CALL FROM SOMEONE.    BROOKLYN FISCHER  [phone]    
Noted patient was seen by Dr. Hernandez as inpt consult on 8-6-21.  Per 8-12-21 DCS:  Non-STEMI Stress test on 8/9 was abnormal.  Angiogram done on 8/10 showed multivessel disease.  Patient premedicated with prednisone 50 mg x 2 thereby leading to uncontrolled blood sugars  - Defer meds to Cardiology. Plavix, ASA.   - CV surgery consulted. Not good candidate for CABG.   - Close outpatient follow up.   - Antiplatelet agents per Cards.   - Cardiology follow up.   
Return call to patient - explained reason for follow-up was so she can physically be evaluated after recent hospitalization  - patient became very argumentative and claimed she is not on any medications from Dr. Hernandez and does not think she needs to be seen because she is doing fine after procedure.    Patient appeared to not understand explanation and issues discussed - questioned her level of cognitive ability based on recent neuro event noted during hospitalization - patient declined request to have her daughter call back to address missed appt who patient stated was with her upon discharge 8-12-21 - patient stated she was accompanied by her son today.    Patient reported that she has cancer and is not scheduled to follow-up with her PCP or neurologist - patient accepted offer to resched follow-up with Dr. Hernandez to 9-8-21 [facility] visit - call transf to sched.     Update forwarded to PCP and Dr. Hernandez as RONNIE  mg  
fair minus

## (undated) DEVICE — SYSTEM PANNUS RETENTION 4 PAD 2 STRAP CZ-PRS-04

## (undated) DEVICE — CATH GUIDING SASUKE 145CM TAPER TIP SS 0.014IN GW

## (undated) DEVICE — CATH MICROCATH 1.9FRX135CML ASAHI CARAVEL CRV135-19P

## (undated) DEVICE — SHEATH ULTIMUM 6FR 12CM 407845

## (undated) DEVICE — WIRE GUIDE EXT 0.014-.018"X145CM 22260

## (undated) DEVICE — KIT HAND CONTROL ACIST 014644 AR-P54

## (undated) DEVICE — CATH LAUNCHER 6FR EBU 3.5 LA6EBU35

## (undated) DEVICE — CATH ANGIO INFINITI JL4 4FRX100CM 538420

## (undated) DEVICE — MANIFOLD KIT ANGIO AUTOMATED 014613

## (undated) DEVICE — CATH ANGIO INFINITI JR4 4FRX100CM 538421

## (undated) DEVICE — INTRO MICRO MINI STICK 4FR STD NITINOL

## (undated) DEVICE — CUSTOM PACK CORONARY SAN5BCRHEA

## (undated) DEVICE — CATHETER BURR ADVANCER DEVICE ROTAPRO 1.50MM X 135CM

## (undated) DEVICE — CATH BALLOON EMERGE 1.5X12MM H7493918912150

## (undated) DEVICE — ELECTRODE ADULT PACING MULTI P-211-M1

## (undated) DEVICE — CATH DUAL ACCESS TWIN PASS 5200

## (undated) DEVICE — GUIDEWIRE ROTAWIRE .014 325CM 228240022

## (undated) DEVICE — SET FLUID DELIVERY 108IN H965913000091

## (undated) DEVICE — SYR ANGIOGRAPHY MULTIUSE KIT ACIST 014612

## (undated) DEVICE — LUB INST 20ML RTGLD 6/PK H7492354800162

## (undated) DEVICE — CATH ANGIO 1.25MM X 12MM SPRIN

## (undated) DEVICE — SHEATH 4FR ULTIMUM 407840

## (undated) DEVICE — GUIDEWIRE FORTE FLOPPY J TOP 34949-05J

## (undated) RX ORDER — DIAZEPAM 5 MG
TABLET ORAL
Status: DISPENSED
Start: 2021-01-01

## (undated) RX ORDER — DIPHENHYDRAMINE HYDROCHLORIDE 50 MG/ML
INJECTION INTRAMUSCULAR; INTRAVENOUS
Status: DISPENSED
Start: 2021-01-01

## (undated) RX ORDER — METOPROLOL TARTRATE 25 MG/1
TABLET, FILM COATED ORAL
Status: DISPENSED
Start: 2021-01-01

## (undated) RX ORDER — NITROGLYCERIN 5 MG/ML
INJECTION, SOLUTION INTRAVENOUS
Status: DISPENSED
Start: 2021-01-01

## (undated) RX ORDER — AMINOPHYLLINE 25 MG/ML
INJECTION, SOLUTION INTRAVENOUS
Status: DISPENSED
Start: 2021-01-01

## (undated) RX ORDER — NICARDIPINE HYDROCHLORIDE 2.5 MG/ML
INJECTION INTRAVENOUS
Status: DISPENSED
Start: 2021-01-01

## (undated) RX ORDER — FENTANYL CITRATE 50 UG/ML
INJECTION, SOLUTION INTRAMUSCULAR; INTRAVENOUS
Status: DISPENSED
Start: 2021-01-01

## (undated) RX ORDER — OXYCODONE HYDROCHLORIDE 5 MG/1
TABLET ORAL
Status: DISPENSED
Start: 2021-01-01

## (undated) RX ORDER — ASPIRIN 325 MG
TABLET ORAL
Status: DISPENSED
Start: 2021-01-01

## (undated) RX ORDER — CLOPIDOGREL BISULFATE 75 MG/1
TABLET ORAL
Status: DISPENSED
Start: 2021-01-01